# Patient Record
Sex: FEMALE | Race: WHITE | NOT HISPANIC OR LATINO | Employment: OTHER | URBAN - METROPOLITAN AREA
[De-identification: names, ages, dates, MRNs, and addresses within clinical notes are randomized per-mention and may not be internally consistent; named-entity substitution may affect disease eponyms.]

---

## 2017-04-26 ENCOUNTER — HOSPITAL ENCOUNTER (OUTPATIENT)
Dept: GENERAL RADIOLOGY | Facility: HOSPITAL | Age: 77
Discharge: HOME | End: 2017-04-26
Attending: INTERNAL MEDICINE | Admitting: INTERNAL MEDICINE

## 2017-04-26 ENCOUNTER — TRANSCRIBE ORDERS (OUTPATIENT)
Dept: ADMINISTRATIVE | Facility: HOSPITAL | Age: 77
End: 2017-04-26

## 2017-04-26 DIAGNOSIS — R05.9 COUGH: Primary | ICD-10-CM

## 2017-04-26 PROCEDURE — 71020 HC CHEST PA AND LATERAL: CPT

## 2017-09-13 ENCOUNTER — TRANSCRIBE ORDERS (OUTPATIENT)
Dept: ADMINISTRATIVE | Facility: HOSPITAL | Age: 77
End: 2017-09-13

## 2017-10-03 ENCOUNTER — TRANSCRIBE ORDERS (OUTPATIENT)
Dept: ADMINISTRATIVE | Facility: HOSPITAL | Age: 77
End: 2017-10-03

## 2017-10-03 DIAGNOSIS — Z12.31 VISIT FOR SCREENING MAMMOGRAM: Primary | ICD-10-CM

## 2017-11-02 ENCOUNTER — APPOINTMENT (OUTPATIENT)
Dept: MAMMOGRAPHY | Facility: HOSPITAL | Age: 77
End: 2017-11-02
Attending: INTERNAL MEDICINE

## 2017-11-17 ENCOUNTER — HOSPITAL ENCOUNTER (OUTPATIENT)
Dept: MAMMOGRAPHY | Facility: HOSPITAL | Age: 77
Discharge: HOME | End: 2017-11-17
Attending: INTERNAL MEDICINE | Admitting: INTERNAL MEDICINE

## 2017-11-17 DIAGNOSIS — Z12.31 VISIT FOR SCREENING MAMMOGRAM: ICD-10-CM

## 2017-11-17 PROCEDURE — G0202 SCR MAMMO BI INCL CAD: HCPCS

## 2017-11-17 PROCEDURE — 77063 BREAST TOMOSYNTHESIS BI: CPT

## 2017-11-18 PROCEDURE — 77063 BREAST TOMOSYNTHESIS BI: CPT | Performed by: RADIOLOGY

## 2017-11-18 PROCEDURE — G0202 SCR MAMMO BI INCL CAD: HCPCS | Performed by: RADIOLOGY

## 2018-02-28 ENCOUNTER — TELEPHONE (OUTPATIENT)
Dept: GASTROENTEROLOGY | Facility: CLINIC | Age: 78
End: 2018-02-28

## 2018-03-01 ENCOUNTER — LAB REQUISITION (OUTPATIENT)
Dept: LAB | Facility: HOSPITAL | Age: 78
End: 2018-03-01

## 2018-03-01 ENCOUNTER — OUTSIDE FACILITY SERVICE (OUTPATIENT)
Dept: GASTROENTEROLOGY | Facility: CLINIC | Age: 78
End: 2018-03-01

## 2018-03-01 DIAGNOSIS — Z12.11 ENCOUNTER FOR SCREENING FOR MALIGNANT NEOPLASM OF COLON: ICD-10-CM

## 2018-03-01 PROCEDURE — 88305 TISSUE EXAM BY PATHOLOGIST: CPT | Performed by: INTERNAL MEDICINE

## 2018-03-01 PROCEDURE — 45380 COLONOSCOPY AND BIOPSY: CPT | Performed by: INTERNAL MEDICINE

## 2018-03-02 LAB
CYTO UR: NORMAL
LAB AP CASE REPORT: NORMAL
LAB AP CLINICAL INFORMATION: NORMAL
Lab: NORMAL
PATH REPORT.FINAL DX SPEC: NORMAL
PATH REPORT.GROSS SPEC: NORMAL

## 2018-06-08 ENCOUNTER — TRANSCRIBE ORDERS (OUTPATIENT)
Dept: PHYSICAL THERAPY | Facility: HOSPITAL | Age: 78
End: 2018-06-08

## 2018-06-08 DIAGNOSIS — M54.41 RIGHT-SIDED LOW BACK PAIN WITH RIGHT-SIDED SCIATICA, UNSPECIFIED CHRONICITY: Primary | ICD-10-CM

## 2018-06-08 DIAGNOSIS — M25.551 RIGHT HIP PAIN: ICD-10-CM

## 2018-06-21 ENCOUNTER — HOSPITAL ENCOUNTER (OUTPATIENT)
Dept: PHYSICAL THERAPY | Facility: HOSPITAL | Age: 78
Setting detail: THERAPIES SERIES
Discharge: HOME | End: 2018-06-21

## 2018-06-21 DIAGNOSIS — M54.41 CHRONIC BILATERAL LOW BACK PAIN WITH RIGHT-SIDED SCIATICA: Primary | ICD-10-CM

## 2018-06-21 DIAGNOSIS — G89.29 CHRONIC BILATERAL LOW BACK PAIN WITH RIGHT-SIDED SCIATICA: Primary | ICD-10-CM

## 2018-06-21 PROCEDURE — G8978 MOBILITY CURRENT STATUS: HCPCS | Performed by: PHYSICAL THERAPIST

## 2018-06-21 PROCEDURE — 97162 PT EVAL MOD COMPLEX 30 MIN: CPT | Performed by: PHYSICAL THERAPIST

## 2018-06-21 PROCEDURE — G8979 MOBILITY GOAL STATUS: HCPCS | Performed by: PHYSICAL THERAPIST

## 2018-06-21 NOTE — THERAPY EVALUATION
"    Outpatient Physical Therapy Ortho Initial Evaluation  [facility]     Patient Name: Serenity Lagos  : 1940  MRN: [id number]  Today's Date: 2018      Visit Date: 2018    There is no problem list on file for this patient.       No past medical history on file.     Past Surgical History:   Procedure Laterality Date   • BREAST BIOPSY     • BREAST CYST EXCISION         Visit Dx:     ICD-10-CM ICD-9-CM   1. Chronic bilateral low back pain with right-sided sciatica M54.41 724.2    G89.29 724.3     338.29             Patient History     Row Name 18 1000             History    Chief Complaint Pain;Difficulty Walking  -ELIAS      Type of Pain Back pain;Lower Extremity / Leg   right  -ELIAS      Date Current Problem(s) Began --   chronic for many years  -ELIAS      Brief Description of Current Complaint This 77 year-old female presents with chronic low back pain, R hip and thigh pain, and intermittent tingling in right anterior calf.  She says she feels like she is \"lumbering\" instead of walking.  She has had no recent falls since 2017.  -ELIAS      Patient/Caregiver Goals Relieve pain;Improve mobility   improve gait and balance  -ELIAS      Hand Dominance right-handed  -ELIAS      Occupation/sports/leisure activities Retired teacher.  Enjoys sedentary activities, working on computer.  -ELIAS      What clinical tests have you had for this problem? --   none  -ELIAS         Pain     Pain Location Back;Hip;Leg   right  -ELIAS      Pain at Present 0  -ELIAS      Pain at Best 0  -ELIAS      Pain at Worst 6  -ELIAS      Pain Frequency Intermittent  -ELIAS      What Performance Factors Make the Current Problem(s) WORSE? moving after a period of inactivity, stiff for about the first 15-20 minutes in the mornings.  -ELIAS      What Performance Factors Make the Current Problem(s) BETTER? Sitting  -ELIAS      Is your sleep disturbed? Yes  -ELIAS      Total hours of sleep per night <6  -ELIAS      What position do you sleep in? Right " sidelying;Left sidelying  -ELIAS         Fall Risk Assessment    Any falls in the past year: No  -ELIAS      Does patient have a fear of falling Yes (comment)  -ELIAS         Daily Activities    Primary Language English  -ELIAS      Are you able to read Yes  -ELIAS      Are you able to write Yes  -ELIAS      How does patient learn best? Listening;Demonstration  -ELIAS      Teaching needs identified Home Exercise Program;Management of Condition  -ELIAS      Patient is concerned about/has problems with Flexibility;Performing home management (household chores, shopping, care of dependents);Standing;Walking;Grasping objects lifting;Climbing Stairs  -ELIAS      Does patient have problems with the following? None  -ELIAS      Pt Participated in POC and Goals Yes  -ELIAS         Safety    Are you being hurt, hit, or frightened by anyone at home or in your life? No  -ELIAS      Are you being neglected by a caregiver No  -ELIAS        User Key  (r) = Recorded By, (t) = Taken By, (c) = Cosigned By    Initials Name Provider Type    ELIAS George, PT Physical Therapist                PT Ortho     Row Name 06/21/18 1300       DTR- Lower Quarter Clearing    Patellar tendon (L2-4) Left:;3- Slightly hyperactive response;Right:;2- Normal response  -ELIAS    Achilles tendon (S1-2) Left:;1- Minimal response;Right:;0- No response  -ELIAS       Neural Tension Signs- Lower Quarter Clearing    Slump Bilateral:;Negative  -ELIAS    SLR Bilateral:;Negative  -ELIAS       Sensory Screen for Light Touch- Lower Quarter Clearing    L1 (inguinal area) Intact  -ELIAS    L2 (anterior mid thigh) Intact  -ELIAS    L3 (distal anterior thigh) Intact  -ELIAS    L4 (medial lower leg/foot) Intact  -ELIAS    L5 (lateral lower leg/great toe) Intact  -ELIAS    S1 (bottom of foot) Intact  -ELIAS       Myotomal Screen- Lower Quarter Clearing    Knee extension (L3) Bilateral:;5 (Normal)  -ELIAS    Ankle DF (L4) Bilateral:;5 (Normal)  -ELIAS    Great toe extension (L5) Right:;5 (Normal);Left:;4 (Good)  -ELIAS    Ankle PF (S1)  Bilateral:;5 (Normal)  -ELIAS    Knee flexion (S2) Bilateral:;5 (Normal)  -ELIAS       Lumbar ROM Screen- Lower Quarter Clearing    Lumbar Flexion --   75%, limited by hamstrings, no increase in back pain  -ELIAS    Lumbar Extension --   25%, tightness in back and front of hips  -ELIAS    Lumbar Lateral Flexion Normal   no complaints  -ELIAS       Hip/Thigh Palpation    Greater Trochanter Right:;Tender  -ELIAS       General ROM    GENERAL ROM COMMENTS No significant ROM deficits.  -ELIAS       General Assessment (Manual Muscle Testing)    Comment, General Manual Muscle Testing (MMT) Assessment Bilateral hip abduction and extension 3/5.  -ELIAS       Sensation    Sensation WNL? WNL  -ELIAS       Lower Extremity Flexibility    Hamstrings Bilateral:;Moderately limited  -ELIAS    Hip Flexors Bilateral:;Mildly limited  -ELIAS    Quadriceps Bilateral:;Mildly limited  -ELIAS    Gastrocnemius Bilateral:;Moderately limited  -ELIAS       Balance Skills Training    SLS Limited to , 3 seconds bilaterally, with R more limited than left.  -ELIAS      User Key  (r) = Recorded By, (t) = Taken By, (c) = Cosigned By    Initials Name Provider Type    ELIAS George, PT Physical Therapist                      Therapy Education  Given: HEP  Program: New  How Provided: Verbal, Demonstration, Written  Provided to: Patient  Level of Understanding: Verbalized, Demonstrated           PT OP Goals     Row Name 06/21/18 1600          PT Short Term Goals    STG Date to Achieve 07/19/18  -ELIAS     STG 1 Pt. demonstrates independence in effective initial HEP.  -ELIAS     STG 2 Pt. reports reduction in intensity of back and R LE pain.  -ELIAS     STG 3 Pt. reports improving gait confidence.  -        Long Term Goals    LTG Date to Achieve 08/16/18  -ELIAS     LTG 1 Pt. is independent in advanced HEP and conditioning program for continued improvement.  -ELIAS     LTG 2 Pain is not constant, is localized to low back, and is no worse than 3/10.  -ELIAS     LTG 3 Hip strength is improved by 1 grade to  4/5 in abduction and extension.  -ELIAS        Time Calculation    PT Goal Re-Cert Due Date 09/19/18  -       User Key  (r) = Recorded By, (t) = Taken By, (c) = Cosigned By    Initials Name Provider Type    ELIAS George PT Physical Therapist                PT Assessment/Plan     Row Name 06/21/18 1626          PT Assessment    Functional Limitations Limitations in functional capacity and performance;Limitation in home management;Performance in leisure activities  -ELIAS     Impairments Pain;Muscle strength;Range of motion;Joint mobility;Poor body mechanics;Gait;Endurance;Impaired flexibility  -ELIAS     Assessment Comments Pt. presents with chronic LBP with increasing R LE symptoms.  She is generally deconditioned with very sedentary lifestyle and will benefit from education in an exercise program which can be continued independently after DC from PT.  -ELIAS     Please refer to paper survey for additional self-reported information Yes  -ELIAS     Rehab Potential Good  -ELIAS     Patient/caregiver participated in establishment of treatment plan and goals Yes  -ELIAS     Patient would benefit from skilled therapy intervention Yes  -ELIAS        PT Plan    PT Frequency 2x/week  -ELIAS     Predicted Duration of Therapy Intervention (Therapy Eval) 12 visits  -ELIAS     Planned CPT's? PT EVAL MOD COMPLELITY: 38702;PT GAIT TRAINING EA 15 MIN: 83399;PT THER PROC EA 15 MIN: 27767;PT MANUAL THERAPY EA 15 MIN: 99336;PT NEUROMUSC RE-EDUCATION EA 15 MIN: 03156;PT ULTRASOUND EA 15 MIN: 70338;PT ELECTRICAL STIM UNATTEND: ;PT HOT OR COLD PACK TREAT MCARE;PT TRACTION LUMBAR: 28410  -ELIAS     PT Plan Comments PT up to 2x/week per POC.  -ELIAS       User Key  (r) = Recorded By, (t) = Taken By, (c) = Cosigned By    Initials Name Provider Type    ELIAS George PT Physical Therapist                              Outcome Measure Options: Modifed Owestry  Modified Oswestry  Modified Oswestry Score/Comments: 9/50=18%      Time Calculation:     Therapy  Suggested Charges     Code   Minutes Charges    None             Start Time: 1000     Therapy Charges for Today     Code Description Service Date Service Provider Modifiers Qty    07817773180 HC PT MOBILITY CURRENT 6/21/2018 Becka George, PT GP, CI 1    38388490680 HC PT MOBILITY PROJECTED 6/21/2018 Becka George, PT GP, CI 1    79383383753 HC PT EVAL MOD COMPLEXITY 3 6/21/2018 Becka George, PT GP 1          PT G-Codes  Outcome Measure Options: Modifed Owestry  Score: 18%  Functional Limitation: Mobility: Walking and moving around  Mobility: Walking and Moving Around Current Status (): At least 1 percent but less than 20 percent impaired, limited or restricted  Mobility: Walking and Moving Around Goal Status (): At least 1 percent but less than 20 percent impaired, limited or restricted         Becka George, PT  6/21/2018

## 2018-07-17 ENCOUNTER — HOSPITAL ENCOUNTER (OUTPATIENT)
Dept: PHYSICAL THERAPY | Facility: HOSPITAL | Age: 78
Setting detail: THERAPIES SERIES
Discharge: HOME | End: 2018-07-17

## 2018-07-17 DIAGNOSIS — M54.41 CHRONIC BILATERAL LOW BACK PAIN WITH RIGHT-SIDED SCIATICA: Primary | ICD-10-CM

## 2018-07-17 DIAGNOSIS — G89.29 CHRONIC BILATERAL LOW BACK PAIN WITH RIGHT-SIDED SCIATICA: Primary | ICD-10-CM

## 2018-07-17 PROCEDURE — 97110 THERAPEUTIC EXERCISES: CPT

## 2018-07-17 NOTE — THERAPY TREATMENT NOTE
Outpatient Physical Therapy Ortho Treatment Note  [facility]     Patient Name: Serenity Lagos  : 1940  MRN: [id number]  Today's Date: 2018      Visit Date: 2018    Visit Dx:    ICD-10-CM ICD-9-CM   1. Chronic bilateral low back pain with right-sided sciatica M54.41 724.2    G89.29 724.3     338.29       There is no problem list on file for this patient.       No past medical history on file.     Past Surgical History:   Procedure Laterality Date   • BREAST BIOPSY     • BREAST CYST EXCISION               PT Ortho     Row Name 18 1400       Subjective Comments    Subjective Comments Pt. reports she has not done her home exercises regularly.  She has travelled to [place] for a trade show.  She reports no symptoms at time of treatment today.  -ELIAS       Subjective Pain    Able to rate subjective pain? yes  -ELIAS    Pre-Treatment Pain Level 0  -ELIAS    Post-Treatment Pain Level 0  -ELIAS      User Key  (r) = Recorded By, (t) = Taken By, (c) = Cosigned By    Initials Name Provider Type    ELIAS George, PT Physical Therapist                            PT Assessment/Plan     Row Name 18 1400          PT Assessment    Assessment Comments Symptoms are very mild to absent recently.    -ELIAS        PT Plan    PT Plan Comments Continue PT working toward independent self-management of symptoms.  -ELIAS       User Key  (r) = Recorded By, (t) = Taken By, (c) = Cosigned By    Initials Name Provider Type    ELIAS George PT Physical Therapist                    Exercises     Row Name 18 1400             Subjective Comments    Subjective Comments Pt. reports she has not done her home exercises regularly.  She has travelled to [place] for a trade show.  She reports no symptoms at time of treatment today.  -ELIAS         Subjective Pain    Able to rate subjective pain? yes  -ELIAS      Pre-Treatment Pain Level 0  -ELIAS      Post-Treatment Pain Level 0  -ELIAS         Total Minutes    68721 - PT  Therapeutic Exercise Minutes 30  -ELIAS         Exercise 1    Exercise Name 1 Completed detailed review of HEP.  Pt. required additional cues to perform correctly.  She has difficulty laying supine due to sinus congestion in this position.  -ELIAS      Time 1 30  -ELIAS        User Key  (r) = Recorded By, (t) = Taken By, (c) = Cosigned By    Initials Name Provider Type    ELIAS George, PT Physical Therapist                                            Time Calculation:   Start Time: 1405  Total Timed Code Minutes- PT: 30 minute(s)  Therapy Suggested Charges     Code   Minutes Charges    21807 (CPT®) Hc Pt Neuromusc Re Education Ea 15 Min      29346 (CPT®) Hc Pt Ther Proc Ea 15 Min 30 2    80783 (CPT®) Hc Gait Training Ea 15 Min      43947 (CPT®) Hc Pt Therapeutic Act Ea 15 Min      17375 (CPT®) Hc Pt Manual Therapy Ea 15 Min      71761 (CPT®) Hc Pt Ther Massage- Per 15 Min      67654 (CPT®) Hc Pt Iontophoresis Ea 15 Min      28646 (CPT®) Hc Pt Elec Stim Ea-Per 15 Min      94424 (CPT®) Hc Pt Ultrasound Ea 15 Min      48818 (CPT®) Hc Pt Self Care/Mgmt/Train Ea 15 Min      Total  30 2                      Becka George, PT  7/17/2018

## 2018-07-19 ENCOUNTER — HOSPITAL ENCOUNTER (OUTPATIENT)
Dept: PHYSICAL THERAPY | Facility: HOSPITAL | Age: 78
Setting detail: THERAPIES SERIES
Discharge: HOME | End: 2018-07-19

## 2018-07-19 DIAGNOSIS — M54.41 CHRONIC BILATERAL LOW BACK PAIN WITH RIGHT-SIDED SCIATICA: Primary | ICD-10-CM

## 2018-07-19 DIAGNOSIS — G89.29 CHRONIC BILATERAL LOW BACK PAIN WITH RIGHT-SIDED SCIATICA: Primary | ICD-10-CM

## 2018-07-19 PROCEDURE — 97110 THERAPEUTIC EXERCISES: CPT

## 2018-07-19 NOTE — THERAPY PROGRESS REPORT/RE-CERT
Outpatient Physical Therapy Ortho Re-Assessment  [facility]     Patient Name: Serenity Lagos  : 1940  MRN: [id number]  Today's Date: 2018      Visit Date: 2018    There is no problem list on file for this patient.       No past medical history on file.     Past Surgical History:   Procedure Laterality Date   • BREAST BIOPSY     • BREAST CYST EXCISION         Visit Dx:     ICD-10-CM ICD-9-CM   1. Chronic bilateral low back pain with right-sided sciatica M54.41 724.2    G89.29 724.3     338.29                 PT Ortho     Row Name 18 1600       General Assessment (Manual Muscle Testing)    Comment, General Manual Muscle Testing (MMT) Assessment Bilateral hip abduction 3+/5, bilateral hip extension 3-/5.  -ELIAS    Row Name 18 1400       Subjective Comments    Subjective Comments Pt. has tried doing her home exercises.  She says they are helpful when she does them.  She is interested in additional exercises for her abdominals.  -ELIAS       Subjective Pain    Able to rate subjective pain? yes  -ELIAS    Pre-Treatment Pain Level 0  -ELIAS    Post-Treatment Pain Level 0  -ELIAS    Row Name 18 1400       Subjective Comments    Subjective Comments Pt. reports she has not done her home exercises regularly.  She has travelled to [place] for a trade show.  She reports no symptoms at time of treatment today.  -ELIAS       Subjective Pain    Able to rate subjective pain? yes  -ELIAS    Pre-Treatment Pain Level 0  -ELIAS    Post-Treatment Pain Level 0  -ELIAS      User Key  (r) = Recorded By, (t) = Taken By, (c) = Cosigned By    Initials Name Provider Type    ELIAS George PT Physical Therapist                      Therapy Education  Given: HEP  Program: Progressed  How Provided: Verbal, Demonstration, Written  Provided to: Patient  Level of Understanding: Verbalized, Demonstrated           PT OP Goals     Row Name 18 1400          PT Short Term Goals    STG Date to Achieve 18  -ELIAS     STG  1 Pt. demonstrates independence in effective initial HEP.  -ELIAS     STG 1 Progress Met  -     STG 2 Pt. reports reduction in intensity of back and R LE pain.  -ELIAS     STG 2 Progress Met  -     STG 3 Pt. reports improving gait confidence.  -     STG 3 Progress Progressing;Ongoing  -        Long Term Goals    LTG Date to Achieve 08/16/18  -     LTG 1 Pt. is independent in advanced HEP and conditioning program for continued improvement.  -ELIAS     LTG 1 Progress Ongoing  -ELIAS     LTG 2 Pain is not constant, is localized to low back, and is no worse than 3/10.  -ELIAS     LTG 2 Progress Partially Met;Ongoing  -ELIAS     LTG 3 Hip strength is improved by 1 grade to 4/5 in abduction and extension.  -ELIAS     LTG 3 Progress Ongoing  -       User Key  (r) = Recorded By, (t) = Taken By, (c) = Cosigned By    Initials Name Provider Type    ELIAS George, PT Physical Therapist                PT Assessment/Plan     Row Name 07/19/18 1600          PT Assessment    Assessment Comments Symptoms are responsive to basic exercises.  Pt. recognizes that she sits at computer too much and this contributes to her symptoms.   -        PT Plan    PT Plan Comments Continue for remaining visits to progress and finalize HEP.  Pt. expresses interest in working on her balance.  Order will be requested to address these concerns.  -       User Key  (r) = Recorded By, (t) = Taken By, (c) = Cosigned By    Initials Name Provider Type    ELIAS George, PT Physical Therapist                  Exercises     Row Name 07/19/18 1400             Subjective Comments    Subjective Comments Pt. has tried doing her home exercises.  She says they are helpful when she does them.  She is interested in additional exercises for her abdominals.  -ELIAS         Subjective Pain    Able to rate subjective pain? yes  -ELIAS      Pre-Treatment Pain Level 0  -ELIAS      Post-Treatment Pain Level 0  -ELIAS         Total Minutes    95797 - PT Therapeutic Exercise  Minutes 40  -ELIAS         Exercise 1    Exercise Name 1 Reassessment completed and exercises added to HEP after practice in clinic.  Pt. struggles to tolerate laying supine due to sinus congestion.  -ELIAS      Time 1 40  -ELIAS        User Key  (r) = Recorded By, (t) = Taken By, (c) = Cosigned By    Initials Name Provider Type    ELIAS George, PT Physical Therapist                        Outcome Measure Options: Sai Larry  Modified Oswestry  Modified Oswestry Score/Comments: 6/50=12%      Time Calculation:     Therapy Suggested Charges     Code   Minutes Charges    30148 (CPT®) Hc Pt Neuromusc Re Education Ea 15 Min      26542 (CPT®) Hc Pt Ther Proc Ea 15 Min 40 3    73586 (CPT®) Hc Gait Training Ea 15 Min      06820 (CPT®) Hc Pt Therapeutic Act Ea 15 Min      20802 (CPT®) Hc Pt Manual Therapy Ea 15 Min      38645 (CPT®) Hc Pt Ther Massage- Per 15 Min      90103 (CPT®) Hc Pt Iontophoresis Ea 15 Min      50728 (CPT®) Hc Pt Elec Stim Ea-Per 15 Min      04991 (CPT®) Hc Pt Ultrasound Ea 15 Min      55802 (CPT®) Hc Pt Self Care/Mgmt/Train Ea 15 Min      Total  40 3          Start Time: 1415  Total Timed Code Minutes- PT: 40 minute(s)         PT G-Codes  Outcome Measure Options: Sai George, PT  7/19/2018

## 2018-07-20 ENCOUNTER — TRANSCRIBE ORDERS (OUTPATIENT)
Dept: PHYSICAL THERAPY | Facility: HOSPITAL | Age: 78
End: 2018-07-20

## 2018-07-20 DIAGNOSIS — R26.89 POOR BALANCE: Primary | ICD-10-CM

## 2018-07-24 ENCOUNTER — HOSPITAL ENCOUNTER (OUTPATIENT)
Dept: PHYSICAL THERAPY | Facility: HOSPITAL | Age: 78
Setting detail: THERAPIES SERIES
Discharge: HOME | End: 2018-07-24

## 2018-07-24 DIAGNOSIS — M54.41 CHRONIC BILATERAL LOW BACK PAIN WITH RIGHT-SIDED SCIATICA: Primary | ICD-10-CM

## 2018-07-24 DIAGNOSIS — G89.29 CHRONIC BILATERAL LOW BACK PAIN WITH RIGHT-SIDED SCIATICA: Primary | ICD-10-CM

## 2018-07-24 PROCEDURE — 97110 THERAPEUTIC EXERCISES: CPT
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
## 2018-07-26 ENCOUNTER — HOSPITAL ENCOUNTER (OUTPATIENT)
Dept: PHYSICAL THERAPY | Facility: HOSPITAL | Age: 78
Setting detail: THERAPIES SERIES
Discharge: HOME | End: 2018-07-26

## 2018-07-26 DIAGNOSIS — G89.29 CHRONIC BILATERAL LOW BACK PAIN WITH RIGHT-SIDED SCIATICA: Primary | ICD-10-CM

## 2018-07-26 DIAGNOSIS — M54.41 CHRONIC BILATERAL LOW BACK PAIN WITH RIGHT-SIDED SCIATICA: Primary | ICD-10-CM

## 2018-07-26 PROCEDURE — 97110 THERAPEUTIC EXERCISES: CPT
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
## 2018-08-08 ENCOUNTER — APPOINTMENT (OUTPATIENT)
Dept: PHYSICAL THERAPY | Facility: HOSPITAL | Age: 78
End: 2018-08-08

## 2018-08-20 ENCOUNTER — DOCUMENTATION (OUTPATIENT)
Dept: PHYSICAL THERAPY | Facility: HOSPITAL | Age: 78
End: 2018-08-20

## 2018-08-20 DIAGNOSIS — G89.29 CHRONIC BILATERAL LOW BACK PAIN WITH RIGHT-SIDED SCIATICA: Primary | ICD-10-CM

## 2018-08-20 DIAGNOSIS — M54.41 CHRONIC BILATERAL LOW BACK PAIN WITH RIGHT-SIDED SCIATICA: Primary | ICD-10-CM

## 2018-08-20 NOTE — THERAPY DISCHARGE NOTE
Outpatient Physical Therapy Discharge Summary         Patient Name: Sreenity Lagos  : 1940  MRN: [id number]    Today's Date: 2018    Visit Dx:    ICD-10-CM ICD-9-CM   1. Chronic bilateral low back pain with right-sided sciatica M54.41 724.2    G89.29 724.3     338.29             PT OP Goals     Row Name 18 1500          PT Short Term Goals    STG Date to Achieve 18  -ELIAS     STG 1 Pt. demonstrates independence in effective initial HEP.  -ELIAS     STG 1 Progress Met  -ELIAS     STG 2 Pt. reports reduction in intensity of back and R LE pain.  -ELIAS     STG 2 Progress Met  -ELIAS     STG 3 Pt. reports improving gait confidence.  -ELIAS     STG 3 Progress Progressing;Ongoing  -ELIAS        Long Term Goals    LTG Date to Achieve 18  -ELIAS     LTG 1 Pt. is independent in advanced HEP and conditioning program for continued improvement.  -ELIAS     LTG 1 Progress Ongoing  -ELIAS     LTG 2 Pain is not constant, is localized to low back, and is no worse than 3/10.  -ELIAS     LTG 2 Progress Partially Met  -ELIAS     LTG 3 Hip strength is improved by 1 grade to 4/5 in abduction and extension.  -ELIAS     LTG 3 Progress Ongoing  -ELIAS       User Key  (r) = Recorded By, (t) = Taken By, (c) = Cosigned By    Initials Name Provider Type    Becka Mitchell, PT Physical Therapist          OP PT Discharge Summary  Date of Discharge: 18  Reason for Discharge: Patient/Caregiver request, Independent  Outcomes Achieved: Patient able to partially acheive established goals  Discharge Destination: Home with home program      Time Calculation:        Therapy Suggested Charges     Code   Minutes Charges    None                       Becka George, PT  2018

## 2018-09-17 ENCOUNTER — APPOINTMENT (OUTPATIENT)
Dept: PHYSICAL THERAPY | Facility: HOSPITAL | Age: 78
End: 2018-09-17

## 2018-12-19 ENCOUNTER — TRANSCRIBE ORDERS (OUTPATIENT)
Dept: ADMINISTRATIVE | Facility: HOSPITAL | Age: 78
End: 2018-12-19

## 2018-12-19 DIAGNOSIS — Z12.31 VISIT FOR SCREENING MAMMOGRAM: Primary | ICD-10-CM

## 2019-01-28 PROBLEM — E55.9 VITAMIN D DEFICIENCY: Status: ACTIVE | Noted: 2019-01-28

## 2019-01-28 PROBLEM — R60.0 PEDAL EDEMA: Status: ACTIVE | Noted: 2019-01-28

## 2019-01-28 PROBLEM — M85.80 OSTEOPENIA AFTER MENOPAUSE: Status: ACTIVE | Noted: 2019-01-28

## 2019-01-28 PROBLEM — G89.29 CHRONIC RIGHT-SIDED LOW BACK PAIN WITH RIGHT-SIDED SCIATICA: Status: ACTIVE | Noted: 2019-01-28

## 2019-01-28 PROBLEM — Z98.890 HISTORY OF BENIGN BREAST BIOPSY: Status: ACTIVE | Noted: 2019-01-28

## 2019-01-28 PROBLEM — L71.9 ACNE ROSACEA: Status: ACTIVE | Noted: 2019-01-28

## 2019-01-28 PROBLEM — Z78.0 OSTEOPENIA AFTER MENOPAUSE: Status: ACTIVE | Noted: 2019-01-28

## 2019-01-28 PROBLEM — I10 BENIGN ESSENTIAL HYPERTENSION: Status: ACTIVE | Noted: 2019-01-28

## 2019-01-28 PROBLEM — R20.2 NUMBNESS AND TINGLING OF BOTH LEGS BELOW KNEES: Status: ACTIVE | Noted: 2019-01-28

## 2019-01-28 PROBLEM — R20.0 NUMBNESS AND TINGLING OF BOTH LEGS BELOW KNEES: Status: ACTIVE | Noted: 2019-01-28

## 2019-01-28 PROBLEM — L29.9 CHRONIC PRURITUS: Status: ACTIVE | Noted: 2019-01-28

## 2019-01-28 PROBLEM — E66.811 OBESITY, CLASS I, BMI 30-34.9: Status: ACTIVE | Noted: 2019-01-28

## 2019-01-28 PROBLEM — M25.551 RIGHT HIP PAIN: Status: ACTIVE | Noted: 2019-01-28

## 2019-01-28 PROBLEM — M54.41 CHRONIC RIGHT-SIDED LOW BACK PAIN WITH RIGHT-SIDED SCIATICA: Status: ACTIVE | Noted: 2019-01-28

## 2019-01-28 PROBLEM — E66.9 OBESITY, CLASS I, BMI 30-34.9: Status: ACTIVE | Noted: 2019-01-28

## 2019-01-28 PROBLEM — E03.9 ACQUIRED HYPOTHYROIDISM: Status: ACTIVE | Noted: 2019-01-28

## 2019-01-28 PROBLEM — R26.89 POOR BALANCE: Status: ACTIVE | Noted: 2019-01-28

## 2019-01-28 PROBLEM — M17.0 PRIMARY OSTEOARTHRITIS OF BOTH KNEES: Status: ACTIVE | Noted: 2019-01-28

## 2019-02-05 ENCOUNTER — HOSPITAL ENCOUNTER (OUTPATIENT)
Dept: MAMMOGRAPHY | Facility: HOSPITAL | Age: 79
Discharge: HOME | End: 2019-02-05
Attending: INTERNAL MEDICINE | Admitting: INTERNAL MEDICINE

## 2019-02-05 DIAGNOSIS — Z12.31 VISIT FOR SCREENING MAMMOGRAM: ICD-10-CM

## 2019-02-05 PROCEDURE — 77063 BREAST TOMOSYNTHESIS BI: CPT | Performed by: RADIOLOGY

## 2019-02-05 PROCEDURE — 77063 BREAST TOMOSYNTHESIS BI: CPT

## 2019-02-05 PROCEDURE — 77067 SCR MAMMO BI INCL CAD: CPT | Performed by: RADIOLOGY

## 2019-02-05 PROCEDURE — 77067 SCR MAMMO BI INCL CAD: CPT

## 2019-05-03 PROBLEM — G89.29 OTHER CHRONIC PAIN: Status: ACTIVE | Noted: 2019-05-03

## 2019-05-03 PROBLEM — M54.41 ACUTE RIGHT-SIDED LOW BACK PAIN WITH RIGHT-SIDED SCIATICA: Status: ACTIVE | Noted: 2019-05-03

## 2019-05-16 ENCOUNTER — TELEPHONE (OUTPATIENT)
Dept: INTERNAL MEDICINE | Facility: CLINIC | Age: 79
End: 2019-05-16

## 2019-05-20 ENCOUNTER — LAB (OUTPATIENT)
Dept: LAB | Facility: HOSPITAL | Age: 79
End: 2019-05-20

## 2019-05-20 DIAGNOSIS — E03.9 ACQUIRED HYPOTHYROIDISM: ICD-10-CM

## 2019-05-20 DIAGNOSIS — I10 BENIGN ESSENTIAL HYPERTENSION: ICD-10-CM

## 2019-05-20 DIAGNOSIS — E55.9 VITAMIN D DEFICIENCY: ICD-10-CM

## 2019-05-20 LAB
ALBUMIN SERPL-MCNC: 3.6 G/DL (ref 3.5–5.2)
ALBUMIN UR-MCNC: <1.2 MG/L
ALBUMIN/GLOB SERPL: 0.9 G/DL
ALP SERPL-CCNC: 48 U/L (ref 39–117)
ALT SERPL W P-5'-P-CCNC: 16 U/L (ref 1–33)
ANION GAP SERPL CALCULATED.3IONS-SCNC: 11.7 MMOL/L
AST SERPL-CCNC: 16 U/L (ref 1–32)
BACTERIA UR QL AUTO: NORMAL /HPF
BASOPHILS # BLD AUTO: 0.07 10*3/MM3 (ref 0–0.2)
BASOPHILS NFR BLD AUTO: 0.9 % (ref 0–1.5)
BILIRUB SERPL-MCNC: 0.2 MG/DL (ref 0.2–1.2)
BILIRUB UR QL STRIP: NEGATIVE
BUN BLD-MCNC: 13 MG/DL (ref 8–23)
BUN/CREAT SERPL: 17.8 (ref 7–25)
CALCIUM SPEC-SCNC: 9.6 MG/DL (ref 8.6–10.5)
CHLORIDE SERPL-SCNC: 100 MMOL/L (ref 98–107)
CHOLEST SERPL-MCNC: 201 MG/DL (ref 0–200)
CLARITY UR: CLEAR
CO2 SERPL-SCNC: 24.3 MMOL/L (ref 22–29)
COLOR UR: YELLOW
CREAT BLD-MCNC: 0.73 MG/DL (ref 0.57–1)
CREAT UR-MCNC: 37.7 MG/DL
DEPRECATED RDW RBC AUTO: 46.3 FL (ref 37–54)
EOSINOPHIL # BLD AUTO: 0.16 10*3/MM3 (ref 0–0.4)
EOSINOPHIL NFR BLD AUTO: 2.1 % (ref 0.3–6.2)
ERYTHROCYTE [DISTWIDTH] IN BLOOD BY AUTOMATED COUNT: 12.9 % (ref 12.3–15.4)
GFR SERPL CREATININE-BSD FRML MDRD: 77 ML/MIN/1.73
GLOBULIN UR ELPH-MCNC: 3.9 GM/DL
GLUCOSE BLD-MCNC: 90 MG/DL (ref 65–99)
GLUCOSE UR STRIP-MCNC: NEGATIVE MG/DL
HCT VFR BLD AUTO: 42.2 % (ref 34–46.6)
HDLC SERPL-MCNC: 69 MG/DL (ref 40–60)
HGB BLD-MCNC: 13.1 G/DL (ref 12–15.9)
HGB UR QL STRIP.AUTO: NEGATIVE
HYALINE CASTS UR QL AUTO: NORMAL /LPF
IMM GRANULOCYTES # BLD AUTO: 0.02 10*3/MM3 (ref 0–0.05)
IMM GRANULOCYTES NFR BLD AUTO: 0.3 % (ref 0–0.5)
KETONES UR QL STRIP: NEGATIVE
LDLC SERPL CALC-MCNC: 106 MG/DL (ref 0–100)
LDLC/HDLC SERPL: 1.53 {RATIO}
LEUKOCYTE ESTERASE UR QL STRIP.AUTO: ABNORMAL
LYMPHOCYTES # BLD AUTO: 3.07 10*3/MM3 (ref 0.7–3.1)
LYMPHOCYTES NFR BLD AUTO: 40.7 % (ref 19.6–45.3)
MCH RBC QN AUTO: 30.5 PG (ref 26.6–33)
MCHC RBC AUTO-ENTMCNC: 31 G/DL (ref 31.5–35.7)
MCV RBC AUTO: 98.4 FL (ref 79–97)
MICROALBUMIN/CREAT UR: NORMAL MG/G
MONOCYTES # BLD AUTO: 0.81 10*3/MM3 (ref 0.1–0.9)
MONOCYTES NFR BLD AUTO: 10.7 % (ref 5–12)
NEUTROPHILS # BLD AUTO: 3.42 10*3/MM3 (ref 1.7–7)
NEUTROPHILS NFR BLD AUTO: 45.3 % (ref 42.7–76)
NITRITE UR QL STRIP: NEGATIVE
NRBC BLD AUTO-RTO: 0 /100 WBC (ref 0–0.2)
PH UR STRIP.AUTO: 7.5 [PH] (ref 5–8)
PLATELET # BLD AUTO: 299 10*3/MM3 (ref 140–450)
PMV BLD AUTO: 11.4 FL (ref 6–12)
POTASSIUM BLD-SCNC: 4.6 MMOL/L (ref 3.5–5.2)
PROT SERPL-MCNC: 7.5 G/DL (ref 6–8.5)
PROT UR QL STRIP: NEGATIVE
RBC # BLD AUTO: 4.29 10*6/MM3 (ref 3.77–5.28)
RBC # UR: NORMAL /HPF
REF LAB TEST METHOD: NORMAL
SODIUM BLD-SCNC: 136 MMOL/L (ref 136–145)
SP GR UR STRIP: 1.01 (ref 1–1.03)
SQUAMOUS #/AREA URNS HPF: NORMAL /HPF
T3FREE SERPL-MCNC: 2.22 PG/ML (ref 2–4.4)
T4 FREE SERPL-MCNC: 1.46 NG/DL (ref 0.93–1.7)
TRIGL SERPL-MCNC: 131 MG/DL (ref 0–150)
TSH SERPL DL<=0.05 MIU/L-ACNC: 3.74 MIU/ML (ref 0.27–4.2)
UROBILINOGEN UR QL STRIP: ABNORMAL
VLDLC SERPL-MCNC: 26.2 MG/DL (ref 5–40)
WBC NRBC COR # BLD: 7.55 10*3/MM3 (ref 3.4–10.8)
WBC UR QL AUTO: NORMAL /HPF

## 2019-05-20 PROCEDURE — 82570 ASSAY OF URINE CREATININE: CPT

## 2019-05-20 PROCEDURE — 82306 VITAMIN D 25 HYDROXY: CPT

## 2019-05-20 PROCEDURE — 80053 COMPREHEN METABOLIC PANEL: CPT

## 2019-05-20 PROCEDURE — 84481 FREE ASSAY (FT-3): CPT

## 2019-05-20 PROCEDURE — 85025 COMPLETE CBC W/AUTO DIFF WBC: CPT

## 2019-05-20 PROCEDURE — 81001 URINALYSIS AUTO W/SCOPE: CPT

## 2019-05-20 PROCEDURE — 80061 LIPID PANEL: CPT

## 2019-05-20 PROCEDURE — 84439 ASSAY OF FREE THYROXINE: CPT

## 2019-05-20 PROCEDURE — 84443 ASSAY THYROID STIM HORMONE: CPT

## 2019-05-20 PROCEDURE — 82043 UR ALBUMIN QUANTITATIVE: CPT

## 2019-05-21 ENCOUNTER — OFFICE VISIT (OUTPATIENT)
Dept: INTERNAL MEDICINE | Facility: CLINIC | Age: 79
End: 2019-05-21

## 2019-05-21 VITALS
SYSTOLIC BLOOD PRESSURE: 140 MMHG | HEART RATE: 70 BPM | HEIGHT: 64 IN | WEIGHT: 189 LBS | DIASTOLIC BLOOD PRESSURE: 86 MMHG | BODY MASS INDEX: 32.27 KG/M2

## 2019-05-21 DIAGNOSIS — M65.332 TRIGGER FINGER, LEFT MIDDLE FINGER: ICD-10-CM

## 2019-05-21 DIAGNOSIS — E55.9 VITAMIN D DEFICIENCY: ICD-10-CM

## 2019-05-21 DIAGNOSIS — I10 BENIGN ESSENTIAL HYPERTENSION: Primary | ICD-10-CM

## 2019-05-21 DIAGNOSIS — M25.551 RIGHT HIP PAIN: ICD-10-CM

## 2019-05-21 DIAGNOSIS — E03.9 ACQUIRED HYPOTHYROIDISM: ICD-10-CM

## 2019-05-21 DIAGNOSIS — R26.89 POOR BALANCE: ICD-10-CM

## 2019-05-21 DIAGNOSIS — R60.0 PEDAL EDEMA: ICD-10-CM

## 2019-05-21 DIAGNOSIS — E66.9 OBESITY, CLASS I, BMI 30-34.9: ICD-10-CM

## 2019-05-21 LAB — 25(OH)D3 SERPL-MCNC: 61.6 NG/ML (ref 30–100)

## 2019-05-21 PROCEDURE — 99214 OFFICE O/P EST MOD 30 MIN: CPT | Performed by: INTERNAL MEDICINE

## 2019-05-21 RX ORDER — LATANOPROST 50 UG/ML
1 SOLUTION/ DROPS OPHTHALMIC NIGHTLY
COMMUNITY
Start: 2019-04-15

## 2019-05-21 RX ORDER — LOSARTAN POTASSIUM 100 MG/1
100 TABLET ORAL DAILY
COMMUNITY
Start: 2019-04-15 | End: 2019-10-05 | Stop reason: SDUPTHER

## 2019-05-21 RX ORDER — LEVOTHYROXINE SODIUM 125 MCG
125 TABLET ORAL DAILY
COMMUNITY
Start: 2019-04-15 | End: 2020-02-20 | Stop reason: SDUPTHER

## 2019-05-21 RX ORDER — CYANOCOBALAMIN (VITAMIN B-12) 500 MCG
LOZENGE ORAL
COMMUNITY
Start: 2013-09-24

## 2019-05-21 RX ORDER — ACETAMINOPHEN 160 MG
TABLET,DISINTEGRATING ORAL AS NEEDED
COMMUNITY
Start: 2018-06-04 | End: 2021-12-17 | Stop reason: SDUPTHER

## 2019-05-21 RX ORDER — ASPIRIN 81 MG
TABLET, DELAYED RELEASE (ENTERIC COATED) ORAL
COMMUNITY
Start: 2018-11-14

## 2019-05-21 RX ORDER — CYPROHEPTADINE HYDROCHLORIDE 4 MG/1
4 TABLET ORAL DAILY
COMMUNITY
Start: 2019-05-19 | End: 2022-11-17 | Stop reason: SDDI

## 2019-05-21 RX ORDER — ASPIRIN 325 MG
325 TABLET ORAL AS NEEDED
COMMUNITY
End: 2019-09-26

## 2019-05-21 RX ORDER — RALOXIFENE HYDROCHLORIDE 60 MG/1
60 TABLET, FILM COATED ORAL DAILY
COMMUNITY
Start: 2019-04-15 | End: 2019-10-05 | Stop reason: SDUPTHER

## 2019-05-21 RX ORDER — CYANOCOBALAMIN (VITAMIN B-12) 2500 MCG
TABLET, SUBLINGUAL SUBLINGUAL
COMMUNITY
Start: 2018-06-04

## 2019-05-21 NOTE — PATIENT INSTRUCTIONS
For the left middle trigger finger, she will make an appointment with Dr. Josh Eagle or Dr. Serenity Griffin.  If she needs a referral she will let us know.    For joint pains including hip, shoulder, hands, may continue taking aspirin with food as needed.    Good results on the cholesterol testing with LDL (bad cholesterol) at 106, only slightly over the goal of 100.  Continue to eat a low-fat and low sugar diet and try to get some regular exercise.    For hypertension, continue taking losartan daily.  Avoid salt in the diet.    Losing some weight would help the blood pressure and cholesterol and overall health.  Try to eat small portion sizes and lose about 5 pounds by November    For hypothyroidism, continue your current dose of 125 mcg of levothyroxine daily.    For vitamin D deficiency, continue your current dose.    For varicose veins and swelling in the feet with travel, consider wearing support hose or support socks.  Also elevate your feet whenever you get the chance and drink plenty of water.    Balance improves with doing some regular exercises.

## 2019-05-21 NOTE — PROGRESS NOTES
[facility]     Serenity Lagos  1940   [id number]      Patient Care Team:  Bernadette Somers MD as PCP - General (Internal Medicine)    Chief Complaint;:   Chief Complaint   Patient presents with   • Hypertension     follow-up   • Hypothyroidism            HPI  Patient is a 78 y.o. female presents with left middle finger pain and triggering. Onset of symptoms was abrupt starting 2 weeks ago.  Chronicity acutely worse. Severity severe.  Symptoms are associated with severe arthritis. Pertinent negatives no redness.   Symptoms are aggravated by use.   Symptoms improve with nothing.  Context Dr. Gillis used to inject her fingers whenever they were painful or triggering.  She has also seen Dr. Arguelles in the past but he is retired.      CHRONIC CONDITIONS  BP's usually controlled. Takes meds regularly.     Takes thyroid med regularly. Energy is good.     Edema in feet when traveling.Does not like support hose.     R hip pain some better. Only went to PT once then quit due to 's illness.    Has shoulder pain and hip pain on and off and low back pain.  Whenever there is a flare she takes 1 of the 325 mg aspirin tablets with food and the pain resolves.    Past Medical History:   Diagnosis Date   • Cervicalgia    • Chronic bronchitis (CMS/HCC)    • Dysphagia    • Glaucoma, open angle    • Hearing loss    • Osteoarthritis     cervical spine degenerative   • Perforation of tympanic membrane    • Pruritus     chronic general pruritis   • Salivary gland cancer (CMS/HCC)    • Shingles     postherpetic neuralgia-thorax   • Spondylolisthesis      lumbar spine   • Wrist tendonitis        Past Surgical History:   Procedure Laterality Date   • APPENDECTOMY     • BREAST BIOPSY     • BREAST CYST EXCISION     • ENDOSCOPY  2017    normal (per pt) EGD   • MOUTH SURGERY  2000   • TONSILLECTOMY         Family History   Problem Relation Age of Onset   • Hypertension Mother    • Heart failure Mother          CHF   • Coronary artery disease Mother         CABG   • Lung cancer Father 59        Heavy smoker and Black Lung   • Rheum arthritis Sister    • Other Sister 76        sepsis due to infected knee replacement   • COPD Sister         heavy smoker    • Alzheimer's disease Maternal Grandmother    • Coronary artery disease Maternal Grandfather    • Cancer Paternal Grandmother    • Coronary artery disease Paternal Grandfather    • Hypertension Other    • Obesity Other    • Stroke Maternal Aunt    • Stroke Maternal Aunt    • Stroke Maternal Aunt    • Coronary artery disease Maternal Uncle    • Stroke Maternal Uncle    • Breast cancer Neg Hx    • Ovarian cancer Neg Hx        Social History     Socioeconomic History   • Marital status:      Spouse name: Not on file   • Number of children: Not on file   • Years of education: Not on file   • Highest education level: Not on file   Tobacco Use   • Smoking status: Former Smoker     Types: Cigarettes     Last attempt to quit:      Years since quittin.4   • Smokeless tobacco: Never Used   Substance and Sexual Activity   • Alcohol use: Yes     Frequency: 4 or more times a week     Drinks per session: 1 or 2     Comment: a couple glasses of wine daily with dinner   • Drug use: Defer   • Sexual activity: Defer       No Known Allergies    Review of Systems:     Review of Systems   Constitutional: Negative for chills, fatigue and fever.   HENT: Positive for postnasal drip.    Respiratory: Positive for cough. Negative for shortness of breath and wheezing.    Cardiovascular: Positive for leg swelling. Negative for chest pain and palpitations.   Gastrointestinal: Negative for abdominal pain, blood in stool, constipation and diarrhea.   Genitourinary: Negative for dysuria and frequency.   Musculoskeletal: Positive for arthralgias and back pain.   Allergic/Immunologic: Positive for environmental allergies.   Psychiatric/Behavioral: Negative for dysphoric mood. The patient is  "not nervous/anxious.        Vital Signs  Vitals:    05/21/19 1535   BP: 140/86   BP Location: Left arm   Patient Position: Sitting   Cuff Size: Adult   Pulse: 70   Weight: 85.7 kg (189 lb)   Height: 161.3 cm (63.5\")     Body mass index is 32.95 kg/m².      Current Outpatient Medications:   •  aspirin 325 MG tablet, Take 325 mg by mouth As Needed for Mild Pain ., Disp: , Rfl:   •  Biotin 5000 MCG sublingual tablet, take 1 by oral route  every day, Disp: , Rfl:   •  Cholecalciferol (VITAMIN D3) 2000 units capsule, take 1 Capsule by Oral route 2 times every day, Disp: , Rfl:   •  cyproheptadine (PERIACTIN) 4 MG tablet, , Disp: , Rfl:   •  Docusate Sodium 100 MG capsule, take 1 tablet by oral route twice daily, Disp: , Rfl:   •  Glucosamine-Chondroitin (OSTEO BI-FLEX REGULAR STRENGTH PO), 1 daily, Disp: , Rfl:   •  latanoprost (XALATAN) 0.005 % ophthalmic solution, , Disp: , Rfl:   •  losartan (COZAAR) 100 MG tablet, , Disp: , Rfl:   •  Probiotic Product (PROBIOTIC DAILY PO), One capsule daily, Disp: , Rfl:   •  raloxifene (EVISTA) 60 MG tablet, , Disp: , Rfl:   •  SYNTHROID 125 MCG tablet, , Disp: , Rfl:   •  Vitamin E 400 units tablet, Vitamin E 400 UNIT Oral Tablet; Patient Sig: Vitamin E 400 UNIT Oral Tablet TAKE 1 TABLET DAILY.; 0; 24-Sep-2013; Active, Disp: , Rfl:     Physical Exam:    Physical Exam   Constitutional: She is oriented to person, place, and time. She appears well-developed and well-nourished. She is obese.  HENT:   Head: Normocephalic.   Eyes: Conjunctivae and EOM are normal. Pupils are equal, round, and reactive to light.   Neck: Normal range of motion. Neck supple. No thyromegaly present.   Cardiovascular: Normal rate, regular rhythm, normal heart sounds and intact distal pulses.   Trace edema in ankles. Varicose veins in both lower legs.   Pulmonary/Chest: Effort normal and breath sounds normal.   Musculoskeletal: She exhibits edema.        Right knee: She exhibits deformity.        Left knee: She " exhibits deformity.        Right hand: She exhibits deformity. She exhibits normal range of motion and no tenderness.        Left hand: She exhibits decreased range of motion, tenderness and deformity.   L middle finger triggering.   Lymphadenopathy:     She has no cervical adenopathy.   Neurological: She is alert and oriented to person, place, and time.   Psychiatric: She has a normal mood and affect. Thought content normal.   Nursing note and vitals reviewed.       ACE III MINI        Results Review:    I reviewed the patient's new clinical results.  I reviewed the patient's recent labs with her.    CMP:  Lab Results   Component Value Date    BUN 13 05/20/2019    CREATININE 0.73 05/20/2019    EGFRIFNONA 77 05/20/2019    BCR 17.8 05/20/2019     05/20/2019    K 4.6 05/20/2019    CO2 24.3 05/20/2019    CALCIUM 9.6 05/20/2019    ALBUMIN 3.60 05/20/2019    BILITOT 0.2 05/20/2019    ALKPHOS 48 05/20/2019    AST 16 05/20/2019    ALT 16 05/20/2019     HbA1c:  No results found for: HGBA1C  Microalbumin:  Lab Results   Component Value Date    MICROALBUR <1.2 05/20/2019     Lipid Panel  Lab Results   Component Value Date    CHOL 201 (H) 05/20/2019    TRIG 131 05/20/2019    HDL 69 (H) 05/20/2019     (H) 05/20/2019    AST 16 05/20/2019    ALT 16 05/20/2019       Medication Review: Medications reviewed and noted    Problem List Items Addressed This Visit        Cardiovascular and Mediastinum    Benign essential hypertension - Primary    Relevant Medications    losartan (COZAAR) 100 MG tablet    Other Relevant Orders    Comprehensive Metabolic Panel (Completed)    Urinalysis With Microscopic - Urine, Clean Catch (Completed)    CBC & Differential (Completed)    Microalbumin / Creatinine Urine Ratio - Urine, Clean Catch (Completed)       Digestive    Vitamin D deficiency    Relevant Orders    Vitamin D 25 Hydroxy (Completed)       Endocrine    Acquired hypothyroidism    Relevant Medications    SYNTHROID 125 MCG tablet     Other Relevant Orders    TSH (Completed)    Lipid Panel (Completed)    T4, Free (Completed)    T3, Free (Completed)       Nervous and Auditory    Right hip pain       Other    Obesity, Class I, BMI 30-34.9    Poor balance    Pedal edema      Other Visit Diagnoses     Trigger finger, left middle finger               Patient Instructions   For the left middle trigger finger, she will make an appointment with Dr. Josh Eagle or Dr. Serenity Griffin.  If she needs a referral she will let us know.    For joint pains including hip, shoulder, hands, may continue taking aspirin with food as needed.    Good results on the cholesterol testing with LDL (bad cholesterol) at 106, only slightly over the goal of 100.  Continue to eat a low-fat and low sugar diet and try to get some regular exercise.    For hypertension, continue taking losartan daily.  Avoid salt in the diet.    Losing some weight would help the blood pressure and cholesterol and overall health.  Try to eat small portion sizes and lose about 5 pounds by November    For hypothyroidism, continue your current dose of 125 mcg of levothyroxine daily.    For vitamin D deficiency, continue your current dose.    For varicose veins and swelling in the feet with travel, consider wearing support hose or support socks.  Also elevate your feet whenever you get the chance and drink plenty of water.    Balance improves with doing some regular exercises.      Plan of care reviewed with patient at the conclusion of today's visit. Education was provided regarding diagnosis, management, and any prescribed or recommended OTC medications.Patient verbalizes understanding of and agreement with management plan.         Bernadette Somers MD

## 2019-07-02 ENCOUNTER — TRANSCRIBE ORDERS (OUTPATIENT)
Dept: MAMMOGRAPHY | Facility: HOSPITAL | Age: 79
End: 2019-07-02

## 2019-07-02 DIAGNOSIS — N64.59 OTHER SIGNS AND SYMPTOMS IN BREAST: Primary | ICD-10-CM

## 2019-07-18 ENCOUNTER — HOSPITAL ENCOUNTER (OUTPATIENT)
Dept: MAMMOGRAPHY | Facility: HOSPITAL | Age: 79
Discharge: HOME | End: 2019-07-18
Admitting: SURGERY

## 2019-07-18 ENCOUNTER — HOSPITAL ENCOUNTER (OUTPATIENT)
Dept: ULTRASOUND IMAGING | Facility: HOSPITAL | Age: 79
Discharge: HOME | End: 2019-07-18

## 2019-07-18 ENCOUNTER — TRANSCRIBE ORDERS (OUTPATIENT)
Dept: ADMINISTRATIVE | Facility: HOSPITAL | Age: 79
End: 2019-07-18

## 2019-07-18 DIAGNOSIS — N64.59 OTHER SIGNS AND SYMPTOMS IN BREAST: ICD-10-CM

## 2019-07-18 DIAGNOSIS — R92.8 ABNORMAL MAMMOGRAM: Primary | ICD-10-CM

## 2019-07-18 PROCEDURE — G0279 TOMOSYNTHESIS, MAMMO: HCPCS | Performed by: RADIOLOGY

## 2019-07-18 PROCEDURE — 76642 ULTRASOUND BREAST LIMITED: CPT

## 2019-07-18 PROCEDURE — 77065 DX MAMMO INCL CAD UNI: CPT

## 2019-07-18 PROCEDURE — 77065 DX MAMMO INCL CAD UNI: CPT | Performed by: RADIOLOGY

## 2019-07-18 PROCEDURE — G0279 TOMOSYNTHESIS, MAMMO: HCPCS

## 2019-07-18 PROCEDURE — 76642 ULTRASOUND BREAST LIMITED: CPT | Performed by: RADIOLOGY

## 2019-07-29 ENCOUNTER — HOSPITAL ENCOUNTER (OUTPATIENT)
Dept: ULTRASOUND IMAGING | Facility: HOSPITAL | Age: 79
Discharge: HOME | End: 2019-07-29
Admitting: RADIOLOGY

## 2019-07-29 ENCOUNTER — HOSPITAL ENCOUNTER (OUTPATIENT)
Dept: MAMMOGRAPHY | Facility: HOSPITAL | Age: 79
Discharge: HOME | End: 2019-07-29

## 2019-07-29 DIAGNOSIS — R92.8 ABNORMAL MAMMOGRAM: ICD-10-CM

## 2019-07-29 PROCEDURE — 25010000003 LIDOCAINE 1 % SOLUTION: Performed by: RADIOLOGY

## 2019-07-29 PROCEDURE — 77065 DX MAMMO INCL CAD UNI: CPT | Performed by: RADIOLOGY

## 2019-07-29 PROCEDURE — 19083 BX BREAST 1ST LESION US IMAG: CPT | Performed by: RADIOLOGY

## 2019-07-29 PROCEDURE — A4648 IMPLANTABLE TISSUE MARKER: HCPCS

## 2019-07-29 PROCEDURE — 88305 TISSUE EXAM BY PATHOLOGIST: CPT | Performed by: SURGERY

## 2019-07-29 RX ORDER — LIDOCAINE HYDROCHLORIDE 10 MG/ML
5 INJECTION, SOLUTION INFILTRATION; PERINEURAL ONCE
Status: COMPLETED | OUTPATIENT
Start: 2019-07-29 | End: 2019-07-29

## 2019-07-29 RX ORDER — LIDOCAINE HYDROCHLORIDE AND EPINEPHRINE 10; 10 MG/ML; UG/ML
10 INJECTION, SOLUTION INFILTRATION; PERINEURAL ONCE
Status: COMPLETED | OUTPATIENT
Start: 2019-07-29 | End: 2019-07-29

## 2019-07-29 RX ADMIN — LIDOCAINE HYDROCHLORIDE,EPINEPHRINE BITARTRATE 10 ML: 10; .01 INJECTION, SOLUTION INFILTRATION; PERINEURAL at 15:29

## 2019-07-29 RX ADMIN — LIDOCAINE HYDROCHLORIDE 3 ML: 10 INJECTION, SOLUTION INFILTRATION; PERINEURAL at 15:29

## 2019-07-31 LAB
CYTO UR: NORMAL
LAB AP CASE REPORT: NORMAL
LAB AP CLINICAL INFORMATION: NORMAL
LAB AP DIAGNOSIS COMMENT: NORMAL
PATH REPORT.FINAL DX SPEC: NORMAL
PATH REPORT.GROSS SPEC: NORMAL

## 2019-08-01 ENCOUNTER — TELEPHONE (OUTPATIENT)
Dept: MAMMOGRAPHY | Facility: HOSPITAL | Age: 79
End: 2019-08-01

## 2019-08-01 NOTE — TELEPHONE ENCOUNTER
Pt notified of pathology results and recommendations. Verbalizes understanding. Denies discomfort. Denies signs and symptoms of infection. Questions answered, verbalized understanding.

## 2019-08-27 ENCOUNTER — HOSPITAL ENCOUNTER (EMERGENCY)
Facility: HOSPITAL | Age: 79
Discharge: HOME | End: 2019-08-27
Attending: EMERGENCY MEDICINE | Admitting: EMERGENCY MEDICINE

## 2019-08-27 ENCOUNTER — APPOINTMENT (OUTPATIENT)
Dept: GENERAL RADIOLOGY | Facility: HOSPITAL | Age: 79
End: 2019-08-27

## 2019-08-27 VITALS
SYSTOLIC BLOOD PRESSURE: 149 MMHG | HEIGHT: 63 IN | WEIGHT: 180 LBS | OXYGEN SATURATION: 98 % | RESPIRATION RATE: 14 BRPM | HEART RATE: 80 BPM | DIASTOLIC BLOOD PRESSURE: 72 MMHG | TEMPERATURE: 98.7 F | BODY MASS INDEX: 31.89 KG/M2

## 2019-08-27 DIAGNOSIS — R07.9 CHEST PAIN, UNSPECIFIED TYPE: Primary | ICD-10-CM

## 2019-08-27 LAB
ALBUMIN SERPL-MCNC: 3.9 G/DL (ref 3.5–5.2)
ALBUMIN/GLOB SERPL: 1.3 G/DL
ALP SERPL-CCNC: 62 U/L (ref 39–117)
ALT SERPL W P-5'-P-CCNC: 32 U/L (ref 1–33)
ANION GAP SERPL CALCULATED.3IONS-SCNC: 10 MMOL/L (ref 5–15)
AST SERPL-CCNC: 23 U/L (ref 1–32)
BASOPHILS # BLD AUTO: 0.04 10*3/MM3 (ref 0–0.2)
BASOPHILS NFR BLD AUTO: 0.5 % (ref 0–1.5)
BILIRUB SERPL-MCNC: 0.2 MG/DL (ref 0.2–1.2)
BUN BLD-MCNC: 15 MG/DL (ref 8–23)
BUN/CREAT SERPL: 20.3 (ref 7–25)
CALCIUM SPEC-SCNC: 8.9 MG/DL (ref 8.6–10.5)
CHLORIDE SERPL-SCNC: 102 MMOL/L (ref 98–107)
CO2 SERPL-SCNC: 26 MMOL/L (ref 22–29)
CREAT BLD-MCNC: 0.74 MG/DL (ref 0.57–1)
DEPRECATED RDW RBC AUTO: 42.7 FL (ref 37–54)
EOSINOPHIL # BLD AUTO: 0.18 10*3/MM3 (ref 0–0.4)
EOSINOPHIL NFR BLD AUTO: 2.3 % (ref 0.3–6.2)
ERYTHROCYTE [DISTWIDTH] IN BLOOD BY AUTOMATED COUNT: 12.5 % (ref 12.3–15.4)
GFR SERPL CREATININE-BSD FRML MDRD: 76 ML/MIN/1.73
GLOBULIN UR ELPH-MCNC: 3.1 GM/DL
GLUCOSE BLD-MCNC: 97 MG/DL (ref 65–99)
HCT VFR BLD AUTO: 38.6 % (ref 34–46.6)
HGB BLD-MCNC: 12.5 G/DL (ref 12–15.9)
HOLD SPECIMEN: NORMAL
HOLD SPECIMEN: NORMAL
IMM GRANULOCYTES # BLD AUTO: 0.03 10*3/MM3 (ref 0–0.05)
IMM GRANULOCYTES NFR BLD AUTO: 0.4 % (ref 0–0.5)
LIPASE SERPL-CCNC: 30 U/L (ref 13–60)
LYMPHOCYTES # BLD AUTO: 3.14 10*3/MM3 (ref 0.7–3.1)
LYMPHOCYTES NFR BLD AUTO: 39.7 % (ref 19.6–45.3)
MCH RBC QN AUTO: 30.3 PG (ref 26.6–33)
MCHC RBC AUTO-ENTMCNC: 32.4 G/DL (ref 31.5–35.7)
MCV RBC AUTO: 93.5 FL (ref 79–97)
MONOCYTES # BLD AUTO: 0.74 10*3/MM3 (ref 0.1–0.9)
MONOCYTES NFR BLD AUTO: 9.4 % (ref 5–12)
NEUTROPHILS # BLD AUTO: 3.78 10*3/MM3 (ref 1.7–7)
NEUTROPHILS NFR BLD AUTO: 47.7 % (ref 42.7–76)
NRBC BLD AUTO-RTO: 0 /100 WBC (ref 0–0.2)
NT-PROBNP SERPL-MCNC: 47.8 PG/ML (ref 5–1800)
PLATELET # BLD AUTO: 318 10*3/MM3 (ref 140–450)
PMV BLD AUTO: 10.6 FL (ref 6–12)
POTASSIUM BLD-SCNC: 4 MMOL/L (ref 3.5–5.2)
PROT SERPL-MCNC: 7 G/DL (ref 6–8.5)
RBC # BLD AUTO: 4.13 10*6/MM3 (ref 3.77–5.28)
SODIUM BLD-SCNC: 138 MMOL/L (ref 136–145)
TROPONIN T SERPL-MCNC: <0.01 NG/ML (ref 0–0.03)
TROPONIN T SERPL-MCNC: <0.01 NG/ML (ref 0–0.03)
WBC NRBC COR # BLD: 7.91 10*3/MM3 (ref 3.4–10.8)
WHOLE BLOOD HOLD SPECIMEN: NORMAL
WHOLE BLOOD HOLD SPECIMEN: NORMAL

## 2019-08-27 PROCEDURE — 71046 X-RAY EXAM CHEST 2 VIEWS: CPT

## 2019-08-27 PROCEDURE — 84484 ASSAY OF TROPONIN QUANT: CPT | Performed by: EMERGENCY MEDICINE

## 2019-08-27 PROCEDURE — 83880 ASSAY OF NATRIURETIC PEPTIDE: CPT | Performed by: EMERGENCY MEDICINE

## 2019-08-27 PROCEDURE — 93005 ELECTROCARDIOGRAM TRACING: CPT | Performed by: EMERGENCY MEDICINE

## 2019-08-27 PROCEDURE — 83690 ASSAY OF LIPASE: CPT | Performed by: EMERGENCY MEDICINE

## 2019-08-27 PROCEDURE — 99284 EMERGENCY DEPT VISIT MOD MDM: CPT

## 2019-08-27 PROCEDURE — 93005 ELECTROCARDIOGRAM TRACING: CPT

## 2019-08-27 PROCEDURE — 85025 COMPLETE CBC W/AUTO DIFF WBC: CPT | Performed by: EMERGENCY MEDICINE

## 2019-08-27 PROCEDURE — 80053 COMPREHEN METABOLIC PANEL: CPT | Performed by: EMERGENCY MEDICINE

## 2019-08-27 RX ORDER — SODIUM CHLORIDE 0.9 % (FLUSH) 0.9 %
10 SYRINGE (ML) INJECTION AS NEEDED
Status: DISCONTINUED | OUTPATIENT
Start: 2019-08-27 | End: 2019-08-27 | Stop reason: HOSPADM

## 2019-09-04 ENCOUNTER — HOSPITAL ENCOUNTER (OUTPATIENT)
Dept: CARDIOLOGY | Facility: HOSPITAL | Age: 79
Discharge: HOME | End: 2019-09-04
Admitting: NURSE PRACTITIONER

## 2019-09-04 ENCOUNTER — OFFICE VISIT (OUTPATIENT)
Dept: CARDIOLOGY | Facility: HOSPITAL | Age: 79
End: 2019-09-04

## 2019-09-04 VITALS
WEIGHT: 188.13 LBS | TEMPERATURE: 97.4 F | OXYGEN SATURATION: 99 % | HEART RATE: 78 BPM | HEIGHT: 63 IN | BODY MASS INDEX: 33.33 KG/M2 | RESPIRATION RATE: 20 BRPM | DIASTOLIC BLOOD PRESSURE: 78 MMHG | SYSTOLIC BLOOD PRESSURE: 179 MMHG

## 2019-09-04 DIAGNOSIS — R06.09 DOE (DYSPNEA ON EXERTION): ICD-10-CM

## 2019-09-04 DIAGNOSIS — R00.2 PALPITATIONS: ICD-10-CM

## 2019-09-04 DIAGNOSIS — I10 ESSENTIAL HYPERTENSION: ICD-10-CM

## 2019-09-04 DIAGNOSIS — R07.9 CHEST PAIN, UNSPECIFIED TYPE: Primary | ICD-10-CM

## 2019-09-04 PROCEDURE — 99214 OFFICE O/P EST MOD 30 MIN: CPT | Performed by: NURSE PRACTITIONER

## 2019-09-04 PROCEDURE — 0296T HC EXT ECG > 48HR TO 21 DAY RCRD W/CONECT INTL RCRD: CPT

## 2019-09-04 NOTE — PROGRESS NOTES
[facility] Heart Monitor Documentation    Serenity Lagos  1940  [id number]  09/04/19    SHARMILA Hoffmann    [] ZIO XT Patch  Model [id number] Prescribed for N/A Days    · Serial Number: (N + 9 Digits) N   · Apply-By Date on Box:   · USPS Tracking Number:   · USPS Tracking        [] [employer] BodyGuardian MINI PLUS Mobile Cardiac Telemetry  Model BGMINIPLUS Prescribed for N/A Days    · Serial Number: (BGM + 7 Digits) BGM  · Shipped-By Date on Box:   · UPS Tracking Number: [id number]  · UPS Tracking      [x] [employer] BodyGuardian MINI Holter Monitor  Model BGMINIEL Prescribed for 14 Days    · Serial Number: (7 Digits) [id number]  · Shipped-By Date on Box: August 30, 2019  · UPS Tracking Number: [id number]  · UPS Tracking        This monitor was applied to the patient's chest and checked for proper functioning.  Ms. Serenity Lagos was instructed in the proper use of this monitor.  She was given the opportunity to ask questions and left the office with the device 's instruction manual.    Jil Rao CMA, 9:35 AM, 09/04/19                  [facility]

## 2019-09-18 ENCOUNTER — TELEPHONE (OUTPATIENT)
Dept: CARDIOLOGY | Facility: HOSPITAL | Age: 79
End: 2019-09-18

## 2019-09-18 NOTE — TELEPHONE ENCOUNTER
Pt called stating she turned her monitor in and also stated she was having some small ping pains in chest. Told her if they seemed to get more frequent she should go to the ER. She also had questions about nuclear stress test for Monday 9/23/19. Went over instructions with pt.

## 2019-09-23 ENCOUNTER — HOSPITAL ENCOUNTER (OUTPATIENT)
Dept: CARDIOLOGY | Facility: HOSPITAL | Age: 79
Discharge: HOME | End: 2019-09-23

## 2019-09-23 DIAGNOSIS — R07.9 CHEST PAIN, UNSPECIFIED TYPE: ICD-10-CM

## 2019-09-23 DIAGNOSIS — R06.09 DOE (DYSPNEA ON EXERTION): ICD-10-CM

## 2019-09-23 LAB
BH CV STRESS BP STAGE 2: NORMAL
BH CV STRESS BP STAGE 4: NORMAL
BH CV STRESS COMMENTS STAGE 1: NORMAL
BH CV STRESS DOSE REGADENOSON STAGE 1: 0.4
BH CV STRESS DURATION MIN STAGE 1: 1
BH CV STRESS DURATION MIN STAGE 2: 1
BH CV STRESS DURATION MIN STAGE 3: 1
BH CV STRESS DURATION MIN STAGE 4: 1
BH CV STRESS DURATION SEC STAGE 1: 0
BH CV STRESS DURATION SEC STAGE 2: 0
BH CV STRESS DURATION SEC STAGE 3: 0
BH CV STRESS DURATION SEC STAGE 4: 0
BH CV STRESS HR STAGE 1: 72
BH CV STRESS HR STAGE 2: 95
BH CV STRESS HR STAGE 3: 88
BH CV STRESS HR STAGE 4: 86
BH CV STRESS PROTOCOL 1: NORMAL
BH CV STRESS RECOVERY BP: NORMAL MMHG
BH CV STRESS RECOVERY HR: 87 BPM
BH CV STRESS STAGE 1: 1
BH CV STRESS STAGE 2: 2
BH CV STRESS STAGE 3: 3
BH CV STRESS STAGE 4: 4
LV EF NUC BP: 84 %
MAXIMAL PREDICTED HEART RATE: 142 BPM
PERCENT MAX PREDICTED HR: 68.31 %
STRESS BASELINE BP: NORMAL MMHG
STRESS BASELINE HR: 61 BPM
STRESS PERCENT HR: 80 %
STRESS POST PEAK BP: NORMAL MMHG
STRESS POST PEAK HR: 97 BPM
STRESS TARGET HR: 121 BPM

## 2019-09-23 PROCEDURE — 25010000002 REGADENOSON 0.4 MG/5ML SOLUTION: Performed by: NURSE PRACTITIONER

## 2019-09-23 PROCEDURE — 78452 HT MUSCLE IMAGE SPECT MULT: CPT | Performed by: INTERNAL MEDICINE

## 2019-09-23 PROCEDURE — 93017 CV STRESS TEST TRACING ONLY: CPT

## 2019-09-23 PROCEDURE — 0 TECHNETIUM SESTAMIBI: Performed by: NURSE PRACTITIONER

## 2019-09-23 PROCEDURE — A9500 TC99M SESTAMIBI: HCPCS | Performed by: NURSE PRACTITIONER

## 2019-09-23 PROCEDURE — 93018 CV STRESS TEST I&R ONLY: CPT | Performed by: INTERNAL MEDICINE

## 2019-09-23 PROCEDURE — 78452 HT MUSCLE IMAGE SPECT MULT: CPT

## 2019-09-23 RX ADMIN — TECHNETIUM TC 99M SESTAMIBI 1 DOSE: 1 INJECTION INTRAVENOUS at 10:15

## 2019-09-23 RX ADMIN — REGADENOSON 0.4 MG: 0.08 INJECTION, SOLUTION INTRAVENOUS at 10:02

## 2019-09-23 RX ADMIN — TECHNETIUM TC 99M SESTAMIBI 1 DOSE: 1 INJECTION INTRAVENOUS at 08:30

## 2019-09-25 PROBLEM — I25.10 CAD (CORONARY ARTERY DISEASE): Status: ACTIVE | Noted: 2019-09-25

## 2019-09-26 ENCOUNTER — TELEPHONE (OUTPATIENT)
Dept: CARDIOLOGY | Facility: HOSPITAL | Age: 79
End: 2019-09-26

## 2019-09-26 DIAGNOSIS — I25.119 CORONARY ARTERY DISEASE INVOLVING NATIVE CORONARY ARTERY OF NATIVE HEART WITH ANGINA PECTORIS (HCC): Primary | ICD-10-CM

## 2019-09-26 RX ORDER — ATORVASTATIN CALCIUM 20 MG/1
20 TABLET, FILM COATED ORAL NIGHTLY
Qty: 30 TABLET | Refills: 3 | Status: SHIPPED | OUTPATIENT
Start: 2019-09-26 | End: 2019-10-18

## 2019-09-26 RX ORDER — ASPIRIN 81 MG/1
81 TABLET ORAL DAILY
Qty: 30 TABLET | Refills: 3 | Status: SHIPPED | OUTPATIENT
Start: 2019-09-26 | End: 2020-01-20 | Stop reason: SDUPTHER

## 2019-09-26 NOTE — PROGRESS NOTES
Reviewed patient stress test.  No ischemia.  Coronary artery calcification noted on CT portion.    Would like patient to start aspirin 81 mg enteric-coated daily.  Also to start a statin.    Pt will have referral for C for continued management.

## 2019-09-26 NOTE — TELEPHONE ENCOUNTER
Patient notified of results of stress test and informed patient to begin medication. Patient verbalized understanding.    ===View-only below this line===    ----- Message -----  From: Sheila Redman APRN  Sent: 9/26/2019   8:17 AM  To: Jil Rao [facility]    I have reviewed patient's stress test.  Her stress test did not show significant or concerning heart blockage.  But it did show that she has some coronary artery calcification.      Due to her results I would like for her to start an aspirin 81 mg enteric-coated daily, and atorvastatin 20 mg 1 tablet at night. This will help prevent heart blockages.      I have also sent referral to [facility] for continued management.  She has an appointment scheduled with me in 1 month to review her heart monitor results which are still pending.  If [facility] can schedule her an appointment around that time we will cancel my appointment, if not we will keep that appointment and discuss her results.

## 2019-09-27 ENCOUNTER — TELEPHONE (OUTPATIENT)
Dept: CARDIOLOGY | Facility: HOSPITAL | Age: 79
End: 2019-09-27

## 2019-09-27 NOTE — TELEPHONE ENCOUNTER
Spoke to patient about phone call yesterday. Patient states that she has concerns about medications prescribed due to side effects. Explained to patient that many medications have side effects and that many patient do no experience side effects and that it would be wise to try the Atorvastatin due to the coronary artery calcification noted on her stress test. Patient seemed to verbalize understanding.    Patient also noted that she has been scheduled with Dr. Kearney and she does not need to keep her appointment with Sheila. Appointment was cancelled.

## 2019-09-30 ENCOUNTER — TELEPHONE (OUTPATIENT)
Dept: CARDIOLOGY | Facility: HOSPITAL | Age: 79
End: 2019-09-30

## 2019-09-30 DIAGNOSIS — R00.2 PALPITATIONS: Primary | ICD-10-CM

## 2019-09-30 NOTE — TELEPHONE ENCOUNTER
Patient called and would like to speak with provider regarding monitor results.  She is aware that the provider, Yvonne Redman is out of the office today, and returning on Tuesday.

## 2019-10-01 PROCEDURE — 0298T HOLTER MONITOR - 72 HOUR UP TO 21 DAY: CPT | Performed by: INTERNAL MEDICINE

## 2019-10-01 RX ORDER — METOPROLOL SUCCINATE 25 MG/1
25 TABLET, EXTENDED RELEASE ORAL DAILY
Qty: 30 TABLET | Refills: 3 | Status: SHIPPED | OUTPATIENT
Start: 2019-10-01 | End: 2019-12-27 | Stop reason: SDUPTHER

## 2019-10-07 RX ORDER — RALOXIFENE HYDROCHLORIDE 60 MG/1
TABLET, FILM COATED ORAL
Qty: 90 TABLET | Refills: 4 | Status: SHIPPED | OUTPATIENT
Start: 2019-10-07 | End: 2020-12-30

## 2019-10-07 RX ORDER — LOSARTAN POTASSIUM 100 MG/1
TABLET ORAL
Qty: 90 TABLET | Refills: 4 | Status: SHIPPED | OUTPATIENT
Start: 2019-10-07 | End: 2020-12-30

## 2019-10-17 NOTE — PROGRESS NOTES
OFFICE VISIT  NOTE  [facility] CARDIOLOGY      Name: Serenity Lagos    Date: 10/18/2019  MRN:  [id number]  :  1940      REFERRING/PRIMARY PROVIDER:  Sheila Redman APRN    Chief Complaint   Patient presents with   • Coronary Artery Disease     Consult    • Chest Pain   • Shortness of Breath   • Irregular Heart Beat   • Edema       HPI: Serenity Lagos is a 79 y.o. female who presents today for new consultation for coronary artery disease.  Patient presented on 2019 to the ER with complaints of chest pain.  Described as substernal chest discomfort, awoke patient from sleep.  She underwent exercise nuclear stress test 2019 which was normal, although did show coronary calcification on the CT portion.  Patient reports an episode of chest discomfort on 2019 while walking [place] the mall, she was evaluated at the ER, ruled out for ACS with normal EKG.  Followed up with heart valve clinic, stress test was ordered which was normal as stated above.  She reports daily short-lived sharp/electrical pains, focal can be right-sided or left-sided she is not sure if it is possible musculoskeletal.  Denies exertional chest pain or pressure.  She gets occasional shortness of breath when walking up inclines or stairs.  Denies lower extremity edema, PND, or orthopnea.  Is fairly sedentary at home, does not exercise routinely.  She wakes up in the morning with occasional fast heart rates, the usually go away within 30 seconds.  She does not want to take Lipitor due to perceived side effects.  No history of CVA or MI    Past Medical History:   Diagnosis Date   • Cervicalgia    • Chronic bronchitis (CMS/HCC)    • Dysphagia    • Glaucoma, open angle    • Hearing loss    • Osteoarthritis     cervical spine degenerative   • Perforation of tympanic membrane    • Pruritus     chronic general pruritis   • Salivary gland cancer (CMS/HCC)    • Shingles     postherpetic neuralgia-thorax   •  Spondylolisthesis      lumbar spine   • Wrist tendonitis        Past Surgical History:   Procedure Laterality Date   • APPENDECTOMY     • BREAST BIOPSY     • BREAST BIOPSY Right 2019   • BREAST CYST EXCISION     • ENDOSCOPY  2017    normal (per pt) EGD   • MOUTH SURGERY     • TONSILLECTOMY         Social History     Socioeconomic History   • Marital status:      Spouse name: Not on file   • Number of children: Not on file   • Years of education: Not on file   • Highest education level: Not on file   Tobacco Use   • Smoking status: Former Smoker     Years: 20.00     Types: Cigarettes     Last attempt to quit: 1980     Years since quittin.8   • Smokeless tobacco: Never Used   Substance and Sexual Activity   • Alcohol use: Yes     Frequency: 4 or more times a week     Drinks per session: 1 or 2     Binge frequency: Never     Comment: a couple glasses of wine daily with dinner   • Drug use: No   • Sexual activity: Defer   Social History Narrative    Caffeine: 1 pot of coffee (8 cups); 1 coke (8 oz can) daily       Family History   Problem Relation Age of Onset   • Hypertension Mother    • Heart failure Mother         CHF   • Coronary artery disease Mother         CABG   • Lung cancer Father 59        Heavy smoker and Black Lung   • Rheum arthritis Sister    • Other Sister 76        sepsis due to infected knee replacement   • COPD Sister         heavy smoker    • Alzheimer's disease Maternal Grandmother    • Coronary artery disease Maternal Grandfather    • Cancer Paternal Grandmother    • No Known Problems Paternal Grandfather    • Hypertension Other    • Obesity Other    • Stroke Maternal Aunt    • Stroke Maternal Aunt    • Stroke Maternal Aunt    • Coronary artery disease Maternal Uncle    • Stroke Maternal Uncle    • Breast cancer Neg Hx    • Ovarian cancer Neg Hx         ROS:   Constitutional no fever,  no weight loss   Skin no rash, no subcutaneous nodules   Otolaryngeal no difficulty  "swallowing   Cardiovascular See HPI   Pulmonary no cough, no sputum production   Gastrointestinal no constipation, no diarrhea   Genitourinary no dysuria, no hematuria   Hematologic no easy bruisability, no abnormal bleeding   Musculoskeletal no muscle pain   Neurologic no dizziness, no falls         No Known Allergies      Current Outpatient Medications:   •  aspirin 81 MG EC tablet, Take 1 tablet by mouth Daily., Disp: 30 tablet, Rfl: 3  •  Biotin 5000 MCG sublingual tablet, take 1 by oral route  every day, Disp: , Rfl:   •  Cholecalciferol (VITAMIN D3) 2000 units capsule, take 1 Capsule by Oral daily, Disp: , Rfl:   •  cyproheptadine (PERIACTIN) 4 MG tablet, Take 4 mg by mouth Daily As Needed., Disp: , Rfl:   •  Docusate Sodium 100 MG capsule, take 1 tablet by oral route twice daily, Disp: , Rfl:   •  Glucosamine-Chondroitin (OSTEO BI-FLEX REGULAR STRENGTH PO), 1 daily, Disp: , Rfl:   •  latanoprost (XALATAN) 0.005 % ophthalmic solution, Administer 1 drop to both eyes Every Night., Disp: , Rfl:   •  losartan (COZAAR) 100 MG tablet, TAKE 1 TABLET DAILY, Disp: 90 tablet, Rfl: 4  •  metoprolol succinate XL (TOPROL-XL) 25 MG 24 hr tablet, Take 1 tablet by mouth Daily., Disp: 30 tablet, Rfl: 3  •  Probiotic Product (PROBIOTIC DAILY PO), One capsule daily, Disp: , Rfl:   •  raloxifene (EVISTA) 60 MG tablet, TAKE 1 TABLET DAILY, Disp: 90 tablet, Rfl: 4  •  SYNTHROID 125 MCG tablet, Take 125 mcg by mouth Daily., Disp: , Rfl:   •  Vitamin E 400 units tablet, Vitamin E 400 UNIT Oral Tablet; Patient Sig: Vitamin E 400 UNIT Oral Tablet TAKE 1 TABLET DAILY.; 0; 24-Sep-2013; Active, Disp: , Rfl:     Vitals:    10/18/19 0837   BP: 138/80   BP Location: Right arm   Patient Position: Sitting   Pulse: 67   Weight: 86.1 kg (189 lb 12.8 oz)   Height: 160 cm (63\")     Body mass index is 33.62 kg/m².    PHYSICAL EXAM:    General Appearance:   · well developed  · well nourished  HENT:   · oropharynx moist  · lips not " cyanotic  Neck:  · thyroid not enlarged  · supple  Respiratory:  · no respiratory distress  · normal breath sounds  · no rales  Cardiovascular:  · no jugular venous distention  · regular rhythm  · apical impulse normal  · S1 normal, S2 normal  · no S3, no S4   · no murmur  · no rub, no thrill  · carotid pulses normal; no bruit  · pedal pulses normal  · lower extremity edema: none    Gastrointestinal:   · bowel sounds normal  · non-tender  · no hepatomegaly, no splenomegaly  Musculoskeletal:  · no clubbing of fingers.   · normocephalic, head atraumatic  Skin:   · warm, dry  Psychiatric:  · judgement and insight appropriate  · normal mood and affect    RESULTS:     ECG 12 Lead  Date/Time: 10/18/2019 9:27 AM  Performed by: Syed Hooker MD  Authorized by: Syed Hooker MD   Comparison: compared with previous ECG from 8/27/2019  Similar to previous ECG  Rhythm: sinus rhythm  Rate: normal  QRS axis: normal    Clinical impression: normal ECG                   Labs:  Lab Results   Component Value Date    CHOL 201 (H) 05/20/2019    TRIG 131 05/20/2019    HDL 69 (H) 05/20/2019     (H) 05/20/2019    AST 23 08/27/2019    ALT 32 08/27/2019     No results found for: HGBA1C  No components found for: CREATINININE  eGFR Non  Amer   Date Value Ref Range Status   08/27/2019 76 >60 mL/min/1.73 Final   05/20/2019 77 >60 mL/min/1.73 Final         ASSESSMENT:  Problem List Items Addressed This Visit        Cardiovascular and Mediastinum    Benign essential hypertension    CAD (coronary artery disease) - Primary    Overview     · Myocardial perfusion stress test 9/23/2019: EF greater than 70%, no ischemia, coronary artery calcification noted on CT portion of stressed.         Relevant Orders    ECG 12 Lead    Mixed hyperlipidemia    Palpitations      Other Visit Diagnoses     Dyspnea on exertion        Relevant Orders    Adult Transthoracic Echo Complete W/ Cont if Necessary Per Protocol          PLAN:    1.  Chest  pain:  Sharp, atypical, stress test reviewed, low risk  She does have coronary calcification but they are likely extravascular.  Continue monitoring    2.  Palpitations/paroxysmal SVT:  Advised patient decrease caffeine intake, she currently drinks 1 pot of coffee daily  Decrease caffeine, increase water intake  Continue metoprolol  Increase exercise    3.  Hyperlipidemia:  Recent lipid panel reviewed, as she has no history of CVA or microinfarction, I do not feel strongly that she needs statin therapy.  She does not want to take a statin due to perceived side effects  Work on diet control low cholesterol diet.    4.  Dyspnea on exertion:  Check echocardiogram      Return to clinic in 4 months     Thank you for the opportunity to share in the care of your patient; please do not hesitate to call me with any questions.     Syed Hooker MD, [facility]  Office: [phone] [address] [place] [address]

## 2019-10-18 ENCOUNTER — CONSULT (OUTPATIENT)
Dept: CARDIOLOGY | Facility: CLINIC | Age: 79
End: 2019-10-18

## 2019-10-18 VITALS
DIASTOLIC BLOOD PRESSURE: 80 MMHG | BODY MASS INDEX: 33.63 KG/M2 | WEIGHT: 189.8 LBS | HEART RATE: 67 BPM | HEIGHT: 63 IN | SYSTOLIC BLOOD PRESSURE: 138 MMHG

## 2019-10-18 DIAGNOSIS — E78.2 MIXED HYPERLIPIDEMIA: ICD-10-CM

## 2019-10-18 DIAGNOSIS — R06.09 DYSPNEA ON EXERTION: ICD-10-CM

## 2019-10-18 DIAGNOSIS — R00.2 PALPITATIONS: ICD-10-CM

## 2019-10-18 DIAGNOSIS — I10 BENIGN ESSENTIAL HYPERTENSION: ICD-10-CM

## 2019-10-18 DIAGNOSIS — I25.118 CORONARY ARTERY DISEASE OF NATIVE ARTERY OF NATIVE HEART WITH STABLE ANGINA PECTORIS (HCC): Primary | ICD-10-CM

## 2019-10-18 PROCEDURE — 99204 OFFICE O/P NEW MOD 45 MIN: CPT | Performed by: INTERNAL MEDICINE

## 2019-10-18 PROCEDURE — 93000 ELECTROCARDIOGRAM COMPLETE: CPT | Performed by: INTERNAL MEDICINE

## 2019-10-18 NOTE — PATIENT INSTRUCTIONS
Decrease caffeine and increase water     Exercise daily.   Heart Disease Prevention  Heart disease is the leading cause of death in the world. Coronary artery disease is the most common cause of heart disease. This condition results when cholesterol and other substances (plaque) build up inside the walls of the blood vessels that supply your heart muscle (arteries). This buildup in arteries is called atherosclerosis. You can take actions to lower your risk of heart disease.  How can heart disease affect me?  Heart disease can cause many unpleasant symptoms and complications, such as:  · Chest pain (angina).  · Reduced or blocked blood flow to your heart. This can cause:  ? Irregular heartbeats (arrhythmias).  ? Heart attack.  ? Heart failure.  What can increase my risk?  The following factors may make you more likely to develop this condition:  · High blood pressure (hypertension).  · High cholesterol.  · Smoking.  · A diet high in saturated fats or trans fats.  · Lack of physical activity.  · Obesity.  · Drinking too much alcohol.  · Diabetes.  · Having a family history of heart disease.  What actions can I take to prevent heart disease?  Nutrition    · Eat a heart-healthy eating plan as told by your health care provider. Examples include the DASH (Dietary Approaches to Stop Hypertension) eating plan or the Mediterranean diet.  · Generally, it is recommended that you:  ? Eat less salt (sodium). Ask your health care provider how much sodium is safe for you. Most people should have less than 2,300 mg each day.  ? Limit unhealthy fats, such as saturated and trans fats, in your diet. You can do this by eating low-fat dairy products, eating less red meat, and avoiding processed foods.  ? Eat healthy fats (omega-3 fatty acids). These are found in fish, such as mackerel or salmon.  ? Eat more fruits and vegetables. You should try to fill one-half of your plate with fruits and vegetables at each meal.  ? Eat more whole  grains.  ? Avoid foods and drinks that have added sugars.  Lifestyle    · Get regular exercise. This is one of the most important things you can do for your health. Generally, it is recommended that you:  ? Exercise for at least 30 minutes on most days of the week (150 minutes each week). The exercise should increase your heart rate and make you sweat (aerobic exercise).  ? Add strength exercises on at least 2 days each week.  · Do not use any products that contain nicotine or tobacco, such as cigarettes and e-cigarettes. These can damage your heart and blood vessels. If you need help quitting, ask your health care provider.  Alcohol use  · Do not drink alcohol if:  ? Your health care provider tells you not to drink.  ? You are pregnant, may be pregnant, or are planning to become pregnant.  · If you drink alcohol, limit how much you have:  ? 0-1 drink a day for women.  ? 0-2 drinks a day for men.  · Be aware of how much alcohol is in your drink. In the [place] one drink equals one typical bottle of beer (12 oz), one-half glass of wine (5 oz), or one shot of hard liquor (1½ oz).  Medicines  · Take over-the-counter and prescription medicines only as told by your health care provider.  · Ask your health care provider whether you should take an aspirin every day. Taking aspirin may help reduce your risk of heart disease and stroke.  · Depending on your risk factors, your health care provider may prescribe medicines to lower your risk of heart disease or to control related conditions. You may take medicine to:  ? Lower cholesterol.  ? Control blood pressure.  ? Control diabetes.  General information  · Keep your blood pressure under control, as recommended by your health care provider. For most healthy people, the upper number of your blood pressure (systolic) should be no higher than 120, and the lower number (diastolic) no higher than 80. Treatment may be needed if your blood pressure is higher than 130/80.  · Have your  blood pressure checked at least every two years. Your health care provider may check your blood pressure more often if you have high blood pressure.  · After age 20, have your cholesterol checked every 4-6 years. If you have risk factors for heart disease, you may need to have it checked more frequently. Treatment may be needed if your cholesterol is high.  · Have your body mass index (BMI) checked every year. Your health care provider can calculate your BMI from your height and weight.  · Work with your health care provider to lose weight, if needed, or to maintain a healthy weight.  Where to find more information:  · Centers for Disease Control and Prevention: www.cdc.gov/heartdisease  · American Heart Association: www.heart.org  ? Take a free online heart disease risk quiz to better understand your personal risk factors.  Summary  · Heart disease is the leading cause of death in the world.  · Heart disease can cause chest pain, abnormal heart rhythms, heart attack, and heart failure.  · High blood pressure, high cholesterol, and smoking are the main risk factors for heart disease, although other factors also contribute.  · You can take actions to lower your chances of developing heart disease. Work with your health care provider to reduce your risk by following a heart-healthy diet, being physically active, and controlling your weight, blood pressure, and cholesterol level.  This information is not intended to replace advice given to you by your health care provider. Make sure you discuss any questions you have with your health care provider.  Document Released: 08/01/2005 Document Revised: 01/02/2019 Document Reviewed: 01/02/2019  [employer] Patient Education © 2019 [employer]

## 2019-11-18 ENCOUNTER — LAB (OUTPATIENT)
Dept: LAB | Facility: HOSPITAL | Age: 79
End: 2019-11-18

## 2019-11-18 ENCOUNTER — TELEPHONE (OUTPATIENT)
Dept: INTERNAL MEDICINE | Facility: CLINIC | Age: 79
End: 2019-11-18

## 2019-11-18 DIAGNOSIS — E55.9 VITAMIN D DEFICIENCY: ICD-10-CM

## 2019-11-18 DIAGNOSIS — E03.9 ACQUIRED HYPOTHYROIDISM: ICD-10-CM

## 2019-11-18 DIAGNOSIS — E78.2 MIXED HYPERLIPIDEMIA: ICD-10-CM

## 2019-11-18 DIAGNOSIS — I25.10 CORONARY ARTERY DISEASE INVOLVING NATIVE CORONARY ARTERY OF NATIVE HEART WITHOUT ANGINA PECTORIS: Primary | ICD-10-CM

## 2019-11-18 LAB
25(OH)D3 SERPL-MCNC: 54.9 NG/ML (ref 30–100)
ALBUMIN SERPL-MCNC: 4.2 G/DL (ref 3.5–5.2)
ALBUMIN/GLOB SERPL: 1.1 G/DL
ALP SERPL-CCNC: 48 U/L (ref 39–117)
ALT SERPL W P-5'-P-CCNC: 11 U/L (ref 1–33)
ANION GAP SERPL CALCULATED.3IONS-SCNC: 13.6 MMOL/L (ref 5–15)
AST SERPL-CCNC: 18 U/L (ref 1–32)
BASOPHILS # BLD AUTO: 0.06 10*3/MM3 (ref 0–0.2)
BASOPHILS NFR BLD AUTO: 0.6 % (ref 0–1.5)
BILIRUB SERPL-MCNC: 0.3 MG/DL (ref 0.2–1.2)
BUN BLD-MCNC: 13 MG/DL (ref 8–23)
BUN/CREAT SERPL: 18.3 (ref 7–25)
CALCIUM SPEC-SCNC: 9.6 MG/DL (ref 8.6–10.5)
CHLORIDE SERPL-SCNC: 101 MMOL/L (ref 98–107)
CHOLEST SERPL-MCNC: 189 MG/DL (ref 0–200)
CO2 SERPL-SCNC: 25.4 MMOL/L (ref 22–29)
CREAT BLD-MCNC: 0.71 MG/DL (ref 0.57–1)
DEPRECATED RDW RBC AUTO: 41.1 FL (ref 37–54)
EOSINOPHIL # BLD AUTO: 0.21 10*3/MM3 (ref 0–0.4)
EOSINOPHIL NFR BLD AUTO: 2.1 % (ref 0.3–6.2)
ERYTHROCYTE [DISTWIDTH] IN BLOOD BY AUTOMATED COUNT: 12.3 % (ref 12.3–15.4)
GFR SERPL CREATININE-BSD FRML MDRD: 80 ML/MIN/1.73
GLOBULIN UR ELPH-MCNC: 3.8 GM/DL
GLUCOSE BLD-MCNC: 88 MG/DL (ref 65–99)
HCT VFR BLD AUTO: 39.6 % (ref 34–46.6)
HDLC SERPL-MCNC: 68 MG/DL (ref 40–60)
HGB BLD-MCNC: 13.3 G/DL (ref 12–15.9)
IMM GRANULOCYTES # BLD AUTO: 0.05 10*3/MM3 (ref 0–0.05)
IMM GRANULOCYTES NFR BLD AUTO: 0.5 % (ref 0–0.5)
LDLC SERPL CALC-MCNC: 94 MG/DL (ref 0–100)
LDLC/HDLC SERPL: 1.38 {RATIO}
LYMPHOCYTES # BLD AUTO: 3.59 10*3/MM3 (ref 0.7–3.1)
LYMPHOCYTES NFR BLD AUTO: 35.5 % (ref 19.6–45.3)
MCH RBC QN AUTO: 30.8 PG (ref 26.6–33)
MCHC RBC AUTO-ENTMCNC: 33.6 G/DL (ref 31.5–35.7)
MCV RBC AUTO: 91.7 FL (ref 79–97)
MONOCYTES # BLD AUTO: 0.94 10*3/MM3 (ref 0.1–0.9)
MONOCYTES NFR BLD AUTO: 9.3 % (ref 5–12)
NEUTROPHILS # BLD AUTO: 5.27 10*3/MM3 (ref 1.7–7)
NEUTROPHILS NFR BLD AUTO: 52 % (ref 42.7–76)
NRBC BLD AUTO-RTO: 0 /100 WBC (ref 0–0.2)
PLATELET # BLD AUTO: 342 10*3/MM3 (ref 140–450)
PMV BLD AUTO: 11.3 FL (ref 6–12)
POTASSIUM BLD-SCNC: 4.5 MMOL/L (ref 3.5–5.2)
PROT SERPL-MCNC: 8 G/DL (ref 6–8.5)
RBC # BLD AUTO: 4.32 10*6/MM3 (ref 3.77–5.28)
SODIUM BLD-SCNC: 140 MMOL/L (ref 136–145)
TRIGL SERPL-MCNC: 135 MG/DL (ref 0–150)
TSH SERPL DL<=0.05 MIU/L-ACNC: 2.7 UIU/ML (ref 0.27–4.2)
VLDLC SERPL-MCNC: 27 MG/DL (ref 5–40)
WBC NRBC COR # BLD: 10.12 10*3/MM3 (ref 3.4–10.8)

## 2019-11-18 PROCEDURE — 82306 VITAMIN D 25 HYDROXY: CPT

## 2019-11-18 PROCEDURE — 81003 URINALYSIS AUTO W/O SCOPE: CPT

## 2019-11-18 PROCEDURE — 80053 COMPREHEN METABOLIC PANEL: CPT

## 2019-11-18 PROCEDURE — 84443 ASSAY THYROID STIM HORMONE: CPT

## 2019-11-18 PROCEDURE — 80061 LIPID PANEL: CPT

## 2019-11-18 PROCEDURE — 85025 COMPLETE CBC W/AUTO DIFF WBC: CPT

## 2019-11-19 LAB
BILIRUB UR QL STRIP: NEGATIVE
CLARITY UR: CLEAR
COLOR UR: YELLOW
GLUCOSE UR STRIP-MCNC: NEGATIVE MG/DL
HGB UR QL STRIP.AUTO: NEGATIVE
KETONES UR QL STRIP: NEGATIVE
LEUKOCYTE ESTERASE UR QL STRIP.AUTO: ABNORMAL
NITRITE UR QL STRIP: NEGATIVE
PH UR STRIP.AUTO: 7 [PH] (ref 5–8)
PROT UR QL STRIP: NEGATIVE
SP GR UR STRIP: <=1.005 (ref 1–1.03)
UROBILINOGEN UR QL STRIP: ABNORMAL

## 2019-11-20 ENCOUNTER — OFFICE VISIT (OUTPATIENT)
Dept: INTERNAL MEDICINE | Facility: CLINIC | Age: 79
End: 2019-11-20

## 2019-11-20 VITALS
SYSTOLIC BLOOD PRESSURE: 150 MMHG | WEIGHT: 186 LBS | HEART RATE: 64 BPM | TEMPERATURE: 97.8 F | DIASTOLIC BLOOD PRESSURE: 68 MMHG | HEIGHT: 63 IN | BODY MASS INDEX: 32.96 KG/M2

## 2019-11-20 DIAGNOSIS — Z00.00 MEDICARE ANNUAL WELLNESS VISIT, SUBSEQUENT: Primary | ICD-10-CM

## 2019-11-20 PROCEDURE — G0439 PPPS, SUBSEQ VISIT: HCPCS | Performed by: NURSE PRACTITIONER

## 2019-11-20 NOTE — PATIENT INSTRUCTIONS
Check with your pharmacy to see if you had second Hep A vaccine.  (We gave you one 2018)    Medicare Wellness  Personal Prevention Plan of Service     Date of Office Visit:  2019  Encounter Provider:  SHARMILA Sanchez  Place of Service:  [facility] INTERNAL MEDICINE  Patient Name: Serenity Lagos  :  1940    As part of the Medicare Wellness portion of your visit today, we are providing you with this personalized preventive plan of services (PPPS). This plan is based upon recommendations of the [place] Preventive Services Task Force (USPSTF) and the Advisory Committee on Immunization Practices (ACIP).    This lists the preventive care services that should be considered, and provides dates of when you are due. Items listed as completed are up-to-date and do not require any further intervention.    Health Maintenance   Topic Date Due   • TDAP/TD VACCINES (1 - Tdap) 1960   • ZOSTER VACCINE (3 of 3) 2019   • MEDICARE ANNUAL WELLNESS  2019   • LIPID PANEL  2020   • COLONOSCOPY  2028   • INFLUENZA VACCINE  Completed   • PNEUMOCOCCAL VACCINES (65+ LOW/MEDIUM RISK)  Completed       No orders of the defined types were placed in this encounter.      Return for Next scheduled follow up, Labs this visit, Annual physical.

## 2019-11-20 NOTE — PROGRESS NOTES
QUICK REFERENCE INFORMATION:  The ABCs of the Annual Wellness Visit    Subsequent Medicare Wellness Visit    HEALTH RISK ASSESSMENT    1940    Recent Hospitalizations:  No hospitalization(s) within the last year..        Current Medical Providers:  Patient Care Team:  Bernadette Somers MD as PCP - General (Internal Medicine)        Smoking Status:  Social History     Tobacco Use   Smoking Status Former Smoker   • Years: 20.00   • Types: Cigarettes   • Last attempt to quit:    • Years since quittin.9   Smokeless Tobacco Never Used       Alcohol Consumption:  Social History     Substance and Sexual Activity   Alcohol Use Yes   • Alcohol/week: 0.6 - 1.2 oz   • Types: 1 - 2 Glasses of wine per week   • Frequency: 4 or more times a week   • Drinks per session: 1 or 2   • Binge frequency: Never    Comment:  daily with dinner       Depression Screen:   PHQ-2/PHQ-9 Depression Screening 2019   Little interest or pleasure in doing things 0   Feeling down, depressed, or hopeless 0   Total Score 0       Health Habits and Functional and Cognitive Screening:  Functional & Cognitive Status 2019   Do you have difficulty preparing food and eating? No   Do you have difficulty bathing yourself, getting dressed or grooming yourself? No   Do you have difficulty using the toilet? No   Do you have difficulty moving around from place to place? No   Do you have trouble with steps or getting out of a bed or a chair? Yes   Current Diet Well Balanced Diet   Dental Exam Up to date   Eye Exam Up to date   Exercise (times per week) 0 times per week   Current Exercise Activities Include None   Do you need help using the phone?  No   Are you deaf or do you have serious difficulty hearing?  Yes   Do you need help with transportation? No   Do you need help shopping? No   Do you need help preparing meals?  No   Do you need help with housework?  No   Do you need help with laundry? No   Do you need help taking your  medications? No   Do you need help managing money? No   Do you ever drive or ride in a car without wearing a seat belt? No   Have you felt unusual stress, anger or loneliness in the last month? No   Who do you live with? Spouse   If you need help, do you have trouble finding someone available to you? No   Have you been bothered in the last four weeks by sexual problems? No   Do you have difficulty concentrating, remembering or making decisions? No       Fall Risk Screen:  ANTOINETTE Fall Risk Assessment was completed, and patient is at MODERATE risk for falls. Assessment completed on:2019    ACE III MINI   ATTENTION  What is the year: correct  What is the month of the year: correct  What is the day of the week?: correct  What is the date?: correct  MEMORY  Repeat address three times, only score third attempt: [address] [place], [place]: 7  HOW MANY ANIMALS DID THE PATIENT NAME  Verbal Fluency -- Animal Names (0-25): 17-21  CLOCK DRAWING  Clock Drawing: All Correct  MEMORY RECALL  Tell me what you remember about that name and address we were repeating at the beginnin  ACE TOTAL SCORE  Total ACE Score - <25/30 strongly suggests cognitive impairment; <21/30 almost certainly shows dementia: 29    Does the patient have evidence of cognitive impairment? No    Aspirin use counseling: Does not need ASA (and currently is not on it)    Recent Lab Results:  CMP:  Lab Results   Component Value Date    BUN 13 2019    CREATININE 0.71 2019    EGFRIFNONA 80 2019    BCR 18.3 2019     2019    K 4.5 2019    CO2 25.4 2019    CALCIUM 9.6 2019    ALBUMIN 4.20 2019    BILITOT 0.3 2019    ALKPHOS 48 2019    AST 18 2019    ALT 11 2019     HbA1c:  No results found for: HGBA1C  Microalbumin:  Lab Results   Component Value Date    MICROALBUR <1.2 2019     Lipid Panel  Lab Results   Component Value Date    CHOL 189 2019     TRIG 135 11/18/2019    HDL 68 (H) 11/18/2019    LDL 94 11/18/2019    AST 18 11/18/2019    ALT 11 11/18/2019       Visual Acuity:  No exam data present    Age-appropriate Screening Schedule:  Refer to the list below for future screening recommendations based on patient's age, sex and/or medical conditions. Orders for these recommended tests are listed in the plan section. The patient has been provided with a written plan.    Health Maintenance   Topic Date Due   • TDAP/TD VACCINES (1 - Tdap) 07/31/1960   • ZOSTER VACCINE (3 of 3) 04/24/2019   • LIPID PANEL  11/18/2020   • COLONOSCOPY  03/01/2028   • INFLUENZA VACCINE  Completed   • PNEUMOCOCCAL VACCINES (65+ LOW/MEDIUM RISK)  Completed        Subjective   History of Present Illness    Serenity Lagos is a 79 y.o. female who presents for a Subsequent Wellness Visit.    CHRONIC CONDITIONS    The following portions of the patient's history were reviewed and updated as appropriate: allergies, current medications, past family history, past medical history, past social history, past surgical history and problem list.    Outpatient Medications Prior to Visit   Medication Sig Dispense Refill   • aspirin 81 MG EC tablet Take 1 tablet by mouth Daily. 30 tablet 3   • Biotin 5000 MCG sublingual tablet take 1 by oral route  every day     • Cholecalciferol (VITAMIN D3) 2000 units capsule take 1 Capsule by Oral daily     • cyproheptadine (PERIACTIN) 4 MG tablet Take 4 mg by mouth Daily As Needed.     • Docusate Sodium 100 MG capsule take 1 tablet by oral route twice daily     • Glucosamine-Chondroitin (OSTEO BI-FLEX REGULAR STRENGTH PO) 1 daily     • latanoprost (XALATAN) 0.005 % ophthalmic solution Administer 1 drop to both eyes Every Night.     • losartan (COZAAR) 100 MG tablet TAKE 1 TABLET DAILY 90 tablet 4   • metoprolol succinate XL (TOPROL-XL) 25 MG 24 hr tablet Take 1 tablet by mouth Daily. 30 tablet 3   • Probiotic Product (PROBIOTIC DAILY PO) One capsule daily     •  raloxifene (EVISTA) 60 MG tablet TAKE 1 TABLET DAILY 90 tablet 4   • SYNTHROID 125 MCG tablet Take 125 mcg by mouth Daily.     • Vitamin E 400 units tablet Vitamin E 400 UNIT Oral Tablet; Patient Sig: Vitamin E 400 UNIT Oral Tablet TAKE 1 TABLET DAILY.; 0; 24-Sep-2013; Active       No facility-administered medications prior to visit.        Patient Active Problem List   Diagnosis   • Osteopenia after menopause   • Chronic right-sided low back pain with right-sided sciatica   • Primary osteoarthritis of both knees   • Numbness and tingling of both legs below knees   • Chronic pruritus   • Acquired hypothyroidism   • Acne rosacea   • Vitamin D deficiency   • Obesity, Class I, BMI 30-34.9   • Benign essential hypertension   • Right hip pain   • Poor balance   • Pedal edema   • History of benign breast biopsy   • Acute right-sided low back pain with right-sided sciatica   • Other chronic pain   • CAD (coronary artery disease)   • Mixed hyperlipidemia   • Palpitations       Advance Care Planning:  Patient has an advance directive - a copy has not been provided. Have asked the patient to send this to us to add to record    Identification of Risk Factors:  Risk factors include: Immunizations Discussed/Encouraged (specific immunizations; Hepatitis A Vaccine/Series ).    Review of Systems   Constitutional: Negative for chills, fatigue and fever.   HENT: Negative for congestion, ear pain and sinus pressure.    Respiratory: Negative for cough, chest tightness, shortness of breath and wheezing.    Cardiovascular: Negative for chest pain and palpitations.   Gastrointestinal: Negative for abdominal pain, blood in stool and constipation.   Skin: Negative for color change.   Allergic/Immunologic: Negative for environmental allergies.   Neurological: Negative for dizziness, speech difficulty and headaches.   Psychiatric/Behavioral: Negative for confusion. The patient is not nervous/anxious.        Compared to one year ago, the  "patient feels her physical health is worse.  Compared to one year ago, the patient feels her mental health is the same.    Objective     Physical Exam     Procedures     Vitals:    11/20/19 1509 11/20/19 1539   BP: 150/91 150/68   BP Location: Left arm    Patient Position: Sitting    Cuff Size: Adult    Pulse: 64    Temp: 97.8 °F (36.6 °C)    TempSrc: Temporal    Weight: 84.4 kg (186 lb)    Height: 159 cm (62.6\")    PainSc: 0-No pain        Patient's Body mass index is 33.37 kg/m². BMI is above normal parameters. Recommendations include: exercise counseling, nutrition counseling and referral to primary care.      Assessment/Plan   Problem List Items Addressed This Visit     None      Visit Diagnoses     Medicare annual wellness visit, subsequent    -  Primary        Patient Self-Management and Personalized Health Advice  The patient has been provided with information about: diet, exercise and weight management and preventive services including:   · Annual Wellness Visit (AWV).    Outpatient Encounter Medications as of 11/20/2019   Medication Sig Dispense Refill   • aspirin 81 MG EC tablet Take 1 tablet by mouth Daily. 30 tablet 3   • Biotin 5000 MCG sublingual tablet take 1 by oral route  every day     • Cholecalciferol (VITAMIN D3) 2000 units capsule take 1 Capsule by Oral daily     • cyproheptadine (PERIACTIN) 4 MG tablet Take 4 mg by mouth Daily As Needed.     • Docusate Sodium 100 MG capsule take 1 tablet by oral route twice daily     • Glucosamine-Chondroitin (OSTEO BI-FLEX REGULAR STRENGTH PO) 1 daily     • latanoprost (XALATAN) 0.005 % ophthalmic solution Administer 1 drop to both eyes Every Night.     • losartan (COZAAR) 100 MG tablet TAKE 1 TABLET DAILY 90 tablet 4   • metoprolol succinate XL (TOPROL-XL) 25 MG 24 hr tablet Take 1 tablet by mouth Daily. 30 tablet 3   • Probiotic Product (PROBIOTIC DAILY PO) One capsule daily     • raloxifene (EVISTA) 60 MG tablet TAKE 1 TABLET DAILY 90 tablet 4   • SYNTHROID " 125 MCG tablet Take 125 mcg by mouth Daily.     • Vitamin E 400 units tablet Vitamin E 400 UNIT Oral Tablet; Patient Sig: Vitamin E 400 UNIT Oral Tablet TAKE 1 TABLET DAILY.; 0; 24-Sep-2013; Active       No facility-administered encounter medications on file as of 2019.        Reviewed use of high risk medication in the elderly: yes  Reviewed for potential of harmful drug interactions in the elderly: yes    Follow Up:  Return for Next scheduled follow up, Labs this visit, Annual physical.     Patient Instructions     Check with your pharmacy to see if you had second Hep A vaccine.  (We gave you one 2018)    Medicare Wellness  Personal Prevention Plan of Service     Date of Office Visit:  2019  Encounter Provider:  SHARMILA Sanchez  Place of Service:  [facility]  Patient Name: Serenity Lagos  :  1940    As part of the Medicare Wellness portion of your visit today, we are providing you with this personalized preventive plan of services (PPPS). This plan is based upon recommendations of the [place] Preventive Services Task Force (USPSTF) and the Advisory Committee on Immunization Practices (ACIP).    This lists the preventive care services that should be considered, and provides dates of when you are due. Items listed as completed are up-to-date and do not require any further intervention.    Health Maintenance   Topic Date Due   • TDAP/TD VACCINES (1 - Tdap) 1960   • ZOSTER VACCINE (3 of 3) 2019   • MEDICARE ANNUAL WELLNESS  2019   • LIPID PANEL  2020   • COLONOSCOPY  2028   • INFLUENZA VACCINE  Completed   • PNEUMOCOCCAL VACCINES (65+ LOW/MEDIUM RISK)  Completed       No orders of the defined types were placed in this encounter.      Return for Next scheduled follow up, Labs this visit, Annual physical.            An After Visit Summary and PPPS with all of these plans were given to the patient.

## 2019-11-21 ENCOUNTER — HOSPITAL ENCOUNTER (OUTPATIENT)
Dept: CARDIOLOGY | Facility: HOSPITAL | Age: 79
Discharge: HOME | End: 2019-11-21
Admitting: INTERNAL MEDICINE

## 2019-11-21 VITALS — BODY MASS INDEX: 34.23 KG/M2 | HEIGHT: 62 IN | WEIGHT: 186 LBS

## 2019-11-21 DIAGNOSIS — R06.09 DYSPNEA ON EXERTION: ICD-10-CM

## 2019-11-21 PROCEDURE — 93306 TTE W/DOPPLER COMPLETE: CPT | Performed by: INTERNAL MEDICINE

## 2019-11-21 PROCEDURE — 93306 TTE W/DOPPLER COMPLETE: CPT

## 2019-11-22 ENCOUNTER — TELEPHONE (OUTPATIENT)
Dept: CARDIOLOGY | Facility: CLINIC | Age: 79
End: 2019-11-22

## 2019-11-22 LAB
ASCENDING AORTA: 2.8 CM
BH CV ECHO MEAS - AO MAX PG (FULL): 2.7 MMHG
BH CV ECHO MEAS - AO MAX PG: 7.3 MMHG
BH CV ECHO MEAS - AO MEAN PG (FULL): 2 MMHG
BH CV ECHO MEAS - AO MEAN PG: 4 MMHG
BH CV ECHO MEAS - AO ROOT AREA (BSA CORRECTED): 1.8
BH CV ECHO MEAS - AO ROOT AREA: 8.6 CM^2
BH CV ECHO MEAS - AO ROOT DIAM: 3.3 CM
BH CV ECHO MEAS - AO V2 MAX: 135 CM/SEC
BH CV ECHO MEAS - AO V2 MEAN: 92 CM/SEC
BH CV ECHO MEAS - AO V2 VTI: 34.9 CM
BH CV ECHO MEAS - ASC AORTA: 2.8 CM
BH CV ECHO MEAS - AVA(I,A): 2.2 CM^2
BH CV ECHO MEAS - AVA(I,D): 2.2 CM^2
BH CV ECHO MEAS - AVA(V,A): 2.5 CM^2
BH CV ECHO MEAS - AVA(V,D): 2.5 CM^2
BH CV ECHO MEAS - BSA(HAYCOCK): 2 M^2
BH CV ECHO MEAS - BSA: 1.9 M^2
BH CV ECHO MEAS - BZI_BMI: 34 KILOGRAMS/M^2
BH CV ECHO MEAS - BZI_METRIC_HEIGHT: 157.5 CM
BH CV ECHO MEAS - BZI_METRIC_WEIGHT: 84.4 KG
BH CV ECHO MEAS - EDV(CUBED): 53.6 ML
BH CV ECHO MEAS - EDV(MOD-SP2): 61 ML
BH CV ECHO MEAS - EDV(MOD-SP4): 49 ML
BH CV ECHO MEAS - EDV(TEICH): 60.8 ML
BH CV ECHO MEAS - EF(CUBED): 74.2 %
BH CV ECHO MEAS - EF(MOD-BP): 69 %
BH CV ECHO MEAS - EF(MOD-SP2): 70.5 %
BH CV ECHO MEAS - EF(MOD-SP4): 69.4 %
BH CV ECHO MEAS - EF(TEICH): 66.8 %
BH CV ECHO MEAS - ESV(CUBED): 13.8 ML
BH CV ECHO MEAS - ESV(MOD-SP2): 18 ML
BH CV ECHO MEAS - ESV(MOD-SP4): 15 ML
BH CV ECHO MEAS - ESV(TEICH): 20.2 ML
BH CV ECHO MEAS - FS: 36.3 %
BH CV ECHO MEAS - IVS/LVPW: 1.1
BH CV ECHO MEAS - IVSD: 1 CM
BH CV ECHO MEAS - LA DIMENSION: 3.4 CM
BH CV ECHO MEAS - LA/AO: 1
BH CV ECHO MEAS - LAD MAJOR: 4.6 CM
BH CV ECHO MEAS - LAT PEAK E' VEL: 9.9 CM/SEC
BH CV ECHO MEAS - LATERAL E/E' RATIO: 10.7
BH CV ECHO MEAS - LV DIASTOLIC VOL/BSA (35-75): 26.4 ML/M^2
BH CV ECHO MEAS - LV MASS(C)D: 110.7 GRAMS
BH CV ECHO MEAS - LV MASS(C)DI: 59.8 GRAMS/M^2
BH CV ECHO MEAS - LV MAX PG: 4.6 MMHG
BH CV ECHO MEAS - LV MEAN PG: 2 MMHG
BH CV ECHO MEAS - LV SYSTOLIC VOL/BSA (12-30): 8.1 ML/M^2
BH CV ECHO MEAS - LV V1 MAX: 107 CM/SEC
BH CV ECHO MEAS - LV V1 MEAN: 63.8 CM/SEC
BH CV ECHO MEAS - LV V1 VTI: 24.4 CM
BH CV ECHO MEAS - LVIDD: 3.8 CM
BH CV ECHO MEAS - LVIDS: 2.4 CM
BH CV ECHO MEAS - LVLD AP2: 7.4 CM
BH CV ECHO MEAS - LVLD AP4: 7.3 CM
BH CV ECHO MEAS - LVLS AP2: 5.9 CM
BH CV ECHO MEAS - LVLS AP4: 6 CM
BH CV ECHO MEAS - LVOT AREA (M): 3.1 CM^2
BH CV ECHO MEAS - LVOT AREA: 3.1 CM^2
BH CV ECHO MEAS - LVOT DIAM: 2 CM
BH CV ECHO MEAS - LVPWD: 0.93 CM
BH CV ECHO MEAS - MED PEAK E' VEL: 6.1 CM/SEC
BH CV ECHO MEAS - MEDIAL E/E' RATIO: 17.3
BH CV ECHO MEAS - MV A MAX VEL: 131 CM/SEC
BH CV ECHO MEAS - MV DEC TIME: 0.3 SEC
BH CV ECHO MEAS - MV E MAX VEL: 106 CM/SEC
BH CV ECHO MEAS - MV E/A: 0.81
BH CV ECHO MEAS - MV MAX PG: 8.1 MMHG
BH CV ECHO MEAS - MV MEAN PG: 3 MMHG
BH CV ECHO MEAS - MV V2 MAX: 142 CM/SEC
BH CV ECHO MEAS - MV V2 MEAN: 86.2 CM/SEC
BH CV ECHO MEAS - MV V2 VTI: 43 CM
BH CV ECHO MEAS - MVA(VTI): 1.8 CM^2
BH CV ECHO MEAS - PA ACC SLOPE: 453 CM/SEC^2
BH CV ECHO MEAS - PA ACC TIME: 0.18 SEC
BH CV ECHO MEAS - PA MAX PG: 4.8 MMHG
BH CV ECHO MEAS - PA PR(ACCEL): -3.4 MMHG
BH CV ECHO MEAS - PA V2 MAX: 109 CM/SEC
BH CV ECHO MEAS - PI END-D VEL: 81.4 CM/SEC
BH CV ECHO MEAS - RAP SYSTOLE: 3 MMHG
BH CV ECHO MEAS - RVSP: 29.4 MMHG
BH CV ECHO MEAS - SI(AO): 161.1 ML/M^2
BH CV ECHO MEAS - SI(CUBED): 21.5 ML/M^2
BH CV ECHO MEAS - SI(LVOT): 41.4 ML/M^2
BH CV ECHO MEAS - SI(MOD-SP2): 23.2 ML/M^2
BH CV ECHO MEAS - SI(MOD-SP4): 18.3 ML/M^2
BH CV ECHO MEAS - SI(TEICH): 21.9 ML/M^2
BH CV ECHO MEAS - SV(AO): 298.5 ML
BH CV ECHO MEAS - SV(CUBED): 39.8 ML
BH CV ECHO MEAS - SV(LVOT): 76.7 ML
BH CV ECHO MEAS - SV(MOD-SP2): 43 ML
BH CV ECHO MEAS - SV(MOD-SP4): 34 ML
BH CV ECHO MEAS - SV(TEICH): 40.6 ML
BH CV ECHO MEAS - TAPSE (>1.6): 2.2 CM2
BH CV ECHO MEAS - TR MAX PG: 26.4 MMHG
BH CV ECHO MEAS - TR MAX VEL: 257 CM/SEC
BH CV ECHO MEASUREMENTS AVERAGE E/E' RATIO: 13.25
BH CV VAS BP LEFT ARM: NORMAL MMHG
BH CV XLRA - RV BASE: 3.1 CM
BH CV XLRA - RV LENGTH: 6.4 CM
BH CV XLRA - RV MID: 2.4 CM
BH CV XLRA - TDI S': 12.6 CM/SEC
LEFT ATRIUM VOLUME INDEX: 16.7 ML/M^2
LEFT ATRIUM VOLUME: 31 ML

## 2019-11-22 NOTE — TELEPHONE ENCOUNTER
----- Message from Syed Hooker MD sent at 11/22/2019  9:45 AM EST -----  Please inform the patient of their normal test results. Thank you.

## 2019-11-27 ENCOUNTER — OFFICE VISIT (OUTPATIENT)
Dept: INTERNAL MEDICINE | Facility: CLINIC | Age: 79
End: 2019-11-27

## 2019-11-27 VITALS
BODY MASS INDEX: 35.51 KG/M2 | HEIGHT: 62 IN | HEART RATE: 60 BPM | DIASTOLIC BLOOD PRESSURE: 96 MMHG | SYSTOLIC BLOOD PRESSURE: 166 MMHG | WEIGHT: 193 LBS

## 2019-11-27 DIAGNOSIS — I25.84 CORONARY ARTERY CALCIFICATION OF NATIVE ARTERY: Chronic | ICD-10-CM

## 2019-11-27 DIAGNOSIS — I25.10 CORONARY ARTERY CALCIFICATION OF NATIVE ARTERY: Chronic | ICD-10-CM

## 2019-11-27 DIAGNOSIS — M85.80 OSTEOPENIA AFTER MENOPAUSE: ICD-10-CM

## 2019-11-27 DIAGNOSIS — E66.01 CLASS 2 SEVERE OBESITY DUE TO EXCESS CALORIES WITH SERIOUS COMORBIDITY AND BODY MASS INDEX (BMI) OF 35.0 TO 35.9 IN ADULT (HCC): Chronic | ICD-10-CM

## 2019-11-27 DIAGNOSIS — E55.9 VITAMIN D DEFICIENCY: ICD-10-CM

## 2019-11-27 DIAGNOSIS — R07.89 ATYPICAL CHEST PAIN: Primary | ICD-10-CM

## 2019-11-27 DIAGNOSIS — E78.2 MIXED HYPERLIPIDEMIA: ICD-10-CM

## 2019-11-27 DIAGNOSIS — I10 BENIGN ESSENTIAL HYPERTENSION: ICD-10-CM

## 2019-11-27 DIAGNOSIS — E03.9 ACQUIRED HYPOTHYROIDISM: ICD-10-CM

## 2019-11-27 DIAGNOSIS — Z78.0 OSTEOPENIA AFTER MENOPAUSE: ICD-10-CM

## 2019-11-27 DIAGNOSIS — N64.4 BREAST TENDERNESS IN FEMALE: ICD-10-CM

## 2019-11-27 PROBLEM — E66.812 CLASS 2 SEVERE OBESITY DUE TO EXCESS CALORIES WITH SERIOUS COMORBIDITY AND BODY MASS INDEX (BMI) OF 35.0 TO 35.9 IN ADULT: Chronic | Status: ACTIVE | Noted: 2019-11-27

## 2019-11-27 PROCEDURE — 99214 OFFICE O/P EST MOD 30 MIN: CPT | Performed by: INTERNAL MEDICINE

## 2019-11-27 RX ORDER — OMEPRAZOLE 40 MG/1
40 CAPSULE, DELAYED RELEASE ORAL DAILY
Qty: 30 CAPSULE | Refills: 5 | OUTPATIENT
Start: 2019-11-27 | End: 2020-07-26 | Stop reason: HOSPADM

## 2019-11-27 NOTE — PROGRESS NOTES
[facility]     Serenity Lagos  1940   [id number]      Patient Care Team:  Bernadette Somers MD as PCP - General (Internal Medicine)    Chief Complaint   Patient presents with   • Hypertension   • Hyperlipidemia   • Hypothyroidism            HPI  Patient is a 79 y.o. female presents with chest pain, hypertension, hyperlipidemia, hypothyroidism      CHRONIC CONDITIONS    She complains of the chest pain lasting all day on Saturday.  She was not doing anything active, she was working all day on the computer.  She was not stressed.  It is unknown what the blood pressure was that day.  She did not have any associated symptoms like nausea, palpitations, edema.  She denies heartburn or indigestion.  She is not taking anything for GERD.  She admits to drinking a full pot of coffee and a Coke every  day.  She is resistant to decreasing her caffeine.    Stress test in September was normal except for some coronary calcium on the CT portion of it.  She had an echo last week that showed normal wall motion.  Tricuspid regurgitation to be mild to moderate.  Ejection fraction was greater than 70.  She did show some mild diastolic dysfunction.    BP's-she has not checked at home. Takes metoprolol and losartan.     Hyperlipidemia is very mild.  in May, but normal this time.  Dr. Hooker did not feel she needed to be on a statin since her chest pain did not appear to be cardiac related.    She takes Synthroid daily.    She denies any recent edema.    Past Medical History:   Diagnosis Date   • Cervicalgia    • Chronic bronchitis (CMS/HCC)    • Dysphagia    • Glaucoma, open angle    • Hearing loss    • Osteoarthritis     cervical spine degenerative   • Perforation of tympanic membrane    • Pruritus     chronic general pruritis   • Salivary gland cancer (CMS/HCC)    • Shingles     postherpetic neuralgia-thorax   • Spondylolisthesis      lumbar spine   • Wrist tendonitis        Past Surgical History:    Procedure Laterality Date   • APPENDECTOMY     • BREAST BIOPSY     • BREAST BIOPSY Right 2019   • BREAST CYST EXCISION     • ENDOSCOPY  2017    normal (per pt) EGD   • MOUTH SURGERY     • TONSILLECTOMY         Family History   Problem Relation Age of Onset   • Hypertension Mother    • Heart failure Mother         CHF   • Coronary artery disease Mother         CABG   • Lung cancer Father 59        Heavy smoker and Black Lung   • Rheum arthritis Sister    • Other Sister 76        sepsis due to infected knee replacement   • COPD Sister         heavy smoker    • Alzheimer's disease Maternal Grandmother    • Coronary artery disease Maternal Grandfather    • Cancer Paternal Grandmother    • No Known Problems Paternal Grandfather    • Hypertension Other    • Obesity Other    • Stroke Maternal Aunt    • Stroke Maternal Aunt    • Stroke Maternal Aunt    • Coronary artery disease Maternal Uncle    • Stroke Maternal Uncle    • Breast cancer Neg Hx    • Ovarian cancer Neg Hx        Social History     Socioeconomic History   • Marital status:      Spouse name: Not on file   • Number of children: Not on file   • Years of education: Not on file   • Highest education level: Not on file   Tobacco Use   • Smoking status: Former Smoker     Years: 20.00     Types: Cigarettes     Last attempt to quit: 1980     Years since quittin.9   • Smokeless tobacco: Never Used   Substance and Sexual Activity   • Alcohol use: Yes     Alcohol/week: 0.6 - 1.2 oz     Types: 1 - 2 Glasses of wine per week     Frequency: 4 or more times a week     Drinks per session: 1 or 2     Binge frequency: Never     Comment:  daily with dinner   • Drug use: No   • Sexual activity: Defer   Social History Narrative    Caffeine: 1 pot of coffee (8 cups); 1 coke (8 oz can) daily       No Known Allergies    Review of Systems:     Review of Systems   Constitutional: Negative for chills, fatigue and fever.   HENT: Positive for postnasal drip,  "rhinorrhea, sinus pressure and sneezing. Negative for congestion, ear pain, sore throat and swollen glands.    Eyes: Negative for visual disturbance.   Respiratory: Positive for cough. Negative for chest tightness, shortness of breath and wheezing.    Cardiovascular: Positive for chest pain. Negative for palpitations and leg swelling.   Gastrointestinal: Negative for abdominal pain, blood in stool, constipation and GERD.   Endocrine: Negative for cold intolerance and heat intolerance.   Genitourinary: Negative for dysuria and frequency.   Musculoskeletal: Positive for arthralgias. Negative for back pain and gait problem.   Skin: Negative for color change and rash.   Allergic/Immunologic: Negative for environmental allergies.   Neurological: Negative for dizziness, speech difficulty and headache.   Hematological: Negative for adenopathy. Does not bruise/bleed easily.   Psychiatric/Behavioral: Positive for stress. Negative for decreased concentration, dysphoric mood and suicidal ideas. The patient is not nervous/anxious.        Vital Signs  Vitals:    11/27/19 1453   BP: 166/96   BP Location: Left arm   Patient Position: Sitting   Cuff Size: Adult   Pulse: 60   Weight: 87.5 kg (193 lb)   Height: 157.5 cm (62.01\")   PainSc: 0-No pain     Body mass index is 35.29 kg/m².      Current Outpatient Medications:   •  aspirin 81 MG EC tablet, Take 1 tablet by mouth Daily., Disp: 30 tablet, Rfl: 3  •  Biotin 5000 MCG sublingual tablet, take 1 by oral route  every day, Disp: , Rfl:   •  Cholecalciferol (VITAMIN D3) 2000 units capsule, take 1 Capsule by Oral daily, Disp: , Rfl:   •  cyproheptadine (PERIACTIN) 4 MG tablet, Take 4 mg by mouth Daily As Needed., Disp: , Rfl:   •  Docusate Sodium 100 MG capsule, take 1 tablet by oral route twice daily, Disp: , Rfl:   •  Glucosamine-Chondroitin (OSTEO BI-FLEX REGULAR STRENGTH PO), 1 daily, Disp: , Rfl:   •  latanoprost (XALATAN) 0.005 % ophthalmic solution, Administer 1 drop to both " eyes Every Night., Disp: , Rfl:   •  losartan (COZAAR) 100 MG tablet, TAKE 1 TABLET DAILY, Disp: 90 tablet, Rfl: 4  •  metoprolol succinate XL (TOPROL-XL) 25 MG 24 hr tablet, Take 1 tablet by mouth Daily., Disp: 30 tablet, Rfl: 3  •  Probiotic Product (PROBIOTIC DAILY PO), One capsule daily, Disp: , Rfl:   •  raloxifene (EVISTA) 60 MG tablet, TAKE 1 TABLET DAILY, Disp: 90 tablet, Rfl: 4  •  SYNTHROID 125 MCG tablet, Take 125 mcg by mouth Daily., Disp: , Rfl:   •  Vitamin E 400 units tablet, Vitamin E 400 UNIT Oral Tablet; Patient Sig: Vitamin E 400 UNIT Oral Tablet TAKE 1 TABLET DAILY.; 0; 24-Sep-2013; Active, Disp: , Rfl:   •  omeprazole (priLOSEC) 40 MG capsule, Take 1 capsule by mouth Daily., Disp: 30 capsule, Rfl: 5    Physical Exam:    Physical Exam   Constitutional: She is oriented to person, place, and time. She appears well-developed and well-nourished. She is morbidly obese.  Eyes: Conjunctivae and EOM are normal. Pupils are equal, round, and reactive to light.   Neck: Normal range of motion. Neck supple. No thyromegaly present.   Cardiovascular: Normal rate, regular rhythm, normal heart sounds and intact distal pulses.   No murmur heard.  Pulmonary/Chest: Effort normal and breath sounds normal. She has no wheezes. Right breast exhibits tenderness. Right breast exhibits no inverted nipple, no mass, no nipple discharge and no skin change. Left breast exhibits tenderness. Left breast exhibits no inverted nipple, no mass, no nipple discharge and no skin change.   There is tenderness of the mid chest, but both breast are very tender and the patient states that they always are.   Abdominal: Soft. Bowel sounds are normal. She exhibits no distension and no mass. There is no tenderness.   Musculoskeletal: Normal range of motion. She exhibits no edema or tenderness.        Right knee: She exhibits deformity. She exhibits no swelling.        Left knee: She exhibits deformity. She exhibits no swelling.        Right  hand: She exhibits deformity. She exhibits no tenderness.        Left hand: She exhibits deformity. She exhibits no tenderness.   Primary osteoarthritis of hands and knees.   Lymphadenopathy:     She has no cervical adenopathy.   Neurological: She is alert and oriented to person, place, and time. She has normal strength. No cranial nerve deficit or sensory deficit. Coordination and gait normal.   Skin: Skin is warm and dry. No rash noted.   Psychiatric: She has a normal mood and affect. Her speech is normal and behavior is normal. Judgment and thought content normal. Cognition and memory are normal.   Nursing note and vitals reviewed.       ACE III MINI        Results Review:    I reviewed the patient's new clinical results.  We reviewed her labs from last week in detail.    CMP:  Lab Results   Component Value Date    BUN 13 11/18/2019    CREATININE 0.71 11/18/2019    EGFRIFNONA 80 11/18/2019    BCR 18.3 11/18/2019     11/18/2019    K 4.5 11/18/2019    CO2 25.4 11/18/2019    CALCIUM 9.6 11/18/2019    ALBUMIN 4.20 11/18/2019    BILITOT 0.3 11/18/2019    ALKPHOS 48 11/18/2019    AST 18 11/18/2019    ALT 11 11/18/2019     HbA1c:  No results found for: HGBA1C  Microalbumin:  Lab Results   Component Value Date    MICROALBUR <1.2 05/20/2019     Lipid Panel  Lab Results   Component Value Date    CHOL 189 11/18/2019    TRIG 135 11/18/2019    HDL 68 (H) 11/18/2019    LDL 94 11/18/2019    AST 18 11/18/2019    ALT 11 11/18/2019       Medication Review: Medications reviewed and noted  Patient Instructions   Problem List Items Addressed This Visit        Cardiovascular and Mediastinum    Coronary artery calcification of native artery (Chronic)    Overview     11/27/2019 Bernadette Somers MD    Stress test in September 2019 showed some calcification of the coronary arteries on the CT portion of the test.  The patient saw Dr. Hooker and was not felt to have significant coronary artery disease.    Continue 81 mg  enteric-coated aspirin daily.  Improve blood pressure control.  Improve low-fat diet.  Increase exercise.         Relevant Medications    metoprolol succinate XL (TOPROL-XL) 25 MG 24 hr tablet    Benign essential hypertension    Overview     11/27/2019 Bernadette Somers MD    Continue losartan and metoprolol daily.  Decrease salt in the diet.  Avoid sodas.  Decrease caffeine.    Monitor blood pressures at home and record the values to bring to your next appointment.  Goal is less than 135/85.         Relevant Medications    metoprolol succinate XL (TOPROL-XL) 25 MG 24 hr tablet    losartan (COZAAR) 100 MG tablet    Mixed hyperlipidemia    Overview     11/27/2019 Bernadette Somers MD    Continue to improve low-fat diet.  Start going to the [facility] to exercise 3 days a week.            Digestive    Class 2 severe obesity due to excess calories with serious comorbidity and body mass index (BMI) of 35.0 to 35.9 in adult (CMS/[facility]) (Chronic)    Overview     11/27/2019 Bernadette Somers MD    Start going to the  3 days a week to increase exercise and physical activity.  Improve low-fat and low sugar diet.  Try to lose about 4 pounds per month.  Weigh once a week on Monday mornings to monitor progress.         Vitamin D deficiency    Overview     11/27/2019 Bernadette Somers MD    Continue current dose of vitamin D.            Endocrine    Acquired hypothyroidism    Overview     11/27/2019 Bernadette Somers MD    Continue current dose of Synthroid.         Relevant Medications    SYNTHROID 125 MCG tablet    metoprolol succinate XL (TOPROL-XL) 25 MG 24 hr tablet       Nervous and Auditory    Breast tenderness in female    Overview     11/27/2019 Bernadette Somers MD    She was advised to decrease caffeine.         Atypical chest pain - Primary    Overview     11/27/2019 Bernadette Somers MD    Chest pain has low likelihood of being cardiac in origin.  It may be GI related.  It could be musculoskeletal.    She was advised  to check the blood pressures at home and if they are running more than 135/85 we will take steps to optimize control.      She was advised to decrease her caffeine intake.    We will do a trial of omeprazole daily for a month  to decrease acid reflux to see if that improves the chest pain.         Relevant Medications    omeprazole (priLOSEC) 40 MG capsule       Musculoskeletal and Integument    Osteopenia after menopause    Overview     11/27/2019 Bernadette Somers MD    Do some weightbearing exercises daily with walking and small hand weights at home.  Continue calcium and vitamin D.                    Diagnosis Plan   1. Atypical chest pain  omeprazole (priLOSEC) 40 MG capsule   2. Benign essential hypertension     3. Coronary artery calcification of native artery     4. Class 2 severe obesity due to excess calories with serious comorbidity and body mass index (BMI) of 35.0 to 35.9 in adult (CMS/[facility])     5. Mixed hyperlipidemia     6. Acquired hypothyroidism     7. Vitamin D deficiency     8. Osteopenia after menopause     9. Breast tenderness in female         Patient Instructions     Patient Instructions   Problem List Items Addressed This Visit        Cardiovascular and Mediastinum    Coronary artery calcification of native artery (Chronic)    Overview     11/27/2019 Bernadette Somers MD    Stress test in September 2019 showed some calcification of the coronary arteries on the CT portion of the test.  The patient saw Dr. Hooker and was not felt to have significant coronary artery disease.    Continue 81 mg enteric-coated aspirin daily.  Improve blood pressure control.  Improve low-fat diet.  Increase exercise.         Relevant Medications    metoprolol succinate XL (TOPROL-XL) 25 MG 24 hr tablet    Benign essential hypertension    Overview     11/27/2019 Bernadette Somers MD    Continue losartan and metoprolol daily.  Decrease salt in the diet.  Avoid sodas.  Decrease caffeine.    Monitor blood pressures at  home and record the values to bring to your next appointment.  Goal is less than 135/85.         Relevant Medications    metoprolol succinate XL (TOPROL-XL) 25 MG 24 hr tablet    losartan (COZAAR) 100 MG tablet    Mixed hyperlipidemia    Overview     11/27/2019 Bernadette Somers MD    Continue to improve low-fat diet.  Start going to the [facility] to exercise 3 days a week.            Digestive    Class 2 severe obesity due to excess calories with serious comorbidity and body mass index (BMI) of 35.0 to 35.9 in adult (CMS/HCC) (Chronic)    Overview     11/27/2019 Bernadette Somers MD    Start going to the  3 days a week to increase exercise and physical activity.  Improve low-fat and low sugar diet.  Try to lose about 4 pounds per month.  Weigh once a week on Monday mornings to monitor progress.         Vitamin D deficiency    Overview     11/27/2019 Bernadette Somers MD    Continue current dose of vitamin D.            Endocrine    Acquired hypothyroidism    Overview     11/27/2019 Bernadette Somers MD    Continue current dose of Synthroid.         Relevant Medications    SYNTHROID 125 MCG tablet    metoprolol succinate XL (TOPROL-XL) 25 MG 24 hr tablet       Nervous and Auditory    Breast tenderness in female    Overview     11/27/2019 Bernadette Somers MD    She was advised to decrease caffeine.         Atypical chest pain - Primary    Overview     11/27/2019 Bernadette Somers MD    Chest pain has low likelihood of being cardiac in origin.  It may be GI related.  It could be musculoskeletal.    She was advised to check the blood pressures at home and if they are running more than 135/85 we will take steps to optimize control.      She was advised to decrease her caffeine intake.    We will do a trial of omeprazole daily for a month  to decrease acid reflux to see if that improves the chest pain.         Relevant Medications    omeprazole (priLOSEC) 40 MG capsule       Musculoskeletal and Integument     Osteopenia after menopause    Overview     11/27/2019 Bernadette Somers MD    Do some weightbearing exercises daily with walking and small hand weights at home.  Continue calcium and vitamin D.                        Plan of care reviewed with patient at the conclusion of today's visit. Education was provided regarding diagnosis, management, and any prescribed or recommended OTC medications.Patient verbalizes understanding of and agreement with management plan.         Bernadette Somers MD

## 2019-11-27 NOTE — PATIENT INSTRUCTIONS
Patient Instructions   Problem List Items Addressed This Visit        Cardiovascular and Mediastinum    Coronary artery calcification of native artery (Chronic)    Overview     11/27/2019 Bernadette Somers MD    Stress test in September 2019 showed some calcification of the coronary arteries on the CT portion of the test.  The patient saw Dr. Hooker and was not felt to have significant coronary artery disease.    Continue 81 mg enteric-coated aspirin daily.  Improve blood pressure control.  Improve low-fat diet.  Increase exercise.         Relevant Medications    metoprolol succinate XL (TOPROL-XL) 25 MG 24 hr tablet    Benign essential hypertension    Overview     11/27/2019 Bernadette Somers MD    Continue losartan and metoprolol daily.  Decrease salt in the diet.  Avoid sodas.  Decrease caffeine.    Monitor blood pressures at home and record the values to bring to your next appointment.  Goal is less than 135/85.         Relevant Medications    metoprolol succinate XL (TOPROL-XL) 25 MG 24 hr tablet    losartan (COZAAR) 100 MG tablet    Mixed hyperlipidemia    Overview     11/27/2019 Bernadette Somers MD    Continue to improve low-fat diet.  Start going to the [facility] to exercise 3 days a week.            Digestive    Class 2 severe obesity due to excess calories with serious comorbidity and body mass index (BMI) of 35.0 to 35.9 in adult (CMS/[facility]) (Chronic)    Overview     11/27/2019 Bernadette Somers MD    Start going to the  3 days a week to increase exercise and physical activity.  Improve low-fat and low sugar diet.  Try to lose about 4 pounds per month.  Weigh once a week on Monday mornings to monitor progress.         Vitamin D deficiency    Overview     11/27/2019 Bernadette Somers MD    Continue current dose of vitamin D.            Endocrine    Acquired hypothyroidism    Overview     11/27/2019 eBrnadette Somers MD    Continue current dose of Synthroid.         Relevant Medications    SYNTHROID 125  MCG tablet    metoprolol succinate XL (TOPROL-XL) 25 MG 24 hr tablet       Nervous and Auditory    Breast tenderness in female    Overview     11/27/2019 Bernadette Somers MD    She was advised to decrease caffeine.         Atypical chest pain - Primary    Overview     11/27/2019 Bernadette Somers MD    Chest pain has low likelihood of being cardiac in origin.  It may be GI related.  It could be musculoskeletal.    She was advised to check the blood pressures at home and if they are running more than 135/85 we will take steps to optimize control.      She was advised to decrease her caffeine intake.    We will do a trial of omeprazole daily for a month  to decrease acid reflux to see if that improves the chest pain.         Relevant Medications    omeprazole (priLOSEC) 40 MG capsule       Musculoskeletal and Integument    Osteopenia after menopause    Overview     11/27/2019 Bernadette Somers MD    Do some weightbearing exercises daily with walking and small hand weights at home.  Continue calcium and vitamin D.

## 2019-12-03 ENCOUNTER — TRANSCRIBE ORDERS (OUTPATIENT)
Dept: ADMINISTRATIVE | Facility: HOSPITAL | Age: 79
End: 2019-12-03

## 2019-12-03 DIAGNOSIS — R92.8 ABNORMAL MAMMOGRAM: Primary | ICD-10-CM

## 2019-12-26 DIAGNOSIS — I25.119 CORONARY ARTERY DISEASE INVOLVING NATIVE CORONARY ARTERY OF NATIVE HEART WITH ANGINA PECTORIS (HCC): ICD-10-CM

## 2019-12-27 ENCOUNTER — OFFICE VISIT (OUTPATIENT)
Dept: INTERNAL MEDICINE | Facility: CLINIC | Age: 79
End: 2019-12-27

## 2019-12-27 VITALS
SYSTOLIC BLOOD PRESSURE: 130 MMHG | HEIGHT: 62 IN | BODY MASS INDEX: 35.7 KG/M2 | DIASTOLIC BLOOD PRESSURE: 84 MMHG | WEIGHT: 194 LBS | HEART RATE: 66 BPM

## 2019-12-27 DIAGNOSIS — R00.2 PALPITATIONS: ICD-10-CM

## 2019-12-27 DIAGNOSIS — I10 BENIGN ESSENTIAL HYPERTENSION: Primary | ICD-10-CM

## 2019-12-27 DIAGNOSIS — H40.053 BILATERAL OCULAR HYPERTENSION: ICD-10-CM

## 2019-12-27 DIAGNOSIS — I25.10 CORONARY ARTERY DISEASE INVOLVING NATIVE CORONARY ARTERY OF NATIVE HEART WITHOUT ANGINA PECTORIS: ICD-10-CM

## 2019-12-27 DIAGNOSIS — E78.2 MIXED HYPERLIPIDEMIA: ICD-10-CM

## 2019-12-27 PROCEDURE — 99214 OFFICE O/P EST MOD 30 MIN: CPT | Performed by: INTERNAL MEDICINE

## 2019-12-27 RX ORDER — ATORVASTATIN CALCIUM 20 MG/1
20 TABLET, FILM COATED ORAL
Qty: 90 TABLET | Refills: 0 | Status: SHIPPED | OUTPATIENT
Start: 2019-12-27 | End: 2020-02-21

## 2019-12-27 RX ORDER — METOPROLOL SUCCINATE 50 MG/1
50 TABLET, EXTENDED RELEASE ORAL DAILY
Qty: 90 TABLET | Refills: 1 | Status: SHIPPED | OUTPATIENT
Start: 2019-12-27 | End: 2020-01-20 | Stop reason: SDUPTHER

## 2019-12-27 NOTE — PROGRESS NOTES
[facility]     Serenity Lagos  1940   [id number]      Patient Care Team:  Bernadette Somers MD as PCP - General (Internal Medicine)  Syed Hooker MD as Consulting Physician (Cardiology)  Celio Iniguez MD as Consulting Physician (Ophthalmology)    Chief Complaint   Patient presents with   • Hypertension     f/u            HPI  Patient is a 79 y.o. female presents with f/u hypertension and hyperlipidemia        CHRONIC CONDITIONS  BP's at home 139 to 162/75 to 89. No exercise. States she is afraid to walk on uneven sidewalk, has had exercise equipment in the past and didn't use it. Doesn't go to gym because  needs their only car to go to meet his friends.     She does not salt her food extra but she does buy prepackaged processed foods.    She drinks a large amount of caffeine every day.    Past Medical History:   Diagnosis Date   • Cervicalgia    • Chronic bronchitis (CMS/HCC)    • Dysphagia    • Glaucoma, open angle    • Hearing loss    • Osteoarthritis     cervical spine degenerative   • Perforation of tympanic membrane    • Pruritus     chronic general pruritis   • Salivary gland cancer (CMS/HCC)    • Shingles     postherpetic neuralgia-thorax   • Spondylolisthesis      lumbar spine   • Wrist tendonitis        Past Surgical History:   Procedure Laterality Date   • APPENDECTOMY     • BREAST BIOPSY     • BREAST BIOPSY Right 07/29/2019   • BREAST CYST EXCISION     • ENDOSCOPY  2017    normal (per pt) EGD   • MOUTH SURGERY  2000   • TONSILLECTOMY         Family History   Problem Relation Age of Onset   • Hypertension Mother    • Heart failure Mother         CHF   • Coronary artery disease Mother         CABG   • Lung cancer Father 59        Heavy smoker and Black Lung   • Rheum arthritis Sister    • Other Sister 76        sepsis due to infected knee replacement   • COPD Sister         heavy smoker    • Alzheimer's disease Maternal Grandmother    • Coronary artery disease  "Maternal Grandfather    • Cancer Paternal Grandmother    • No Known Problems Paternal Grandfather    • Hypertension Other    • Obesity Other    • Stroke Maternal Aunt    • Stroke Maternal Aunt    • Stroke Maternal Aunt    • Coronary artery disease Maternal Uncle    • Stroke Maternal Uncle    • Breast cancer Neg Hx    • Ovarian cancer Neg Hx        Social History     Socioeconomic History   • Marital status:      Spouse name: Not on file   • Number of children: Not on file   • Years of education: Not on file   • Highest education level: Not on file   Tobacco Use   • Smoking status: Former Smoker     Years: 20.00     Types: Cigarettes     Last attempt to quit: 1980     Years since quittin.0   • Smokeless tobacco: Never Used   Substance and Sexual Activity   • Alcohol use: Yes     Alcohol/week: 1.0 - 2.0 standard drinks     Types: 1 - 2 Glasses of wine per week     Frequency: 4 or more times a week     Drinks per session: 1 or 2     Binge frequency: Never     Comment:  daily with dinner   • Drug use: No   • Sexual activity: Defer   Social History Narrative    Caffeine: 1 pot of coffee (8 cups); 1 coke (8 oz can) daily       No Known Allergies    Review of Systems:     Review of Systems   Constitutional: Negative for chills, fatigue and fever.   Respiratory: Negative for cough, shortness of breath and wheezing.    Cardiovascular: Negative for chest pain, palpitations and leg swelling.   Gastrointestinal: Negative for abdominal pain, blood in stool, constipation and diarrhea.   Genitourinary: Negative for dysuria and frequency.   Musculoskeletal: Negative for arthralgias and gait problem.       Vital Signs  Vitals:    19 1318   BP: 130/84   BP Location: Left arm   Patient Position: Sitting   Cuff Size: Adult   Pulse: 66   Weight: 88 kg (194 lb)   Height: 157.5 cm (62\")   PainSc: 0-No pain     Body mass index is 35.48 kg/m².      Current Outpatient Medications:   •  aspirin 81 MG EC tablet, Take 1 tablet " by mouth Daily., Disp: 30 tablet, Rfl: 3  •  Biotin 5000 MCG sublingual tablet, take 1 by oral route  every day, Disp: , Rfl:   •  Cholecalciferol (VITAMIN D3) 2000 units capsule, take 1 Capsule by Oral daily, Disp: , Rfl:   •  cyproheptadine (PERIACTIN) 4 MG tablet, Take 4 mg by mouth Daily As Needed., Disp: , Rfl:   •  Docusate Sodium 100 MG capsule, take 1 tablet by oral route twice daily, Disp: , Rfl:   •  Glucosamine-Chondroitin (OSTEO BI-FLEX REGULAR STRENGTH PO), 1 daily, Disp: , Rfl:   •  latanoprost (XALATAN) 0.005 % ophthalmic solution, Administer 1 drop to both eyes Every Night., Disp: , Rfl:   •  losartan (COZAAR) 100 MG tablet, TAKE 1 TABLET DAILY, Disp: 90 tablet, Rfl: 4  •  metoprolol succinate XL (TOPROL-XL) 50 MG 24 hr tablet, Take 1 tablet by mouth Daily., Disp: 90 tablet, Rfl: 1  •  omeprazole (priLOSEC) 40 MG capsule, Take 1 capsule by mouth Daily., Disp: 30 capsule, Rfl: 5  •  Probiotic Product (PROBIOTIC DAILY PO), One capsule daily, Disp: , Rfl:   •  raloxifene (EVISTA) 60 MG tablet, TAKE 1 TABLET DAILY, Disp: 90 tablet, Rfl: 4  •  SYNTHROID 125 MCG tablet, Take 125 mcg by mouth Daily., Disp: , Rfl:   •  Vitamin E 400 units tablet, Vitamin E 400 UNIT Oral Tablet; Patient Sig: Vitamin E 400 UNIT Oral Tablet TAKE 1 TABLET DAILY.; 0; 24-Sep-2013; Active, Disp: , Rfl:   •  atorvastatin (LIPITOR) 20 MG tablet, Take 1 tablet by mouth every night at bedtime. Obtain further refills at appointment with Dr. Hooker on 2/21/20, Disp: 90 tablet, Rfl: 0    Physical Exam:    Physical Exam   Constitutional: She is oriented to person, place, and time. She appears well-developed and well-nourished. She is obese.  HENT:   Head: Normocephalic.   Eyes: Pupils are equal, round, and reactive to light. Conjunctivae and EOM are normal.   Neck: Normal range of motion. Neck supple. No thyromegaly present.   Cardiovascular: Normal rate, regular rhythm, normal heart sounds and intact distal pulses.   Pulmonary/Chest:  Effort normal and breath sounds normal.   Musculoskeletal: Normal range of motion. She exhibits no edema.   Lymphadenopathy:     She has no cervical adenopathy.   Neurological: She is alert and oriented to person, place, and time.   Psychiatric: She has a normal mood and affect. Thought content normal.   Nursing note and vitals reviewed.       ACE III MINI        Results Review:    None    CMP:  Lab Results   Component Value Date    BUN 13 11/18/2019    CREATININE 0.71 11/18/2019    EGFRIFNONA 80 11/18/2019    BCR 18.3 11/18/2019     11/18/2019    K 4.5 11/18/2019    CO2 25.4 11/18/2019    CALCIUM 9.6 11/18/2019    ALBUMIN 4.20 11/18/2019    BILITOT 0.3 11/18/2019    ALKPHOS 48 11/18/2019    AST 18 11/18/2019    ALT 11 11/18/2019     HbA1c:  No results found for: HGBA1C  Microalbumin:  Lab Results   Component Value Date    MICROALBUR <1.2 05/20/2019     Lipid Panel  Lab Results   Component Value Date    CHOL 189 11/18/2019    TRIG 135 11/18/2019    HDL 68 (H) 11/18/2019    LDL 94 11/18/2019    AST 18 11/18/2019    ALT 11 11/18/2019       Medication Review: Medications reviewed and noted  Patient Instructions   Problem List Items Addressed This Visit        Cardiovascular and Mediastinum    Benign essential hypertension - Primary    Overview     12/27/2019 Bernadette Somers MD    Continue losartan 100mg daily.  Increase metoprolol to 50mg  daily.      Increase exercise and walking. Decrease salt in the diet.  Avoid sodas.  Decrease caffeine.    Monitor blood pressures at home and record the values to send to us in about 3 weeks.  Goal is less than 135/85.         Relevant Medications    losartan (COZAAR) 100 MG tablet    metoprolol succinate XL (TOPROL-XL) 50 MG 24 hr tablet    CAD (coronary artery disease)    Overview     · Myocardial perfusion stress test 9/23/2019 was normal: EF greater than 70%, no ischemia, coronary artery calcification noted on CT portion of stress test.    Echo with mild diastolic  dysfunction and mild to moderate TR.    12/27/2019 Bernadette Somers MD     Follow up with cardiologist, Dr. Hooker, as planned.                 Relevant Medications    aspirin 81 MG EC tablet    atorvastatin (LIPITOR) 20 MG tablet    metoprolol succinate XL (TOPROL-XL) 50 MG 24 hr tablet    Mixed hyperlipidemia    Overview     12/27/2019 Bernadette Somers MD    Continue to improve low-fat diet.  Start going to the [facility] to exercise 3 days a week.         Relevant Medications    atorvastatin (LIPITOR) 20 MG tablet    Palpitations    Overview     · 14-day heart monitor 9/4/2019: Wear time 12 days.  Average heart rate 82 bpm.  PACs less than 1% brief SVT longest 8 beats.    12/27/2019 Bernadette Somers MD    Take 50 mg metoprolol every  day.         Relevant Medications    metoprolol succinate XL (TOPROL-XL) 50 MG 24 hr tablet       Other    Bilateral ocular hypertension    Overview     12/27/2019 Bernadette Somers MD    Ocular pressures are now around 16 per the patient.    She will follow-up with Dr. Champagne every 4 months as planned.         Relevant Medications    latanoprost (XALATAN) 0.005 % ophthalmic solution             Diagnosis Plan   1. Benign essential hypertension  metoprolol succinate XL (TOPROL-XL) 50 MG 24 hr tablet   2. Palpitations  metoprolol succinate XL (TOPROL-XL) 50 MG 24 hr tablet   3. Mixed hyperlipidemia     4. Coronary artery disease involving native coronary artery of native heart without angina pectoris     5. Bilateral ocular hypertension         There are no Patient Instructions on file for this visit.    Plan of care reviewed with patient at the conclusion of today's visit. Education was provided regarding diagnosis, management, and any prescribed or recommended OTC medications.Patient verbalizes understanding of and agreement with management plan.         Bernadette Somers MD        Answers for HPI/ROS submitted by the patient on 12/20/2019   Hypertension  Chronicity:  recurrent  Onset: more than 1 year ago  Progression since onset: gradually worsening  Condition status: resistant  anxiety: Yes  peripheral edema: Yes  Agents associated with hypertension: estrogens, thyroid hormones  CAD risks: family history, obesity, post-menopausal state, sedentary lifestyle  Compliance problems: exercise

## 2019-12-27 NOTE — TELEPHONE ENCOUNTER
Patient last seen on 9/4/19 and will follow up on 2/21/20 with Dr. Hooker. Sent a refill for atorvastatin 20mg daily with instructions to obtain further refills at appointment with Dr. Hooker.     Jil Pierre, AugustinaD

## 2020-01-16 DIAGNOSIS — I25.119 CORONARY ARTERY DISEASE INVOLVING NATIVE CORONARY ARTERY OF NATIVE HEART WITH ANGINA PECTORIS (HCC): ICD-10-CM

## 2020-01-16 DIAGNOSIS — R00.2 PALPITATIONS: ICD-10-CM

## 2020-01-16 RX ORDER — ASPIRIN 81 MG/1
TABLET ORAL
Qty: 30 TABLET | Refills: 3 | OUTPATIENT
Start: 2020-01-16

## 2020-01-16 RX ORDER — METOPROLOL SUCCINATE 25 MG/1
TABLET, EXTENDED RELEASE ORAL
Qty: 90 TABLET | Refills: 1 | OUTPATIENT
Start: 2020-01-16

## 2020-01-16 NOTE — TELEPHONE ENCOUNTER
Pt has f/u with Dr. Hooker.  No f/u in H&V.  Refill requested routed to [facility].

## 2020-01-20 DIAGNOSIS — R00.2 PALPITATIONS: ICD-10-CM

## 2020-01-20 DIAGNOSIS — I25.119 CORONARY ARTERY DISEASE INVOLVING NATIVE CORONARY ARTERY OF NATIVE HEART WITH ANGINA PECTORIS (HCC): ICD-10-CM

## 2020-01-20 DIAGNOSIS — I10 BENIGN ESSENTIAL HYPERTENSION: ICD-10-CM

## 2020-01-20 RX ORDER — ASPIRIN 81 MG/1
81 TABLET ORAL DAILY
Qty: 90 TABLET | Refills: 3 | Status: SHIPPED | OUTPATIENT
Start: 2020-01-20

## 2020-01-20 RX ORDER — METOPROLOL SUCCINATE 50 MG/1
50 TABLET, EXTENDED RELEASE ORAL DAILY
Qty: 90 TABLET | Refills: 3 | Status: SHIPPED | OUTPATIENT
Start: 2020-01-20 | End: 2021-02-01 | Stop reason: SDUPTHER

## 2020-02-20 PROBLEM — R06.09 DOE (DYSPNEA ON EXERTION): Status: ACTIVE | Noted: 2020-02-20

## 2020-02-20 PROBLEM — I25.118 CORONARY ARTERY DISEASE OF NATIVE ARTERY OF NATIVE HEART WITH STABLE ANGINA PECTORIS: Status: ACTIVE | Noted: 2019-09-25

## 2020-02-20 PROBLEM — R07.9 CHEST PAIN: Status: ACTIVE | Noted: 2019-11-27

## 2020-02-20 RX ORDER — LEVOTHYROXINE SODIUM 0.12 MG/1
TABLET ORAL
Qty: 90 TABLET | Refills: 4 | OUTPATIENT
Start: 2020-02-20

## 2020-02-20 RX ORDER — LEVOTHYROXINE SODIUM 125 MCG
125 TABLET ORAL DAILY
Qty: 90 TABLET | Refills: 1 | Status: SHIPPED | OUTPATIENT
Start: 2020-02-20 | End: 2020-02-28 | Stop reason: SDUPTHER

## 2020-02-20 NOTE — PROGRESS NOTES
OFFICE VISIT  NOTE  [facility] CARDIOLOGY      Name: Serenity Lagos    Date: 2020  MRN:  [id number]  :  1940      REFERRING/PRIMARY PROVIDER:  No ref. provider found    Chief Complaint   Patient presents with   • Chest Pain       HPI: Serenity Lagos is a 79 y.o. female who presents today for follow-up for coronary artery disease.  Patient presented on 2019 to the ER with complaints of chest pain.  Described as substernal chest discomfort, awoke patient from sleep.  She underwent exercise nuclear stress test 2019 which was normal, although did show coronary calcification on the CT portion.  Patient reports an episode of chest discomfort on 2019 while walking [place] the mall, she was evaluated at the ER, ruled out for ACS with normal EKG.  Followed up with heart valve clinic, stress test was ordered which was normal as stated above.  She reports daily short-lived sharp/electrical pains, focal can be right-sided or left-sided she is not sure if it is possible musculoskeletal.  Denies exertional chest pain or pressure.  She gets occasional shortness of breath when walking up inclines or stairs.  Denies lower extremity edema, PND, or orthopnea.  Is fairly sedentary at home, does not exercise routinely. She does not want to take Lipitor due to perceived side effects.  No history of CVA or MI.  Palpitations have drastically improved after PCP increase metoprolol to 50 mg daily.  She is tolerating that dose well.  Denies exertional chest pain or shortness of breath.    Past Medical History:   Diagnosis Date   • Cervicalgia    • Chronic bronchitis (CMS/HCC)    • Coronary artery disease    • Dysphagia    • Glaucoma, open angle    • Hearing loss    • Hypertension    • Mixed hyperlipidemia    • Osteoarthritis     cervical spine degenerative   • Perforation of tympanic membrane    • Pruritus     chronic general pruritis   • Salivary gland cancer (CMS/HCC)    • Shingles      postherpetic neuralgia-thorax   • Spondylolisthesis      lumbar spine   • Wrist tendonitis        Past Surgical History:   Procedure Laterality Date   • APPENDECTOMY     • BREAST BIOPSY     • BREAST BIOPSY Right 2019   • BREAST CYST EXCISION     • ENDOSCOPY  2017    normal (per pt) EGD   • MOUTH SURGERY     • TONSILLECTOMY         Social History     Socioeconomic History   • Marital status:      Spouse name: Not on file   • Number of children: Not on file   • Years of education: Not on file   • Highest education level: Not on file   Tobacco Use   • Smoking status: Former Smoker     Packs/day: 1.00     Years: 20.00     Pack years: 20.00     Types: Cigarettes     Last attempt to quit:      Years since quittin.1   • Smokeless tobacco: Never Used   Substance and Sexual Activity   • Alcohol use: Yes     Alcohol/week: 7.0 standard drinks     Types: 7 Glasses of wine per week     Frequency: 4 or more times a week     Drinks per session: 1 or 2     Binge frequency: Never     Comment:  daily with dinner   • Drug use: No   • Sexual activity: Never   Social History Narrative    Caffeine: 1 pot of coffee (8 cups); 1 coke (8 oz can) daily       Family History   Problem Relation Age of Onset   • Hypertension Mother    • Heart failure Mother         CHF   • Coronary artery disease Mother         CABG   • Lung cancer Father 59        Heavy smoker and Black Lung   • Rheum arthritis Sister    • Other Sister 76        sepsis due to infected knee replacement   • COPD Sister         heavy smoker    • Alzheimer's disease Maternal Grandmother    • Coronary artery disease Maternal Grandfather    • Cancer Paternal Grandmother    • Heart disease Paternal Grandfather    • Hypertension Other    • Obesity Other    • Stroke Maternal Aunt    • Heart disease Maternal Aunt    • Stroke Maternal Aunt    • Stroke Maternal Aunt    • Coronary artery disease Maternal Uncle    • Stroke Maternal Uncle    • Heart disease  Maternal Uncle    • Breast cancer Neg Hx    • Ovarian cancer Neg Hx         ROS:   Constitutional no fever,  no weight loss   Skin no rash, no subcutaneous nodules   Otolaryngeal no difficulty swallowing   Cardiovascular See HPI   Pulmonary no cough, no sputum production   Gastrointestinal no constipation, no diarrhea   Genitourinary no dysuria, no hematuria   Hematologic no easy bruisability, no abnormal bleeding   Musculoskeletal no muscle pain   Neurologic no dizziness, no falls         No Known Allergies      Current Outpatient Medications:   •  aspirin 81 MG EC tablet, Take 1 tablet by mouth Daily., Disp: 90 tablet, Rfl: 3  •  Biotin 5000 MCG sublingual tablet, take 1 by oral route  every day, Disp: , Rfl:   •  Cholecalciferol (VITAMIN D3) 2000 units capsule, take 1 Capsule by Oral daily, Disp: , Rfl:   •  cyproheptadine (PERIACTIN) 4 MG tablet, Take 4 mg by mouth Daily As Needed., Disp: , Rfl:   •  Docusate Sodium 100 MG capsule, take 1 tablet by oral route twice daily, Disp: , Rfl:   •  Glucosamine-Chondroitin (OSTEO BI-FLEX REGULAR STRENGTH PO), 1 daily, Disp: , Rfl:   •  latanoprost (XALATAN) 0.005 % ophthalmic solution, Administer 1 drop to both eyes Every Night., Disp: , Rfl:   •  losartan (COZAAR) 100 MG tablet, TAKE 1 TABLET DAILY, Disp: 90 tablet, Rfl: 4  •  metoprolol succinate XL (TOPROL-XL) 50 MG 24 hr tablet, Take 1 tablet by mouth Daily., Disp: 90 tablet, Rfl: 3  •  omeprazole (priLOSEC) 40 MG capsule, Take 1 capsule by mouth Daily., Disp: 30 capsule, Rfl: 5  •  Probiotic Product (PROBIOTIC DAILY PO), One capsule daily, Disp: , Rfl:   •  raloxifene (EVISTA) 60 MG tablet, TAKE 1 TABLET DAILY, Disp: 90 tablet, Rfl: 4  •  SYNTHROID 125 MCG tablet, Take 1 tablet by mouth Daily., Disp: 90 tablet, Rfl: 1  •  Vitamin E 400 units tablet, Vitamin E 400 UNIT Oral Tablet; Patient Sig: Vitamin E 400 UNIT Oral Tablet TAKE 1 TABLET DAILY.; 0; 24-Sep-2013; Active, Disp: , Rfl:     Vitals:    02/21/20 1157   BP:  "120/64   BP Location: Right arm   Patient Position: Sitting   Pulse: 68   Weight: 84.4 kg (186 lb)   Height: 157.5 cm (62\")     Body mass index is 34.02 kg/m².    PHYSICAL EXAM:    General Appearance:   · well developed  · well nourished  HENT:   · oropharynx moist  · lips not cyanotic  Neck:  · thyroid not enlarged  · supple  Respiratory:  · no respiratory distress  · normal breath sounds  · no rales  Cardiovascular:  · no jugular venous distention  · regular rhythm  · apical impulse normal  · S1 normal, S2 normal  · no S3, no S4   · no murmur  · no rub, no thrill  · carotid pulses normal; no bruit  · pedal pulses normal  · lower extremity edema: none    Gastrointestinal:   · bowel sounds normal  · non-tender  · no hepatomegaly, no splenomegaly  Musculoskeletal:  · no clubbing of fingers.   · normocephalic, head atraumatic  Skin:   · warm, dry  Psychiatric:  · judgement and insight appropriate  · normal mood and affect    RESULTS:   Procedures    Results for orders placed during the hospital encounter of 11/21/19   Adult Transthoracic Echo Complete W/ Cont if Necessary Per Protocol    Narrative · Calculated EF = 69.0%.  · Left ventricular diastolic dysfunction (grade I a) consistent with   impaired relaxation.  · Left ventricular systolic function is normal.  · Mild to moderate tricuspid valve regurgitation is present.  · Estimated right ventricular systolic pressure from tricuspid   regurgitation is normal (<35 mmHg  · Mild pulmonic valve regurgitation is present.            Labs:  Lab Results   Component Value Date    CHOL 189 11/18/2019    TRIG 135 11/18/2019    HDL 68 (H) 11/18/2019    LDL 94 11/18/2019    AST 18 11/18/2019    ALT 11 11/18/2019     No results found for: HGBA1C  No components found for: CREATINININE  eGFR Non  Amer   Date Value Ref Range Status   11/18/2019 80 >60 mL/min/1.73 Final   08/27/2019 76 >60 mL/min/1.73 Final   05/20/2019 77 >60 mL/min/1.73 Final         ASSESSMENT:  Problem " List Items Addressed This Visit        Cardiovascular and Mediastinum    Benign essential hypertension    Overview     12/27/2019 Bernadette Somers MD    Continue losartan 100mg daily.  Increase metoprolol to 50mg  daily.      Increase exercise and walking. Decrease salt in the diet.  Avoid sodas.  Decrease caffeine.    Monitor blood pressures at home and record the values to send to us in about 3 weeks.  Goal is less than 135/85.         Coronary artery disease of native artery of native heart with stable angina pectoris (CMS/HCC)    Overview     · Myocardial perfusion stress test 9/23/2019 was normal: EF greater than 70%, no ischemia, coronary artery calcification noted on CT portion of stress test.    Echo with mild diastolic dysfunction and mild to moderate TR.    12/27/2019 Bernadette Somers MD     Follow up with cardiologist, Dr. Hooker, as planned.                 Mixed hyperlipidemia - Primary    Palpitations    Overview     · 14-day heart monitor 9/4/2019: Wear time 12 days.  Average heart rate 82 bpm.  PACs less than 1% brief SVT longest 8 beats.              Respiratory    KING (dyspnea on exertion)    Overview     11/21/19 Echo  · LVEF = 69.0%.  · Left ventricular diastolic dysfunction (grade I a) consistent with impaired relaxation.  · Mild to moderate tricuspid valve regurgitation is present.            Nervous and Auditory    Chest pain    Overview     9/23/2019 Stress test   · LVEF > 70%  · Myocardial perfusion imaging indicates a normal myocardial perfusion study with no evidence of ischemia.  · There was coronary artery calcification noted on the CT portion of the stress test.  · Impressions are consistent with a low risk study.               PLAN:    1.  Chest pain:  Atypical likely due to palpitations, resolved with increased dose of metoprolol   stress test from 9/23/2019 reviewed, low risk  She does have coronary calcification but they are likely extravascular.  Continue monitoring    2.   Palpitations/paroxysmal SVT:  Advised patient decrease caffeine intake, she currently drinks 1 pot of coffee daily  Decrease caffeine, increase water intake  Continue metoprolol at current dose  Increase exercise    3.  Hyperlipidemia:  Recent lipid panel reviewed, as she has no history of CVA or microinfarction, I do not feel strongly that she needs statin therapy.  She does not want to take a statin due to perceived side effects  Work on diet control low cholesterol diet.    4.  Dyspnea on exertion:  Echocardiogram 11/21/19 reviewed, LVEF=69%, grade I diastolic dysfunction.       Return to clinic in 6 months or sooner as needed    Thank you for the opportunity to share in the care of your patient; please do not hesitate to call me with any questions.     Syed oHoker MD, FACC  Office: [phone] [address] [id number]

## 2020-02-20 NOTE — TELEPHONE ENCOUNTER
Patient called requesting a refill for  SYNTHROID 125 MCG tablet.    Please advise.  Call back:[phone]    Pharmacy:  [facility]/pharmacy #[id number] - [facility] [address]

## 2020-02-21 ENCOUNTER — OFFICE VISIT (OUTPATIENT)
Dept: CARDIOLOGY | Facility: CLINIC | Age: 80
End: 2020-02-21

## 2020-02-21 VITALS
DIASTOLIC BLOOD PRESSURE: 64 MMHG | BODY MASS INDEX: 34.23 KG/M2 | HEART RATE: 68 BPM | WEIGHT: 186 LBS | HEIGHT: 62 IN | SYSTOLIC BLOOD PRESSURE: 120 MMHG

## 2020-02-21 DIAGNOSIS — I10 BENIGN ESSENTIAL HYPERTENSION: ICD-10-CM

## 2020-02-21 DIAGNOSIS — E78.2 MIXED HYPERLIPIDEMIA: Primary | ICD-10-CM

## 2020-02-21 DIAGNOSIS — R07.9 CHEST PAIN, UNSPECIFIED TYPE: ICD-10-CM

## 2020-02-21 DIAGNOSIS — I25.118 CORONARY ARTERY DISEASE OF NATIVE ARTERY OF NATIVE HEART WITH STABLE ANGINA PECTORIS (HCC): ICD-10-CM

## 2020-02-21 DIAGNOSIS — R06.09 DOE (DYSPNEA ON EXERTION): ICD-10-CM

## 2020-02-21 DIAGNOSIS — R00.2 PALPITATIONS: ICD-10-CM

## 2020-02-21 PROCEDURE — 99214 OFFICE O/P EST MOD 30 MIN: CPT | Performed by: INTERNAL MEDICINE

## 2020-02-27 ENCOUNTER — HOSPITAL ENCOUNTER (OUTPATIENT)
Dept: MAMMOGRAPHY | Facility: HOSPITAL | Age: 80
Discharge: HOME | End: 2020-02-27
Admitting: INTERNAL MEDICINE

## 2020-02-27 ENCOUNTER — HOSPITAL ENCOUNTER (OUTPATIENT)
Dept: ULTRASOUND IMAGING | Facility: HOSPITAL | Age: 80
Discharge: HOME | End: 2020-02-27

## 2020-02-27 DIAGNOSIS — R92.8 ABNORMAL MAMMOGRAM: ICD-10-CM

## 2020-02-27 PROCEDURE — G0279 TOMOSYNTHESIS, MAMMO: HCPCS | Performed by: RADIOLOGY

## 2020-02-27 PROCEDURE — 76642 ULTRASOUND BREAST LIMITED: CPT | Performed by: RADIOLOGY

## 2020-02-27 PROCEDURE — G0279 TOMOSYNTHESIS, MAMMO: HCPCS

## 2020-02-27 PROCEDURE — 76642 ULTRASOUND BREAST LIMITED: CPT

## 2020-02-27 PROCEDURE — 77066 DX MAMMO INCL CAD BI: CPT | Performed by: RADIOLOGY

## 2020-02-27 PROCEDURE — 77066 DX MAMMO INCL CAD BI: CPT

## 2020-02-28 RX ORDER — LEVOTHYROXINE SODIUM 125 MCG
125 TABLET ORAL DAILY
Qty: 90 TABLET | Refills: 1 | Status: SHIPPED | OUTPATIENT
Start: 2020-02-28 | End: 2020-04-30

## 2020-04-30 ENCOUNTER — TELEPHONE (OUTPATIENT)
Dept: INTERNAL MEDICINE | Facility: CLINIC | Age: 80
End: 2020-04-30

## 2020-04-30 RX ORDER — LEVOTHYROXINE SODIUM 125 MCG
125 TABLET ORAL DAILY
Qty: 90 TABLET | Refills: 1 | Status: CANCELLED | OUTPATIENT
Start: 2020-04-30

## 2020-04-30 RX ORDER — LEVOTHYROXINE SODIUM 0.12 MG/1
125 TABLET ORAL DAILY
Qty: 90 TABLET | Refills: 1 | Status: SHIPPED | OUTPATIENT
Start: 2020-04-30 | End: 2020-10-20

## 2020-04-30 NOTE — TELEPHONE ENCOUNTER
I took out the brand-name prescription and put in levothyroxine generic and sent that to her mail order.  Please let her know we are sorry, that was a mistake.

## 2020-04-30 NOTE — TELEPHONE ENCOUNTER
"Patient states she normally gets the generic levothyroxine. The synthroid was sent in February at \"[facility]". Can the prescription be sent in as generic?  "

## 2020-04-30 NOTE — TELEPHONE ENCOUNTER
Pt called in med refill on levothyroxine please send to the [facility] - [place], [place] - [address] [place] [address] - [phone] [facility] [phone]       Please advise [phone]

## 2020-05-15 ENCOUNTER — TELEPHONE (OUTPATIENT)
Dept: INTERNAL MEDICINE | Facility: CLINIC | Age: 80
End: 2020-05-15

## 2020-05-15 NOTE — TELEPHONE ENCOUNTER
I put in lab order.  She may do the labs on the same day she is here or any day before that.  Tell her to go to the lab next-door to us

## 2020-05-21 ENCOUNTER — LAB (OUTPATIENT)
Dept: LAB | Facility: HOSPITAL | Age: 80
End: 2020-05-21

## 2020-05-21 DIAGNOSIS — E78.2 MIXED HYPERLIPIDEMIA: ICD-10-CM

## 2020-05-21 DIAGNOSIS — E03.9 ACQUIRED HYPOTHYROIDISM: ICD-10-CM

## 2020-05-21 DIAGNOSIS — R20.2 NUMBNESS AND TINGLING OF BOTH LEGS BELOW KNEES: ICD-10-CM

## 2020-05-21 DIAGNOSIS — E55.9 VITAMIN D DEFICIENCY: ICD-10-CM

## 2020-05-21 DIAGNOSIS — R20.0 NUMBNESS AND TINGLING OF BOTH LEGS BELOW KNEES: ICD-10-CM

## 2020-05-21 DIAGNOSIS — I10 BENIGN ESSENTIAL HYPERTENSION: ICD-10-CM

## 2020-05-21 LAB
25(OH)D3 SERPL-MCNC: 42.1 NG/ML (ref 30–100)
ALBUMIN SERPL-MCNC: 3.8 G/DL (ref 3.5–5.2)
ALBUMIN/GLOB SERPL: 1.1 G/DL
ALP SERPL-CCNC: 49 U/L (ref 39–117)
ALT SERPL W P-5'-P-CCNC: 15 U/L (ref 1–33)
ANION GAP SERPL CALCULATED.3IONS-SCNC: 9.6 MMOL/L (ref 5–15)
AST SERPL-CCNC: 16 U/L (ref 1–32)
BASOPHILS # BLD AUTO: 0.04 10*3/MM3 (ref 0–0.2)
BASOPHILS NFR BLD AUTO: 0.5 % (ref 0–1.5)
BILIRUB SERPL-MCNC: 0.3 MG/DL (ref 0.2–1.2)
BUN BLD-MCNC: 14 MG/DL (ref 8–23)
BUN/CREAT SERPL: 18.7 (ref 7–25)
CALCIUM SPEC-SCNC: 9.3 MG/DL (ref 8.6–10.5)
CHLORIDE SERPL-SCNC: 100 MMOL/L (ref 98–107)
CHOLEST SERPL-MCNC: 174 MG/DL (ref 0–200)
CO2 SERPL-SCNC: 26.4 MMOL/L (ref 22–29)
CREAT BLD-MCNC: 0.75 MG/DL (ref 0.57–1)
DEPRECATED RDW RBC AUTO: 39.7 FL (ref 37–54)
EOSINOPHIL # BLD AUTO: 0.17 10*3/MM3 (ref 0–0.4)
EOSINOPHIL NFR BLD AUTO: 2 % (ref 0.3–6.2)
ERYTHROCYTE [DISTWIDTH] IN BLOOD BY AUTOMATED COUNT: 12.4 % (ref 12.3–15.4)
GFR SERPL CREATININE-BSD FRML MDRD: 75 ML/MIN/1.73
GLOBULIN UR ELPH-MCNC: 3.6 GM/DL
GLUCOSE BLD-MCNC: 95 MG/DL (ref 65–99)
HCT VFR BLD AUTO: 36.6 % (ref 34–46.6)
HDLC SERPL-MCNC: 62 MG/DL (ref 40–60)
HGB BLD-MCNC: 12.6 G/DL (ref 12–15.9)
IMM GRANULOCYTES # BLD AUTO: 0.03 10*3/MM3 (ref 0–0.05)
IMM GRANULOCYTES NFR BLD AUTO: 0.3 % (ref 0–0.5)
LDLC SERPL CALC-MCNC: 90 MG/DL (ref 0–100)
LDLC/HDLC SERPL: 1.45 {RATIO}
LYMPHOCYTES # BLD AUTO: 2.67 10*3/MM3 (ref 0.7–3.1)
LYMPHOCYTES NFR BLD AUTO: 31.1 % (ref 19.6–45.3)
MCH RBC QN AUTO: 30.7 PG (ref 26.6–33)
MCHC RBC AUTO-ENTMCNC: 34.4 G/DL (ref 31.5–35.7)
MCV RBC AUTO: 89.1 FL (ref 79–97)
MONOCYTES # BLD AUTO: 0.9 10*3/MM3 (ref 0.1–0.9)
MONOCYTES NFR BLD AUTO: 10.5 % (ref 5–12)
NEUTROPHILS # BLD AUTO: 4.78 10*3/MM3 (ref 1.7–7)
NEUTROPHILS NFR BLD AUTO: 55.6 % (ref 42.7–76)
NRBC BLD AUTO-RTO: 0 /100 WBC (ref 0–0.2)
PLATELET # BLD AUTO: 309 10*3/MM3 (ref 140–450)
PMV BLD AUTO: 11.4 FL (ref 6–12)
POTASSIUM BLD-SCNC: 4.5 MMOL/L (ref 3.5–5.2)
PROT SERPL-MCNC: 7.4 G/DL (ref 6–8.5)
RBC # BLD AUTO: 4.11 10*6/MM3 (ref 3.77–5.28)
SODIUM BLD-SCNC: 136 MMOL/L (ref 136–145)
T4 FREE SERPL-MCNC: 1.68 NG/DL (ref 0.93–1.7)
TRIGL SERPL-MCNC: 111 MG/DL (ref 0–150)
TSH SERPL DL<=0.05 MIU/L-ACNC: 1.1 UIU/ML (ref 0.27–4.2)
VIT B12 BLD-MCNC: 308 PG/ML (ref 211–946)
VLDLC SERPL-MCNC: 22.2 MG/DL (ref 5–40)
WBC NRBC COR # BLD: 8.59 10*3/MM3 (ref 3.4–10.8)

## 2020-05-21 PROCEDURE — 82306 VITAMIN D 25 HYDROXY: CPT

## 2020-05-21 PROCEDURE — 82607 VITAMIN B-12: CPT

## 2020-05-21 PROCEDURE — 84439 ASSAY OF FREE THYROXINE: CPT

## 2020-05-21 PROCEDURE — 84443 ASSAY THYROID STIM HORMONE: CPT

## 2020-05-21 PROCEDURE — 80053 COMPREHEN METABOLIC PANEL: CPT

## 2020-05-21 PROCEDURE — 85025 COMPLETE CBC W/AUTO DIFF WBC: CPT

## 2020-05-21 PROCEDURE — 80061 LIPID PANEL: CPT

## 2020-05-27 ENCOUNTER — OFFICE VISIT (OUTPATIENT)
Dept: INTERNAL MEDICINE | Facility: CLINIC | Age: 80
End: 2020-05-27

## 2020-05-27 DIAGNOSIS — E53.8 LOW VITAMIN B12 LEVEL: ICD-10-CM

## 2020-05-27 DIAGNOSIS — R20.0 NUMBNESS AND TINGLING OF BOTH LEGS BELOW KNEES: ICD-10-CM

## 2020-05-27 DIAGNOSIS — I25.118 CORONARY ARTERY DISEASE OF NATIVE ARTERY OF NATIVE HEART WITH STABLE ANGINA PECTORIS (HCC): ICD-10-CM

## 2020-05-27 DIAGNOSIS — M85.80 OSTEOPENIA AFTER MENOPAUSE: ICD-10-CM

## 2020-05-27 DIAGNOSIS — E66.01 CLASS 2 SEVERE OBESITY DUE TO EXCESS CALORIES WITH SERIOUS COMORBIDITY AND BODY MASS INDEX (BMI) OF 35.0 TO 35.9 IN ADULT (HCC): Chronic | ICD-10-CM

## 2020-05-27 DIAGNOSIS — E55.9 VITAMIN D DEFICIENCY: ICD-10-CM

## 2020-05-27 DIAGNOSIS — R00.2 PALPITATIONS: ICD-10-CM

## 2020-05-27 DIAGNOSIS — R20.0 NUMBNESS OF ARM: ICD-10-CM

## 2020-05-27 DIAGNOSIS — I10 BENIGN ESSENTIAL HYPERTENSION: Primary | ICD-10-CM

## 2020-05-27 DIAGNOSIS — E03.9 ACQUIRED HYPOTHYROIDISM: ICD-10-CM

## 2020-05-27 DIAGNOSIS — Z78.0 OSTEOPENIA AFTER MENOPAUSE: ICD-10-CM

## 2020-05-27 DIAGNOSIS — E78.2 MIXED HYPERLIPIDEMIA: ICD-10-CM

## 2020-05-27 DIAGNOSIS — R20.2 NUMBNESS AND TINGLING OF BOTH LEGS BELOW KNEES: ICD-10-CM

## 2020-05-27 PROBLEM — M54.2 CERVICALGIA: Chronic | Status: ACTIVE | Noted: 2020-05-27

## 2020-05-27 PROBLEM — R79.89 LOW VITAMIN B12 LEVEL: Status: ACTIVE | Noted: 2020-05-27

## 2020-05-27 PROCEDURE — 99214 OFFICE O/P EST MOD 30 MIN: CPT | Performed by: INTERNAL MEDICINE

## 2020-05-27 RX ORDER — ASCORBIC ACID 1000 MG
1 TABLET, EXTENDED RELEASE ORAL DAILY
COMMUNITY
Start: 2020-05-07 | End: 2022-10-25

## 2020-05-27 NOTE — PATIENT INSTRUCTIONS
Patient Instructions   Problem List Items Addressed This Visit        Cardiovascular and Mediastinum    Benign essential hypertension - Primary    Overview     5/27/2020 Bernadette Somers MD    Continue losartan 100mg and metoprolol  50mg  daily.      Try to resume exercise and walking.  Continue to avoid salt in the diet.  Avoid sodas.  Decrease caffeine.             Relevant Medications    losartan (COZAAR) 100 MG tablet    metoprolol succinate XL (TOPROL-XL) 50 MG 24 hr tablet    Other Relevant Orders    CBC & Differential (Completed)    Comprehensive Metabolic Panel (Completed)    Coronary artery disease of native artery of native heart with stable angina pectoris (CMS/[facility])    Overview     · Myocardial perfusion stress test 9/23/2019 was normal: EF greater than 70%, no ischemia, coronary artery calcification noted on CT portion of stress test.    Echo with mild diastolic dysfunction and mild to moderate TR.    5/27/2020 Bernadette Somers MD     Continue metoprolol and losartan and daily aspirin.    Follow up with cardiologist, Dr. Hooker, as planned.                 Relevant Medications    aspirin 81 MG EC tablet    metoprolol succinate XL (TOPROL-XL) 50 MG 24 hr tablet    Mixed hyperlipidemia    Overview     5/27/2020 Bernadette Somers MD    Continue low-fat diet.  Try to resume regular exercise and walking as the foot pain improves.         Relevant Orders    Lipid Panel (Completed)    Palpitations    Overview     · 14-day heart monitor 9/4/2019: Wear time 12 days.  Average heart rate 82 bpm.  PACs less than 1% brief SVT longest 8 beats.    5/27/2020 Bernadette Somers MD    Avoid too much caffeine and chocolate.  Drink plenty of water.           Relevant Medications    metoprolol succinate XL (TOPROL-XL) 50 MG 24 hr tablet       Digestive    Class 2 severe obesity due to excess calories with serious comorbidity and body mass index (BMI) of 35.0 to 35.9 in adult (CMS/[facility]) (Chronic)    Overview     5/27/2020  Bernadette Somers MD    Try to increase exercise and physical activity.  Continue to improve low-fat and low sugar diet.  Try to lose about 4 pounds per month.  Weigh once a week on Monday mornings to monitor progress.         Vitamin D deficiency    Overview     5/27/2020 Bernadette Somers MD    Continue current dose of vitamin D3.         Relevant Orders    Vitamin D 25 Hydroxy (Completed)       Endocrine    Acquired hypothyroidism    Overview     5/27/2020 Bernadette Somers MD    Continue current dose of Synthroid.         Relevant Medications    metoprolol succinate XL (TOPROL-XL) 50 MG 24 hr tablet    levothyroxine (SYNTHROID, LEVOTHROID) 125 MCG tablet    Other Relevant Orders    TSH (Completed)    T4, Free (Completed)       Nervous and Auditory    Numbness and tingling of both legs below knees    Relevant Orders    Vitamin B12 (Completed)    Numbness of arm    Overview     5/27/2020 Bernadette Somers MD    Bilateral arm numbness made worse by working on the computer or lying in bed at night.  Arm numbness has been helped by steroid injections per Dr. Martini in the past.    Refer to Dr. Martini.         Relevant Orders    Ambulatory Referral to Neurology (Completed)       Musculoskeletal and Integument    Osteopenia after menopause    Overview     5/27/2020 Bernadette Somers MD    Do some weightbearing exercises daily with walking and small hand weights at home.  Continue calcium and vitamin D.              Hematopoietic and Hemostatic    Low vitamin B12 level    Overview     5/27/2020 Bernadette Somers MD    Resume taking of B12 tablet 1000 mcg daily.                Neck Exercises  Ask your health care provider which exercises are safe for you. Do exercises exactly as told by your health care provider and adjust them as directed. It is normal to feel mild stretching, pulling, tightness, or discomfort as you do these exercises. Stop right away if you feel sudden pain or your pain gets worse. Do not begin  these exercises until told by your health care provider.  Neck exercises can be important for many reasons. They can improve strength and maintain flexibility in your neck, which will help your upper back and prevent neck pain.  Stretching exercises  Rotation neck stretching    3. Sit in a chair or stand up.  4. Place your feet flat on the floor, shoulder width apart.  5. Slowly turn your head (rotate) to the right until a slight stretch is felt. Turn it all the way to the right so you can look over your right shoulder. Do not tilt or tip your head.  6. Hold this position for 10-30 seconds.  7. Slowly turn your head (rotate) to the left until a slight stretch is felt. Turn it all the way to the left so you can look over your left shoulder. Do not tilt or tip your head.  8. Hold this position for 10-30 seconds.  Repeat __________ times. Complete this exercise __________ times a day.  Neck retraction  1. Sit in a sturdy chair or stand up.  2. Look straight ahead. Do not bend your neck.  3. Use your fingers to push your chin backward (retraction). Do not bend your neck for this movement. Continue to face straight ahead. If you are doing the exercise properly, you will feel a slight sensation in your throat and a stretch at the back of your neck.  4. Hold the stretch for 1-2 seconds.  Repeat __________ times. Complete this exercise __________ times a day.  Strengthening exercises  Neck press  1. Lie on your back on a firm bed or on the floor with a pillow under your head.  2. Use your neck muscles to push your head down on the pillow and straighten your spine.  3. Hold the position as well as you can. Keep your head facing up (in a neutral position) and your chin tucked.  4. Slowly count to 5 while holding this position.  Repeat __________ times. Complete this exercise __________ times a day.  Isometrics  These are exercises in which you strengthen the muscles in your neck while keeping your neck still  (isometrics).  1. Sit in a supportive chair and place your hand on your forehead.  2. Keep your head and face facing straight ahead. Do not flex or extend your neck while doing isometrics.  3. Push forward with your head and neck while pushing back with your hand. Hold for 10 seconds.  4. Do the sequence again, this time putting your hand against the back of your head. Use your head and neck to push backward against the hand pressure.  5. Finally, do the same exercise on either side of your head, pushing sideways against the pressure of your hand.  Repeat __________ times. Complete this exercise __________ times a day.  Prone head lifts  1. Lie face-down (prone position), resting on your elbows so that your chest and upper back are raised.  2. Start with your head facing downward, near your chest. Position your chin either on or near your chest.  3. Slowly lift your head upward. Lift until you are looking straight ahead. Then continue lifting your head as far back as you can comfortably stretch.  4. Hold your head up for 5 seconds. Then slowly lower it to your starting position.  Repeat __________ times. Complete this exercise __________ times a day.  Supine head lifts  1. Lie on your back (supine position), bending your knees to point to the ceiling and keeping your feet flat on the floor.  2. Lift your head slowly off the floor, raising your chin toward your chest.  3. Hold for 5 seconds.  Repeat __________ times. Complete this exercise __________ times a day.  Scapular retraction  1. Stand with your arms at your sides. Look straight ahead.  2. Slowly pull both shoulders (scapulae) backward and downward (retraction) until you feel a stretch between your shoulder blades in your upper back.  3. Hold for 10-30 seconds.  4. Relax and repeat.  Repeat __________ times. Complete this exercise __________ times a day.  Contact a health care provider if:  · Your neck pain or discomfort gets much worse when you do an  exercise.  · Your neck pain or discomfort does not improve within 2 hours after you exercise.  If you have any of these problems, stop exercising right away. Do not do the exercises again unless your health care provider says that you can.  Get help right away if:  · You develop sudden, severe neck pain.  If this happens, stop exercising right away. Do not do the exercises again unless your health care provider says that you can.  This information is not intended to replace advice given to you by your health care provider. Make sure you discuss any questions you have with your health care provider.  Document Released: 11/28/2016 Document Revised: 10/16/2019 Document Reviewed: 10/16/2019  Elsevier Patient Education © 2020 [employer]

## 2020-05-27 NOTE — PROGRESS NOTES
[facility]     Serenity Lagos  1940   [id number]      Patient Care Team:  Bernadette Somers MD as PCP - General (Internal Medicine)  Syed Hooker MD as Consulting Physician (Cardiology)  Celio Iniguez MD as Consulting Physician (Ophthalmology)  Ender Jolly MD as Consulting Physician (Dermatology)    Chief Complaint   Patient presents with   • Hypertension     f/u   • Hyperlipidemia   • Referral     needs referral to neurology            HPI  Patient is a 79 y.o. female presents with numbness in both arms, L>R. Onset of symptoms was gradual starting 2 months ago.  Chronicity chronic recurrent. Severity moderate to severe.  Symptoms are associated with neck pain and tight muscles. Pertinent negatives -no pain in hands or arm.   Symptoms are aggravated by working at computer for extended time or lying in bed at night..   Symptoms improve with in past, Dr. Martini gave her steroid shots in L shoulder and numbness resolved..  Context Cervical spine arthritis and spurring.       CHRONIC CONDITIONS  Hypertension well controlled.  Tries to eat low salt.Takes meds regularly.    For hyperlipidemia, Eats low fat healthy diet usually. More comfort food over last couple months. Less active since spider bite on foot.     Hasn't exercised since spider bite on left foot. Was on antibiotic twice for cellulitis.Will f/u with Dr. Ender Jolly tomorrow.     Chronic low back pain is stable.    CAD-no chest pain  or increase in shortness of breath.Occasional mild palpitations that she relates to having more caffeine.    Past Medical History:   Diagnosis Date   • Cervicalgia    • Chronic bronchitis (CMS/HCC)    • Coronary artery disease    • Dysphagia    • Glaucoma, open angle    • Hearing loss    • Hypertension    • Mixed hyperlipidemia    • Osteoarthritis     cervical spine degenerative   • Perforation of tympanic membrane    • Pruritus     chronic general pruritis   • Salivary gland cancer (CMS/HCC)     • Shingles     postherpetic neuralgia-thorax   • Spondylolisthesis      lumbar spine   • Wrist tendonitis        Past Surgical History:   Procedure Laterality Date   • APPENDECTOMY     • BREAST BIOPSY     • BREAST BIOPSY Right 2019   • BREAST CYST EXCISION     • ENDOSCOPY  2017    normal (per pt) EGD   • MOUTH SURGERY     • TONSILLECTOMY         Family History   Problem Relation Age of Onset   • Hypertension Mother    • Heart failure Mother         CHF   • Coronary artery disease Mother         CABG   • Lung cancer Father 59        Heavy smoker and Black Lung   • Rheum arthritis Sister    • Other Sister 76        sepsis due to infected knee replacement   • COPD Sister         heavy smoker    • Alzheimer's disease Maternal Grandmother    • Coronary artery disease Maternal Grandfather    • Cancer Paternal Grandmother    • Heart disease Paternal Grandfather    • Hypertension Other    • Obesity Other    • Stroke Maternal Aunt    • Heart disease Maternal Aunt    • Stroke Maternal Aunt    • Stroke Maternal Aunt    • Coronary artery disease Maternal Uncle    • Stroke Maternal Uncle    • Heart disease Maternal Uncle    • Breast cancer Neg Hx    • Ovarian cancer Neg Hx        Social History     Socioeconomic History   • Marital status:      Spouse name: Not on file   • Number of children: Not on file   • Years of education: Not on file   • Highest education level: Not on file   Tobacco Use   • Smoking status: Former Smoker     Packs/day: 1.00     Years: 20.00     Pack years: 20.00     Types: Cigarettes     Last attempt to quit: 1980     Years since quittin.4   • Smokeless tobacco: Never Used   Substance and Sexual Activity   • Alcohol use: Yes     Alcohol/week: 7.0 standard drinks     Types: 7 Glasses of wine per week     Frequency: 4 or more times a week     Drinks per session: 1 or 2     Binge frequency: Never     Comment:  daily with dinner   • Drug use: No   • Sexual activity: Never   Social  "History Narrative    Caffeine: 1 pot of coffee (8 cups); 1 coke (8 oz can) daily       No Known Allergies    Review of Systems:     Review of Systems   Constitutional: Negative for chills, fatigue and fever.   Respiratory: Negative for cough, shortness of breath and wheezing.    Cardiovascular: Negative for chest pain and palpitations.   Gastrointestinal: Negative for abdominal pain, blood in stool, constipation and diarrhea.   Musculoskeletal: Positive for neck pain. Negative for arthralgias.   Neurological: Positive for numbness.       Vital Signs  Vitals:    05/27/20 1444 05/27/20 1516 05/28/20 1324   BP: 120/78 126/78    BP Location: Left arm Left arm    Patient Position: Sitting Sitting    Cuff Size: Adult Adult    Pulse: 68     Temp: 98.4 °F (36.9 °C)     TempSrc: Temporal     Weight: 89.9 kg (198 lb 3.2 oz)  87.1 kg (192 lb)  Comment: Patient reported   Height: 157.5 cm (62\")     PainSc: 0-No pain       Body mass index is 35.12 kg/m².      Current Outpatient Medications:   •  Ascorbic Acid (VITAMIN C ER) 1000 MG tablet controlled-release, 1 tablet Daily., Disp: , Rfl:   •  aspirin 81 MG EC tablet, Take 1 tablet by mouth Daily., Disp: 90 tablet, Rfl: 3  •  Biotin 5000 MCG sublingual tablet, take 1 by oral route  every day, Disp: , Rfl:   •  Cholecalciferol (VITAMIN D3) 2000 units capsule, take 1 Capsule by Oral daily, Disp: , Rfl:   •  cyproheptadine (PERIACTIN) 4 MG tablet, Take 4 mg by mouth Daily As Needed., Disp: , Rfl:   •  Docusate Sodium 100 MG capsule, take 1 tablet by oral route twice daily, Disp: , Rfl:   •  Glucosamine-Chondroitin (OSTEO BI-FLEX REGULAR STRENGTH PO), 1 daily, Disp: , Rfl:   •  latanoprost (XALATAN) 0.005 % ophthalmic solution, Administer 1 drop to both eyes Every Night., Disp: , Rfl:   •  levothyroxine (SYNTHROID, LEVOTHROID) 125 MCG tablet, Take 1 tablet by mouth Daily., Disp: 90 tablet, Rfl: 1  •  losartan (COZAAR) 100 MG tablet, TAKE 1 TABLET DAILY, Disp: 90 tablet, Rfl: 4  •  " metoprolol succinate XL (TOPROL-XL) 50 MG 24 hr tablet, Take 1 tablet by mouth Daily., Disp: 90 tablet, Rfl: 3  •  omeprazole (priLOSEC) 40 MG capsule, Take 1 capsule by mouth Daily. (Patient taking differently: Take 40 mg by mouth Daily As Needed.), Disp: 30 capsule, Rfl: 5  •  Probiotic Product (PROBIOTIC DAILY PO), One capsule daily, Disp: , Rfl:   •  raloxifene (EVISTA) 60 MG tablet, TAKE 1 TABLET DAILY, Disp: 90 tablet, Rfl: 4  •  Vitamin E 400 units tablet, Vitamin E 400 UNIT Oral Tablet; Patient Sig: Vitamin E 400 UNIT Oral Tablet TAKE 1 TABLET DAILY.; 0; 24-Sep-2013; Active, Disp: , Rfl:     Physical Exam:    Physical Exam   Constitutional: She is oriented to person, place, and time. She appears well-developed and well-nourished. She is obese.  HENT:   Head: Normocephalic.   Eyes: Pupils are equal, round, and reactive to light. Conjunctivae and EOM are normal.   Neck: Normal range of motion. Neck supple. No thyromegaly present.   Cardiovascular: Normal rate, regular rhythm, normal heart sounds and intact distal pulses.   Pulmonary/Chest: Effort normal and breath sounds normal.   Musculoskeletal: Normal range of motion. She exhibits no edema.        Cervical back: She exhibits spasm. She exhibits no tenderness.   Lymphadenopathy:     She has no cervical adenopathy.   Neurological: She is alert and oriented to person, place, and time.   Psychiatric: She has a normal mood and affect. Thought content normal.   Nursing note and vitals reviewed.       ACE III MINI        Results Review:    None    CMP:  Lab Results   Component Value Date    BUN 14 05/21/2020    CREATININE 0.75 05/21/2020    EGFRIFNONA 75 05/21/2020    BCR 18.7 05/21/2020     05/21/2020    K 4.5 05/21/2020    CO2 26.4 05/21/2020    CALCIUM 9.3 05/21/2020    ALBUMIN 3.80 05/21/2020    BILITOT 0.3 05/21/2020    ALKPHOS 49 05/21/2020    AST 16 05/21/2020    ALT 15 05/21/2020     HbA1c:  No results found for: HGBA1C  Microalbumin:  Lab Results    Component Value Date    MICROALBUR <1.2 05/20/2019     Lipid Panel  Lab Results   Component Value Date    CHOL 174 05/21/2020    TRIG 111 05/21/2020    HDL 62 (H) 05/21/2020    LDL 90 05/21/2020    AST 16 05/21/2020    ALT 15 05/21/2020       Medication Review: Medications reviewed and noted  Patient Instructions   Problem List Items Addressed This Visit        Cardiovascular and Mediastinum    Benign essential hypertension - Primary    Overview     5/27/2020 Bernadette Somers MD    Continue losartan 100mg and metoprolol  50mg  daily.      Try to resume exercise and walking.  Continue to avoid salt in the diet.  Avoid sodas.  Decrease caffeine.             Relevant Medications    losartan (COZAAR) 100 MG tablet    metoprolol succinate XL (TOPROL-XL) 50 MG 24 hr tablet    Other Relevant Orders    CBC & Differential (Completed)    Comprehensive Metabolic Panel (Completed)    Coronary artery disease of native artery of native heart with stable angina pectoris (CMS/[facility])    Overview     · Myocardial perfusion stress test 9/23/2019 was normal: EF greater than 70%, no ischemia, coronary artery calcification noted on CT portion of stress test.    Echo with mild diastolic dysfunction and mild to moderate TR.    5/27/2020 Bernadette Somers MD     Continue metoprolol and losartan and daily aspirin.    Follow up with cardiologist, Dr. Hooker, as planned.                 Relevant Medications    aspirin 81 MG EC tablet    metoprolol succinate XL (TOPROL-XL) 50 MG 24 hr tablet    Mixed hyperlipidemia    Overview     5/27/2020 Bernadette Somers MD    Continue low-fat diet.  Try to resume regular exercise and walking as the foot pain improves.         Relevant Orders    Lipid Panel (Completed)    Palpitations    Overview     · 14-day heart monitor 9/4/2019: Wear time 12 days.  Average heart rate 82 bpm.  PACs less than 1% brief SVT longest 8 beats.    5/27/2020 Bernadette Somers MD    Avoid too much caffeine and chocolate.  Drink  plenty of water.           Relevant Medications    metoprolol succinate XL (TOPROL-XL) 50 MG 24 hr tablet       Digestive    Class 2 severe obesity due to excess calories with serious comorbidity and body mass index (BMI) of 35.0 to 35.9 in adult (CMS/[facility]) (Chronic)    Overview     5/27/2020 Bernadette Somers MD    Try to increase exercise and physical activity.  Continue to improve low-fat and low sugar diet.  Try to lose about 4 pounds per month.  Weigh once a week on Monday mornings to monitor progress.         Vitamin D deficiency    Overview     5/27/2020 Bernadette Somers MD    Continue current dose of vitamin D3.         Relevant Orders    Vitamin D 25 Hydroxy (Completed)       Endocrine    Acquired hypothyroidism    Overview     5/27/2020 Bernadette Somers MD    Continue current dose of Synthroid.         Relevant Medications    metoprolol succinate XL (TOPROL-XL) 50 MG 24 hr tablet    levothyroxine (SYNTHROID, LEVOTHROID) 125 MCG tablet    Other Relevant Orders    TSH (Completed)    T4, Free (Completed)       Nervous and Auditory    Numbness and tingling of both legs below knees    Relevant Orders    Vitamin B12 (Completed)    Numbness of arm    Overview     5/27/2020 Bernadette Somers MD    Bilateral arm numbness made worse by working on the computer or lying in bed at night.  Arm numbness has been helped by steroid injections per Dr. Martini in the past.    Refer to Dr. Martini.         Relevant Orders    Ambulatory Referral to Neurology (Completed)       Musculoskeletal and Integument    Osteopenia after menopause    Overview     5/27/2020 Bernadette Somers MD    Do some weightbearing exercises daily with walking and small hand weights at home.  Continue calcium and vitamin D.              Hematopoietic and Hemostatic    Low vitamin B12 level    Overview     5/27/2020 Bernadette Somers MD    Resume taking of B12 tablet 1000 mcg daily.                  Diagnosis Plan   1. Benign essential hypertension   CBC & Differential    Comprehensive Metabolic Panel   2. Mixed hyperlipidemia  Lipid Panel   3. Numbness of arm  Ambulatory Referral to Neurology   4. Low vitamin B12 level     5. Class 2 severe obesity due to excess calories with serious comorbidity and body mass index (BMI) of 35.0 to 35.9 in adult (CMS/[facility])     6. Acquired hypothyroidism  TSH    T4, Free   7. Palpitations     8. Osteopenia after menopause     9. Coronary artery disease of native artery of native heart with stable angina pectoris (CMS/[facility])     10. Vitamin D deficiency  Vitamin D 25 Hydroxy   11. Numbness and tingling of both legs below knees  Vitamin B12       Plan of care reviewed with patient at the conclusion of today's visit. Education was provided regarding diagnosis, management, and any prescribed or recommended OTC medications.Patient verbalizes understanding of and agreement with management plan.         Bernadette Somers MD

## 2020-05-28 VITALS
BODY MASS INDEX: 35.33 KG/M2 | WEIGHT: 192 LBS | SYSTOLIC BLOOD PRESSURE: 126 MMHG | HEIGHT: 62 IN | DIASTOLIC BLOOD PRESSURE: 78 MMHG | HEART RATE: 68 BPM | TEMPERATURE: 98.4 F

## 2020-06-19 ENCOUNTER — OFFICE VISIT (OUTPATIENT)
Dept: NEUROLOGY | Facility: CLINIC | Age: 80
End: 2020-06-19

## 2020-06-19 VITALS
TEMPERATURE: 98.2 F | HEIGHT: 64 IN | DIASTOLIC BLOOD PRESSURE: 76 MMHG | HEART RATE: 70 BPM | OXYGEN SATURATION: 98 % | SYSTOLIC BLOOD PRESSURE: 134 MMHG | BODY MASS INDEX: 33.46 KG/M2 | WEIGHT: 196 LBS

## 2020-06-19 DIAGNOSIS — M50.30 DDD (DEGENERATIVE DISC DISEASE), CERVICAL: ICD-10-CM

## 2020-06-19 DIAGNOSIS — M79.18 CERVICAL MYOFASCIAL PAIN SYNDROME: Primary | ICD-10-CM

## 2020-06-19 PROBLEM — H40.10X0 OPEN-ANGLE GLAUCOMA: Status: ACTIVE | Noted: 2017-04-14

## 2020-06-19 PROBLEM — H52.4 PRESBYOPIA: Status: ACTIVE | Noted: 2018-05-29

## 2020-06-19 PROBLEM — H52.229 REGULAR ASTIGMATISM: Status: ACTIVE | Noted: 2018-05-29

## 2020-06-19 PROBLEM — M79.7 FIBROSITIS: Status: ACTIVE | Noted: 2020-06-19

## 2020-06-19 PROBLEM — H52.10 MYOPIA: Status: ACTIVE | Noted: 2018-05-29

## 2020-06-19 PROBLEM — I10 BP (HIGH BLOOD PRESSURE): Status: ACTIVE | Noted: 2020-06-19

## 2020-06-19 PROBLEM — G56.00 CARPAL TUNNEL SYNDROME: Status: ACTIVE | Noted: 2020-06-19

## 2020-06-19 PROBLEM — E53.8 B12 DEFICIENCY: Status: ACTIVE | Noted: 2020-06-19

## 2020-06-19 PROCEDURE — 99214 OFFICE O/P EST MOD 30 MIN: CPT | Performed by: NURSE PRACTITIONER

## 2020-06-19 PROCEDURE — 20552 NJX 1/MLT TRIGGER POINT 1/2: CPT | Performed by: NURSE PRACTITIONER

## 2020-06-19 RX ORDER — METHYLPREDNISOLONE SODIUM SUCCINATE 125 MG/2ML
125 INJECTION, POWDER, LYOPHILIZED, FOR SOLUTION INTRAMUSCULAR; INTRAVENOUS ONCE
Status: COMPLETED | OUTPATIENT
Start: 2020-06-19 | End: 2020-06-19

## 2020-06-19 RX ADMIN — METHYLPREDNISOLONE SODIUM SUCCINATE 125 MG: 125 INJECTION, POWDER, LYOPHILIZED, FOR SOLUTION INTRAMUSCULAR; INTRAVENOUS at 14:35

## 2020-06-19 NOTE — PROGRESS NOTES
Subjective:     Patient ID: Serenity Lagos is a 79 y.o. female.    CC:   Chief Complaint   Patient presents with   • Numbness     mostly left sided along shoulder down to tips of fingers   • Injections     Solu-Medrol 125mg -ndc 4382-2617-26 Lot#[id number] Exp. 02/2022       HPI:   History of Present Illness   This is a 79-year-old female who presents for neurology evaluation of recurrent cervical degenerative disc changes with radiculopathy into the left arm present for over 20 years.  She has had this on and off since 1992.  She has been seen by Dr. Martini intermittently over the past several years (since around 1992) for injections and trigger points.  She was last seen in the office on 3/19/2015.  Dr. Martini completed an EMG and NCVS of bilateral upper extremities and this did show some mild bilateral carpal tunnel syndrome with no acute radiculopathy or other abnormalities.  He gave her a trigger point injection in her left cervical and shoulder region and then she tells me usually her symptoms resolved for a few years and then this returns.  She has had x-rays of her cervical and lumbar spine in the past and was told she has multiple bone spurs in the cervical spine as well as spondylolisthesis in the L-spine.  Saw [facility] and Dr. Paz for L spine changes and was told she has scoliosis. No surgical interventions recommended at that time. Has left neck into shoulder and hand numbness and tingling recurrent for the past few months. Has happened a few times in right arm. No weakness or stroke like symptoms. She has seen a hand specialist in the past for trigger finger with an injection which was not very helpful.  Sometimes her PCP gives her an injection in her left index finger and this does help.  She has tried physical therapy multiple times in the past and really has not seen any improvement.  She is not interested in MRIs or surgical intervention or pain management injections.  She is really here just to  get a trigger point injection in hopes that this will give her a few more years without pain.  No other complaints or symptoms.  I cannot see her imaging since it is been prior to our epic system but she gives me all of her history and I have reviewed Dr. Martini's notes from 2015 which are consistent. Not interested in xrays or PT at this time. Prefers no additional oral medications.    The following portions of the patient's history were reviewed and updated as appropriate: allergies, current medications, past family history, past medical history, past social history, past surgical history and problem list.    Past Medical History:   Diagnosis Date   • Cervicalgia    • Chronic bronchitis (CMS/HCC)    • Coronary artery disease    • Dysphagia    • Glaucoma, open angle    • Hearing loss    • Hypertension    • Mixed hyperlipidemia    • Osteoarthritis     cervical spine degenerative   • Perforation of tympanic membrane    • Pruritus     chronic general pruritis   • Salivary gland cancer (CMS/HCC)    • Shingles     postherpetic neuralgia-thorax   • Spondylolisthesis      lumbar spine   • Wrist tendonitis        Past Surgical History:   Procedure Laterality Date   • APPENDECTOMY     • BREAST BIOPSY     • BREAST BIOPSY Right 2019   • BREAST CYST EXCISION     • ENDOSCOPY  2017    normal (per pt) EGD   • MOUTH SURGERY     • TONSILLECTOMY         Social History     Socioeconomic History   • Marital status:      Spouse name: Not on file   • Number of children: Not on file   • Years of education: Not on file   • Highest education level: Not on file   Tobacco Use   • Smoking status: Former Smoker     Packs/day: 1.00     Years: 20.00     Pack years: 20.00     Types: Cigarettes     Last attempt to quit: 1980     Years since quittin.4   • Smokeless tobacco: Never Used   Substance and Sexual Activity   • Alcohol use: Yes     Alcohol/week: 7.0 standard drinks     Types: 7 Glasses of wine per week      Frequency: 4 or more times a week     Drinks per session: 1 or 2     Binge frequency: Never     Comment:  daily with dinner   • Drug use: No   • Sexual activity: Never   Social History Narrative    Caffeine: 1 pot of coffee (8 cups); 1 coke (8 oz can) daily       Family History   Problem Relation Age of Onset   • Hypertension Mother    • Heart failure Mother         CHF   • Coronary artery disease Mother         CABG   • Lung cancer Father 59        Heavy smoker and Black Lung   • Rheum arthritis Sister    • Other Sister 76        sepsis due to infected knee replacement   • COPD Sister         heavy smoker    • Alzheimer's disease Maternal Grandmother    • Coronary artery disease Maternal Grandfather    • Cancer Paternal Grandmother    • Heart disease Paternal Grandfather    • Hypertension Other    • Obesity Other    • Stroke Maternal Aunt    • Heart disease Maternal Aunt    • Stroke Maternal Aunt    • Stroke Maternal Aunt    • Coronary artery disease Maternal Uncle    • Stroke Maternal Uncle    • Heart disease Maternal Uncle    • Breast cancer Neg Hx    • Ovarian cancer Neg Hx         Review of Systems   Constitutional: Negative for chills, fatigue, fever and unexpected weight change.   HENT: Negative for ear pain, hearing loss, nosebleeds, rhinorrhea and sore throat.    Eyes: Negative for photophobia, pain, discharge, itching and visual disturbance.   Respiratory: Negative for cough, chest tightness, shortness of breath and wheezing.    Cardiovascular: Negative for chest pain, palpitations and leg swelling.   Gastrointestinal: Negative for abdominal pain, blood in stool, constipation, diarrhea, nausea and vomiting.   Genitourinary: Negative for dysuria, frequency, hematuria and urgency.   Musculoskeletal: Negative for arthralgias, back pain, gait problem, joint swelling, myalgias, neck pain and neck stiffness.   Skin: Negative for rash and wound.   Allergic/Immunologic: Negative for environmental allergies and  "food allergies.   Neurological: Negative for dizziness, tremors, seizures, syncope, speech difficulty, weakness, light-headedness and headaches.   Hematological: Negative for adenopathy. Does not bruise/bleed easily.   Psychiatric/Behavioral: Negative for agitation, confusion, decreased concentration, hallucinations, sleep disturbance and suicidal ideas. The patient is not nervous/anxious.    All other systems reviewed and are negative.       Objective:  /76   Pulse 70   Temp 98.2 °F (36.8 °C)   Ht 162.6 cm (64\")   Wt 88.9 kg (196 lb)   SpO2 98%   BMI 33.64 kg/m²     Neurologic Exam     Mental Status   Oriented to person, place, and time.   Registration: recalls 3 of 3 objects. Recall at 5 minutes: recalls 3 of 3 objects. Follows 3 step commands.   Attention: normal. Concentration: normal.   Speech: speech is normal   Level of consciousness: alert  Knowledge: good and consistent with education. Able to perform simple calculations.   Able to name object. Able to read. Able to repeat. Able to write. Normal comprehension.     Cranial Nerves   Cranial nerves II through XII intact.     Motor Exam   Muscle bulk: normal  Overall muscle tone: normal    Strength   Strength 5/5 throughout.     Sensory Exam   Light touch normal.   Vibration normal.   Proprioception normal.   Pinprick normal.     Gait, Coordination, and Reflexes     Gait  Gait: normal    Coordination   Romberg: negative  Finger to nose coordination: normal  Heel to shin coordination: normal    Tremor   Resting tremor: absent  Intention tremor: absent  Action tremor: absent    Reflexes   Right brachioradialis: 2+  Left brachioradialis: 2+  Right biceps: 2+  Left biceps: 2+  Right triceps: 2+  Left triceps: 2+  Right patellar: 1+  Left patellar: 1+  Right achilles: 1+  Left achilles: 1+  Right : 2+  Left : 2+  Right plantar: normal  Left plantar: normal  Right Johnson: absent  Left Johnson: absent  Right ankle clonus: absent  Left ankle " clonus: absent      Physical Exam   Constitutional: She is oriented to person, place, and time. She appears well-developed and well-nourished.   Neck: Muscular tenderness present. No spinous process tenderness present. No neck rigidity. Decreased range of motion present. No edema and no erythema present.   Cardiovascular:     BLE edema 1+ LLE/ trace RLE-chronic per patient   Musculoskeletal:        Left shoulder: She exhibits decreased range of motion, tenderness, pain and spasm. She exhibits no bony tenderness, no swelling, no effusion, no crepitus, no deformity, no laceration, normal pulse and normal strength.        Cervical back: She exhibits decreased range of motion, tenderness, pain and spasm. She exhibits no bony tenderness, no swelling, no edema, no deformity, no laceration and normal pulse.        Back:    Neurological: She is oriented to person, place, and time. She has normal strength. She has a normal Finger-Nose-Finger Test, a normal Heel to Dee Test and a normal Romberg Test. Gait normal.   Reflex Scores:       Tricep reflexes are 2+ on the right side and 2+ on the left side.       Bicep reflexes are 2+ on the right side and 2+ on the left side.       Brachioradialis reflexes are 2+ on the right side and 2+ on the left side.       Patellar reflexes are 1+ on the right side and 1+ on the left side.       Achilles reflexes are 1+ on the right side and 1+ on the left side.  Psychiatric: She has a normal mood and affect. Her speech is normal and behavior is normal. Judgment and thought content normal. Cognition and memory are normal.     Trigger Point Injection     Chief Complaint   Patient presents with   • Numbness     mostly left sided along shoulder down to tips of fingers   • Injections     Solu-Medrol 125mg IM-ndc 5563-4606-26 Lot#[id number] Exp. 02/2022       After risks and benefits were explained including bleeding, infection, worsening of the pain, damage to the area being injected, weakness,  allergic reaction to medications, vascular injection, and nerve damage, signed consent was obtained.  All questions were answered.      The area of the trigger point was identified and the skin prepped three times with alcohol and the alcohol allowed to dry.  Next, a 25 gauge 1 inch needle was placed in the area of the trigger point.  Once reproduction of the pain was elicited and negative aspiration confirmed, the trigger point was injected and the needle removed.      The patient Did tolerate procedure well without complications.      Medication used: 125 mg/2 ml of Solu-Medrol; 1 ml of 2% Lidocaine     Trigger points injected: 1      Trigger point(s) location(s):  left trapezius muscle -Procedure/Injection Witnessed by Carey Cr CMA 6/19/2020 at 235pm    Assessment/Plan:       Serenity was seen today for numbness and injections.    Diagnoses and all orders for this visit:    Cervical myofascial pain syndrome  Comments:  declines PT, NSAIDs, oral steroids or xrays-trigger point given today. F/U PRN with Dr. Martini.  Orders:  -     methylPREDNISolone sodium succinate (SOLU-Medrol) injection 125 mg    DDD (degenerative disc disease), cervical  Comments:  declines PT, NSAIDs, oral steroids or xrays-trigger point given today. F/U PRN with Dr. Martini.  Orders:  -     methylPREDNISolone sodium succinate (SOLU-Medrol) injection 125 mg           Declines PT, additional Xrays or other treatments. Trigger point given on left side per patient request. If not better, return for F/U with xrays/PT and more injections if indicated. Verbalizes understanding and agrees with plan. Recommended she consider coming in every 2-3 years to avoid becoming a new patient.  Reviewed medications, potential side effects and signs and symptoms to report. Discussed risk versus benefits of treatment plan with patient and/or family-including medications, labs and radiology that may be ordered. Addressed questions and concerns during  visit. Patient and/or family verbalized understanding and agree with plan.    During this visit the following were done:  Labs Reviewed []    Labs Ordered []    Radiology Reports Reviewed []    Radiology Ordered []    PCP Records Reviewed [x]    Referring Provider Records Reviewed []    ER Records Reviewed []    Hospital Records Reviewed []    History Obtained From Family []    Radiology Images Reviewed []    Other Reviewed [x]    Records Requested []      SHARMILA Bearden  6/19/2020        Answers for HPI/ROS submitted by the patient on 6/12/2020   What is the primary reason for your visit?: Other  Please describe your symptoms.: A problem (not constant but occuring from time to time) I have had for over 20 years.  A numbness, tingling in my left arm from shoulder to fingers  caused by tightness, constriction of area from shoulder to neck.  Treated several times over those 20+ years by shots (cortisone?) in constricted area by Dr Martini.  Have you had these symptoms before?: Yes  How long have you been having these symptoms?: Greater than 2 weeks  Please list any medications you are currently taking for this condition.: Nothing

## 2020-07-26 ENCOUNTER — APPOINTMENT (OUTPATIENT)
Dept: GENERAL RADIOLOGY | Facility: HOSPITAL | Age: 80
End: 2020-07-26

## 2020-07-26 ENCOUNTER — HOSPITAL ENCOUNTER (EMERGENCY)
Facility: HOSPITAL | Age: 80
Discharge: HOME | End: 2020-07-26
Attending: EMERGENCY MEDICINE | Admitting: EMERGENCY MEDICINE

## 2020-07-26 ENCOUNTER — APPOINTMENT (OUTPATIENT)
Dept: CARDIOLOGY | Facility: HOSPITAL | Age: 80
End: 2020-07-26

## 2020-07-26 VITALS
SYSTOLIC BLOOD PRESSURE: 162 MMHG | WEIGHT: 185 LBS | DIASTOLIC BLOOD PRESSURE: 69 MMHG | OXYGEN SATURATION: 97 % | TEMPERATURE: 99 F | BODY MASS INDEX: 34.04 KG/M2 | HEART RATE: 58 BPM | HEIGHT: 62 IN | RESPIRATION RATE: 18 BRPM

## 2020-07-26 DIAGNOSIS — M79.671 RIGHT FOOT PAIN: ICD-10-CM

## 2020-07-26 DIAGNOSIS — M79.661 RIGHT CALF PAIN: Primary | ICD-10-CM

## 2020-07-26 LAB
BH CV ECHO MEAS - BSA(HAYCOCK): 2 M^2
BH CV ECHO MEAS - BSA: 1.8 M^2
BH CV ECHO MEAS - BZI_BMI: 33.8 KILOGRAMS/M^2
BH CV ECHO MEAS - BZI_METRIC_HEIGHT: 157.5 CM
BH CV ECHO MEAS - BZI_METRIC_WEIGHT: 83.9 KG
BH CV LOWER VASCULAR LEFT COMMON FEMORAL AUGMENT: NORMAL
BH CV LOWER VASCULAR LEFT COMMON FEMORAL COMPETENT: NORMAL
BH CV LOWER VASCULAR LEFT COMMON FEMORAL COMPRESS: NORMAL
BH CV LOWER VASCULAR LEFT COMMON FEMORAL PHASIC: NORMAL
BH CV LOWER VASCULAR LEFT COMMON FEMORAL SPONT: NORMAL
BH CV LOWER VASCULAR RIGHT COMMON FEMORAL AUGMENT: NORMAL
BH CV LOWER VASCULAR RIGHT COMMON FEMORAL COMPETENT: NORMAL
BH CV LOWER VASCULAR RIGHT COMMON FEMORAL COMPRESS: NORMAL
BH CV LOWER VASCULAR RIGHT COMMON FEMORAL PHASIC: NORMAL
BH CV LOWER VASCULAR RIGHT COMMON FEMORAL SPONT: NORMAL
BH CV LOWER VASCULAR RIGHT DISTAL FEMORAL COMPRESS: NORMAL
BH CV LOWER VASCULAR RIGHT GASTRONEMIUS COMPRESS: NORMAL
BH CV LOWER VASCULAR RIGHT GREATER SAPH AK COMPRESS: NORMAL
BH CV LOWER VASCULAR RIGHT GREATER SAPH BK COMPRESS: NORMAL
BH CV LOWER VASCULAR RIGHT LESSER SAPH COMPRESS: NORMAL
BH CV LOWER VASCULAR RIGHT MID FEMORAL AUGMENT: NORMAL
BH CV LOWER VASCULAR RIGHT MID FEMORAL COMPETENT: NORMAL
BH CV LOWER VASCULAR RIGHT MID FEMORAL COMPRESS: NORMAL
BH CV LOWER VASCULAR RIGHT MID FEMORAL PHASIC: NORMAL
BH CV LOWER VASCULAR RIGHT MID FEMORAL SPONT: NORMAL
BH CV LOWER VASCULAR RIGHT PERONEAL COMPRESS: NORMAL
BH CV LOWER VASCULAR RIGHT POPLITEAL AUGMENT: NORMAL
BH CV LOWER VASCULAR RIGHT POPLITEAL COMPETENT: NORMAL
BH CV LOWER VASCULAR RIGHT POPLITEAL COMPRESS: NORMAL
BH CV LOWER VASCULAR RIGHT POPLITEAL PHASIC: NORMAL
BH CV LOWER VASCULAR RIGHT POPLITEAL SPONT: NORMAL
BH CV LOWER VASCULAR RIGHT POSTERIOR TIBIAL COMPRESS: NORMAL
BH CV LOWER VASCULAR RIGHT PROFUNDA FEMORAL COMPRESS: NORMAL
BH CV LOWER VASCULAR RIGHT PROXIMAL FEMORAL COMPRESS: NORMAL
BH CV LOWER VASCULAR RIGHT SAPHENOFEMORAL JUNCTION AUGMENT: NORMAL
BH CV LOWER VASCULAR RIGHT SAPHENOFEMORAL JUNCTION COMPETENT: NORMAL
BH CV LOWER VASCULAR RIGHT SAPHENOFEMORAL JUNCTION COMPRESS: NORMAL
BH CV LOWER VASCULAR RIGHT SAPHENOFEMORAL JUNCTION PHASIC: NORMAL
BH CV LOWER VASCULAR RIGHT SAPHENOFEMORAL JUNCTION SPONT: NORMAL

## 2020-07-26 PROCEDURE — 73590 X-RAY EXAM OF LOWER LEG: CPT

## 2020-07-26 PROCEDURE — 73630 X-RAY EXAM OF FOOT: CPT

## 2020-07-26 PROCEDURE — 99283 EMERGENCY DEPT VISIT LOW MDM: CPT

## 2020-07-26 PROCEDURE — 93971 EXTREMITY STUDY: CPT | Performed by: INTERNAL MEDICINE

## 2020-07-26 PROCEDURE — 93971 EXTREMITY STUDY: CPT

## 2020-07-26 NOTE — ED PROVIDER NOTES
EMERGENCY DEPARTMENT ENCOUNTER      Pt Name: Serenity Lagos  MRN: [id number]  YOB: 1940  Date of evaluation: 7/26/2020  Provider: Norm Bradford DO    CHIEF COMPLAINT       Chief Complaint   Patient presents with   • Foot Pain         HISTORY OF PRESENT ILLNESS  (Location/Symptom, Timing/Onset, Context/Setting, Quality, Duration, Modifying Factors, Severity.)   Serenity Lagos is a 79 y.o. female who presents to the emergency department for evaluation of a pain which she noted on the sole of her right heel earlier today which was short-lived, but this started noticing pain radiating from her right ankle up her right calf, concern for possible blood clot.  She denies any fall, injury or trauma.  Denies any history of PE or DVTs or clotting disorders in the past.  The patient does have a history of venous insufficiency bilateral lower extremities which is chronic in nature, does not usually cause discomfort.  Does have chronic swelling in her bilateral lower extremities as well.  She denies any chest pain, fevers or chills, no recent illness.  She denies any unilateral weakness, numbness or tingling.  Patient states her discomfort has improved on arrival to the emergency department, but she was concerned about a possible DVT which prompted her evaluation to the hospitalist afternoon.  No other acute systemic complaints at this time.      Nursing notes were reviewed.    REVIEW OF SYSTEMS    (2-9 systems for level 4, 10 or more for level 5)   ROS:  General:  No fevers, no chills, no weakness  Cardiovascular:  No chest pain, no palpitations  Respiratory:  No shortness of breath, no cough, no wheezing  Gastrointestinal:  No pain, no nausea, no vomiting, no diarrhea  Musculoskeletal: Positive right heel, right calf pain  Skin:  No rash, no easy bruising  Neurologic:  No speech problems, no headache, no extremity numbness, no extremity tingling, no extremity weakness  Psychiatric:  No  anxiety  Genitourinary:  No dysuria, no hematuria    Except as noted above the remainder of the review of systems was reviewed and negative.       PAST MEDICAL HISTORY     Past Medical History:   Diagnosis Date   • Cervicalgia    • Chronic bronchitis (CMS/HCC)    • Coronary artery disease    • Dysphagia    • Glaucoma, open angle    • Hearing loss    • Hypertension    • Mixed hyperlipidemia    • Osteoarthritis     cervical spine degenerative   • Perforation of tympanic membrane    • Pruritus     chronic general pruritis   • Salivary gland cancer (CMS/HCC)    • Shingles     postherpetic neuralgia-thorax   • Spondylolisthesis      lumbar spine   • Wrist tendonitis          SURGICAL HISTORY       Past Surgical History:   Procedure Laterality Date   • APPENDECTOMY     • BREAST BIOPSY     • BREAST BIOPSY Right 07/29/2019   • BREAST CYST EXCISION     • ENDOSCOPY  2017    normal (per pt) EGD   • MOUTH SURGERY  2000   • TONSILLECTOMY           CURRENT MEDICATIONS     No current facility-administered medications for this encounter.     Current Outpatient Medications:   •  Ascorbic Acid (VITAMIN C ER) 1000 MG tablet controlled-release, 1 tablet Daily., Disp: , Rfl:   •  aspirin 81 MG EC tablet, Take 1 tablet by mouth Daily., Disp: 90 tablet, Rfl: 3  •  Biotin 5000 MCG sublingual tablet, take 1 by oral route  every day, Disp: , Rfl:   •  Cholecalciferol (VITAMIN D3) 2000 units capsule, take 1 Capsule by Oral daily, Disp: , Rfl:   •  cyproheptadine (PERIACTIN) 4 MG tablet, Take 4 mg by mouth Daily As Needed., Disp: , Rfl:   •  Docusate Sodium 100 MG capsule, take 1 tablet by oral route twice daily, Disp: , Rfl:   •  Glucosamine-Chondroitin (OSTEO BI-FLEX REGULAR STRENGTH PO), 1 daily, Disp: , Rfl:   •  latanoprost (XALATAN) 0.005 % ophthalmic solution, Administer 1 drop to both eyes Every Night., Disp: , Rfl:   •  levothyroxine (SYNTHROID, LEVOTHROID) 125 MCG tablet, Take 1 tablet by mouth Daily., Disp: 90 tablet, Rfl: 1  •   losartan (COZAAR) 100 MG tablet, TAKE 1 TABLET DAILY, Disp: 90 tablet, Rfl: 4  •  metoprolol succinate XL (TOPROL-XL) 50 MG 24 hr tablet, Take 1 tablet by mouth Daily., Disp: 90 tablet, Rfl: 3  •  Probiotic Product (PROBIOTIC DAILY PO), One capsule daily, Disp: , Rfl:   •  raloxifene (EVISTA) 60 MG tablet, TAKE 1 TABLET DAILY, Disp: 90 tablet, Rfl: 4  •  Vitamin E 400 units tablet, Vitamin E 400 UNIT Oral Tablet; Patient Sig: Vitamin E 400 UNIT Oral Tablet TAKE 1 TABLET DAILY.; 0; 24-Sep-2013; Active, Disp: , Rfl:     ALLERGIES     Patient has no known allergies.    FAMILY HISTORY       Family History   Problem Relation Age of Onset   • Hypertension Mother    • Heart failure Mother         CHF   • Coronary artery disease Mother         CABG   • Lung cancer Father 59        Heavy smoker and Black Lung   • Rheum arthritis Sister    • Other Sister 76        sepsis due to infected knee replacement   • COPD Sister         heavy smoker    • Alzheimer's disease Maternal Grandmother    • Coronary artery disease Maternal Grandfather    • Cancer Paternal Grandmother    • Heart disease Paternal Grandfather    • Hypertension Other    • Obesity Other    • Stroke Maternal Aunt    • Heart disease Maternal Aunt    • Stroke Maternal Aunt    • Stroke Maternal Aunt    • Coronary artery disease Maternal Uncle    • Stroke Maternal Uncle    • Heart disease Maternal Uncle    • Breast cancer Neg Hx    • Ovarian cancer Neg Hx           SOCIAL HISTORY       Social History     Socioeconomic History   • Marital status:      Spouse name: Not on file   • Number of children: Not on file   • Years of education: Not on file   • Highest education level: Not on file   Tobacco Use   • Smoking status: Former Smoker     Packs/day: 1.00     Years: 20.00     Pack years: 20.00     Types: Cigarettes     Last attempt to quit: 1980     Years since quittin.5   • Smokeless tobacco: Never Used   Substance and Sexual Activity   • Alcohol use: Yes      "Alcohol/week: 7.0 standard drinks     Types: 7 Glasses of wine per week     Frequency: 4 or more times a week     Drinks per session: 1 or 2     Binge frequency: Never     Comment:  daily with dinner   • Drug use: No   • Sexual activity: Defer   Social History Narrative    Caffeine: 1 pot of coffee (8 cups); 1 coke (8 oz can) daily         PHYSICAL EXAM    (up to 7 for level 4, 8 or more for level 5)     Vitals:    07/26/20 1539 07/26/20 1542 07/26/20 1543 07/26/20 1715   BP:  163/72     BP Location:  Left arm     Pulse:  71     Resp:   18    Temp: 99 °F (37.2 °C)      TempSrc: Oral      SpO2:  95%     Weight: 83.9 kg (185 lb)   83.9 kg (185 lb)   Height: 157.5 cm (62\")   157.5 cm (62.01\")       Physical Exam  General :Patient is awake, alert, oriented, in no acute distress, nontoxic appearing  HEENT: Pupils are equally round and reactive to light, EOMI, conjunctivae clear, sclerae white, there is no injection no icterus.  Oral mucosa is moist, no exudate. Uvula is midline  Neck: Neck is supple, full range of motion, trachea midline  Cardiac: Heart regular rate, rhythm, no murmurs, rubs, or gallops  Lungs: Lungs are clear to auscultation, there is no wheezing, rhonchi, or rales. There is no use of accessory muscles.  Abdomen: Abdomen is soft, nontender, nondistended. There is no firm or pulsatile masses, no rebound rigidity or guarding.   Musculoskeletal: Venous stasis changes in the bilateral lower extremities are appreciated.  There is no unilateral swelling, no calf swelling, very mild tenderness along the medial and distal right calf, Khoury's test is normal, there is no abnormality on palpation of the bony structures of the foot or ankle.  Trace edema noted to the bilateral lower extremities.  5 out of 5 strength in all 4 extremities.  No focal muscle deficits are appreciated  Neuro: Motor intact, sensory intact, level of consciousness is normal  Dermatology: Skin is warm and dry  Psych: Mentation is grossly " normal, cognition is grossly normal. Affect is appropriate.      DIAGNOSTIC RESULTS     EKG: All EKG's are interpreted by the Emergency Department Physician who either signs or Co-signs this chart in the absence of a cardiologist.    No orders to display       RADIOLOGY:   Non-plain film images such as CT, Ultrasound and MRI are read by the radiologist. Plain radiographic images are visualized and preliminarily interpreted by the emergency physician with the below findings:      [] Radiologist's Report Reviewed:  XR Tibia Fibula 2 View Right   Preliminary Result   No acute bony abnormality is seen of the right tibia or   fibula.        DICTATED:   07/26/2020   EDITED/ls :   07/26/2020           XR Foot 3+ View Right   Preliminary Result   Prominent calcaneal spurs but no evidence of bony erosive   change or other clearly acute disease. No evidence of acute trauma the   right foot.        DICTATED:   07/26/2020   EDITED/ls :   07/26/2020                 ED BEDSIDE ULTRASOUND:   Performed by ED Physician - none    LABS:    I have reviewed and interpreted all of the currently available lab results from this visit (if applicable):  Results for orders placed or performed during the hospital encounter of 07/26/20   Duplex Venous Lower Extremity - Right CAR   Result Value Ref Range    BSA 1.8 m^2     CV ECHO IDA - BZI_BMI 33.8 kilograms/m^2     CV ECHO IDA - BSA([place]) 2.0 m^2     CV ECHO IDA - BZI_METRIC_WEIGHT 83.9 kg     CV ECHO IDA - BZI_METRIC_HEIGHT 157.5 cm    Right Common Femoral Spont Y     Right Common Femoral Phasic Y     Right Common Femoral Augment Y     Right Common Femoral Competent Y     Right Common Femoral Compress C     Right Saphenofemoral Junction Spont Y     Right Saphenofemoral Junction Phasic Y     Right Saphenofemoral Junction Augment Y     Right Saphenofemoral Junction Competent Y     Right Saphenofemoral Junction Compress C     Right Profunda Femoral Compress C     Right Proximal  "Femoral Compress C     Right Mid Femoral Spont Y     Right Mid Femoral Phasic Y     Right Mid Femoral Augment Y     Right Mid Femoral Competent Y     Right Mid Femoral Compress C     Right Distal Femoral Compress C     Right Popliteal Spont Y     Right Popliteal Phasic Y     Right Popliteal Augment Y     Right Popliteal Competent Y     Right Popliteal Compress C     Right Posterior Tibial Compress C     Right Peroneal Compress C     Right GastronemiusSoleal Compress C     Right Greater Saph AK Compress C     Right Greater Saph BK Compress C     Right Lesser Saph Compress C     Left Common Femoral Spont Y     Left Common Femoral Phasic Y     Left Common Femoral Augment Y     Left Common Femoral Competent Y     Left Common Femoral Compress C         All other labs were within normal range or not returned as of this dictation.      EMERGENCY DEPARTMENT COURSE and DIFFERENTIAL DIAGNOSIS/MDM:   Vitals:    Vitals:    07/26/20 1539 07/26/20 1542 07/26/20 1543 07/26/20 1715   BP:  163/72     BP Location:  Left arm     Pulse:  71     Resp:   18    Temp: 99 °F (37.2 °C)      TempSrc: Oral      SpO2:  95%     Weight: 83.9 kg (185 lb)   83.9 kg (185 lb)   Height: 157.5 cm (62\")   157.5 cm (62.01\")            Patient with pain to the right foot, now radiating from the right calf approximately without any significant swelling, no history of DVT.  Patient said about underlying blood clot.  Her vital signs are stable on arrival, we did obtain a venous Doppler right lower extremity for further evaluation.  DVT study is not negative note, no acute clot or abnormality.  X-ray imaging also unremarkable, some bone spurs.  Patient was updated on these results, we discussed elevation to help with the continued fluid drainage, following up with her PCP in a couple days for reevaluation.  Patient is agreeable to this.  Anti-inflammatories for a few days as needed.  I encouraged the patient to return to the emergency department immediately " for ANY concerns, worsening, new complaints, or if symptoms persist and unable to seek follow-up in a timely fashion.  The patient/family expressed understanding and agreement with this plan.  The patient will follow-up with their PCP in 1-2 days for reevaluation.       MEDICATIONS ADMINISTERED IN ED:  Medications - No data to display    PROCEDURES:  Procedures    CRITICAL CARE TIME    Total Critical Care time was 0 minutes, excluding separately reportable procedures.   There was a high probability of clinically significant/life threatening deterioration in the patient's condition which required my urgent intervention.      FINAL IMPRESSION      1. Right calf pain    2. Right foot pain          DISPOSITION/PLAN     ED Disposition     ED Disposition Condition Comment    Discharge Stable           PATIENT REFERRED TO:  Bernadette Somers MD  [address] [address]  [phone]    In 2 days      [facility] Emergency Department  [address] [phone]    If symptoms worsen      DISCHARGE MEDICATIONS:     Medication List      CONTINUE taking these medications    aspirin 81 MG EC tablet  Take 1 tablet by mouth Daily.     Biotin 5000 MCG sublingual tablet     cyproheptadine 4 MG tablet  Commonly known as:  PERIACTIN     Docusate Sodium 100 MG capsule     latanoprost 0.005 % ophthalmic solution  Commonly known as:  XALATAN     levothyroxine 125 MCG tablet  Commonly known as:  SYNTHROID, LEVOTHROID  Take 1 tablet by mouth Daily.     losartan 100 MG tablet  Commonly known as:  COZAAR  TAKE 1 TABLET DAILY     metoprolol succinate XL 50 MG 24 hr tablet  Commonly known as:  TOPROL-XL  Take 1 tablet by mouth Daily.     OSTEO BI-FLEX REGULAR STRENGTH PO     PROBIOTIC DAILY PO     raloxifene 60 MG tablet  Commonly known as:  EVISTA  TAKE 1 TABLET DAILY     Vitamin C ER 1000 MG tablet controlled-release     Vitamin D3 50 MCG (2000 UT) capsule      Vitamin E 400 units tablet        STOP taking these medications    omeprazole 40 MG capsule  Commonly known as:  priLOSEC     VITAMIN B-12 CR PO              Comment: Please note this report has been produced using speech recognition software.      Norm Bradford DO  Attending Emergency Physician               Norm Bradford DO  07/26/20 [id number]

## 2020-07-28 ENCOUNTER — EPISODE CHANGES (OUTPATIENT)
Dept: CASE MANAGEMENT | Facility: OTHER | Age: 80
End: 2020-07-28

## 2020-07-28 ENCOUNTER — PATIENT OUTREACH (OUTPATIENT)
Dept: CASE MANAGEMENT | Facility: OTHER | Age: 80
End: 2020-07-28

## 2020-07-28 NOTE — OUTREACH NOTE
Care Coordination Assessment    Documented/Reviewed By:  Maria Eugenia Ricks RN Date/time:  7/28/2020 11:33 AM   Assessment completed with:  patient  Living arrangement:  spouse  Support system:  spouse, family  Type of residence:  private residence  Equipment used at home:  none  Bed or wheelchair confined:  No  Inadequate nutrition:  No  Medication adherence problem:  No  Experiencing side effects from current medications:  No  History of fall(s) in last 6 months:  No  Difficulty keeping appointments:  No  Family aware of the patient's advance care planning wishes:  Yes (Comment: States has Living Will )

## 2020-07-28 NOTE — OUTREACH NOTE
"Care Plan Note      Responses   Annual Wellness Visit:   Patient Has Completed   Care Gaps Addressed  Colon Cancer Screening, Flu Shot, Mammogram, Pneumonia Vaccine   Colon Cancer Screening Type  Colonoscopy   Colonoscopy Status  Up to Date (< 10 yrs) [3/1/18 Dr. Villatoro]   Flu Shot Status  Up to Date   Flu Shot Completion at [facility] or Other  [facility]   Mammogram Status  Up to Date [7/29/19]   Pneumonia Vaccine Status  Up to Date   Other Patient Education/Resources   24/7 [facility] Nurse Call Line   24/7 Nurse Call Line Education Method  Verbal   Does patient have depression diagnosis?  No   Advanced Directives:  Patient Has   Ed Visits past 12 months:  2 or 3   Hospitalizations past 12 months  None        The main concerns and/or symptoms the patient would like to address are: Contacted pt regarding ED visit 7/26/2020 with chief c/o foot pain.  States foot is still sore, however no further pain in calf. Was mostly concerned had DVT and \"now that I know I don't, I'm OK.  Is able to walk on foot and ambulates without assistive device.  Denies any recent falls.  States \"I am pretty self sufficient\"  She can drive, however spouse does most of the driving. Independent with ADLs, manages her own medicines, and medical appt.   Denies any food insecurities.  She is active on MyChart and states she does have Living Will.  She denies any needs and voiced her appreciation for the call.     Education/instruction provided by Care Coordinator:  Role of  explained and contact number given.  [facility] 24/7 Nurse line explained and contact number provided.  [employer]  explained and referred to back of card.       Follow Up Outreach Due: as needed     Maria Eugenia Ricks RN  Ambulatory     7/28/2020, 11:36    "

## 2020-08-17 ENCOUNTER — EPISODE CHANGES (OUTPATIENT)
Dept: CASE MANAGEMENT | Facility: OTHER | Age: 80
End: 2020-08-17

## 2020-09-15 ENCOUNTER — LAB (OUTPATIENT)
Dept: LAB | Facility: HOSPITAL | Age: 80
End: 2020-09-15

## 2020-09-15 ENCOUNTER — TRANSCRIBE ORDERS (OUTPATIENT)
Dept: LAB | Facility: HOSPITAL | Age: 80
End: 2020-09-15

## 2020-09-15 DIAGNOSIS — H66.91 RIGHT OTITIS MEDIA WITH SPONTANEOUS RUPTURE OF EARDRUM: Primary | ICD-10-CM

## 2020-09-15 DIAGNOSIS — H66.91 RIGHT OTITIS MEDIA WITH SPONTANEOUS RUPTURE OF EARDRUM: ICD-10-CM

## 2020-09-15 DIAGNOSIS — H72.91 RIGHT OTITIS MEDIA WITH SPONTANEOUS RUPTURE OF EARDRUM: ICD-10-CM

## 2020-09-15 DIAGNOSIS — H72.91 RIGHT OTITIS MEDIA WITH SPONTANEOUS RUPTURE OF EARDRUM: Primary | ICD-10-CM

## 2020-09-15 PROCEDURE — 87070 CULTURE OTHR SPECIMN AEROBIC: CPT

## 2020-09-15 PROCEDURE — 87077 CULTURE AEROBIC IDENTIFY: CPT

## 2020-09-15 PROCEDURE — 87205 SMEAR GRAM STAIN: CPT

## 2020-09-15 PROCEDURE — 87186 SC STD MICRODIL/AGAR DIL: CPT

## 2020-09-18 LAB
BACTERIA SPEC AEROBE CULT: ABNORMAL
GRAM STN SPEC: ABNORMAL
GRAM STN SPEC: ABNORMAL

## 2020-10-20 RX ORDER — LEVOTHYROXINE SODIUM 0.12 MG/1
TABLET ORAL
Qty: 90 TABLET | Refills: 3 | Status: SHIPPED | OUTPATIENT
Start: 2020-10-20 | End: 2021-10-15

## 2020-11-04 PROBLEM — E78.5 HYPERLIPIDEMIA LDL GOAL <70: Status: ACTIVE | Noted: 2019-10-18

## 2020-11-04 NOTE — PROGRESS NOTES
OFFICE VISIT  NOTE  [facility] CARDIOLOGY      Name: Serenity Lagos    Date: 2020  MRN:  [id number]  :  1940      REFERRING/PRIMARY PROVIDER:  Bernadette Somers MD    Chief Complaint   Patient presents with   • Mixed hyperlipidemia       HPI: Serenity Lagos is a 80 y.o. female who presents today for follow-up for coronary artery disease.  Patient presented on 2019 to the ER with complaints of chest pain.  Described as substernal chest discomfort, awoke patient from sleep.  She underwent exercise nuclear stress test 2019 which was normal, although did show coronary calcification on the CT portion.  Doing well from cardiac standpoint, denies chest pain or shortness of breath.  Occasional palpitations awakens her from sleep, usually 1-2 times per month, lasting 1 to 2 minutes, not bothersome.  No PND orthopnea.    Past Medical History:   Diagnosis Date   • Cervicalgia    • Chronic bronchitis (CMS/HCC)    • Coronary artery disease    • Dysphagia    • Glaucoma, open angle    • Hearing loss    • Hypertension    • Mixed hyperlipidemia    • Osteoarthritis     cervical spine degenerative   • Perforation of tympanic membrane    • Pruritus     chronic general pruritis   • Salivary gland cancer (CMS/HCC)    • Shingles     postherpetic neuralgia-thorax   • Spondylolisthesis      lumbar spine   • Wrist tendonitis        Past Surgical History:   Procedure Laterality Date   • APPENDECTOMY     • BREAST BIOPSY     • BREAST BIOPSY Right 2019   • BREAST CYST EXCISION     • ENDOSCOPY  2017    normal (per pt) EGD   • MOUTH SURGERY     • TONSILLECTOMY         Social History     Socioeconomic History   • Marital status:      Spouse name: Not on file   • Number of children: Not on file   • Years of education: Not on file   • Highest education level: Not on file   Social Needs   • Financial resource strain: Not on file   • Food insecurity     Worry: Never true      Inability: Never true   • Transportation needs     Medical: No     Non-medical: No   Tobacco Use   • Smoking status: Former Smoker     Packs/day: 1.00     Years: 20.00     Pack years: 20.00     Types: Cigarettes     Quit date:      Years since quittin.8   • Smokeless tobacco: Never Used   Substance and Sexual Activity   • Alcohol use: Yes     Alcohol/week: 7.0 standard drinks     Types: 7 Glasses of wine per week     Frequency: 4 or more times a week     Drinks per session: 1 or 2     Binge frequency: Never     Comment:  daily with dinner   • Drug use: No   • Sexual activity: Defer   Social History Narrative    Caffeine: 1 pot of coffee (8 cups); 1 coke (8 oz can) daily       Family History   Problem Relation Age of Onset   • Hypertension Mother    • Heart failure Mother         CHF   • Coronary artery disease Mother         CABG   • Lung cancer Father 59        Heavy smoker and Black Lung   • Rheum arthritis Sister    • Other Sister 76        sepsis due to infected knee replacement   • COPD Sister         heavy smoker    • Alzheimer's disease Maternal Grandmother    • Coronary artery disease Maternal Grandfather    • Cancer Paternal Grandmother    • Heart disease Paternal Grandfather    • Hypertension Other    • Obesity Other    • Stroke Maternal Aunt    • Heart disease Maternal Aunt    • Stroke Maternal Aunt    • Stroke Maternal Aunt    • Coronary artery disease Maternal Uncle    • Stroke Maternal Uncle    • Heart disease Maternal Uncle    • Breast cancer Neg Hx    • Ovarian cancer Neg Hx         ROS:   Constitutional no fever,  no weight loss   Skin no rash, no subcutaneous nodules   Otolaryngeal no difficulty swallowing   Cardiovascular See HPI   Pulmonary no cough, no sputum production   Gastrointestinal no constipation, no diarrhea   Genitourinary no dysuria, no hematuria   Hematologic no easy bruisability, no abnormal bleeding   Musculoskeletal no muscle pain   Neurologic no dizziness, no falls  "        No Known Allergies      Current Outpatient Medications:   •  Ascorbic Acid (VITAMIN C ER) 1000 MG tablet controlled-release, 1 tablet Daily., Disp: , Rfl:   •  aspirin 81 MG EC tablet, Take 1 tablet by mouth Daily., Disp: 90 tablet, Rfl: 3  •  Biotin 5000 MCG sublingual tablet, take 1 by oral route  every day, Disp: , Rfl:   •  Cholecalciferol (VITAMIN D3) 2000 units capsule, take 1 Capsule by Oral daily, Disp: , Rfl:   •  cyproheptadine (PERIACTIN) 4 MG tablet, Take 4 mg by mouth Daily., Disp: , Rfl:   •  Docusate Sodium 100 MG capsule, take 1 tablet by oral route twice daily, Disp: , Rfl:   •  Glucosamine-Chondroitin (OSTEO BI-FLEX REGULAR STRENGTH PO), 1 daily, Disp: , Rfl:   •  latanoprost (XALATAN) 0.005 % ophthalmic solution, Administer 1 drop to both eyes Every Night., Disp: , Rfl:   •  levothyroxine (SYNTHROID, LEVOTHROID) 125 MCG tablet, TAKE 1 TABLET DAILY, Disp: 90 tablet, Rfl: 3  •  losartan (COZAAR) 100 MG tablet, TAKE 1 TABLET DAILY, Disp: 90 tablet, Rfl: 4  •  metoprolol succinate XL (TOPROL-XL) 50 MG 24 hr tablet, Take 1 tablet by mouth Daily., Disp: 90 tablet, Rfl: 3  •  Probiotic Product (PROBIOTIC DAILY PO), One capsule daily, Disp: , Rfl:   •  raloxifene (EVISTA) 60 MG tablet, TAKE 1 TABLET DAILY, Disp: 90 tablet, Rfl: 4  •  Vitamin E 400 units tablet, Vitamin E 400 UNIT Oral Tablet; Patient Sig: Vitamin E 400 UNIT Oral Tablet TAKE 1 TABLET DAILY.; 0; 24-Sep-2013; Active, Disp: , Rfl:     Vitals:    11/06/20 1307   BP: 152/74   BP Location: Left arm   Patient Position: Sitting   Cuff Size: Adult   Pulse: 73   SpO2: 98%   Weight: 88 kg (194 lb)   Height: 157.5 cm (62\")     Body mass index is 35.48 kg/m².    PHYSICAL EXAM:    General Appearance:   · well developed  · well nourished  HENT:   · oropharynx moist  · lips not cyanotic  Neck:  · thyroid not enlarged  · supple  Respiratory:  · no respiratory distress  · normal breath sounds  · no rales  Cardiovascular:  · no jugular venous " distention  · regular rhythm  · apical impulse normal  · S1 normal, S2 normal  · no S3, no S4   · no murmur  · no rub, no thrill  · carotid pulses normal; no bruit  · pedal pulses normal  · lower extremity edema: none    Gastrointestinal:   · bowel sounds normal  · non-tender  · no hepatomegaly, no splenomegaly  Musculoskeletal:  · no clubbing of fingers.   · normocephalic, head atraumatic  Skin:   · warm, dry  Psychiatric:  · judgement and insight appropriate  · normal mood and affect    RESULTS:   Procedures    Results for orders placed during the hospital encounter of 11/21/19   Adult Transthoracic Echo Complete W/ Cont if Necessary Per Protocol    Narrative · Calculated EF = 69.0%.  · Left ventricular diastolic dysfunction (grade I a) consistent with   impaired relaxation.  · Left ventricular systolic function is normal.  · Mild to moderate tricuspid valve regurgitation is present.  · Estimated right ventricular systolic pressure from tricuspid   regurgitation is normal (<35 mmHg  · Mild pulmonic valve regurgitation is present.            Labs:  Lab Results   Component Value Date    CHOL 174 05/21/2020    TRIG 111 05/21/2020    HDL 62 (H) 05/21/2020    LDL 90 05/21/2020    AST 16 05/21/2020    ALT 15 05/21/2020     No results found for: HGBA1C  No components found for: CREATINININE  eGFR Non  Amer   Date Value Ref Range Status   05/21/2020 75 >60 mL/min/1.73 Final   11/18/2019 80 >60 mL/min/1.73 Final   08/27/2019 76 >60 mL/min/1.73 Final         ASSESSMENT:  Problem List Items Addressed This Visit        Cardiovascular and Mediastinum    Coronary artery disease of native artery of native heart with stable angina pectoris (CMS/HCC) - Primary    Overview     · Myocardial perfusion stress test 9/23/2019 was normal: EF greater than 70%, no ischemia, coronary artery calcification noted on CT portion of stress test.           Hyperlipidemia LDL goal <70    Overview     5/27/2020 Bernadette Somers,  MD    Continue low-fat diet.  Try to resume regular exercise and walking as the foot pain improves.         Palpitations    Overview     · 14-day heart monitor 9/4/2019: Wear time 12 days.  Average heart rate 82 bpm.  PACs less than 1% brief SVT longest 8 beats.            Respiratory    KING (dyspnea on exertion)    Overview     11/21/19 Echo  · LVEF = 69.0%.  · Left ventricular diastolic dysfunction (grade I a) consistent with impaired relaxation.  · Mild to moderate tricuspid valve regurgitation is present.               PLAN:    1.  Chest pain:  Atypical likely due to palpitations, resolved with increased dose of metoprolol   stress test from 9/23/2019 reviewed, low risk  She does have coronary calcification but they are likely extravascular.  Continue monitoring    2.  Palpitations/paroxysmal SVT:  Advised patient decrease caffeine intake, she currently drinks 1 pot of coffee daily, not interested in cutting down  Decrease caffeine, increase water intake  Continue metoprolol at current dose  Increase exercise as tolerated    3.  Hyperlipidemia:  Recent lipid panel reviewed, as she has no history of CVA or MI, I do not feel strongly that she needs statin therapy.  She does not want to take a statin due to perceived side effects  Work on diet control low cholesterol diet.    4.  Dyspnea on exertion:  Echocardiogram 11/21/19 reviewed, LVEF=69%, grade I diastolic dysfunction.   Improved      Return to clinic in 12 months or sooner as needed    Thank you for the opportunity to share in the care of your patient; please do not hesitate to call me with any questions.     Syed Hooker MD, [facility]  Office: [phone]  [facility]  [address] [place] [address]

## 2020-11-06 ENCOUNTER — OFFICE VISIT (OUTPATIENT)
Dept: CARDIOLOGY | Facility: CLINIC | Age: 80
End: 2020-11-06

## 2020-11-06 VITALS
OXYGEN SATURATION: 98 % | SYSTOLIC BLOOD PRESSURE: 152 MMHG | DIASTOLIC BLOOD PRESSURE: 74 MMHG | WEIGHT: 194 LBS | BODY MASS INDEX: 35.7 KG/M2 | HEIGHT: 62 IN | HEART RATE: 73 BPM

## 2020-11-06 DIAGNOSIS — I25.118 CORONARY ARTERY DISEASE OF NATIVE ARTERY OF NATIVE HEART WITH STABLE ANGINA PECTORIS (HCC): Primary | ICD-10-CM

## 2020-11-06 DIAGNOSIS — R06.09 DOE (DYSPNEA ON EXERTION): ICD-10-CM

## 2020-11-06 DIAGNOSIS — E78.5 HYPERLIPIDEMIA LDL GOAL <70: ICD-10-CM

## 2020-11-06 DIAGNOSIS — R00.2 PALPITATIONS: ICD-10-CM

## 2020-11-06 PROCEDURE — 99214 OFFICE O/P EST MOD 30 MIN: CPT | Performed by: INTERNAL MEDICINE

## 2020-11-23 ENCOUNTER — TELEPHONE (OUTPATIENT)
Dept: INTERNAL MEDICINE | Facility: CLINIC | Age: 80
End: 2020-11-23

## 2020-11-24 ENCOUNTER — LAB (OUTPATIENT)
Dept: LAB | Facility: HOSPITAL | Age: 80
End: 2020-11-24

## 2020-11-24 DIAGNOSIS — E55.9 VITAMIN D DEFICIENCY: ICD-10-CM

## 2020-11-24 DIAGNOSIS — E78.5 HYPERLIPIDEMIA LDL GOAL <70: ICD-10-CM

## 2020-11-24 DIAGNOSIS — R20.0 NUMBNESS AND TINGLING OF BOTH LEGS BELOW KNEES: ICD-10-CM

## 2020-11-24 DIAGNOSIS — E03.9 ACQUIRED HYPOTHYROIDISM: ICD-10-CM

## 2020-11-24 DIAGNOSIS — R20.2 NUMBNESS AND TINGLING OF BOTH LEGS BELOW KNEES: ICD-10-CM

## 2020-11-24 DIAGNOSIS — I10 BENIGN ESSENTIAL HYPERTENSION: ICD-10-CM

## 2020-11-24 LAB
BACTERIA UR QL AUTO: ABNORMAL /HPF
BILIRUB UR QL STRIP: NEGATIVE
CLARITY UR: CLEAR
COLOR UR: YELLOW
GLUCOSE UR STRIP-MCNC: NEGATIVE MG/DL
HGB UR QL STRIP.AUTO: NEGATIVE
HYALINE CASTS UR QL AUTO: ABNORMAL /LPF
KETONES UR QL STRIP: NEGATIVE
LEUKOCYTE ESTERASE UR QL STRIP.AUTO: ABNORMAL
NITRITE UR QL STRIP: NEGATIVE
PH UR STRIP.AUTO: 7 [PH] (ref 5–8)
PROT UR QL STRIP: NEGATIVE
RBC # UR: ABNORMAL /HPF
REF LAB TEST METHOD: ABNORMAL
SP GR UR STRIP: 1.01 (ref 1–1.03)
SQUAMOUS #/AREA URNS HPF: ABNORMAL /HPF
UROBILINOGEN UR QL STRIP: ABNORMAL
WBC UR QL AUTO: ABNORMAL /HPF

## 2020-11-24 PROCEDURE — 82306 VITAMIN D 25 HYDROXY: CPT

## 2020-11-24 PROCEDURE — 82607 VITAMIN B-12: CPT

## 2020-11-24 PROCEDURE — 85025 COMPLETE CBC W/AUTO DIFF WBC: CPT

## 2020-11-24 PROCEDURE — 84439 ASSAY OF FREE THYROXINE: CPT

## 2020-11-24 PROCEDURE — 81001 URINALYSIS AUTO W/SCOPE: CPT

## 2020-11-24 PROCEDURE — 80053 COMPREHEN METABOLIC PANEL: CPT

## 2020-11-24 PROCEDURE — 80061 LIPID PANEL: CPT

## 2020-11-24 PROCEDURE — 82570 ASSAY OF URINE CREATININE: CPT

## 2020-11-24 PROCEDURE — 82043 UR ALBUMIN QUANTITATIVE: CPT

## 2020-11-24 PROCEDURE — 84481 FREE ASSAY (FT-3): CPT

## 2020-11-24 PROCEDURE — 84443 ASSAY THYROID STIM HORMONE: CPT

## 2020-11-25 LAB
25(OH)D3 SERPL-MCNC: 46.4 NG/ML (ref 30–100)
ALBUMIN SERPL-MCNC: 4.1 G/DL (ref 3.5–5.2)
ALBUMIN UR-MCNC: <1.2 MG/DL
ALBUMIN/GLOB SERPL: 1.3 G/DL
ALP SERPL-CCNC: 52 U/L (ref 39–117)
ALT SERPL W P-5'-P-CCNC: 17 U/L (ref 1–33)
ANION GAP SERPL CALCULATED.3IONS-SCNC: 9.8 MMOL/L (ref 5–15)
AST SERPL-CCNC: 19 U/L (ref 1–32)
BASOPHILS # BLD AUTO: 0.05 10*3/MM3 (ref 0–0.2)
BASOPHILS NFR BLD AUTO: 0.6 % (ref 0–1.5)
BILIRUB SERPL-MCNC: 0.3 MG/DL (ref 0–1.2)
BUN SERPL-MCNC: 13 MG/DL (ref 8–23)
BUN/CREAT SERPL: 17.8 (ref 7–25)
CALCIUM SPEC-SCNC: 9.3 MG/DL (ref 8.6–10.5)
CHLORIDE SERPL-SCNC: 101 MMOL/L (ref 98–107)
CHOLEST SERPL-MCNC: 182 MG/DL (ref 0–200)
CO2 SERPL-SCNC: 26.2 MMOL/L (ref 22–29)
CREAT SERPL-MCNC: 0.73 MG/DL (ref 0.57–1)
CREAT UR-MCNC: 76.3 MG/DL
DEPRECATED RDW RBC AUTO: 41.8 FL (ref 37–54)
EOSINOPHIL # BLD AUTO: 0.2 10*3/MM3 (ref 0–0.4)
EOSINOPHIL NFR BLD AUTO: 2.3 % (ref 0.3–6.2)
ERYTHROCYTE [DISTWIDTH] IN BLOOD BY AUTOMATED COUNT: 12.7 % (ref 12.3–15.4)
GFR SERPL CREATININE-BSD FRML MDRD: 77 ML/MIN/1.73
GLOBULIN UR ELPH-MCNC: 3.2 GM/DL
GLUCOSE SERPL-MCNC: 91 MG/DL (ref 65–99)
HCT VFR BLD AUTO: 38.8 % (ref 34–46.6)
HDLC SERPL-MCNC: 77 MG/DL (ref 40–60)
HGB BLD-MCNC: 13 G/DL (ref 12–15.9)
IMM GRANULOCYTES # BLD AUTO: 0.06 10*3/MM3 (ref 0–0.05)
IMM GRANULOCYTES NFR BLD AUTO: 0.7 % (ref 0–0.5)
LDLC SERPL CALC-MCNC: 90 MG/DL (ref 0–100)
LDLC/HDLC SERPL: 1.15 {RATIO}
LYMPHOCYTES # BLD AUTO: 2.32 10*3/MM3 (ref 0.7–3.1)
LYMPHOCYTES NFR BLD AUTO: 26.6 % (ref 19.6–45.3)
MCH RBC QN AUTO: 30.7 PG (ref 26.6–33)
MCHC RBC AUTO-ENTMCNC: 33.5 G/DL (ref 31.5–35.7)
MCV RBC AUTO: 91.5 FL (ref 79–97)
MICROALBUMIN/CREAT UR: NORMAL MG/G{CREAT}
MONOCYTES # BLD AUTO: 0.89 10*3/MM3 (ref 0.1–0.9)
MONOCYTES NFR BLD AUTO: 10.2 % (ref 5–12)
NEUTROPHILS NFR BLD AUTO: 5.19 10*3/MM3 (ref 1.7–7)
NEUTROPHILS NFR BLD AUTO: 59.6 % (ref 42.7–76)
NRBC BLD AUTO-RTO: 0 /100 WBC (ref 0–0.2)
PLATELET # BLD AUTO: 342 10*3/MM3 (ref 140–450)
PMV BLD AUTO: 11.1 FL (ref 6–12)
POTASSIUM SERPL-SCNC: 4.9 MMOL/L (ref 3.5–5.2)
PROT SERPL-MCNC: 7.3 G/DL (ref 6–8.5)
RBC # BLD AUTO: 4.24 10*6/MM3 (ref 3.77–5.28)
SODIUM SERPL-SCNC: 137 MMOL/L (ref 136–145)
T3FREE SERPL-MCNC: 2.55 PG/ML (ref 2–4.4)
T4 FREE SERPL-MCNC: 1.88 NG/DL (ref 0.93–1.7)
TRIGL SERPL-MCNC: 82 MG/DL (ref 0–150)
TSH SERPL DL<=0.05 MIU/L-ACNC: 0.71 UIU/ML (ref 0.27–4.2)
VIT B12 BLD-MCNC: 280 PG/ML (ref 211–946)
VLDLC SERPL-MCNC: 15 MG/DL (ref 5–40)
WBC # BLD AUTO: 8.71 10*3/MM3 (ref 3.4–10.8)

## 2020-11-30 PROBLEM — Z00.00 ANNUAL PHYSICAL EXAM: Status: ACTIVE | Noted: 2020-11-30

## 2020-12-15 ENCOUNTER — OFFICE VISIT (OUTPATIENT)
Dept: INTERNAL MEDICINE | Facility: CLINIC | Age: 80
End: 2020-12-15

## 2020-12-15 VITALS
WEIGHT: 192.4 LBS | SYSTOLIC BLOOD PRESSURE: 136 MMHG | TEMPERATURE: 97.1 F | HEIGHT: 62 IN | BODY MASS INDEX: 35.41 KG/M2 | HEART RATE: 61 BPM | DIASTOLIC BLOOD PRESSURE: 82 MMHG

## 2020-12-15 DIAGNOSIS — E53.8 B12 DEFICIENCY: ICD-10-CM

## 2020-12-15 DIAGNOSIS — R60.0 PEDAL EDEMA: ICD-10-CM

## 2020-12-15 DIAGNOSIS — N95.9 MENOPAUSAL AND POSTMENOPAUSAL DISORDER: ICD-10-CM

## 2020-12-15 DIAGNOSIS — E55.9 VITAMIN D DEFICIENCY: ICD-10-CM

## 2020-12-15 DIAGNOSIS — I10 BENIGN ESSENTIAL HYPERTENSION: ICD-10-CM

## 2020-12-15 DIAGNOSIS — E78.5 HYPERLIPIDEMIA LDL GOAL <70: ICD-10-CM

## 2020-12-15 DIAGNOSIS — E03.9 ACQUIRED HYPOTHYROIDISM: ICD-10-CM

## 2020-12-15 DIAGNOSIS — E66.01 CLASS 2 SEVERE OBESITY DUE TO EXCESS CALORIES WITH SERIOUS COMORBIDITY AND BODY MASS INDEX (BMI) OF 35.0 TO 35.9 IN ADULT (HCC): Chronic | ICD-10-CM

## 2020-12-15 DIAGNOSIS — M85.80 OSTEOPENIA AFTER MENOPAUSE: ICD-10-CM

## 2020-12-15 DIAGNOSIS — I25.10 CORONARY ARTERY CALCIFICATION OF NATIVE ARTERY: Chronic | ICD-10-CM

## 2020-12-15 DIAGNOSIS — Z00.00 ANNUAL PHYSICAL EXAM: ICD-10-CM

## 2020-12-15 DIAGNOSIS — I25.84 CORONARY ARTERY CALCIFICATION OF NATIVE ARTERY: Chronic | ICD-10-CM

## 2020-12-15 DIAGNOSIS — Z78.0 OSTEOPENIA AFTER MENOPAUSE: ICD-10-CM

## 2020-12-15 DIAGNOSIS — Z12.31 SCREENING MAMMOGRAM, ENCOUNTER FOR: ICD-10-CM

## 2020-12-15 DIAGNOSIS — Z00.00 MEDICARE ANNUAL WELLNESS VISIT, SUBSEQUENT: Primary | ICD-10-CM

## 2020-12-15 PROCEDURE — 99397 PER PM REEVAL EST PAT 65+ YR: CPT | Performed by: INTERNAL MEDICINE

## 2020-12-15 PROCEDURE — G0439 PPPS, SUBSEQ VISIT: HCPCS | Performed by: INTERNAL MEDICINE

## 2020-12-15 PROCEDURE — 1126F AMNT PAIN NOTED NONE PRSNT: CPT | Performed by: INTERNAL MEDICINE

## 2020-12-15 PROCEDURE — 1170F FXNL STATUS ASSESSED: CPT | Performed by: INTERNAL MEDICINE

## 2020-12-15 RX ORDER — OMEPRAZOLE 40 MG/1
40 CAPSULE, DELAYED RELEASE ORAL DAILY
COMMUNITY
Start: 2020-09-29 | End: 2020-12-28

## 2020-12-15 NOTE — PATIENT INSTRUCTIONS
Patient Instructions   Problem List Items Addressed This Visit        Cardiovascular and Mediastinum    Coronary artery calcification of native artery (Chronic)    Overview     12/15/2020 Bernadette Somers MD    Stress test in September 2019 showed some calcification of the coronary arteries on the CT portion of the test.  The patient saw Dr. Hooker and was not felt to have significant coronary artery disease.    Continue 81 mg enteric-coated aspirin daily.  Improve blood pressure control.  Improve low-fat diet.  Increase exercise.         Relevant Medications    metoprolol succinate XL (TOPROL-XL) 50 MG 24 hr tablet    Benign essential hypertension    Overview     12/15/2020 Bernadette Somers MD    Continue losartan 100mg and metoprolol  50mg  daily.      Try to resume exercise and walking.  Continue to avoid salt in the diet.  Avoid sodas.  Decrease caffeine.             Relevant Medications    losartan (COZAAR) 100 MG tablet    metoprolol succinate XL (TOPROL-XL) 50 MG 24 hr tablet    Hyperlipidemia LDL goal <70    Overview     12/15/2020 Bernadette Somers MD    Continue low-fat diet.  Resume regular exercise and walking.            Digestive    Class 2 severe obesity due to excess calories with serious comorbidity and body mass index (BMI) of 35.0 to 35.9 in adult (CMS/[facility]) (Chronic)    Overview     12/15/2020 Bernadette Somers MD    Try to increase exercise and physical activity.  Continue to improve low-fat and low sugar diet.  Try to lose about 4 pounds per month.  Weigh once a week on Monday mornings to monitor progress.         Vitamin D deficiency    Overview     12/15/2020 Bernadette Somers MD    Continue current dose of vitamin D3.         B12 deficiency    Overview     12/15/2020 Bernadette Somers MD    Take 1000 mcg B12 tablet daily.            Endocrine    Acquired hypothyroidism    Overview     5/27/2020 Bernadette Somers MD    Continue current dose of Synthroid 125mcg.         Relevant Medications     metoprolol succinate XL (TOPROL-XL) 50 MG 24 hr tablet    levothyroxine (SYNTHROID, LEVOTHROID) 125 MCG tablet       Musculoskeletal and Integument    Osteopenia after menopause    Overview     12/15/2020 Bernadette Somers MD    Continue taking Evista daily.    Do some weightbearing exercises daily with walking and small hand weights at home.  Continue calcium and vitamin D.    DEXA ordered for February.              Other    Pedal edema    Overview     12/15/2020 Bernadette Somers MD    Continue to elevate feet when sitting at home.  Exercise the feet and legs daily.  Avoid salt in the diet.         Annual physical exam    Overview     12/15/2020 Bernadette Somers MD    She is up-to-date on vaccinations.         Medicare annual wellness visit, subsequent - Primary    Overview     12/15/2020 Bernadette Somers MD    DEXA and mammogram have been ordered for February.  She is up-to-date on colonoscopy.           Other Visit Diagnoses     Menopausal and postmenopausal disorder        Relevant Orders    DEXA Bone Density Axial    Screening mammogram, encounter for        Relevant Orders    Mammo Screening Digital Tomosynthesis Bilateral With CAD          Exercising to Stay Healthy  To become healthy and stay healthy, it is recommended that you do moderate-intensity and vigorous-intensity exercise. You can tell that you are exercising at a moderate intensity if your heart starts beating faster and you start breathing faster but can still hold a conversation. You can tell that you are exercising at a vigorous intensity if you are breathing much harder and faster and cannot hold a conversation while exercising.  Exercising regularly is important. It has many health benefits, such as:  · Improving overall fitness, flexibility, and endurance.  · Increasing bone density.  · Helping with weight control.  · Decreasing body fat.  · Increasing muscle strength.  · Reducing stress and tension.  · Improving overall health.  How  often should I exercise?  Choose an activity that you enjoy, and set realistic goals. Your health care provider can help you make an activity plan that works for you.  Exercise regularly as told by your health care provider. This may include:  · Doing strength training two times a week, such as:  ? Lifting weights.  ? Using resistance bands.  ? Push-ups.  ? Sit-ups.  ? Yoga.  · Doing a certain intensity of exercise for a given amount of time. Choose from these options:  ? A total of 150 minutes of moderate-intensity exercise every week.  ? A total of 75 minutes of vigorous-intensity exercise every week.  ? A mix of moderate-intensity and vigorous-intensity exercise every week.  Children, pregnant women, people who have not exercised regularly, people who are overweight, and older adults may need to talk with a health care provider about what activities are safe to do. If you have a medical condition, be sure to talk with your health care provider before you start a new exercise program.  What are some exercise ideas?  Moderate-intensity exercise ideas include:  · Walking 1 mile (1.6 km) in about 15 minutes.  · Biking.  · Hiking.  · Golfing.  · Dancing.  · Water aerobics.  Vigorous-intensity exercise ideas include:  · Walking 4.5 miles (7.2 km) or more in about 1 hour.  · Jogging or running 5 miles (8 km) in about 1 hour.  · Biking 10 miles (16.1 km) or more in about 1 hour.  · Lap swimming.  · Roller-skating or in-line skating.  · Cross-country skiing.  · Vigorous competitive sports, such as football, basketball, and soccer.  · Jumping rope.  · Aerobic dancing.  What are some everyday activities that can help me to get exercise?  · Yard work, such as:  ? Pushing a .  ? Raking and bagging leaves.  · Washing your car.  · Pushing a stroller.  · Shoveling snow.  · Gardening.  · Washing windows or floors.  How can I be more active in my day-to-day activities?  · Use stairs instead of an elevator.  · Take a walk  during your lunch break.  · If you drive, park your car farther away from your work or school.  · If you take public transportation, get off one stop early and walk the rest of the way.  · Stand up or walk around during all of your indoor phone calls.  · Get up, stretch, and walk around every 30 minutes throughout the day.  · Enjoy exercise with a friend. Support to continue exercising will help you keep a regular routine of activity.  What guidelines can I follow while exercising?  · Before you start a new exercise program, talk with your health care provider.  · Do not exercise so much that you hurt yourself, feel dizzy, or get very short of breath.  · Wear comfortable clothes and wear shoes with good support.  · Drink plenty of water while you exercise to prevent dehydration or heat stroke.  · Work out until your breathing and your heartbeat get faster.  Where to find more information  · [place] Department of Health and Human Services: www.hhs.gov  · Centers for Disease Control and Prevention (CDC): www.cdc.gov  Summary  · Exercising regularly is important. It will improve your overall fitness, flexibility, and endurance.  · Regular exercise also will improve your overall health. It can help you control your weight, reduce stress, and improve your bone density.  · Do not exercise so much that you hurt yourself, feel dizzy, or get very short of breath.  · Before you start a new exercise program, talk with your health care provider.  This information is not intended to replace advice given to you by your health care provider. Make sure you discuss any questions you have with your health care provider.  Document Revised: 11/30/2018 Document Reviewed: 11/08/2018  [employer]vier Patient Education © 2020 [employer]

## 2020-12-15 NOTE — PROGRESS NOTES
The ABCs of the Annual Wellness Visit  Initial Medicare Wellness Visit    Chief Complaint   Patient presents with   • Medicare Wellness-subsequent   • Hypertension     f/u       Subjective   History of Present Illness:  Serenity Lagos is a 80 y.o. female who presents for an Initial Medicare Wellness Visit and follow up chronic conditions including hypertension, hyperlipidemia, osteopenia, GERD, hypothyroidism.    CHRONIC CONDITIONS:    BP-taking losartan and metoprolol.  She has not checked blood pressures at home.    For osteopenia, she is taking Evista daily.  Also taking vitamin D3 and calcium.    Hyperlipidemia-tries to eat less fats and sugars.  Not exercising.    GERD better on omeprazole.. She had delayed starting it for awhile but now taking.    Taking vitamin D3 but not taking B12 supplement.    For hypothyroidism, taking levothyroxine daily.    HEALTH RISK ASSESSMENT    Recent Hospitalizations:  No hospitalization(s) within the last year.    Current Medical Providers:  Patient Care Team:  Bernadette Somers MD as PCP - General (Internal Medicine)  Syed Hooker MD as Consulting Physician (Cardiology)  Celio Iniguez MD as Consulting Physician (Ophthalmology)  Ender Jolly MD as Consulting Physician (Dermatology)    Smoking Status:  Social History     Tobacco Use   Smoking Status Former Smoker   • Packs/day: 1.00   • Years: 20.00   • Pack years: 20.00   • Types: Cigarettes   • Quit date:    • Years since quittin.9   Smokeless Tobacco Never Used       Alcohol Consumption:  Social History     Substance and Sexual Activity   Alcohol Use Yes   • Alcohol/week: 7.0 standard drinks   • Types: 7 Glasses of wine per week   • Frequency: 4 or more times a week   • Drinks per session: 1 or 2   • Binge frequency: Never    Comment:  daily with dinner       Depression Screen:   PHQ-2/PHQ-9 Depression Screening 12/15/2020   Little interest or pleasure in doing things 0   Feeling down, depressed, or  hopeless 0   Total Score 0       Fall Risk Screen:  ANTOINETTE Fall Risk Assessment was completed, and patient is at LOW risk for falls.Assessment completed on:12/15/2020    Health Habits and Functional and Cognitive Screening:  Functional & Cognitive Status 12/15/2020   Do you have difficulty preparing food and eating? No   Do you have difficulty bathing yourself, getting dressed or grooming yourself? No   Do you have difficulty using the toilet? No   Do you have difficulty moving around from place to place? No   Do you have trouble with steps or getting out of a bed or a chair? No   Current Diet Well Balanced Diet   Dental Exam Not up to date   Eye Exam Up to date   Exercise (times per week) 0 times per week   Current Exercise Activities Include No Regular Exercise   Do you need help using the phone?  No   Are you deaf or do you have serious difficulty hearing?  No   Do you need help with transportation? No   Do you need help shopping? No   Do you need help preparing meals?  No   Do you need help with housework?  No   Do you need help with laundry? No   Do you need help taking your medications? No   Do you need help managing money? No   Do you ever drive or ride in a car without wearing a seat belt? No   Have you felt unusual stress, anger or loneliness in the last month? No   Who do you live with? Spouse   If you need help, do you have trouble finding someone available to you? No   Have you been bothered in the last four weeks by sexual problems? No   Do you have difficulty concentrating, remembering or making decisions? No       Does the patient have evidence of cognitive impairment? No    Asprin use counseling:Taking ASA appropriately as indicated    Age-appropriate Screening Schedule:  Refer to the list below for future screening recommendations based on patient's age, sex and/or medical conditions. Orders for these recommended tests are listed in the plan section. The patient has been provided with a written  plan.    Age appropriate preventive counseling done including age appropriate vaccines,regular  Mammogram and self breast exam, colonoscopy, regular dental visits, mental health, injury prevention such as wearing seat belt and preventing falls, healthy  nutrition, healthy weight, regular physical exercise. Alcohol use is moderate.  Tobacco history-none. Drug use-none.  STD's-not at risk.          Health Maintenance   Topic Date Due   • TDAP/TD VACCINES (1 - Tdap) 07/31/1959   • ZOSTER VACCINE (3 of 3) 04/24/2019   • LIPID PANEL  11/24/2021   • COLONOSCOPY  03/01/2028   • INFLUENZA VACCINE  Completed        The following portions of the patient's history were reviewed and updated as appropriate: allergies, current medications, past family history, past medical history, past social history, past surgical history and problem list.    Outpatient Medications Prior to Visit   Medication Sig Dispense Refill   • Ascorbic Acid (VITAMIN C ER) 1000 MG tablet controlled-release 1 tablet Daily.     • aspirin 81 MG EC tablet Take 1 tablet by mouth Daily. 90 tablet 3   • Biotin 5000 MCG sublingual tablet take 1 by oral route  every day     • Cholecalciferol (VITAMIN D3) 2000 units capsule take 1 Capsule by Oral daily     • cyproheptadine (PERIACTIN) 4 MG tablet Take 4 mg by mouth Daily.     • Docusate Sodium 100 MG capsule take 1 tablet by oral route twice daily     • Glucosamine-Chondroitin (OSTEO BI-FLEX REGULAR STRENGTH PO) 1 daily     • latanoprost (XALATAN) 0.005 % ophthalmic solution Administer 1 drop to both eyes Every Night.     • levothyroxine (SYNTHROID, LEVOTHROID) 125 MCG tablet TAKE 1 TABLET DAILY 90 tablet 3   • losartan (COZAAR) 100 MG tablet TAKE 1 TABLET DAILY 90 tablet 4   • metoprolol succinate XL (TOPROL-XL) 50 MG 24 hr tablet Take 1 tablet by mouth Daily. 90 tablet 3   • Probiotic Product (PROBIOTIC DAILY PO) One capsule daily     • raloxifene (EVISTA) 60 MG tablet TAKE 1 TABLET DAILY 90 tablet 4   • Vitamin E  400 units tablet Vitamin E 400 UNIT Oral Tablet; Patient Sig: Vitamin E 400 UNIT Oral Tablet TAKE 1 TABLET DAILY.; 0; 24-Sep-2013; Active     • omeprazole (priLOSEC) 40 MG capsule Take 40 mg by mouth Daily.       No facility-administered medications prior to visit.        Patient Active Problem List   Diagnosis   • Osteopenia after menopause   • Chronic right-sided low back pain with right-sided sciatica   • Primary osteoarthritis of both knees   • Numbness and tingling of both legs below knees   • Chronic pruritus   • Acquired hypothyroidism   • Acne rosacea   • Vitamin D deficiency   • Benign essential hypertension   • Right hip pain   • Poor balance   • Pedal edema   • History of benign breast biopsy   • Other chronic pain   • Coronary artery disease of native artery of native heart with stable angina pectoris (CMS/[facility])   • Hyperlipidemia LDL goal <70   • Palpitations   • Class 2 severe obesity due to excess calories with serious comorbidity and body mass index (BMI) of 35.0 to 35.9 in adult (CMS/[facility])   • Coronary artery calcification of native artery   • Breast tenderness in female   • Chest pain   • Bilateral ocular hypertension   • KING (dyspnea on exertion)   • Cervicalgia   • Numbness of arm   • B12 deficiency   • Carpal tunnel syndrome   • Fibrositis   • Myopia   • Open-angle glaucoma   • Presbyopia   • Regular astigmatism   • Vitreous degeneration   • Cervical myofascial pain syndrome   • DDD (degenerative disc disease), cervical   • Annual physical exam   • Medicare annual wellness visit, subsequent       Advanced Care Planning:  ACP discussion was held with the patient during this visit. Patient does not have an advance directive, information provided.    Review of Systems   Constitutional: Negative for chills, fatigue and fever.   HENT: Negative for congestion, ear pain, hearing loss and sinus pressure.    Eyes: Negative for visual disturbance.   Respiratory: Negative for cough, chest tightness,  "shortness of breath and wheezing.    Cardiovascular: Negative for chest pain, palpitations and leg swelling.   Gastrointestinal: Negative for abdominal pain, blood in stool, constipation and diarrhea.   Endocrine: Negative for cold intolerance and heat intolerance.   Genitourinary: Negative for dysuria and frequency.   Musculoskeletal: Negative for arthralgias, back pain and gait problem.   Skin: Negative for color change and rash.   Allergic/Immunologic: Negative for environmental allergies.   Neurological: Negative for dizziness and headaches.   Hematological: Negative for adenopathy. Does not bruise/bleed easily.   Psychiatric/Behavioral: Negative for confusion, dysphoric mood, sleep disturbance and suicidal ideas. The patient is not nervous/anxious.        Compared to one year ago, the patient feels her physical health is the same.  Compared to one year ago, the patient feels her mental health is the same.    Reviewed chart for potential of high risk medication in the elderly: yes  Reviewed chart for potential of harmful drug interactions in the elderly:yes    Objective         Vitals:    12/15/20 1511 12/15/20 1606   BP: (!) 201/80 136/82   BP Location: Left arm    Patient Position: Sitting    Cuff Size: Adult    Pulse: 61    Temp: 97.1 °F (36.2 °C)    TempSrc: Infrared    Weight: 87.3 kg (192 lb 6.4 oz)    Height: 157.5 cm (62\")    PainSc: 0-No pain        Body mass index is 35.19 kg/m².  Discussed the patient's BMI with her. The BMI is above average; BMI management plan is completed.    Physical Exam  Vitals signs and nursing note reviewed.   Constitutional:       Appearance: She is well-developed. She is obese.   HENT:      Head: Normocephalic.   Eyes:      Conjunctiva/sclera: Conjunctivae normal.      Pupils: Pupils are equal, round, and reactive to light.   Neck:      Musculoskeletal: Normal range of motion and neck supple.      Thyroid: No thyromegaly.   Cardiovascular:      Rate and Rhythm: Normal rate " and regular rhythm.      Heart sounds: Normal heart sounds.   Pulmonary:      Effort: Pulmonary effort is normal.      Breath sounds: Normal breath sounds. No wheezing.   Chest:      Breasts:         Right: No inverted nipple, mass, nipple discharge, skin change or tenderness.         Left: No inverted nipple, mass, nipple discharge, skin change or tenderness.   Abdominal:      General: Bowel sounds are normal.      Palpations: Abdomen is soft.      Tenderness: There is no abdominal tenderness.   Musculoskeletal: Normal range of motion.         General: No tenderness.   Lymphadenopathy:      Cervical: No cervical adenopathy.      Upper Body:      Right upper body: No supraclavicular, axillary or pectoral adenopathy.      Left upper body: No supraclavicular, axillary or pectoral adenopathy.   Skin:     General: Skin is warm and dry.      Findings: No rash.   Neurological:      Mental Status: She is alert and oriented to person, place, and time.      Cranial Nerves: No cranial nerve deficit.      Sensory: No sensory deficit.      Coordination: Coordination normal.      Gait: Gait normal.   Psychiatric:         Attention and Perception: Attention normal.         Mood and Affect: Mood and affect normal.         Speech: Speech normal.         Behavior: Behavior normal.         Thought Content: Thought content normal.         Judgment: Judgment normal.         Lab Results   Component Value Date    TRIG 82 11/24/2020    HDL 77 (H) 11/24/2020    LDL 90 11/24/2020    VLDL 15 11/24/2020        Assessment/Plan   Medicare Risks and Personalized Health Plan  CMS Preventative Services Quick Reference  Cardiovascular risk  Obesity/Overweight   Osteoprorosis Risk    The above risks/problems have been discussed with the patient.  Pertinent information has been shared with the patient in the After Visit Summary.  Follow up plans and orders are seen below in the Assessment/Plan Section.  Patient Instructions   Problem List Items  Addressed This Visit        Cardiovascular and Mediastinum    Coronary artery calcification of native artery (Chronic)    Overview     12/15/2020 Bernadette Somers MD    Stress test in September 2019 showed some calcification of the coronary arteries on the CT portion of the test.  The patient saw Dr. Hooker and was not felt to have significant coronary artery disease.    Continue 81 mg enteric-coated aspirin daily.  Improve blood pressure control.  Improve low-fat diet.  Increase exercise.         Relevant Medications    metoprolol succinate XL (TOPROL-XL) 50 MG 24 hr tablet    Benign essential hypertension    Overview     12/15/2020 Bernadette Somers MD    Continue losartan 100mg and metoprolol  50mg  daily.      Try to resume exercise and walking.  Continue to avoid salt in the diet.  Avoid sodas.  Decrease caffeine.             Relevant Medications    losartan (COZAAR) 100 MG tablet    metoprolol succinate XL (TOPROL-XL) 50 MG 24 hr tablet    Hyperlipidemia LDL goal <70    Overview     12/15/2020 Bernadette Somers MD    Continue low-fat diet.  Resume regular exercise and walking.            Digestive    Class 2 severe obesity due to excess calories with serious comorbidity and body mass index (BMI) of 35.0 to 35.9 in adult (CMS/HCC) (Chronic)    Overview     12/15/2020 Bernadette Somers MD    Try to increase exercise and physical activity.  Continue to improve low-fat and low sugar diet.  Try to lose about 4 pounds per month.  Weigh once a week on Monday mornings to monitor progress.         Vitamin D deficiency    Overview     12/15/2020 Bernadette Somers MD    Continue current dose of vitamin D3.         B12 deficiency    Overview     12/15/2020 Bernadette Somers MD    Take 1000 mcg B12 tablet daily.            Endocrine    Acquired hypothyroidism    Overview     5/27/2020 Bernadette Somers MD    Continue current dose of Synthroid 125mcg.         Relevant Medications    metoprolol succinate XL (TOPROL-XL) 50  MG 24 hr tablet    levothyroxine (SYNTHROID, LEVOTHROID) 125 MCG tablet       Musculoskeletal and Integument    Osteopenia after menopause    Overview     12/15/2020 Bernadette Somers MD    Continue taking Evista daily.    Do some weightbearing exercises daily with walking and small hand weights at home.  Continue calcium and vitamin D.    DEXA ordered for February.              Other    Pedal edema    Overview     12/15/2020 Bernadette Somers MD    Continue to elevate feet when sitting at home.  Exercise the feet and legs daily.  Avoid salt in the diet.         Annual physical exam    Overview     12/15/2020 Bernadette Somers MD    She is up-to-date on vaccinations.         Medicare annual wellness visit, subsequent - Primary    Overview     12/15/2020 Bernadette Somers MD    DEXA and mammogram have been ordered for February.  She is up-to-date on colonoscopy.           Other Visit Diagnoses     Menopausal and postmenopausal disorder        Relevant Orders    DEXA Bone Density Axial    Screening mammogram, encounter for        Relevant Orders    Mammo Screening Digital Tomosynthesis Bilateral With CAD           Follow Up:  Return in about 6 months (around 6/15/2021) for Recheck.     An After Visit Summary and PPPS were given to the patient.

## 2020-12-28 RX ORDER — OMEPRAZOLE 40 MG/1
CAPSULE, DELAYED RELEASE ORAL
Qty: 90 CAPSULE | Refills: 1 | Status: SHIPPED | OUTPATIENT
Start: 2020-12-28 | End: 2021-04-12 | Stop reason: SDUPTHER

## 2020-12-30 RX ORDER — LOSARTAN POTASSIUM 100 MG/1
TABLET ORAL
Qty: 90 TABLET | Refills: 3 | Status: SHIPPED | OUTPATIENT
Start: 2020-12-30 | End: 2021-12-09

## 2020-12-30 RX ORDER — RALOXIFENE HYDROCHLORIDE 60 MG/1
TABLET, FILM COATED ORAL
Qty: 90 TABLET | Refills: 3 | Status: SHIPPED | OUTPATIENT
Start: 2020-12-30 | End: 2021-12-09

## 2021-01-22 ENCOUNTER — TELEPHONE (OUTPATIENT)
Dept: NEUROLOGY | Facility: CLINIC | Age: 81
End: 2021-01-22

## 2021-01-29 ENCOUNTER — CLINICAL SUPPORT (OUTPATIENT)
Dept: NEUROLOGY | Facility: CLINIC | Age: 81
End: 2021-01-29

## 2021-01-29 VITALS
BODY MASS INDEX: 34.6 KG/M2 | HEIGHT: 62 IN | WEIGHT: 188 LBS | SYSTOLIC BLOOD PRESSURE: 134 MMHG | OXYGEN SATURATION: 98 % | HEART RATE: 63 BPM | TEMPERATURE: 98.2 F | DIASTOLIC BLOOD PRESSURE: 74 MMHG

## 2021-01-29 DIAGNOSIS — M79.18 CERVICAL MYOFASCIAL PAIN SYNDROME: Primary | ICD-10-CM

## 2021-01-29 PROCEDURE — 20552 NJX 1/MLT TRIGGER POINT 1/2: CPT | Performed by: NURSE PRACTITIONER

## 2021-01-29 RX ORDER — METHYLPREDNISOLONE SODIUM SUCCINATE 125 MG/2ML
125 INJECTION, POWDER, LYOPHILIZED, FOR SOLUTION INTRAMUSCULAR; INTRAVENOUS ONCE
Status: COMPLETED | OUTPATIENT
Start: 2021-01-29 | End: 2021-01-29

## 2021-01-29 RX ADMIN — METHYLPREDNISOLONE SODIUM SUCCINATE 125 MG: 125 INJECTION, POWDER, LYOPHILIZED, FOR SOLUTION INTRAMUSCULAR; INTRAVENOUS at 13:30

## 2021-01-29 NOTE — PROGRESS NOTES
Here for trigger point injection. Last injection 6/19/2020, pain returned to left arm, shoulder and hand about 1 month ago.    Trigger Point Injection     Chief Complaint   Patient presents with   • Injections     trigger point lot [id number] #[id number] exp. 12/2020 solu-medrol       After risks and benefits were explained including bleeding, infection, worsening of the pain, damage to the area being injected, weakness, allergic reaction to medications, vascular injection, and nerve damage, signed consent was obtained.  All questions were answered.      The area of the trigger point was identified and the skin prepped three times with alcohol and the alcohol allowed to dry.  Next, a 25 gauge 1 inch needle was placed in the area of the trigger point.  Once reproduction of the pain was elicited and negative aspiration confirmed, the trigger point was injected and the needle removed.      The patient Did tolerate procedure well without complications.      Medication used: 125 mg/2 ml of Solu-Medrol; 1 ml of 2% Lidocaine     Trigger points injected: 1      Trigger point(s) location(s):  left trapezius muscle

## 2021-02-01 DIAGNOSIS — I10 BENIGN ESSENTIAL HYPERTENSION: ICD-10-CM

## 2021-02-01 DIAGNOSIS — R00.2 PALPITATIONS: ICD-10-CM

## 2021-02-01 RX ORDER — METOPROLOL SUCCINATE 50 MG/1
50 TABLET, EXTENDED RELEASE ORAL DAILY
Qty: 90 TABLET | Refills: 3 | Status: SHIPPED | OUTPATIENT
Start: 2021-02-01 | End: 2021-03-15

## 2021-03-08 ENCOUNTER — APPOINTMENT (OUTPATIENT)
Dept: MAMMOGRAPHY | Facility: HOSPITAL | Age: 81
End: 2021-03-08

## 2021-03-13 DIAGNOSIS — R00.2 PALPITATIONS: ICD-10-CM

## 2021-03-13 DIAGNOSIS — I10 BENIGN ESSENTIAL HYPERTENSION: ICD-10-CM

## 2021-03-15 DIAGNOSIS — I10 BENIGN ESSENTIAL HYPERTENSION: ICD-10-CM

## 2021-03-15 DIAGNOSIS — R00.2 PALPITATIONS: ICD-10-CM

## 2021-03-15 RX ORDER — METOPROLOL SUCCINATE 50 MG/1
TABLET, EXTENDED RELEASE ORAL
Qty: 90 TABLET | Refills: 3 | Status: SHIPPED | OUTPATIENT
Start: 2021-03-15 | End: 2021-03-15 | Stop reason: SDUPTHER

## 2021-03-15 RX ORDER — METOPROLOL SUCCINATE 50 MG/1
50 TABLET, EXTENDED RELEASE ORAL DAILY
Qty: 90 TABLET | Refills: 3 | Status: SHIPPED | OUTPATIENT
Start: 2021-03-15 | End: 2022-06-03

## 2021-03-25 ENCOUNTER — HOSPITAL ENCOUNTER (OUTPATIENT)
Dept: GENERAL RADIOLOGY | Facility: HOSPITAL | Age: 81
Discharge: HOME | End: 2021-03-25
Admitting: INTERNAL MEDICINE

## 2021-03-25 ENCOUNTER — OFFICE VISIT (OUTPATIENT)
Dept: INTERNAL MEDICINE | Facility: CLINIC | Age: 81
End: 2021-03-25

## 2021-03-25 VITALS
HEART RATE: 68 BPM | HEIGHT: 62 IN | BODY MASS INDEX: 36.33 KG/M2 | TEMPERATURE: 96.9 F | SYSTOLIC BLOOD PRESSURE: 130 MMHG | DIASTOLIC BLOOD PRESSURE: 84 MMHG | WEIGHT: 197.4 LBS

## 2021-03-25 DIAGNOSIS — M54.41 CHRONIC RIGHT-SIDED LOW BACK PAIN WITH RIGHT-SIDED SCIATICA: ICD-10-CM

## 2021-03-25 DIAGNOSIS — G89.29 CHRONIC RIGHT-SIDED LOW BACK PAIN WITH RIGHT-SIDED SCIATICA: ICD-10-CM

## 2021-03-25 DIAGNOSIS — E78.5 HYPERLIPIDEMIA LDL GOAL <70: ICD-10-CM

## 2021-03-25 DIAGNOSIS — E03.9 ACQUIRED HYPOTHYROIDISM: ICD-10-CM

## 2021-03-25 DIAGNOSIS — R07.81 RIB PAIN ON RIGHT SIDE: Primary | ICD-10-CM

## 2021-03-25 DIAGNOSIS — I10 BENIGN ESSENTIAL HYPERTENSION: ICD-10-CM

## 2021-03-25 DIAGNOSIS — R07.81 RIB PAIN ON RIGHT SIDE: ICD-10-CM

## 2021-03-25 DIAGNOSIS — I25.118 CORONARY ARTERY DISEASE OF NATIVE ARTERY OF NATIVE HEART WITH STABLE ANGINA PECTORIS (HCC): ICD-10-CM

## 2021-03-25 PROCEDURE — 99214 OFFICE O/P EST MOD 30 MIN: CPT | Performed by: INTERNAL MEDICINE

## 2021-03-25 PROCEDURE — 72072 X-RAY EXAM THORAC SPINE 3VWS: CPT

## 2021-03-25 PROCEDURE — 71101 X-RAY EXAM UNILAT RIBS/CHEST: CPT

## 2021-03-25 NOTE — PATIENT INSTRUCTIONS
Patient Instructions   Problem List Items Addressed This Visit        Cardiac and Vasculature    Benign essential hypertension    Overview     3/25/2021 Bernadette Somers MD    Continue losartan 100mg and metoprolol  50mg  daily.      Try to resume exercise and walking.  Continue to avoid salt in the diet.  Avoid sodas.  Decrease caffeine.             Relevant Medications    losartan (COZAAR) 100 MG tablet    metoprolol succinate XL (TOPROL-XL) 50 MG 24 hr tablet    Coronary artery disease of native artery of native heart with stable angina pectoris (CMS/[facility])    Overview     · Myocardial perfusion stress test 9/23/2019 was normal: EF greater than 70%, no ischemia, coronary artery calcification noted on CT portion of stress test.  3/25/2021 Bernadette Somers MD    Continue daily aspirin, losartan, and metoprolol.    Continue to improve low-fat low sugar diet.  Increase regular exercise.         Relevant Medications    aspirin 81 MG EC tablet    metoprolol succinate XL (TOPROL-XL) 50 MG 24 hr tablet    Hyperlipidemia LDL goal <70    Overview     3/25/2021 Bernadette Somers MD    Continue low-fat diet.  Resume regular exercise and walking.            Endocrine and Metabolic    Acquired hypothyroidism    Overview     5/27/2020 Bernadette Somers MD    Continue current dose of Synthroid 125mcg.         Relevant Medications    levothyroxine (SYNTHROID, LEVOTHROID) 125 MCG tablet    metoprolol succinate XL (TOPROL-XL) 50 MG 24 hr tablet       Musculoskeletal and Injuries    Chronic right-sided low back pain with right-sided sciatica       Pulmonary and Pneumonias    Rib pain on right side - Primary    Overview     3/25/2021 Bernadette Somers MD    Several days of right anterior lower lateral rib area pain.  Tenderness on exam in the same area.  Also mild lower thoracic spine pain.    X-ray right ribs and thoracic spine today.    Use moist heat on the area.  Use Tylenol as needed.         Relevant Orders    XR Ribs Right  With PA Chest    XR Spine Thoracic 3 View

## 2021-03-25 NOTE — PROGRESS NOTES
[facility]     Serenity Lagso  1940   [id number]      Patient Care Team:  Bernadette Somers MD as PCP - General (Internal Medicine)  Syed Hooker MD as Consulting Physician (Cardiology)  Celio Iniguez MD as Consulting Physician (Ophthalmology)  Ender Jolly MD as Consulting Physician (Dermatology)    Chief Complaint   Patient presents with   • Right sided pain     around waist, denies injury, has been hurting for about a month            HPI  Patient is a 80 y.o. female presents with R sided pain at lateral waist.. Onset of symptoms was gradual starting a few days ago.  Chronicity acute. Severity 9/10 at certain times.  Symptoms are associated with movement. Pertinent negatives no nausea,fever chills, diarrhea , constipation.   Symptoms are aggravated by lying on L side at night, shifting weight while sitting, act of sitting down or getting up..   Symptoms improve with nothing.  Context  No known injuries. She does lift groceries out of car and tries to carry a lot at once so doesn't  Have to make as many trips.      CHRONIC CONDITIONS  BP controlled on current meds.  Hyperlipidemia-not exercising, sits a lot. Eats fairly low fat.    Past Medical History:   Diagnosis Date   • Cervicalgia    • Chronic bronchitis (CMS/HCC)    • Coronary artery disease    • Dysphagia    • Glaucoma, open angle    • Hearing loss    • Hypertension    • Mixed hyperlipidemia    • Osteoarthritis     cervical spine degenerative   • Perforation of tympanic membrane    • Pruritus     chronic general pruritis   • Salivary gland cancer (CMS/HCC)    • Shingles     postherpetic neuralgia-thorax   • Spondylolisthesis      lumbar spine   • Wrist tendonitis        Past Surgical History:   Procedure Laterality Date   • APPENDECTOMY     • BREAST BIOPSY     • BREAST BIOPSY Right 07/29/2019   • BREAST CYST EXCISION     • ENDOSCOPY  2017    normal (per pt) EGD   • MOUTH SURGERY  2000   • TONSILLECTOMY         Family  History   Problem Relation Age of Onset   • Hypertension Mother    • Heart failure Mother         CHF   • Coronary artery disease Mother         CABG   • Lung cancer Father 59        Heavy smoker and Black Lung   • Rheum arthritis Sister    • Other Sister 76        sepsis due to infected knee replacement   • COPD Sister         heavy smoker    • Alzheimer's disease Maternal Grandmother    • Coronary artery disease Maternal Grandfather    • Cancer Paternal Grandmother    • Heart disease Paternal Grandfather    • Hypertension Other    • Obesity Other    • Stroke Maternal Aunt    • Heart disease Maternal Aunt    • Stroke Maternal Aunt    • Stroke Maternal Aunt    • Coronary artery disease Maternal Uncle    • Stroke Maternal Uncle    • Heart disease Maternal Uncle    • Breast cancer Neg Hx    • Ovarian cancer Neg Hx        Social History     Socioeconomic History   • Marital status:      Spouse name: Not on file   • Number of children: Not on file   • Years of education: Not on file   • Highest education level: Not on file   Tobacco Use   • Smoking status: Former Smoker     Packs/day: 1.00     Years: 20.00     Pack years: 20.00     Types: Cigarettes     Quit date:      Years since quittin.2   • Smokeless tobacco: Never Used   Substance and Sexual Activity   • Alcohol use: Yes     Alcohol/week: 7.0 standard drinks     Types: 7 Glasses of wine per week     Comment:  daily with dinner   • Drug use: No   • Sexual activity: Defer       No Known Allergies    Review of Systems:     Review of Systems   Constitutional: Negative for chills, fatigue, fever and unexpected weight loss.   Gastrointestinal: Negative for constipation, diarrhea, nausea and vomiting.   Genitourinary: Positive for frequency. Negative for dysuria and hematuria.   Musculoskeletal: Positive for arthralgias and myalgias.        Rib pain       Vital Signs  Vitals:    21 1115   BP: 130/84   BP Location: Left arm   Patient Position: Sitting  "  Cuff Size: Adult   Pulse: 68   Temp: 96.9 °F (36.1 °C)   TempSrc: Infrared   Weight: 89.5 kg (197 lb 6.4 oz)   Height: 157.5 cm (62\")   PainSc:   8   PainLoc: Hip     Body mass index is 36.1 kg/m².      Current Outpatient Medications:   •  Ascorbic Acid (VITAMIN C ER) 1000 MG tablet controlled-release, 1 tablet Daily., Disp: , Rfl:   •  aspirin 81 MG EC tablet, Take 1 tablet by mouth Daily., Disp: 90 tablet, Rfl: 3  •  B Complex Vitamins (VITAMIN B COMPLEX PO), Take  by mouth., Disp: , Rfl:   •  Biotin 5000 MCG sublingual tablet, take 1 by oral route  every day, Disp: , Rfl:   •  Cholecalciferol (VITAMIN D3) 2000 units capsule, take 1 Capsule by Oral daily, Disp: , Rfl:   •  cyproheptadine (PERIACTIN) 4 MG tablet, Take 4 mg by mouth Daily., Disp: , Rfl:   •  Docusate Sodium 100 MG capsule, take 1 tablet by oral route twice daily, Disp: , Rfl:   •  Glucosamine-Chondroitin (OSTEO BI-FLEX REGULAR STRENGTH PO), 1 daily, Disp: , Rfl:   •  latanoprost (XALATAN) 0.005 % ophthalmic solution, Administer 1 drop to both eyes Every Night., Disp: , Rfl:   •  levothyroxine (SYNTHROID, LEVOTHROID) 125 MCG tablet, TAKE 1 TABLET DAILY, Disp: 90 tablet, Rfl: 3  •  losartan (COZAAR) 100 MG tablet, TAKE 1 TABLET DAILY, Disp: 90 tablet, Rfl: 3  •  metoprolol succinate XL (TOPROL-XL) 50 MG 24 hr tablet, Take 1 tablet by mouth Daily., Disp: 90 tablet, Rfl: 3  •  omeprazole (priLOSEC) 40 MG capsule, TAKE 1 CAPSULE BY MOUTH EVERY DAY, Disp: 90 capsule, Rfl: 1  •  Probiotic Product (PROBIOTIC DAILY PO), One capsule daily, Disp: , Rfl:   •  raloxifene (EVISTA) 60 MG tablet, TAKE 1 TABLET DAILY, Disp: 90 tablet, Rfl: 3  •  Vitamin E 400 units tablet, Vitamin E 400 UNIT Oral Tablet; Patient Sig: Vitamin E 400 UNIT Oral Tablet TAKE 1 TABLET DAILY.; 0; 24-Sep-2013; Active, Disp: , Rfl:     Physical Exam:    Physical Exam  Vitals and nursing note reviewed.   Constitutional:       Appearance: She is well-developed.   HENT:      Head: " Normocephalic.   Eyes:      Conjunctiva/sclera: Conjunctivae normal.      Pupils: Pupils are equal, round, and reactive to light.   Neck:      Thyroid: No thyromegaly.   Cardiovascular:      Rate and Rhythm: Normal rate and regular rhythm.      Heart sounds: Normal heart sounds.   Pulmonary:      Effort: Pulmonary effort is normal.      Breath sounds: Normal breath sounds. No wheezing.   Chest:      Chest wall: Tenderness present. No crepitus.       Musculoskeletal:         General: Normal range of motion.      Cervical back: Normal range of motion and neck supple.      Thoracic back: Tenderness present.        Back:    Lymphadenopathy:      Cervical: No cervical adenopathy.   Skin:     General: Skin is warm and dry.   Neurological:      Mental Status: She is alert and oriented to person, place, and time.   Psychiatric:         Thought Content: Thought content normal.          ACE III MINI        Results Review:    None    CMP:  Lab Results   Component Value Date    BUN 13 11/24/2020    CREATININE 0.73 11/24/2020    EGFRIFNONA 77 11/24/2020    BCR 17.8 11/24/2020     11/24/2020    K 4.9 11/24/2020    CO2 26.2 11/24/2020    CALCIUM 9.3 11/24/2020    ALBUMIN 4.10 11/24/2020    BILITOT 0.3 11/24/2020    ALKPHOS 52 11/24/2020    AST 19 11/24/2020    ALT 17 11/24/2020     HbA1c:  No results found for: HGBA1C  Microalbumin:  Lab Results   Component Value Date    MICROALBUR <1.2 11/24/2020     Lipid Panel  Lab Results   Component Value Date    CHOL 182 11/24/2020    TRIG 82 11/24/2020    HDL 77 (H) 11/24/2020    LDL 90 11/24/2020    AST 19 11/24/2020    ALT 17 11/24/2020       Medication Review: Medications reviewed and noted  Patient Instructions   Problem List Items Addressed This Visit        Cardiac and Vasculature    Benign essential hypertension    Overview     3/25/2021 Bernadette Somers MD    Continue losartan 100mg and metoprolol  50mg  daily.      Try to resume exercise and walking.  Continue to avoid salt  in the diet.  Avoid sodas.  Decrease caffeine.             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  3/25/2021 Bernadette Somers MD    Continue daily aspirin, losartan, and metoprolol.    Continue to improve low-fat low sugar diet.  Increase regular exercise.         Relevant Medications    aspirin 81 MG EC tablet    metoprolol succinate XL (TOPROL-XL) 50 MG 24 hr tablet    Hyperlipidemia LDL goal <70    Overview     3/25/2021 Bernadette Somers MD    Continue low-fat diet.  Resume regular exercise and walking.            Endocrine and Metabolic    Acquired hypothyroidism    Overview     5/27/2020 Bernadette Somers MD    Continue current dose of Synthroid 125mcg.         Relevant Medications    levothyroxine (SYNTHROID, LEVOTHROID) 125 MCG tablet    metoprolol succinate XL (TOPROL-XL) 50 MG 24 hr tablet       Musculoskeletal and Injuries    Chronic right-sided low back pain with right-sided sciatica       Pulmonary and Pneumonias    Rib pain on right side - Primary    Overview     3/25/2021 Bernadette Somers MD    Several days of right anterior lower lateral rib area pain.  Tenderness on exam in the same area.  Also mild lower thoracic spine pain.    X-ray right ribs and thoracic spine today.    Use moist heat on the area.  Use Tylenol as needed.         Relevant Orders    XR Ribs Right With PA Chest    XR Spine Thoracic 3 View             Diagnosis Plan   1. Rib pain on right side  XR Ribs Right With PA Chest    XR Spine Thoracic 3 View   2. Benign essential hypertension     3. 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  Hyperlipidemia LDL goal <70     5. Chronic right-sided low back pain with right-sided sciatica     6. Acquired hypothyroidism             Plan of care reviewed with patient at the conclusion of today's visit. Education was provided regarding diagnosis, management, and any prescribed or recommended OTC medications.Patient verbalizes understanding of and agreement with management plan.         Bernadette Somers MD

## 2021-03-26 ENCOUNTER — TELEPHONE (OUTPATIENT)
Dept: INTERNAL MEDICINE | Facility: CLINIC | Age: 81
End: 2021-03-26

## 2021-03-26 ENCOUNTER — HOSPITAL ENCOUNTER (OUTPATIENT)
Dept: BONE DENSITY | Facility: HOSPITAL | Age: 81
Discharge: HOME | End: 2021-03-26
Admitting: INTERNAL MEDICINE

## 2021-03-26 DIAGNOSIS — G89.29 CHRONIC RIGHT-SIDED LOW BACK PAIN WITH RIGHT-SIDED SCIATICA: Primary | ICD-10-CM

## 2021-03-26 DIAGNOSIS — R07.81 RIB PAIN ON RIGHT SIDE: ICD-10-CM

## 2021-03-26 DIAGNOSIS — M54.41 CHRONIC RIGHT-SIDED LOW BACK PAIN WITH RIGHT-SIDED SCIATICA: Primary | ICD-10-CM

## 2021-03-26 PROCEDURE — 77080 DXA BONE DENSITY AXIAL: CPT

## 2021-03-26 NOTE — TELEPHONE ENCOUNTER
PATIENT CALLED CHECKING THE STATUS OF HER X-RAY RESULTS FROM YESTERDAY.    PLEASE CALL PATIENT BACK [phone]

## 2021-04-12 RX ORDER — OMEPRAZOLE 40 MG/1
40 CAPSULE, DELAYED RELEASE ORAL DAILY
Qty: 90 CAPSULE | Refills: 1 | Status: SHIPPED | OUTPATIENT
Start: 2021-04-12 | End: 2021-09-07

## 2021-05-13 ENCOUNTER — HOSPITAL ENCOUNTER (OUTPATIENT)
Dept: MAMMOGRAPHY | Facility: HOSPITAL | Age: 81
Discharge: HOME | End: 2021-05-13
Admitting: INTERNAL MEDICINE

## 2021-05-13 DIAGNOSIS — Z12.31 SCREENING MAMMOGRAM, ENCOUNTER FOR: ICD-10-CM

## 2021-05-13 PROCEDURE — 77063 BREAST TOMOSYNTHESIS BI: CPT

## 2021-05-13 PROCEDURE — 77063 BREAST TOMOSYNTHESIS BI: CPT | Performed by: RADIOLOGY

## 2021-05-13 PROCEDURE — 77067 SCR MAMMO BI INCL CAD: CPT

## 2021-05-13 PROCEDURE — 77067 SCR MAMMO BI INCL CAD: CPT | Performed by: RADIOLOGY

## 2021-06-09 ENCOUNTER — LAB (OUTPATIENT)
Dept: LAB | Facility: HOSPITAL | Age: 81
End: 2021-06-09

## 2021-06-09 DIAGNOSIS — E55.9 VITAMIN D DEFICIENCY: ICD-10-CM

## 2021-06-09 DIAGNOSIS — E03.9 ACQUIRED HYPOTHYROIDISM: ICD-10-CM

## 2021-06-09 DIAGNOSIS — E78.5 HYPERLIPIDEMIA LDL GOAL <70: ICD-10-CM

## 2021-06-09 DIAGNOSIS — I10 BENIGN ESSENTIAL HYPERTENSION: ICD-10-CM

## 2021-06-09 DIAGNOSIS — E53.8 B12 DEFICIENCY: ICD-10-CM

## 2021-06-09 LAB
ALBUMIN SERPL-MCNC: 3.8 G/DL (ref 3.5–5.2)
ALBUMIN/GLOB SERPL: 1.2 G/DL
ALP SERPL-CCNC: 54 U/L (ref 39–117)
ALT SERPL W P-5'-P-CCNC: 21 U/L (ref 1–33)
ANION GAP SERPL CALCULATED.3IONS-SCNC: 7.1 MMOL/L (ref 5–15)
AST SERPL-CCNC: 17 U/L (ref 1–32)
BASOPHILS # BLD AUTO: 0.05 10*3/MM3 (ref 0–0.2)
BASOPHILS NFR BLD AUTO: 0.6 % (ref 0–1.5)
BILIRUB SERPL-MCNC: 0.3 MG/DL (ref 0–1.2)
BUN SERPL-MCNC: 14 MG/DL (ref 8–23)
BUN/CREAT SERPL: 16.3 (ref 7–25)
CALCIUM SPEC-SCNC: 9.1 MG/DL (ref 8.6–10.5)
CHLORIDE SERPL-SCNC: 104 MMOL/L (ref 98–107)
CHOLEST SERPL-MCNC: 187 MG/DL (ref 0–200)
CO2 SERPL-SCNC: 25.9 MMOL/L (ref 22–29)
CREAT SERPL-MCNC: 0.86 MG/DL (ref 0.57–1)
DEPRECATED RDW RBC AUTO: 43.2 FL (ref 37–54)
EOSINOPHIL # BLD AUTO: 0.37 10*3/MM3 (ref 0–0.4)
EOSINOPHIL NFR BLD AUTO: 4.3 % (ref 0.3–6.2)
ERYTHROCYTE [DISTWIDTH] IN BLOOD BY AUTOMATED COUNT: 12.4 % (ref 12.3–15.4)
GFR SERPL CREATININE-BSD FRML MDRD: 64 ML/MIN/1.73
GLOBULIN UR ELPH-MCNC: 3.2 GM/DL
GLUCOSE SERPL-MCNC: 77 MG/DL (ref 65–99)
HCT VFR BLD AUTO: 39.5 % (ref 34–46.6)
HDLC SERPL-MCNC: 67 MG/DL (ref 40–60)
HGB BLD-MCNC: 12.8 G/DL (ref 12–15.9)
IMM GRANULOCYTES # BLD AUTO: 0.03 10*3/MM3 (ref 0–0.05)
IMM GRANULOCYTES NFR BLD AUTO: 0.3 % (ref 0–0.5)
LDLC SERPL CALC-MCNC: 104 MG/DL (ref 0–100)
LDLC/HDLC SERPL: 1.53 {RATIO}
LYMPHOCYTES # BLD AUTO: 2.5 10*3/MM3 (ref 0.7–3.1)
LYMPHOCYTES NFR BLD AUTO: 29 % (ref 19.6–45.3)
MCH RBC QN AUTO: 30.8 PG (ref 26.6–33)
MCHC RBC AUTO-ENTMCNC: 32.4 G/DL (ref 31.5–35.7)
MCV RBC AUTO: 95.2 FL (ref 79–97)
MONOCYTES # BLD AUTO: 1.03 10*3/MM3 (ref 0.1–0.9)
MONOCYTES NFR BLD AUTO: 11.9 % (ref 5–12)
NEUTROPHILS NFR BLD AUTO: 4.64 10*3/MM3 (ref 1.7–7)
NEUTROPHILS NFR BLD AUTO: 53.9 % (ref 42.7–76)
NRBC BLD AUTO-RTO: 0 /100 WBC (ref 0–0.2)
PLATELET # BLD AUTO: 322 10*3/MM3 (ref 140–450)
PMV BLD AUTO: 11.8 FL (ref 6–12)
POTASSIUM SERPL-SCNC: 4.7 MMOL/L (ref 3.5–5.2)
PROT SERPL-MCNC: 7 G/DL (ref 6–8.5)
RBC # BLD AUTO: 4.15 10*6/MM3 (ref 3.77–5.28)
SODIUM SERPL-SCNC: 137 MMOL/L (ref 136–145)
TRIGL SERPL-MCNC: 88 MG/DL (ref 0–150)
VLDLC SERPL-MCNC: 16 MG/DL (ref 5–40)
WBC # BLD AUTO: 8.62 10*3/MM3 (ref 3.4–10.8)

## 2021-06-09 PROCEDURE — 82043 UR ALBUMIN QUANTITATIVE: CPT

## 2021-06-09 PROCEDURE — 85025 COMPLETE CBC W/AUTO DIFF WBC: CPT

## 2021-06-09 PROCEDURE — 80061 LIPID PANEL: CPT

## 2021-06-09 PROCEDURE — 80053 COMPREHEN METABOLIC PANEL: CPT

## 2021-06-09 PROCEDURE — 82570 ASSAY OF URINE CREATININE: CPT

## 2021-06-09 PROCEDURE — 84439 ASSAY OF FREE THYROXINE: CPT

## 2021-06-09 PROCEDURE — 82306 VITAMIN D 25 HYDROXY: CPT

## 2021-06-09 PROCEDURE — 82607 VITAMIN B-12: CPT

## 2021-06-09 PROCEDURE — 84443 ASSAY THYROID STIM HORMONE: CPT

## 2021-06-10 LAB
25(OH)D3 SERPL-MCNC: 43 NG/ML (ref 30–100)
ALBUMIN UR-MCNC: <1.2 MG/DL
CREAT UR-MCNC: 28.9 MG/DL
MICROALBUMIN/CREAT UR: NORMAL MG/G{CREAT}
T4 FREE SERPL-MCNC: 1.79 NG/DL (ref 0.93–1.7)
TSH SERPL DL<=0.05 MIU/L-ACNC: 0.97 UIU/ML (ref 0.27–4.2)
VIT B12 BLD-MCNC: 1040 PG/ML (ref 211–946)

## 2021-06-11 ENCOUNTER — LAB (OUTPATIENT)
Dept: LAB | Facility: HOSPITAL | Age: 81
End: 2021-06-11

## 2021-06-11 DIAGNOSIS — I10 BENIGN ESSENTIAL HYPERTENSION: Primary | ICD-10-CM

## 2021-06-11 LAB
BACTERIA UR QL AUTO: ABNORMAL /HPF
BILIRUB UR QL STRIP: NEGATIVE
CLARITY UR: CLEAR
COLOR UR: YELLOW
GLUCOSE UR STRIP-MCNC: NEGATIVE MG/DL
HGB UR QL STRIP.AUTO: NEGATIVE
HYALINE CASTS UR QL AUTO: ABNORMAL /LPF
KETONES UR QL STRIP: NEGATIVE
LEUKOCYTE ESTERASE UR QL STRIP.AUTO: ABNORMAL
NITRITE UR QL STRIP: NEGATIVE
PH UR STRIP.AUTO: 6 [PH] (ref 5–8)
PROT UR QL STRIP: NEGATIVE
RBC # UR: ABNORMAL /HPF
REF LAB TEST METHOD: ABNORMAL
SP GR UR STRIP: 1.01 (ref 1–1.03)
SQUAMOUS #/AREA URNS HPF: ABNORMAL /HPF
UROBILINOGEN UR QL STRIP: ABNORMAL
WBC UR QL AUTO: ABNORMAL /HPF

## 2021-06-11 PROCEDURE — 81001 URINALYSIS AUTO W/SCOPE: CPT

## 2021-06-14 ENCOUNTER — TELEPHONE (OUTPATIENT)
Dept: INTERNAL MEDICINE | Facility: CLINIC | Age: 81
End: 2021-06-14

## 2021-06-14 NOTE — TELEPHONE ENCOUNTER
----- Message from Bernadette Somers MD sent at 6/13/2021  5:11 PM EDT -----  There are some white blood cells in Ms. Lagos's urine.  If she is having symptoms like dysuria, frequency, or increase in incontinence, we will treat her.  If not having symptoms, she will not need to be treated.

## 2021-06-14 NOTE — TELEPHONE ENCOUNTER
Called and spoke with patient, she states that she is not having symptoms at this time. Informed her of lab results, she stated that she will be in to see Dr. Somers tomorrow. She verbalized understanding and had no further questions.

## 2021-06-15 ENCOUNTER — OFFICE VISIT (OUTPATIENT)
Dept: INTERNAL MEDICINE | Facility: CLINIC | Age: 81
End: 2021-06-15

## 2021-06-15 ENCOUNTER — APPOINTMENT (OUTPATIENT)
Dept: CARDIOLOGY | Facility: HOSPITAL | Age: 81
End: 2021-06-15

## 2021-06-15 VITALS
WEIGHT: 202 LBS | TEMPERATURE: 97.8 F | SYSTOLIC BLOOD PRESSURE: 140 MMHG | DIASTOLIC BLOOD PRESSURE: 88 MMHG | BODY MASS INDEX: 37.17 KG/M2 | HEART RATE: 68 BPM | HEIGHT: 62 IN

## 2021-06-15 DIAGNOSIS — E03.9 ACQUIRED HYPOTHYROIDISM: ICD-10-CM

## 2021-06-15 DIAGNOSIS — E55.9 VITAMIN D DEFICIENCY: ICD-10-CM

## 2021-06-15 DIAGNOSIS — R06.02 SHORTNESS OF BREATH: ICD-10-CM

## 2021-06-15 DIAGNOSIS — R06.09 DOE (DYSPNEA ON EXERTION): ICD-10-CM

## 2021-06-15 DIAGNOSIS — R60.0 PEDAL EDEMA: Primary | ICD-10-CM

## 2021-06-15 DIAGNOSIS — E66.01 CLASS 2 SEVERE OBESITY DUE TO EXCESS CALORIES WITH SERIOUS COMORBIDITY AND BODY MASS INDEX (BMI) OF 36.0 TO 36.9 IN ADULT (HCC): Chronic | ICD-10-CM

## 2021-06-15 DIAGNOSIS — I25.118 CORONARY ARTERY DISEASE OF NATIVE ARTERY OF NATIVE HEART WITH STABLE ANGINA PECTORIS (HCC): ICD-10-CM

## 2021-06-15 DIAGNOSIS — I10 BENIGN ESSENTIAL HYPERTENSION: ICD-10-CM

## 2021-06-15 DIAGNOSIS — E53.8 B12 DEFICIENCY: ICD-10-CM

## 2021-06-15 DIAGNOSIS — E78.5 HYPERLIPIDEMIA LDL GOAL <70: ICD-10-CM

## 2021-06-15 PROCEDURE — 99214 OFFICE O/P EST MOD 30 MIN: CPT | Performed by: INTERNAL MEDICINE

## 2021-06-15 RX ORDER — LANOLIN ALCOHOL/MO/W.PET/CERES
1000 CREAM (GRAM) TOPICAL DAILY
COMMUNITY
End: 2021-06-15 | Stop reason: SDUPTHER

## 2021-06-15 RX ORDER — LANOLIN ALCOHOL/MO/W.PET/CERES
1000 CREAM (GRAM) TOPICAL EVERY OTHER DAY
Start: 2021-06-15 | End: 2021-10-19

## 2021-06-15 RX ORDER — CLOBETASOL PROPIONATE 0.5 MG/G
OINTMENT TOPICAL TAKE AS DIRECTED
COMMUNITY
Start: 2021-05-07 | End: 2021-12-17

## 2021-06-15 RX ORDER — AZELAIC ACID 0.15 G/G
GEL TOPICAL TAKE AS DIRECTED
COMMUNITY
Start: 2021-05-07 | End: 2021-10-19

## 2021-06-15 RX ORDER — FUROSEMIDE 20 MG/1
20 TABLET ORAL DAILY PRN
Qty: 30 TABLET | Refills: 5 | Status: SHIPPED | OUTPATIENT
Start: 2021-06-15 | End: 2022-06-16

## 2021-06-15 NOTE — PROGRESS NOTES
[facility]     Serenity Lagos  1940   [id number]      Patient Care Team:  Bernadette Somers MD as PCP - General (Internal Medicine)  Syed Hooker MD as Consulting Physician (Cardiology)  Celio Iniguez MD as Consulting Physician (Ophthalmology)  Ender Jolly MD as Consulting Physician (Dermatology)    Current complaint: Worsening edema, shortness of breath, follow-up hypertension and hyperlipidemia        HPI  Patient is a 80 y.o. female presents with edema worsening over the last couple months.. Chronicity chronic but worse. Severity severe.  Symptoms are associated with she does notice more shortness of breath.  She states that she is never very active anyway.  Pertinent negatives no chest pain or palpitations.   Symptoms are aggravated by edema is worse with sitting and worse later in the day, shortness of breath is worse with activity.   Symptoms improve with edema is some better first thing in the morning.    Context weight is 5 pounds higher than it was 3 months ago.     CHRONIC CONDITIONS  BPs at home 130s/? (not sure) on wrist cuff.  Taking losartan and metoprolol    Swelling in both feet.  But worse.    Taking levothyroxine daily.    For hyperlipidemia, she eats a fairly low-fat diet.  She does not watch it closely though.  No exercise or walking.  LDL is only mildly elevated at 107.    She is seeing Dr. Ender Jolly for varicose veins.        Past Medical History:   Diagnosis Date   • Cervicalgia    • Chronic bronchitis (CMS/HCC)    • Coronary artery disease    • Dysphagia    • Glaucoma, open angle    • Hearing loss    • Hypertension    • Mixed hyperlipidemia    • Osteoarthritis     cervical spine degenerative   • Perforation of tympanic membrane    • Pruritus     chronic general pruritis   • Salivary gland cancer (CMS/HCC)    • Shingles     postherpetic neuralgia-thorax   • Spondylolisthesis      lumbar spine   • Wrist tendonitis        Past Surgical History:   Procedure  Laterality Date   • APPENDECTOMY     • BREAST BIOPSY     • BREAST BIOPSY Right 2019   • BREAST CYST EXCISION     • ENDOSCOPY  2017    normal (per pt) EGD   • MOUTH SURGERY     • TONSILLECTOMY         Family History   Problem Relation Age of Onset   • Hypertension Mother    • Heart failure Mother         CHF   • Coronary artery disease Mother         CABG   • Lung cancer Father 59        Heavy smoker and Black Lung   • Rheum arthritis Sister    • Other Sister 76        sepsis due to infected knee replacement   • COPD Sister         heavy smoker    • Alzheimer's disease Maternal Grandmother    • Coronary artery disease Maternal Grandfather    • Cancer Paternal Grandmother    • Heart disease Paternal Grandfather    • Hypertension Other    • Obesity Other    • Stroke Maternal Aunt    • Heart disease Maternal Aunt    • Stroke Maternal Aunt    • Stroke Maternal Aunt    • Coronary artery disease Maternal Uncle    • Stroke Maternal Uncle    • Heart disease Maternal Uncle    • Breast cancer Neg Hx    • Ovarian cancer Neg Hx        Social History     Socioeconomic History   • Marital status:      Spouse name: Not on file   • Number of children: Not on file   • Years of education: Not on file   • Highest education level: Not on file   Tobacco Use   • Smoking status: Former Smoker     Packs/day: 1.00     Years: 20.00     Pack years: 20.00     Types: Cigarettes     Quit date:      Years since quittin.4   • Smokeless tobacco: Never Used   Substance and Sexual Activity   • Alcohol use: Yes     Alcohol/week: 7.0 standard drinks     Types: 7 Glasses of wine per week     Comment:  daily with dinner   • Drug use: No   • Sexual activity: Defer       No Known Allergies    Review of Systems:     Review of Systems   Constitutional: Negative for chills, fatigue and fever.   HENT: Negative for congestion, ear pain and sinus pressure.    Respiratory: Positive for shortness of breath. Negative for cough, chest  "tightness and wheezing.    Cardiovascular: Positive for leg swelling. Negative for chest pain and palpitations.   Gastrointestinal: Negative for abdominal pain, blood in stool and constipation.   Skin: Negative for color change.   Allergic/Immunologic: Negative for environmental allergies.   Neurological: Negative for dizziness and headache.   Psychiatric/Behavioral: Negative for decreased concentration and depressed mood. The patient is not nervous/anxious.        Vital Signs  Vitals:    06/15/21 1340 06/15/21 1441   BP: 178/94 140/88   BP Location: Left arm Left arm   Patient Position: Sitting Sitting   Cuff Size: Adult    Pulse: 68    Temp: 97.8 °F (36.6 °C)    TempSrc: Temporal    Weight: 91.6 kg (202 lb)    Height: 157.5 cm (62.01\")    PainSc:   9    PainLoc: Foot      Body mass index is 36.94 kg/m².      Current Outpatient Medications:   •  Ascorbic Acid (VITAMIN C ER) 1000 MG tablet controlled-release, 1 tablet Daily., Disp: , Rfl:   •  aspirin 81 MG EC tablet, Take 1 tablet by mouth Daily., Disp: 90 tablet, Rfl: 3  •  azelaic acid (AZELEX) 15 % gel, Take As Directed., Disp: , Rfl:   •  Biotin 5000 MCG sublingual tablet, take 1 by oral route  every day, Disp: , Rfl:   •  Cholecalciferol (VITAMIN D3) 2000 units capsule, take 1 Capsule by Oral daily, Disp: , Rfl:   •  clobetasol (TEMOVATE) 0.05 % ointment, Take As Directed., Disp: , Rfl:   •  cyproheptadine (PERIACTIN) 4 MG tablet, Take 4 mg by mouth Daily., Disp: , Rfl:   •  Docusate Sodium 100 MG capsule, take 1 tablet by oral route twice daily, Disp: , Rfl:   •  Glucosamine-Chondroitin (OSTEO BI-FLEX REGULAR STRENGTH PO), 1 daily, Disp: , Rfl:   •  latanoprost (XALATAN) 0.005 % ophthalmic solution, Administer 1 drop to both eyes Every Night., Disp: , Rfl:   •  levothyroxine (SYNTHROID, LEVOTHROID) 125 MCG tablet, TAKE 1 TABLET DAILY, Disp: 90 tablet, Rfl: 3  •  losartan (COZAAR) 100 MG tablet, TAKE 1 TABLET DAILY, Disp: 90 tablet, Rfl: 3  •  metoprolol " succinate XL (TOPROL-XL) 50 MG 24 hr tablet, Take 1 tablet by mouth Daily., Disp: 90 tablet, Rfl: 3  •  omeprazole (priLOSEC) 40 MG capsule, Take 1 capsule by mouth Daily., Disp: 90 capsule, Rfl: 1  •  Probiotic Product (PROBIOTIC DAILY PO), One capsule daily, Disp: , Rfl:   •  raloxifene (EVISTA) 60 MG tablet, TAKE 1 TABLET DAILY, Disp: 90 tablet, Rfl: 3  •  vitamin B-12 (CYANOCOBALAMIN) 1000 MCG tablet, Take 1 tablet by mouth Every Other Day., Disp: , Rfl:   •  Vitamin E 400 units tablet, Vitamin E 400 UNIT Oral Tablet; Patient Sig: Vitamin E 400 UNIT Oral Tablet TAKE 1 TABLET DAILY.; 0; -Sep-2013; Active, Disp: , Rfl:   •  furosemide (Lasix) 20 MG tablet, Take 1 tablet by mouth Daily As Needed (edema)., Disp: 30 tablet, Rfl: 5    Physical Exam:    Physical Exam  Vitals and nursing note reviewed.   Constitutional:       Appearance: She is well-developed. She is obese.   HENT:      Head: Normocephalic.   Eyes:      Conjunctiva/sclera: Conjunctivae normal.      Pupils: Pupils are equal, round, and reactive to light.   Neck:      Thyroid: No thyromegaly.   Cardiovascular:      Rate and Rhythm: Normal rate and regular rhythm.      Heart sounds: Normal heart sounds.      Comments: Peppy edema.  No pitting.  No induration of the lower legs.  Pulmonary:      Effort: Pulmonary effort is normal.      Breath sounds: Normal breath sounds. No wheezing or rales.   Musculoskeletal:         General: Normal range of motion.      Cervical back: Normal range of motion and neck supple.      Right lower le+ Edema present.      Left lower le+ Edema present.   Lymphadenopathy:      Cervical: No cervical adenopathy.   Skin:     General: Skin is warm and dry.   Neurological:      Mental Status: She is alert and oriented to person, place, and time.   Psychiatric:         Thought Content: Thought content normal.          ACE III MINI        Results Review:    None    CMP:  Lab Results   Component Value Date    BUN 14 2021     CREATININE 0.86 06/09/2021    EGFRIFNONA 64 06/09/2021    BCR 16.3 06/09/2021     06/09/2021    K 4.7 06/09/2021    CO2 25.9 06/09/2021    CALCIUM 9.1 06/09/2021    ALBUMIN 3.80 06/09/2021    BILITOT 0.3 06/09/2021    ALKPHOS 54 06/09/2021    AST 17 06/09/2021    ALT 21 06/09/2021     HbA1c:  No results found for: HGBA1C  Microalbumin:  Lab Results   Component Value Date    MICROALBUR <1.2 06/09/2021     Lipid Panel  Lab Results   Component Value Date    CHOL 187 06/09/2021    TRIG 88 06/09/2021    HDL 67 (H) 06/09/2021     (H) 06/09/2021    AST 17 06/09/2021    ALT 21 06/09/2021       Medication Review: Medications reviewed and noted  Patient Instructions   Problem List Items Addressed This Visit        Cardiac and Vasculature    Hyperlipidemia LDL goal <70    Overview     6/15/2021 Bernadette Somers MD    LDL is only mildly elevated at 107.      Continue low-fat diet.  Resume regular exercise and walking.         Relevant Orders    Lipid Panel (Completed)    KING (dyspnea on exertion)    Overview     11/21/19 Echo  · LVEF = 69.0%.  · Left ventricular diastolic dysfunction (grade I a) consistent with impaired relaxation.  · Mild to moderate tricuspid valve regurgitation is present.  6/15/2021 Bernadette Somers MD  ·   · Worsening edema and shortness of breath.    Take Lasix 20 mg daily for 3 days then may take as needed if the edema and shortness of breath have improved.  Echo ordered.  Patient will call her cardiologist, Dr. Hooker for appointment.         Benign essential hypertension    Overview     6/15/2021 Bernadette Somers MD    Continue losartan 100mg and metoprolol  50mg  daily.      Try to resume exercise and walking.  Continue to avoid salt in the diet.  Avoid sodas.  Decrease caffeine.    Check blood pressures at home and record the values and let us know how it is running.         Relevant Medications    losartan (COZAAR) 100 MG tablet    metoprolol succinate XL (TOPROL-XL) 50 MG 24 hr  tablet    furosemide (Lasix) 20 MG tablet    Other Relevant Orders    CBC & Differential (Completed)    Comprehensive Metabolic Panel (Completed)    Microalbumin / Creatinine Urine Ratio - Urine, Clean Catch (Completed)    Urinalysis With Culture If Indicated -    Coronary artery disease of native artery of native heart with stable angina pectoris (CMS/[facility])    Overview     · Myocardial perfusion stress test 9/23/2019 was normal: EF greater than 70%, no ischemia, coronary artery calcification noted on CT portion of stress test.  6/15/2021 Bernadette Somers MD    Continue daily aspirin, losartan, and metoprolol.    Continue to improve low-fat low sugar diet.  Increase regular exercise.    Echocardiogram ordered.    Follow-up regularly with Dr. Hooker.         Relevant Medications    aspirin 81 MG EC tablet    metoprolol succinate XL (TOPROL-XL) 50 MG 24 hr tablet       Endocrine and Metabolic    Class 2 severe obesity due to excess calories with serious comorbidity and body mass index (BMI) of 36.0 to 36.9 in adult (CMS/[facility]) (Chronic)    Overview     6/15/2021 Bernadette Somers MD    Try to increase exercise and physical activity.  Continue to improve low-fat and low sugar diet.  Try to lose about 4 pounds per month.  Weigh once a week on Monday mornings to monitor progress.         Acquired hypothyroidism    Overview     6/15/2021 Bernadette Somers MD    Continue current dose of Synthroid 125mcg.         Relevant Medications    levothyroxine (SYNTHROID, LEVOTHROID) 125 MCG tablet    metoprolol succinate XL (TOPROL-XL) 50 MG 24 hr tablet    Other Relevant Orders    TSH (Completed)    T4, Free (Completed)    Vitamin D deficiency    Overview     6/15/2021 Bernadette Somers MD    Continue current dose of vitamin D3.         Relevant Orders    Vitamin D 25 Hydroxy (Completed)    B12 deficiency    Overview     6/15/2021 Bernadette Somers MD    May decrease  1000 mcg B12 tablet to every other day         Relevant Orders     Vitamin B12 (Completed)       Symptoms and Signs    Pedal edema - Primary    Overview     6/15/2021 Bernadette Somers MD    · Worsening edema and shortness of breath.    Take Lasix 20 mg daily for 3 days then may take as needed if the edema and shortness of breath have improved.  Echo ordered.  Patient will call her cardiologist, Dr. Hooker for appointment.    Continue to elevate feet when sitting at home.  Exercise the feet and legs daily.  Avoid salt in the diet.         Relevant Medications    furosemide (Lasix) 20 MG tablet    Other Relevant Orders    Adult Transthoracic Echo Complete W/ Cont if Necessary Per Protocol      Other Visit Diagnoses     Shortness of breath        Relevant Orders    Adult Transthoracic Echo Complete W/ Cont if Necessary Per Protocol             Diagnosis Plan   1. Pedal edema  furosemide (Lasix) 20 MG tablet    Adult Transthoracic Echo Complete W/ Cont if Necessary Per Protocol   2. KING (dyspnea on exertion)     3. Benign essential hypertension  CBC & Differential    Comprehensive Metabolic Panel    Microalbumin / Creatinine Urine Ratio - Urine, Clean Catch    Urinalysis With Culture If Indicated -   4. Coronary artery disease of native artery of native heart with stable angina pectoris (CMS/[facility])     5. Hyperlipidemia LDL goal <70  Lipid Panel   6. Class 2 severe obesity due to excess calories with serious comorbidity and body mass index (BMI) of 36.0 to 36.9 in adult (CMS/[facility])     7. Acquired hypothyroidism  TSH    T4, Free   8. Vitamin D deficiency  Vitamin D 25 Hydroxy   9. B12 deficiency  Vitamin B12   10. Shortness of breath  Adult Transthoracic Echo Complete W/ Cont if Necessary Per Protocol           Plan of care reviewed with patient at the conclusion of today's visit. Education was provided regarding diagnosis, management, and any prescribed or recommended OTC medications.Patient verbalizes understanding of and agreement with management plan.         Bernadette Somers,  MD        Answers for HPI/ROS submitted by the patient on 6/8/2021  Please describe your symptoms.: Regular checkup  Have you had these symptoms before?: Yes  How long have you been having these symptoms?: Greater than 2 weeks  Please list any medications you are currently taking for this condition.: all the medications that I take are available in my chart.  nothing new.  Please describe any probable cause for these symptoms. : I am getting old.  What is the primary reason for your visit?: Other

## 2021-06-16 ENCOUNTER — TELEPHONE (OUTPATIENT)
Dept: INTERNAL MEDICINE | Facility: CLINIC | Age: 81
End: 2021-06-16

## 2021-06-16 NOTE — TELEPHONE ENCOUNTER
She needs to do it where we can get it.  I want to know now what it shows to make sure we are not missing something important with her heart

## 2021-06-16 NOTE — TELEPHONE ENCOUNTER
Pt wants to have the ECHO next door. Per Ayla at [facility], they do ECHOs but given that the pt was worked in yesterday and cancelled they would not schedule it STAT and they are booked out pretty far. See previous call.

## 2021-06-16 NOTE — TELEPHONE ENCOUNTER
Caller: Serenity Lagos    Relationship: Self    Best call back number: [phone]    What orders are you requesting (i.e. lab or imaging): ECHOCARDIOGRAM             Additional notes: THE PATIENT STATES THAT DOCTOR KIKO ORDERED ON ECHOCARDIOGRAM FOR HER TO HAVE IT  DONE AT  [facility] HOWEVER THE PATIENT DOES NOT WANT TO HAVE IT DONE AT THE HOSPITAL. THE PATIENT WOULD LIKE TO HAVE IT DONE AT THE LAB ON [facility]. PLEASE CALL PATIENT TO LET HER KNOW IF THIS CAN BE DONE.

## 2021-06-16 NOTE — TELEPHONE ENCOUNTER
Spoke with pt and she will go where we can get her scheduled, though she does prefer somewhere that isn't the hospital. She is busy the rest of this week but is available at the beginning of next week.

## 2021-06-17 NOTE — PATIENT INSTRUCTIONS
Patient Instructions   Problem List Items Addressed This Visit        Cardiac and Vasculature    Hyperlipidemia LDL goal <70    Overview     6/15/2021 Bernadette Somers MD    LDL is only mildly elevated at 107.      Continue low-fat diet.  Resume regular exercise and walking.         Relevant Orders    Lipid Panel (Completed)    KING (dyspnea on exertion)    Overview     11/21/19 Echo  · LVEF = 69.0%.  · Left ventricular diastolic dysfunction (grade I a) consistent with impaired relaxation.  · Mild to moderate tricuspid valve regurgitation is present.  6/15/2021 Bernadette Somers MD  ·   · Worsening edema and shortness of breath.    Take Lasix 20 mg daily for 3 days then may take as needed if the edema and shortness of breath have improved.  Echo ordered.  Patient will call her cardiologist, Dr. Hooker for appointment.         Benign essential hypertension    Overview     6/15/2021 Bernadette Somers MD    Continue losartan 100mg and metoprolol  50mg  daily.      Try to resume exercise and walking.  Continue to avoid salt in the diet.  Avoid sodas.  Decrease caffeine.    Check blood pressures at home and record the values and let us know how it is running.         Relevant Medications    losartan (COZAAR) 100 MG tablet    metoprolol succinate XL (TOPROL-XL) 50 MG 24 hr tablet    furosemide (Lasix) 20 MG tablet    Other Relevant Orders    CBC & Differential (Completed)    Comprehensive Metabolic Panel (Completed)    Microalbumin / Creatinine Urine Ratio - Urine, Clean Catch (Completed)    Urinalysis With Culture If Indicated -    Coronary artery disease of native artery of native heart with stable angina pectoris (CMS/HCC)    Overview     · Myocardial perfusion stress test 9/23/2019 was normal: EF greater than 70%, no ischemia, coronary artery calcification noted on CT portion of stress test.  6/15/2021 Bernadette Somers MD    Continue daily aspirin, losartan, and metoprolol.    Continue to improve low-fat low sugar  diet.  Increase regular exercise.    Echocardiogram ordered.    Follow-up regularly with Dr. Hooker.         Relevant Medications    aspirin 81 MG EC tablet    metoprolol succinate XL (TOPROL-XL) 50 MG 24 hr tablet       Endocrine and Metabolic    Class 2 severe obesity due to excess calories with serious comorbidity and body mass index (BMI) of 36.0 to 36.9 in adult (CMS/[facility]) (Chronic)    Overview     6/15/2021 Bernadette Somers MD    Try to increase exercise and physical activity.  Continue to improve low-fat and low sugar diet.  Try to lose about 4 pounds per month.  Weigh once a week on Monday mornings to monitor progress.         Acquired hypothyroidism    Overview     6/15/2021 Bernadette Somers MD    Continue current dose of Synthroid 125mcg.         Relevant Medications    levothyroxine (SYNTHROID, LEVOTHROID) 125 MCG tablet    metoprolol succinate XL (TOPROL-XL) 50 MG 24 hr tablet    Other Relevant Orders    TSH (Completed)    T4, Free (Completed)    Vitamin D deficiency    Overview     6/15/2021 Bernadette Somers MD    Continue current dose of vitamin D3.         Relevant Orders    Vitamin D 25 Hydroxy (Completed)    B12 deficiency    Overview     6/15/2021 Bernadette Somers MD    May decrease  1000 mcg B12 tablet to every other day         Relevant Orders    Vitamin B12 (Completed)       Symptoms and Signs    Pedal edema - Primary    Overview     6/15/2021 Bernadette Somers MD    · Worsening edema and shortness of breath.    Take Lasix 20 mg daily for 3 days then may take as needed if the edema and shortness of breath have improved.  Echo ordered.  Patient will call her cardiologist, Dr. Hooker for appointment.    Continue to elevate feet when sitting at home.  Exercise the feet and legs daily.  Avoid salt in the diet.         Relevant Medications    furosemide (Lasix) 20 MG tablet    Other Relevant Orders    Adult Transthoracic Echo Complete W/ Cont if Necessary Per Protocol      Other Visit Diagnoses      Shortness of breath        Relevant Orders    Adult Transthoracic Echo Complete W/ Cont if Necessary Per Protocol             Edema    Edema is an abnormal buildup of fluids in the body tissues and under the skin. Swelling of the legs, feet, and ankles is a common symptom that becomes more likely as you get older. Swelling is also common in looser tissues, like around the eyes. When the affected area is squeezed, the fluid may move out of that spot and leave a dent for a few moments. This dent is called pitting edema.  There are many possible causes of edema. Eating too much salt (sodium) and being on your feet or sitting for a long time can cause edema in your legs, feet, and ankles. Hot weather may make edema worse. Common causes of edema include:  · Heart failure.  · Liver or kidney disease.  · Weak leg blood vessels.  · Cancer.  · An injury.  · Pregnancy.  · Medicines.  · Being obese.  · Low protein levels in the blood.  Edema is usually painless. Your skin may look swollen or shiny.  Follow these instructions at home:  · Keep the affected body part raised (elevated) above the level of your heart when you are sitting or lying down.  · Do not sit still or stand for long periods of time.  · Do not wear tight clothing. Do not wear garters on your upper legs.  · Exercise your legs to get your circulation going. This helps to move the fluid back into your blood vessels, and it may help the swelling go down.  · Wear elastic bandages or support stockings to reduce swelling as told by your health care provider.  · Eat a low-salt (low-sodium) diet to reduce fluid as told by your health care provider.  · Depending on the cause of your swelling, you may need to limit how much fluid you drink (fluid restriction).  · Take over-the-counter and prescription medicines only as told by your health care provider.  Contact a health care provider if:  · Your edema does not get better with treatment.  · You have heart, liver, or  kidney disease and have symptoms of edema.  · You have sudden and unexplained weight gain.  Get help right away if:  · You develop shortness of breath or chest pain.  · You cannot breathe when you lie down.  · You develop pain, redness, or warmth in the swollen areas.  · You have heart, liver, or kidney disease and suddenly get edema.  · You have a fever and your symptoms suddenly get worse.  Summary  · Edema is an abnormal buildup of fluids in the body tissues and under the skin.  · Eating too much salt (sodium) and being on your feet or sitting for a long time can cause edema in your legs, feet, and ankles.  · Keep the affected body part raised (elevated) above the level of your heart when you are sitting or lying down.  This information is not intended to replace advice given to you by your health care provider. Make sure you discuss any questions you have with your health care provider.  Document Revised: 05/06/2020 Document Reviewed: 01/20/2018  [email or web address] Patient Education © 2021 [employer]

## 2021-07-21 ENCOUNTER — HOSPITAL ENCOUNTER (OUTPATIENT)
Dept: CARDIOLOGY | Facility: HOSPITAL | Age: 81
Discharge: HOME | End: 2021-07-21
Admitting: INTERNAL MEDICINE

## 2021-07-21 VITALS — BODY MASS INDEX: 34.6 KG/M2 | WEIGHT: 188 LBS | HEIGHT: 62 IN

## 2021-07-21 DIAGNOSIS — R06.02 SHORTNESS OF BREATH: ICD-10-CM

## 2021-07-21 DIAGNOSIS — R60.0 PEDAL EDEMA: ICD-10-CM

## 2021-07-21 PROCEDURE — 93306 TTE W/DOPPLER COMPLETE: CPT | Performed by: INTERNAL MEDICINE

## 2021-07-21 PROCEDURE — 93306 TTE W/DOPPLER COMPLETE: CPT

## 2021-07-22 LAB
ASCENDING AORTA: 2.9 CM
BH CV ECHO MEAS - AO MAX PG (FULL): 3.8 MMHG
BH CV ECHO MEAS - AO MAX PG: 9 MMHG
BH CV ECHO MEAS - AO MEAN PG (FULL): 1 MMHG
BH CV ECHO MEAS - AO MEAN PG: 4 MMHG
BH CV ECHO MEAS - AO ROOT AREA (BSA CORRECTED): 1.5
BH CV ECHO MEAS - AO ROOT AREA: 6.2 CM^2
BH CV ECHO MEAS - AO ROOT DIAM: 2.8 CM
BH CV ECHO MEAS - AO V2 MAX: 146 CM/SEC
BH CV ECHO MEAS - AO V2 MEAN: 96.3 CM/SEC
BH CV ECHO MEAS - AO V2 VTI: 33.4 CM
BH CV ECHO MEAS - ASC AORTA: 2.9 CM
BH CV ECHO MEAS - AVA(I,A): 2.5 CM^2
BH CV ECHO MEAS - AVA(I,D): 2.5 CM^2
BH CV ECHO MEAS - AVA(V,A): 2.5 CM^2
BH CV ECHO MEAS - AVA(V,D): 2.5 CM^2
BH CV ECHO MEAS - BSA(HAYCOCK): 2 M^2
BH CV ECHO MEAS - BSA: 1.9 M^2
BH CV ECHO MEAS - BZI_BMI: 34.4 KILOGRAMS/M^2
BH CV ECHO MEAS - BZI_METRIC_HEIGHT: 157.5 CM
BH CV ECHO MEAS - BZI_METRIC_WEIGHT: 85.3 KG
BH CV ECHO MEAS - EDV(CUBED): 48.2 ML
BH CV ECHO MEAS - EDV(MOD-SP2): 55 ML
BH CV ECHO MEAS - EDV(MOD-SP4): 47 ML
BH CV ECHO MEAS - EDV(TEICH): 55.9 ML
BH CV ECHO MEAS - EF(CUBED): 71.7 %
BH CV ECHO MEAS - EF(MOD-BP): 65 %
BH CV ECHO MEAS - EF(MOD-SP2): 67.3 %
BH CV ECHO MEAS - EF(MOD-SP4): 61.7 %
BH CV ECHO MEAS - EF(TEICH): 64.3 %
BH CV ECHO MEAS - ESV(CUBED): 13.7 ML
BH CV ECHO MEAS - ESV(MOD-SP2): 18 ML
BH CV ECHO MEAS - ESV(MOD-SP4): 18 ML
BH CV ECHO MEAS - ESV(TEICH): 20 ML
BH CV ECHO MEAS - FS: 34.3 %
BH CV ECHO MEAS - IVS/LVPW: 1
BH CV ECHO MEAS - IVSD: 1.1 CM
BH CV ECHO MEAS - LA DIMENSION: 3.5 CM
BH CV ECHO MEAS - LA/AO: 1.3
BH CV ECHO MEAS - LAD MAJOR: 5.1 CM
BH CV ECHO MEAS - LAT PEAK E' VEL: 6.9 CM/SEC
BH CV ECHO MEAS - LATERAL E/E' RATIO: 15.5
BH CV ECHO MEAS - LV DIASTOLIC VOL/BSA (35-75): 25.2 ML/M^2
BH CV ECHO MEAS - LV IVRT: 0.07 SEC
BH CV ECHO MEAS - LV MASS(C)D: 119.5 GRAMS
BH CV ECHO MEAS - LV MASS(C)DI: 64.2 GRAMS/M^2
BH CV ECHO MEAS - LV MAX PG: 5.2 MMHG
BH CV ECHO MEAS - LV MEAN PG: 3 MMHG
BH CV ECHO MEAS - LV SYSTOLIC VOL/BSA (12-30): 9.7 ML/M^2
BH CV ECHO MEAS - LV V1 MAX: 114 CM/SEC
BH CV ECHO MEAS - LV V1 MEAN: 79.6 CM/SEC
BH CV ECHO MEAS - LV V1 VTI: 26.5 CM
BH CV ECHO MEAS - LVIDD: 3.6 CM
BH CV ECHO MEAS - LVIDS: 2.4 CM
BH CV ECHO MEAS - LVLD AP2: 7.3 CM
BH CV ECHO MEAS - LVLD AP4: 6.9 CM
BH CV ECHO MEAS - LVLS AP2: 6 CM
BH CV ECHO MEAS - LVLS AP4: 6.4 CM
BH CV ECHO MEAS - LVOT AREA (M): 3.1 CM^2
BH CV ECHO MEAS - LVOT AREA: 3.1 CM^2
BH CV ECHO MEAS - LVOT DIAM: 2 CM
BH CV ECHO MEAS - LVPWD: 1.1 CM
BH CV ECHO MEAS - MED PEAK E' VEL: 6.6 CM/SEC
BH CV ECHO MEAS - MEDIAL E/E' RATIO: 16.3
BH CV ECHO MEAS - MV A MAX VEL: 121 CM/SEC
BH CV ECHO MEAS - MV DEC SLOPE: 564 CM/SEC^2
BH CV ECHO MEAS - MV DEC TIME: 0.27 SEC
BH CV ECHO MEAS - MV E MAX VEL: 107 CM/SEC
BH CV ECHO MEAS - MV E/A: 0.88
BH CV ECHO MEAS - MV P1/2T MAX VEL: 153 CM/SEC
BH CV ECHO MEAS - MV P1/2T: 79.5 MSEC
BH CV ECHO MEAS - MVA P1/2T LCG: 1.4 CM^2
BH CV ECHO MEAS - MVA(P1/2T): 2.8 CM^2
BH CV ECHO MEAS - PA ACC SLOPE: 1005 CM/SEC^2
BH CV ECHO MEAS - PA ACC TIME: 0.12 SEC
BH CV ECHO MEAS - PA PR(ACCEL): 26.1 MMHG
BH CV ECHO MEAS - RAP SYSTOLE: 3 MMHG
BH CV ECHO MEAS - RVSP: 32 MMHG
BH CV ECHO MEAS - SI(AO): 110.5 ML/M^2
BH CV ECHO MEAS - SI(CUBED): 18.6 ML/M^2
BH CV ECHO MEAS - SI(LVOT): 44.7 ML/M^2
BH CV ECHO MEAS - SI(MOD-SP2): 19.9 ML/M^2
BH CV ECHO MEAS - SI(MOD-SP4): 15.6 ML/M^2
BH CV ECHO MEAS - SI(TEICH): 19.3 ML/M^2
BH CV ECHO MEAS - SV(AO): 205.7 ML
BH CV ECHO MEAS - SV(CUBED): 34.6 ML
BH CV ECHO MEAS - SV(LVOT): 83.3 ML
BH CV ECHO MEAS - SV(MOD-SP2): 37 ML
BH CV ECHO MEAS - SV(MOD-SP4): 29 ML
BH CV ECHO MEAS - SV(TEICH): 35.9 ML
BH CV ECHO MEAS - TAPSE (>1.6): 2.4 CM
BH CV ECHO MEAS - TR MAX PG: 29 MMHG
BH CV ECHO MEAS - TR MAX VEL: 271 CM/SEC
BH CV ECHO MEASUREMENTS AVERAGE E/E' RATIO: 15.85
BH CV VAS BP RIGHT ARM: NORMAL MMHG
BH CV XLRA - RV BASE: 3.7 CM
BH CV XLRA - RV LENGTH: 5.3 CM
BH CV XLRA - RV MID: 2.9 CM
BH CV XLRA - TDI S': 14.1 CM/SEC
IVRT: 74 MSEC
LEFT ATRIUM VOLUME INDEX: 20.4 ML/M^2
LEFT ATRIUM VOLUME: 38 ML
MAXIMAL PREDICTED HEART RATE: 141 BPM
STRESS TARGET HR: 120 BPM

## 2021-09-07 RX ORDER — OMEPRAZOLE 40 MG/1
CAPSULE, DELAYED RELEASE ORAL
Qty: 90 CAPSULE | Refills: 3 | Status: SHIPPED | OUTPATIENT
Start: 2021-09-07 | End: 2022-08-15

## 2021-10-15 RX ORDER — LEVOTHYROXINE SODIUM 0.12 MG/1
TABLET ORAL
Qty: 90 TABLET | Refills: 3 | Status: SHIPPED | OUTPATIENT
Start: 2021-10-15 | End: 2022-10-10

## 2021-10-19 ENCOUNTER — OFFICE VISIT (OUTPATIENT)
Dept: INTERNAL MEDICINE | Facility: CLINIC | Age: 81
End: 2021-10-19

## 2021-10-19 VITALS
OXYGEN SATURATION: 98 % | BODY MASS INDEX: 36.51 KG/M2 | DIASTOLIC BLOOD PRESSURE: 70 MMHG | SYSTOLIC BLOOD PRESSURE: 132 MMHG | HEIGHT: 62 IN | RESPIRATION RATE: 16 BRPM | TEMPERATURE: 97.7 F | WEIGHT: 198.4 LBS | HEART RATE: 68 BPM

## 2021-10-19 DIAGNOSIS — R60.0 PEDAL EDEMA: ICD-10-CM

## 2021-10-19 DIAGNOSIS — I10 BENIGN ESSENTIAL HYPERTENSION: ICD-10-CM

## 2021-10-19 DIAGNOSIS — E66.01 CLASS 2 SEVERE OBESITY DUE TO EXCESS CALORIES WITH SERIOUS COMORBIDITY AND BODY MASS INDEX (BMI) OF 36.0 TO 36.9 IN ADULT (HCC): Chronic | ICD-10-CM

## 2021-10-19 DIAGNOSIS — N64.4 BREAST TENDERNESS IN FEMALE: Primary | ICD-10-CM

## 2021-10-19 PROCEDURE — 99214 OFFICE O/P EST MOD 30 MIN: CPT | Performed by: INTERNAL MEDICINE

## 2021-10-19 NOTE — PROGRESS NOTES
[facility]     Serenity Lagos  1940   [id number]      Patient Care Team:  Bernadette Somers MD as PCP - General (Internal Medicine)  Syed Hooker MD as Consulting Physician (Cardiology)  Celio Iniguez MD as Consulting Physician (Ophthalmology)  Ender Jolly MD as Consulting Physician (Dermatology)    Chief Complaint   Patient presents with   • Breast Problem     under lt breast tender to touch    • Hypertension     f/u   • Hyperlipidemia            HPI  Patient is a 81 y.o. female presents with accidentally found a tender spot in left lower breast when she was lying in bed a few days ago.  It does not hurt unless it is pressed on it and it is tender.  She does drink a lot of coffee all day long which she is unwilling to give up.    HPI  L 4th toe nonhealing. In spot where had a spider bite almost 2 years ago. Dr. Jolly plans to do biopsy soon.     CHRONIC CONDITIONS   Taking levothyroxine daily and feels well.     Bilateral leg swelling.  Had a procedure on LLE vein by Dr. Jolly.  Furosemide helped a lot with the swelling in both legs, but she only took it a few days. Does not wear supp hose although she says Dr. Jolly has told her she needs to do so.    Blood pressures doing well, running about 130 over 70s on losartan and metoprolol.      Past Medical History:   Diagnosis Date   • Cervicalgia    • Chronic bronchitis (HCC)    • Coronary artery disease    • Dysphagia    • Glaucoma, open angle    • Hearing loss    • Hypertension    • Mixed hyperlipidemia    • Osteoarthritis     cervical spine degenerative   • Perforation of tympanic membrane    • Pruritus     chronic general pruritis   • Salivary gland cancer (HCC)    • Shingles     postherpetic neuralgia-thorax   • Spondylolisthesis      lumbar spine   • Wrist tendonitis        Past Surgical History:   Procedure Laterality Date   • APPENDECTOMY     • BREAST BIOPSY     • BREAST BIOPSY Right 07/29/2019   • BREAST CYST EXCISION     •  ENDOSCOPY  2017    normal (per pt) EGD   • MOUTH SURGERY     • TONSILLECTOMY         Family History   Problem Relation Age of Onset   • Hypertension Mother    • Heart failure Mother         CHF   • Coronary artery disease Mother         CABG   • Lung cancer Father 59        Heavy smoker and Black Lung   • Rheum arthritis Sister    • Other Sister 76        sepsis due to infected knee replacement   • COPD Sister         heavy smoker    • Alzheimer's disease Maternal Grandmother    • Coronary artery disease Maternal Grandfather    • Cancer Paternal Grandmother    • Heart disease Paternal Grandfather    • Hypertension Other    • Obesity Other    • Stroke Maternal Aunt    • Heart disease Maternal Aunt    • Stroke Maternal Aunt    • Stroke Maternal Aunt    • Coronary artery disease Maternal Uncle    • Stroke Maternal Uncle    • Heart disease Maternal Uncle    • Breast cancer Neg Hx    • Ovarian cancer Neg Hx        Social History     Socioeconomic History   • Marital status:    Tobacco Use   • Smoking status: Former Smoker     Packs/day: 1.00     Years: 20.00     Pack years: 20.00     Types: Cigarettes     Quit date:      Years since quittin.8   • Smokeless tobacco: Never Used   Substance and Sexual Activity   • Alcohol use: Yes     Alcohol/week: 7.0 standard drinks     Types: 7 Glasses of wine per week     Comment:  daily with dinner   • Drug use: No   • Sexual activity: Defer       No Known Allergies    Review of Systems:     Review of Systems   Constitutional: Negative for chills, fatigue and fever.   HENT: Negative for congestion, ear pain and sinus pressure.    Respiratory: Negative for cough, chest tightness, shortness of breath and wheezing.    Cardiovascular: Positive for leg swelling. Negative for chest pain and palpitations.   Gastrointestinal: Negative for abdominal pain, blood in stool and constipation.   Skin: Negative for color change.   Allergic/Immunologic: Negative for environmental  "allergies.   Neurological: Negative for dizziness and headache.   Psychiatric/Behavioral: The patient is not nervous/anxious.        Vital Signs  Vitals:    10/19/21 1358   BP: 132/70   BP Location: Left arm   Patient Position: Sitting   Cuff Size: Adult   Pulse: 68   Resp: 16   Temp: 97.7 °F (36.5 °C)   TempSrc: Infrared   SpO2: 98%   Weight: 90 kg (198 lb 6.4 oz)   Height: 157.5 cm (62\")   PainSc: 0-No pain     Body mass index is 36.29 kg/m².  Patient's Body mass index is 36.29 kg/m². indicating that she is morbidly obese (BMI > 40 or > 35 with obesity - related health condition). Obesity-related health conditions include the following: hypertension and dyslipidemias. Obesity is worsening. BMI is is above average; BMI management plan is completed. We discussed low calorie, low carb based diet program, portion control and increasing exercise..        Current Outpatient Medications:   •  Ascorbic Acid (VITAMIN C ER) 1000 MG tablet controlled-release, 1 tablet Daily., Disp: , Rfl:   •  aspirin 81 MG EC tablet, Take 1 tablet by mouth Daily., Disp: 90 tablet, Rfl: 3  •  Biotin 5000 MCG sublingual tablet, take 1 by oral route  every day, Disp: , Rfl:   •  Cholecalciferol (VITAMIN D3) 2000 units capsule, take 1 Capsule by Oral daily, Disp: , Rfl:   •  clobetasol (TEMOVATE) 0.05 % ointment, Take As Directed., Disp: , Rfl:   •  cyproheptadine (PERIACTIN) 4 MG tablet, Take 4 mg by mouth Daily., Disp: , Rfl:   •  Docusate Sodium 100 MG capsule, take 1 tablet by oral route twice daily, Disp: , Rfl:   •  furosemide (Lasix) 20 MG tablet, Take 1 tablet by mouth Daily As Needed (edema)., Disp: 30 tablet, Rfl: 5  •  Glucosamine-Chondroitin (OSTEO BI-FLEX REGULAR STRENGTH PO), 1 daily, Disp: , Rfl:   •  latanoprost (XALATAN) 0.005 % ophthalmic solution, Administer 1 drop to both eyes Every Night., Disp: , Rfl:   •  levothyroxine (SYNTHROID, LEVOTHROID) 125 MCG tablet, TAKE 1 TABLET DAILY, Disp: 90 tablet, Rfl: 3  •  losartan " (COZAAR) 100 MG tablet, TAKE 1 TABLET DAILY, Disp: 90 tablet, Rfl: 3  •  metoprolol succinate XL (TOPROL-XL) 50 MG 24 hr tablet, Take 1 tablet by mouth Daily., Disp: 90 tablet, Rfl: 3  •  omeprazole (priLOSEC) 40 MG capsule, TAKE 1 CAPSULE DAILY, Disp: 90 capsule, Rfl: 3  •  Probiotic Product (PROBIOTIC DAILY PO), One capsule daily, Disp: , Rfl:   •  raloxifene (EVISTA) 60 MG tablet, TAKE 1 TABLET DAILY, Disp: 90 tablet, Rfl: 3  •  Vitamin E 400 units tablet, Vitamin E 400 UNIT Oral Tablet; Patient Sig: Vitamin E 400 UNIT Oral Tablet TAKE 1 TABLET DAILY.; 0; 24-Sep-2013; Active, Disp: , Rfl:     Physical Exam:    Physical Exam  Vitals and nursing note reviewed.   Constitutional:       Appearance: She is well-developed.   HENT:      Head: Normocephalic.   Eyes:      Conjunctiva/sclera: Conjunctivae normal.      Pupils: Pupils are equal, round, and reactive to light.   Neck:      Thyroid: No thyromegaly.   Cardiovascular:      Rate and Rhythm: Normal rate and regular rhythm.      Heart sounds: Normal heart sounds.      Comments: Pitting bilateral.  Pulmonary:      Effort: Pulmonary effort is normal.      Breath sounds: Normal breath sounds. No wheezing.   Chest:   Breasts:      Right: Tenderness present. No bleeding, inverted nipple, mass, nipple discharge or skin change.      Left: Tenderness present. No bleeding, inverted nipple, mass, nipple discharge or skin change.         Musculoskeletal:         General: Normal range of motion.      Cervical back: Normal range of motion and neck supple.      Right lower leg: 3+ Edema present.      Left lower leg: 3+ Edema present.   Lymphadenopathy:      Cervical: No cervical adenopathy.   Skin:     General: Skin is warm and dry.          Neurological:      Mental Status: She is alert and oriented to person, place, and time.   Psychiatric:         Attention and Perception: Attention normal.         Mood and Affect: Mood normal.         Thought Content: Thought content normal.           ACE III MINI        Results Review:    None    CMP:  Lab Results   Component Value Date    BUN 14 06/09/2021    CREATININE 0.86 06/09/2021    EGFRIFNONA 64 06/09/2021    BCR 16.3 06/09/2021     06/09/2021    K 4.7 06/09/2021    CO2 25.9 06/09/2021    CALCIUM 9.1 06/09/2021    ALBUMIN 3.80 06/09/2021    BILITOT 0.3 06/09/2021    ALKPHOS 54 06/09/2021    AST 17 06/09/2021    ALT 21 06/09/2021     HbA1c:  No results found for: HGBA1C  Microalbumin:  Lab Results   Component Value Date    MICROALBUR <1.2 06/09/2021     Lipid Panel  Lab Results   Component Value Date    CHOL 187 06/09/2021    TRIG 88 06/09/2021    HDL 67 (H) 06/09/2021     (H) 06/09/2021    AST 17 06/09/2021    ALT 21 06/09/2021       Medication Review: Medications reviewed and noted  Patient Instructions   Problem List Items Addressed This Visit        Cardiac and Vasculature    Benign essential hypertension    Overview     10/19/2021 Bernadette Somers MD    Continue losartan 100mg and metoprolol  50mg  daily.      Try to resume exercise and walking.  Continue to avoid salt in the diet.  Avoid sodas.  Decrease caffeine.    Check blood pressures at home and record the values and let us know how it is running.         Relevant Medications    losartan (COZAAR) 100 MG tablet    metoprolol succinate XL (TOPROL-XL) 50 MG 24 hr tablet    furosemide (Lasix) 20 MG tablet       Endocrine and Metabolic    Class 2 severe obesity due to excess calories with serious comorbidity and body mass index (BMI) of 36.0 to 36.9 in adult (HCC) (Chronic)    Overview     10/19/2021 Bernadette Somers MD    Try to increase exercise and physical activity.  Continue to improve low-fat and low sugar diet.  Try to lose about 4 pounds per month.  Weigh once a week on Monday mornings to monitor progress.            Genitourinary and Reproductive     Breast tenderness in female - Primary    Overview     10/19/2021 Bernadette Somers MD    Fibrocystic thickening areas  inferiorly in both breast but mainly the left.  She was advised to decrease caffeine.            Symptoms and Signs    Pedal edema    Overview     10/19/2021 Bernadette Somers MD    May take Lasix 20 mg every day as needed for swelling in the lower legs and feet.      Wear support hose daily.  Continue to elevate feet when sitting at home.  Exercise the feet and legs daily.  Avoid salt in the diet.         Relevant Medications    furosemide (Lasix) 20 MG tablet             Diagnosis Plan   1. Breast tenderness in female     2. Pedal edema     3. Benign essential hypertension     4. Class 2 severe obesity due to excess calories with serious comorbidity and body mass index (BMI) of 36.0 to 36.9 in adult (HCC)             Plan of care reviewed with patient at the conclusion of today's visit. Education was provided regarding diagnosis, management, and any prescribed or recommended OTC medications.Patient verbalizes understanding of and agreement with management plan.         Bernadette Somers MD

## 2021-10-19 NOTE — PATIENT INSTRUCTIONS
Patient Instructions   Problem List Items Addressed This Visit        Cardiac and Vasculature    Benign essential hypertension    Overview     10/19/2021 Bernadette Somers MD    Continue losartan 100mg and metoprolol  50mg  daily.      Try to resume exercise and walking.  Continue to avoid salt in the diet.  Avoid sodas.  Decrease caffeine.    Check blood pressures at home and record the values and let us know how it is running.         Relevant Medications    losartan (COZAAR) 100 MG tablet    metoprolol succinate XL (TOPROL-XL) 50 MG 24 hr tablet    furosemide (Lasix) 20 MG tablet       Endocrine and Metabolic    Class 2 severe obesity due to excess calories with serious comorbidity and body mass index (BMI) of 36.0 to 36.9 in adult (HCC) (Chronic)    Overview     10/19/2021 Bernadette Somers MD    Try to increase exercise and physical activity.  Continue to improve low-fat and low sugar diet.  Try to lose about 4 pounds per month.  Weigh once a week on Monday mornings to monitor progress.            Genitourinary and Reproductive     Breast tenderness in female - Primary    Overview     10/19/2021 Bernadette Somers MD    Fibrocystic thickening areas inferiorly in both breast but mainly the left.  She was advised to decrease caffeine.            Symptoms and Signs    Pedal edema    Overview     10/19/2021 Bernadette Somers MD    May take Lasix 20 mg every day as needed for swelling in the lower legs and feet.      Wear support hose daily.  Continue to elevate feet when sitting at home.  Exercise the feet and legs daily.  Avoid salt in the diet.         Relevant Medications    furosemide (Lasix) 20 MG tablet

## 2021-11-10 NOTE — PROGRESS NOTES
OFFICE VISIT  NOTE  [facility] CARDIOLOGY      Name: Serenity Lagos    Date: 2021  MRN:  [id number]  :  1940      REFERRING/PRIMARY PROVIDER:  Bernadette Somers MD    Chief Complaint   Patient presents with   • Coronary Artery Disease       HPI: Serenity Lagos is a 81 y.o. female who presents today for follow-up for coronary artery disease.  Patient presented on 2019 to the ER with complaints of chest pain.  Described as substernal chest discomfort, awoke patient from sleep.  She underwent exercise nuclear stress test 2019 which was normal, although did show coronary calcification on the CT portion.  Doing well from cardiac standpoint, denies chest pain or shortness of breath.  No recent palpitations, takes furosemide as needed Main complaint is shortness of breath which is stable over the past year.  Lower extreme edema is somewhat improved after vein procedure.    Past Medical History:   Diagnosis Date   • Cervicalgia    • Chronic bronchitis (HCC)    • Coronary artery disease    • Dysphagia    • Glaucoma, open angle    • Hearing loss    • Hypertension    • Mixed hyperlipidemia    • Osteoarthritis     cervical spine degenerative   • Perforation of tympanic membrane    • Pruritus     chronic general pruritis   • Salivary gland cancer (HCC)    • Shingles     postherpetic neuralgia-thorax   • Spondylolisthesis      lumbar spine   • Wrist tendonitis        Past Surgical History:   Procedure Laterality Date   • APPENDECTOMY     • BREAST BIOPSY     • BREAST BIOPSY Right 2019   • BREAST CYST EXCISION     • ENDOSCOPY  2017    normal (per pt) EGD   • MOUTH SURGERY     • TONSILLECTOMY         Social History     Socioeconomic History   • Marital status:    Tobacco Use   • Smoking status: Former Smoker     Packs/day: 1.00     Years: 20.00     Pack years: 20.00     Types: Cigarettes     Quit date:      Years since quittin.8   • Smokeless tobacco: Never  Used   Substance and Sexual Activity   • Alcohol use: Yes     Alcohol/week: 7.0 standard drinks     Types: 7 Glasses of wine per week     Comment:  daily with dinner   • Drug use: No   • Sexual activity: Defer       Family History   Problem Relation Age of Onset   • Hypertension Mother    • Heart failure Mother         CHF   • Coronary artery disease Mother         CABG   • Lung cancer Father 59        Heavy smoker and Black Lung   • Rheum arthritis Sister    • Other Sister 76        sepsis due to infected knee replacement   • COPD Sister         heavy smoker    • Alzheimer's disease Maternal Grandmother    • Coronary artery disease Maternal Grandfather    • Cancer Paternal Grandmother    • Heart disease Paternal Grandfather    • Hypertension Other    • Obesity Other    • Stroke Maternal Aunt    • Heart disease Maternal Aunt    • Stroke Maternal Aunt    • Stroke Maternal Aunt    • Coronary artery disease Maternal Uncle    • Stroke Maternal Uncle    • Heart disease Maternal Uncle    • Breast cancer Neg Hx    • Ovarian cancer Neg Hx         ROS:   Constitutional no fever,  no weight loss   Skin no rash, no subcutaneous nodules   Otolaryngeal no difficulty swallowing   Cardiovascular See HPI   Pulmonary no cough, no sputum production   Gastrointestinal no constipation, no diarrhea   Genitourinary no dysuria, no hematuria   Hematologic no easy bruisability, no abnormal bleeding   Musculoskeletal no muscle pain   Neurologic no dizziness, no falls         No Known Allergies      Current Outpatient Medications:   •  Ascorbic Acid (VITAMIN C ER) 1000 MG tablet controlled-release, 1 tablet Daily., Disp: , Rfl:   •  aspirin 81 MG EC tablet, Take 1 tablet by mouth Daily., Disp: 90 tablet, Rfl: 3  •  Biotin 5000 MCG sublingual tablet, take 1 by oral route  every day, Disp: , Rfl:   •  CALCIUM PO, Take 1 tablet by mouth Daily. Calcium 600 + vitamin d, Disp: , Rfl:   •  Cholecalciferol (VITAMIN D3) 2000 units capsule, As Needed.,  "Disp: , Rfl:   •  clobetasol (TEMOVATE) 0.05 % ointment, Take As Directed., Disp: , Rfl:   •  cyproheptadine (PERIACTIN) 4 MG tablet, Take 4 mg by mouth Daily., Disp: , Rfl:   •  Docusate Sodium 100 MG capsule, take 1 tablet by oral route twice daily, Disp: , Rfl:   •  furosemide (Lasix) 20 MG tablet, Take 1 tablet by mouth Daily As Needed (edema)., Disp: 30 tablet, Rfl: 5  •  Glucosamine-Chondroitin (OSTEO BI-FLEX REGULAR STRENGTH PO), 1 daily, Disp: , Rfl:   •  latanoprost (XALATAN) 0.005 % ophthalmic solution, Administer 1 drop to both eyes Every Night., Disp: , Rfl:   •  levothyroxine (SYNTHROID, LEVOTHROID) 125 MCG tablet, TAKE 1 TABLET DAILY, Disp: 90 tablet, Rfl: 3  •  losartan (COZAAR) 100 MG tablet, TAKE 1 TABLET DAILY, Disp: 90 tablet, Rfl: 3  •  metoprolol succinate XL (TOPROL-XL) 50 MG 24 hr tablet, Take 1 tablet by mouth Daily., Disp: 90 tablet, Rfl: 3  •  omeprazole (priLOSEC) 40 MG capsule, TAKE 1 CAPSULE DAILY, Disp: 90 capsule, Rfl: 3  •  Probiotic Product (PROBIOTIC DAILY PO), One capsule daily, Disp: , Rfl:   •  raloxifene (EVISTA) 60 MG tablet, TAKE 1 TABLET DAILY, Disp: 90 tablet, Rfl: 3  •  Vitamin E 400 units tablet, Vitamin E 400 UNIT Oral Tablet; Patient Sig: Vitamin E 400 UNIT Oral Tablet TAKE 1 TABLET DAILY.; 0; 24-Sep-2013; Active, Disp: , Rfl:     Vitals:    11/11/21 1311   BP: 144/78   BP Location: Right arm   Patient Position: Sitting   Pulse: 64   SpO2: 100%   Weight: 88 kg (194 lb)   Height: 157.5 cm (62\")     Body mass index is 35.48 kg/m².    PHYSICAL EXAM:    General Appearance:   · well developed  · well nourished  HENT:   · oropharynx moist  · lips not cyanotic  Neck:  · thyroid not enlarged  · supple  Respiratory:  · no respiratory distress  · normal breath sounds  · no rales  Cardiovascular:  · no jugular venous distention  · regular rhythm  · apical impulse normal  · S1 normal, S2 normal  · no S3, no S4   · no murmur  · no rub, no thrill  · carotid pulses normal; no " bruit  · pedal pulses normal  · lower extremity edema: none    Gastrointestinal:   · bowel sounds normal  · non-tender  · no hepatomegaly, no splenomegaly  Musculoskeletal:  · no clubbing of fingers.   · normocephalic, head atraumatic  Skin:   · warm, dry  Psychiatric:  · judgement and insight appropriate  · normal mood and affect    RESULTS:     ECG 12 Lead    Date/Time: 11/11/2021 1:54 PM  Performed by: Syed Hooker MD  Authorized by: Syed Hooker MD   Comparison: compared with previous ECG from 10/18/2019  Similar to previous ECG  Rhythm: sinus rhythm  Rate: normal  QRS axis: normal    Clinical impression: normal ECG            Results for orders placed during the hospital encounter of 07/21/21    Adult Transthoracic Echo Complete W/ Cont if Necessary Per Protocol    Interpretation Summary  · Left ventricular ejection fraction appears to be 61 - 65%. Left ventricular systolic function is normal.  · Estimated right ventricular systolic pressure from tricuspid regurgitation is normal (<35 mmHg). Calculated right ventricular systolic pressure from tricuspid regurgitation is 32 mmHg.  · Left ventricular diastolic function is consistent with (grade I) impaired relaxation.  · No significant structural or functional valvular disease.        Labs:  Lab Results   Component Value Date    CHOL 187 06/09/2021    TRIG 88 06/09/2021    HDL 67 (H) 06/09/2021     (H) 06/09/2021    AST 17 06/09/2021    ALT 21 06/09/2021     No results found for: HGBA1C  No components found for: CREATINININE  eGFR Non  Amer   Date Value Ref Range Status   06/09/2021 64 >60 mL/min/1.73 Final   11/24/2020 77 >60 mL/min/1.73 Final   05/21/2020 75 >60 mL/min/1.73 Final         ASSESSMENT:  Problem List Items Addressed This Visit        Cardiac and Vasculature    Benign essential hypertension    Overview     10/19/2021 Bernadette Somers MD    Continue losartan 100mg and metoprolol  50mg  daily.      Try to resume exercise and  walking.  Continue to avoid salt in the diet.  Avoid sodas.  Decrease caffeine.    Check blood pressures at home and record the values and let us know how it is running.         Coronary artery disease of native artery of native heart with stable angina pectoris (HCC) - Primary    Overview     · Myocardial perfusion stress test 9/23/2019 was normal: EF greater than 70%, no ischemia, coronary artery calcification noted on CT portion of stress test.  6/15/2021 Bernadette Somers MD    Continue daily aspirin, losartan, and metoprolol.    Continue to improve low-fat low sugar diet.  Increase regular exercise.    Echocardiogram ordered.    Follow-up regularly with Dr. Hooker.         Hyperlipidemia LDL goal <70    Overview     6/15/2021 Bernadette Somers MD    LDL is only mildly elevated at 107.      Continue low-fat diet.  Resume regular exercise and walking.         Palpitations    Overview     · 14-day heart monitor 9/4/2019: Wear time 12 days.  Average heart rate 82 bpm.  PACs less than 1% brief SVT longest 8 beats.         Chest pain    Overview     9/23/2019 Stress test   · LVEF > 70%  · Myocardial perfusion imaging indicates a normal myocardial perfusion study with no evidence of ischemia.  · There was coronary artery calcification noted on the CT portion of the stress test.  · Impressions are consistent with a low risk study.         KING (dyspnea on exertion)    Overview     11/21/19 Echo  · LVEF = 69.0%.  · Left ventricular diastolic dysfunction (grade I a) consistent with impaired relaxation.  · Mild to moderate tricuspid valve regurgitation is present.  6/15/2021 Bernadette Somers MD  ·   · Worsening edema and shortness of breath.    Take Lasix 20 mg daily for 3 days then may take as needed if the edema and shortness of breath have improved.  Echo ordered.  Patient will call her cardiologist, Dr. Hooker for appointment.               PLAN:    1.  Chest pain:  Atypical likely due to palpitations, resolved with  increased dose of metoprolol   stress test from 9/23/2019 reviewed, low risk  She does have coronary calcification but they are likely extravascular.  Continue monitoring    2.  Palpitations/paroxysmal SVT:  Stable symptoms.  Decrease caffeine, increase water intake  Continue metoprolol at current dose  Increase exercise as tolerated    3.  Hyperlipidemia:  Recent lipid panel reviewed, as she has no history of CVA or MI, I do not feel strongly that she needs statin therapy.  She does not want to take a statin due to perceived side effects  Work on diet control low cholesterol diet.    4.  Dyspnea on exertion:  Echocardiogram 11/21/19 reviewed, LVEF=69%, grade I diastolic dysfunction.   Improved  Advised patient to take Lasix 20 mg daily.      Return to clinic in 12 months or sooner as needed    Thank you for the opportunity to share in the care of your patient; please do not hesitate to call me with any questions.     Syed Hooker MD, FACC  Office: [phone]  [facility] [place] [address]

## 2021-11-11 ENCOUNTER — OFFICE VISIT (OUTPATIENT)
Dept: CARDIOLOGY | Facility: CLINIC | Age: 81
End: 2021-11-11

## 2021-11-11 VITALS
HEART RATE: 64 BPM | BODY MASS INDEX: 35.7 KG/M2 | WEIGHT: 194 LBS | DIASTOLIC BLOOD PRESSURE: 78 MMHG | SYSTOLIC BLOOD PRESSURE: 144 MMHG | OXYGEN SATURATION: 100 % | HEIGHT: 62 IN

## 2021-11-11 DIAGNOSIS — R00.2 PALPITATIONS: ICD-10-CM

## 2021-11-11 DIAGNOSIS — I10 BENIGN ESSENTIAL HYPERTENSION: ICD-10-CM

## 2021-11-11 DIAGNOSIS — R07.9 CHEST PAIN, UNSPECIFIED TYPE: ICD-10-CM

## 2021-11-11 DIAGNOSIS — I25.118 CORONARY ARTERY DISEASE OF NATIVE ARTERY OF NATIVE HEART WITH STABLE ANGINA PECTORIS (HCC): Primary | ICD-10-CM

## 2021-11-11 DIAGNOSIS — R06.09 DOE (DYSPNEA ON EXERTION): ICD-10-CM

## 2021-11-11 DIAGNOSIS — E78.5 HYPERLIPIDEMIA LDL GOAL <70: ICD-10-CM

## 2021-11-11 PROCEDURE — 93000 ELECTROCARDIOGRAM COMPLETE: CPT | Performed by: INTERNAL MEDICINE

## 2021-11-11 PROCEDURE — 99214 OFFICE O/P EST MOD 30 MIN: CPT | Performed by: INTERNAL MEDICINE

## 2021-12-08 NOTE — TELEPHONE ENCOUNTER
Rx Refill Note  Requested Prescriptions     Pending Prescriptions Disp Refills   • losartan (COZAAR) 100 MG tablet [Pharmacy Med Name: LOSARTAN TABS 100MG] 90 tablet 3     Sig: TAKE 1 TABLET DAILY   • raloxifene (EVISTA) 60 MG tablet [Pharmacy Med Name: RALOXIFENE HCL TABS 60MG] 90 tablet 3     Sig: TAKE 1 TABLET DAILY      Last office visit with prescribing clinician: 10/19/2021      Next office visit with prescribing clinician: 12/17/2021            Meera Joyce LPN  12/08/21, 08:17 EST

## 2021-12-09 RX ORDER — LOSARTAN POTASSIUM 100 MG/1
TABLET ORAL
Qty: 90 TABLET | Refills: 3 | Status: SHIPPED | OUTPATIENT
Start: 2021-12-09 | End: 2022-06-20 | Stop reason: SDUPTHER

## 2021-12-09 RX ORDER — RALOXIFENE HYDROCHLORIDE 60 MG/1
TABLET, FILM COATED ORAL
Qty: 90 TABLET | Refills: 3 | Status: SHIPPED | OUTPATIENT
Start: 2021-12-09 | End: 2022-12-05

## 2021-12-13 ENCOUNTER — LAB (OUTPATIENT)
Dept: LAB | Facility: HOSPITAL | Age: 81
End: 2021-12-13

## 2021-12-13 DIAGNOSIS — E55.9 VITAMIN D DEFICIENCY: ICD-10-CM

## 2021-12-13 DIAGNOSIS — E78.5 HYPERLIPIDEMIA LDL GOAL <70: ICD-10-CM

## 2021-12-13 DIAGNOSIS — E53.8 B12 DEFICIENCY: ICD-10-CM

## 2021-12-13 DIAGNOSIS — E03.9 ACQUIRED HYPOTHYROIDISM: ICD-10-CM

## 2021-12-13 DIAGNOSIS — I10 BENIGN ESSENTIAL HYPERTENSION: ICD-10-CM

## 2021-12-13 LAB
ALBUMIN SERPL-MCNC: 4 G/DL (ref 3.5–5.2)
ALBUMIN UR-MCNC: <1.2 MG/DL
ALBUMIN/GLOB SERPL: 1.2 G/DL
ALP SERPL-CCNC: 65 U/L (ref 39–117)
ALT SERPL W P-5'-P-CCNC: 14 U/L (ref 1–33)
ANION GAP SERPL CALCULATED.3IONS-SCNC: 5.7 MMOL/L (ref 5–15)
AST SERPL-CCNC: 14 U/L (ref 1–32)
BACTERIA UR QL AUTO: NORMAL /HPF
BILIRUB SERPL-MCNC: 0.2 MG/DL (ref 0–1.2)
BILIRUB UR QL STRIP: NEGATIVE
BUN SERPL-MCNC: 17 MG/DL (ref 8–23)
BUN/CREAT SERPL: 20.2 (ref 7–25)
CALCIUM SPEC-SCNC: 9.7 MG/DL (ref 8.6–10.5)
CHLORIDE SERPL-SCNC: 101 MMOL/L (ref 98–107)
CHOLEST SERPL-MCNC: 182 MG/DL (ref 0–200)
CLARITY UR: CLEAR
CO2 SERPL-SCNC: 27.3 MMOL/L (ref 22–29)
COLOR UR: YELLOW
CREAT SERPL-MCNC: 0.84 MG/DL (ref 0.57–1)
CREAT UR-MCNC: 72.5 MG/DL
GFR SERPL CREATININE-BSD FRML MDRD: 65 ML/MIN/1.73
GLOBULIN UR ELPH-MCNC: 3.4 GM/DL
GLUCOSE SERPL-MCNC: 80 MG/DL (ref 65–99)
GLUCOSE UR STRIP-MCNC: NEGATIVE MG/DL
HDLC SERPL-MCNC: 59 MG/DL (ref 40–60)
HGB UR QL STRIP.AUTO: NEGATIVE
HYALINE CASTS UR QL AUTO: NORMAL /LPF
KETONES UR QL STRIP: NEGATIVE
LDLC SERPL CALC-MCNC: 94 MG/DL (ref 0–100)
LDLC/HDLC SERPL: 1.51 {RATIO}
LEUKOCYTE ESTERASE UR QL STRIP.AUTO: NEGATIVE
MICROALBUMIN/CREAT UR: NORMAL MG/G{CREAT}
NITRITE UR QL STRIP: NEGATIVE
PH UR STRIP.AUTO: 6 [PH] (ref 5–8)
POTASSIUM SERPL-SCNC: 4.9 MMOL/L (ref 3.5–5.2)
PROT SERPL-MCNC: 7.4 G/DL (ref 6–8.5)
PROT UR QL STRIP: NEGATIVE
RBC # UR STRIP: NORMAL /HPF
REF LAB TEST METHOD: NORMAL
SODIUM SERPL-SCNC: 134 MMOL/L (ref 136–145)
SP GR UR STRIP: 1.01 (ref 1–1.03)
SQUAMOUS #/AREA URNS HPF: NORMAL /HPF
T4 FREE SERPL-MCNC: 1.7 NG/DL (ref 0.93–1.7)
TRIGL SERPL-MCNC: 171 MG/DL (ref 0–150)
TSH SERPL DL<=0.05 MIU/L-ACNC: 0.88 UIU/ML (ref 0.27–4.2)
UROBILINOGEN UR QL STRIP: NORMAL
VLDLC SERPL-MCNC: 29 MG/DL (ref 5–40)
WBC # UR STRIP: NORMAL /HPF

## 2021-12-13 PROCEDURE — 80061 LIPID PANEL: CPT

## 2021-12-13 PROCEDURE — 82607 VITAMIN B-12: CPT

## 2021-12-13 PROCEDURE — 80053 COMPREHEN METABOLIC PANEL: CPT

## 2021-12-13 PROCEDURE — 85025 COMPLETE CBC W/AUTO DIFF WBC: CPT

## 2021-12-13 PROCEDURE — 81001 URINALYSIS AUTO W/SCOPE: CPT

## 2021-12-13 PROCEDURE — 84439 ASSAY OF FREE THYROXINE: CPT

## 2021-12-13 PROCEDURE — 84443 ASSAY THYROID STIM HORMONE: CPT

## 2021-12-13 PROCEDURE — 82306 VITAMIN D 25 HYDROXY: CPT

## 2021-12-13 PROCEDURE — 82570 ASSAY OF URINE CREATININE: CPT

## 2021-12-13 PROCEDURE — 82043 UR ALBUMIN QUANTITATIVE: CPT

## 2021-12-14 LAB
25(OH)D3 SERPL-MCNC: 52.1 NG/ML
BASOPHILS # BLD AUTO: 0.04 10*3/MM3 (ref 0–0.2)
BASOPHILS NFR BLD AUTO: 0.4 % (ref 0–1.5)
DEPRECATED RDW RBC AUTO: 39.1 FL (ref 37–54)
EOSINOPHIL # BLD AUTO: 0.26 10*3/MM3 (ref 0–0.4)
EOSINOPHIL NFR BLD AUTO: 2.8 % (ref 0.3–6.2)
ERYTHROCYTE [DISTWIDTH] IN BLOOD BY AUTOMATED COUNT: 12.1 % (ref 12.3–15.4)
HCT VFR BLD AUTO: 36.5 % (ref 34–46.6)
HGB BLD-MCNC: 12.4 G/DL (ref 12–15.9)
IMM GRANULOCYTES # BLD AUTO: 0.04 10*3/MM3 (ref 0–0.05)
IMM GRANULOCYTES NFR BLD AUTO: 0.4 % (ref 0–0.5)
LYMPHOCYTES # BLD AUTO: 2.84 10*3/MM3 (ref 0.7–3.1)
LYMPHOCYTES NFR BLD AUTO: 30.4 % (ref 19.6–45.3)
MCH RBC QN AUTO: 30.3 PG (ref 26.6–33)
MCHC RBC AUTO-ENTMCNC: 34 G/DL (ref 31.5–35.7)
MCV RBC AUTO: 89.2 FL (ref 79–97)
MONOCYTES # BLD AUTO: 0.88 10*3/MM3 (ref 0.1–0.9)
MONOCYTES NFR BLD AUTO: 9.4 % (ref 5–12)
NEUTROPHILS NFR BLD AUTO: 5.29 10*3/MM3 (ref 1.7–7)
NEUTROPHILS NFR BLD AUTO: 56.6 % (ref 42.7–76)
NRBC BLD AUTO-RTO: 0 /100 WBC (ref 0–0.2)
PLATELET # BLD AUTO: 361 10*3/MM3 (ref 140–450)
PMV BLD AUTO: 11.3 FL (ref 6–12)
RBC # BLD AUTO: 4.09 10*6/MM3 (ref 3.77–5.28)
VIT B12 BLD-MCNC: 587 PG/ML (ref 211–946)
WBC NRBC COR # BLD: 9.35 10*3/MM3 (ref 3.4–10.8)

## 2021-12-17 ENCOUNTER — OFFICE VISIT (OUTPATIENT)
Dept: INTERNAL MEDICINE | Facility: CLINIC | Age: 81
End: 2021-12-17

## 2021-12-17 VITALS
HEART RATE: 56 BPM | DIASTOLIC BLOOD PRESSURE: 78 MMHG | RESPIRATION RATE: 20 BRPM | SYSTOLIC BLOOD PRESSURE: 124 MMHG | BODY MASS INDEX: 36.66 KG/M2 | TEMPERATURE: 97.3 F | OXYGEN SATURATION: 98 % | WEIGHT: 199.2 LBS | HEIGHT: 62 IN

## 2021-12-17 DIAGNOSIS — Z78.0 OSTEOPENIA AFTER MENOPAUSE: ICD-10-CM

## 2021-12-17 DIAGNOSIS — Z00.00 ANNUAL PHYSICAL EXAM: ICD-10-CM

## 2021-12-17 DIAGNOSIS — Z00.00 MEDICARE ANNUAL WELLNESS VISIT, SUBSEQUENT: Primary | ICD-10-CM

## 2021-12-17 DIAGNOSIS — E66.01 CLASS 2 SEVERE OBESITY DUE TO EXCESS CALORIES WITH SERIOUS COMORBIDITY AND BODY MASS INDEX (BMI) OF 36.0 TO 36.9 IN ADULT (HCC): Chronic | ICD-10-CM

## 2021-12-17 DIAGNOSIS — E53.8 B12 DEFICIENCY: ICD-10-CM

## 2021-12-17 DIAGNOSIS — M85.80 OSTEOPENIA AFTER MENOPAUSE: ICD-10-CM

## 2021-12-17 DIAGNOSIS — I10 BENIGN ESSENTIAL HYPERTENSION: ICD-10-CM

## 2021-12-17 DIAGNOSIS — E78.5 HYPERLIPIDEMIA LDL GOAL <70: ICD-10-CM

## 2021-12-17 DIAGNOSIS — I25.118 CORONARY ARTERY DISEASE OF NATIVE ARTERY OF NATIVE HEART WITH STABLE ANGINA PECTORIS (HCC): ICD-10-CM

## 2021-12-17 DIAGNOSIS — E55.9 VITAMIN D DEFICIENCY: ICD-10-CM

## 2021-12-17 DIAGNOSIS — R60.0 PEDAL EDEMA: ICD-10-CM

## 2021-12-17 DIAGNOSIS — E03.9 ACQUIRED HYPOTHYROIDISM: ICD-10-CM

## 2021-12-17 PROBLEM — Z91.81 AT RISK FOR FALLS: Chronic | Status: ACTIVE | Noted: 2021-12-17

## 2021-12-17 PROCEDURE — G0439 PPPS, SUBSEQ VISIT: HCPCS | Performed by: INTERNAL MEDICINE

## 2021-12-17 PROCEDURE — 99397 PER PM REEVAL EST PAT 65+ YR: CPT | Performed by: INTERNAL MEDICINE

## 2021-12-17 PROCEDURE — 1170F FXNL STATUS ASSESSED: CPT | Performed by: INTERNAL MEDICINE

## 2021-12-17 PROCEDURE — 1126F AMNT PAIN NOTED NONE PRSNT: CPT | Performed by: INTERNAL MEDICINE

## 2021-12-17 PROCEDURE — 1159F MED LIST DOCD IN RCRD: CPT | Performed by: INTERNAL MEDICINE

## 2021-12-17 RX ORDER — ACETAMINOPHEN 160 MG
2000 TABLET,DISINTEGRATING ORAL DAILY
Start: 2021-12-17

## 2021-12-17 NOTE — PROGRESS NOTES
The ABCs of the Annual Wellness Visit  Initial Medicare Wellness Visit    Chief Complaint   Patient presents with   • Medicare Wellness-subsequent   • Hypertension     f/u   • Hyperlipidemia       Subjective   History of Present Illness:  Serenity Lagos is a 81 y.o. female who presents for an Initial Medicare Wellness Visit.    CHRONIC CONDITIONS:    Gained 11 lb over last 5 mo. she does not exercise.  She does try to eat less fats and sugars.    She is taking levothyroxine daily as prescribed.    BP well controlled.  Taking losartan daily and metoprolol daily.    GERD symptoms are controlled with omeprazole.    HEALTH RISK ASSESSMENT    Recent Hospitalizations:  No hospitalization(s) within the last year.    Current Medical Providers:  Patient Care Team:  Bernadette Somers MD as PCP - General (Internal Medicine)  Syed Hooker MD as Consulting Physician (Cardiology)  Celio Iniguez MD as Consulting Physician (Ophthalmology)  Ender Jolly MD as Consulting Physician (Dermatology)    Smoking Status:  Social History     Tobacco Use   Smoking Status Former Smoker   • Packs/day: 1.00   • Years: 20.00   • Pack years: 20.00   • Types: Cigarettes   • Start date:    • Quit date:    • Years since quittin.9   Smokeless Tobacco Never Used       Alcohol Consumption:  Social History     Substance and Sexual Activity   Alcohol Use Yes   • Alcohol/week: 7.0 standard drinks   • Types: 7 Glasses of wine per week    Comment:  daily with dinner       Depression Screen:   PHQ-2/PHQ-9 Depression Screening 2021   Little interest or pleasure in doing things 0   Feeling down, depressed, or hopeless 0   Total Score 0       Fall Risk Screen:  STEADI Fall Risk Assessment was completed, and patient is at MODERATE risk for falls. Assessment completed on:2021    Health Habits and Functional and Cognitive Screening:  Functional & Cognitive Status 2021   Do you have difficulty preparing food and eating?  No   Do you have difficulty bathing yourself, getting dressed or grooming yourself? No   Do you have difficulty using the toilet? No   Do you have difficulty moving around from place to place? Yes   Do you have trouble with steps or getting out of a bed or a chair? Yes   Current Diet Well Balanced Diet   Dental Exam Up to date        Dental Exam Comment 11/21   Eye Exam (No Data)        Eye Exam Comment 06/21   Exercise (times per week) 0 times per week   Current Exercises Include No Regular Exercise   Current Exercise Activities Include -   Do you need help using the phone?  No   Are you deaf or do you have serious difficulty hearing?  Yes   Do you need help with transportation? No   Do you need help shopping? No   Do you need help preparing meals?  No   Do you need help with housework?  No   Do you need help with laundry? No   Do you need help taking your medications? No   Do you need help managing money? No   Do you ever drive or ride in a car without wearing a seat belt? No   Have you felt unusual stress, anger or loneliness in the last month? No   Who do you live with? Spouse   If you need help, do you have trouble finding someone available to you? No   Have you been bothered in the last four weeks by sexual problems? No   Do you have difficulty concentrating, remembering or making decisions? No       Does the patient have evidence of cognitive impairment? No    Asprin use counseling:Taking ASA appropriately as indicated    Age-appropriate Screening Schedule:  Refer to the list below for future screening recommendations based on patient's age, sex and/or medical conditions. Orders for these recommended tests are listed in the plan section. The patient has been provided with a written plan.    Age appropriate preventive counseling done including age appropriate vaccines,regular  Mammogram and self breast exam,  colonoscopy, regular dental visits, mental health, injury prevention such as wearing seat belt and  preventing falls, healthy  nutrition, healthy weight, regular physical exercise. Alcohol use is moderate.  Tobacco history-none. Drug use-none.  STD's-not at risk.          Health Maintenance   Topic Date Due   • LIPID PANEL  12/13/2022   • DXA SCAN  03/26/2023   • TDAP/TD VACCINES (2 - Td or Tdap) 10/10/2031   • INFLUENZA VACCINE  Completed   • ZOSTER VACCINE  Discontinued        The following portions of the patient's history were reviewed and updated as appropriate: allergies, current medications, past family history, past medical history, past social history, past surgical history and problem list.    Outpatient Medications Prior to Visit   Medication Sig Dispense Refill   • Ascorbic Acid (VITAMIN C ER) 1000 MG tablet controlled-release 1 tablet Daily.     • aspirin 81 MG EC tablet Take 1 tablet by mouth Daily. 90 tablet 3   • Biotin 5000 MCG sublingual tablet take 1 by oral route  every day     • CALCIUM PO Take 1 tablet by mouth Daily. Calcium 600 + vitamin d     • cyproheptadine (PERIACTIN) 4 MG tablet Take 4 mg by mouth Daily.     • Docusate Sodium 100 MG capsule take 1 tablet by oral route twice daily     • furosemide (Lasix) 20 MG tablet Take 1 tablet by mouth Daily As Needed (edema). 30 tablet 5   • Glucosamine-Chondroitin (OSTEO BI-FLEX REGULAR STRENGTH PO) 1 daily     • latanoprost (XALATAN) 0.005 % ophthalmic solution Administer 1 drop to both eyes Every Night.     • levothyroxine (SYNTHROID, LEVOTHROID) 125 MCG tablet TAKE 1 TABLET DAILY 90 tablet 3   • losartan (COZAAR) 100 MG tablet TAKE 1 TABLET DAILY 90 tablet 3   • metoprolol succinate XL (TOPROL-XL) 50 MG 24 hr tablet Take 1 tablet by mouth Daily. 90 tablet 3   • omeprazole (priLOSEC) 40 MG capsule TAKE 1 CAPSULE DAILY 90 capsule 3   • Probiotic Product (PROBIOTIC DAILY PO) One capsule daily     • raloxifene (EVISTA) 60 MG tablet TAKE 1 TABLET DAILY 90 tablet 3   • Vitamin E 400 units tablet Vitamin E 400 UNIT Oral Tablet; Patient Sig: Vitamin E  400 UNIT Oral Tablet TAKE 1 TABLET DAILY.; 0; 24-Sep-2013; Active     • Cholecalciferol (VITAMIN D3) 2000 units capsule As Needed.     • clobetasol (TEMOVATE) 0.05 % ointment Take As Directed.       No facility-administered medications prior to visit.       Patient Active Problem List   Diagnosis   • Osteopenia after menopause   • Chronic right-sided low back pain with right-sided sciatica   • Primary osteoarthritis of both knees   • Numbness and tingling of both legs below knees   • Chronic pruritus   • Acquired hypothyroidism   • Acne rosacea   • Vitamin D deficiency   • Benign essential hypertension   • Right hip pain   • Poor balance   • Pedal edema   • History of benign breast biopsy   • Other chronic pain   • Coronary artery disease of native artery of native heart with stable angina pectoris ([facility])   • Hyperlipidemia LDL goal <70   • Palpitations   • Class 2 severe obesity due to excess calories with serious comorbidity and body mass index (BMI) of 36.0 to 36.9 in adult ([facility])   • Coronary artery calcification of native artery   • Breast tenderness in female   • Chest pain   • Bilateral ocular hypertension   • KING (dyspnea on exertion)   • Cervicalgia   • Numbness of arm   • B12 deficiency   • Carpal tunnel syndrome   • Fibrositis   • Myopia   • Open-angle glaucoma   • Presbyopia   • Regular astigmatism   • Vitreous degeneration   • Cervical myofascial pain syndrome   • DDD (degenerative disc disease), cervical   • Annual physical exam   • Medicare annual wellness visit, subsequent   • Rib pain on right side   • At risk for falls       Advanced Care Planning:  ACP discussion was held with the patient during this visit. Patient has an advance directive (not in EMR), copy requested.    Review of Systems   Constitutional: Negative for chills, fatigue and fever.   HENT: Negative for congestion, ear pain, hearing loss and sinus pressure.    Eyes: Negative for visual disturbance.   Respiratory: Negative for cough,  "chest tightness, shortness of breath and wheezing.    Cardiovascular: Positive for leg swelling. Negative for chest pain and palpitations.   Gastrointestinal: Negative for abdominal pain, blood in stool and constipation.   Endocrine: Negative for cold intolerance and heat intolerance.   Genitourinary: Negative for dysuria and frequency.   Musculoskeletal: Negative for arthralgias, back pain and gait problem.   Skin: Negative for color change and rash.   Allergic/Immunologic: Negative for environmental allergies.   Neurological: Negative for dizziness and headaches.   Hematological: Negative for adenopathy. Does not bruise/bleed easily.   Psychiatric/Behavioral: Negative for dysphoric mood, sleep disturbance and suicidal ideas. The patient is not nervous/anxious.        Compared to one year ago, the patient feels her physical health is the same.  Compared to one year ago, the patient feels her mental health is the same.    Reviewed chart for potential of high risk medication in the elderly: yes  Reviewed chart for potential of harmful drug interactions in the elderly:yes    Objective         Vitals:    12/17/21 1316   BP: 124/78   BP Location: Left arm   Patient Position: Sitting   Cuff Size: Large Adult   Pulse: 56   Resp: 20   Temp: 97.3 °F (36.3 °C)   TempSrc: Infrared   SpO2: 98%   Weight: 90.4 kg (199 lb 3.2 oz)   Height: 157.5 cm (62\")   PainSc: 0-No pain     Patient's Body mass index is 36.43 kg/m². indicating that she is obese (BMI >30). Obesity-related health conditions include the following: hypertension and dyslipidemias. Obesity is worsening. BMI is is above average; BMI management plan is completed. We discussed low calorie, low carb based diet program, portion control and increasing exercise..    Body mass index is 36.43 kg/m².  Discussed the patient's BMI with her. The BMI is above average; BMI management plan is completed.    Physical Exam  Vitals and nursing note reviewed.   Constitutional:       " Appearance: She is well-developed. She is obese.   HENT:      Head: Normocephalic.   Eyes:      Conjunctiva/sclera: Conjunctivae normal.      Pupils: Pupils are equal, round, and reactive to light.   Neck:      Thyroid: No thyromegaly.   Cardiovascular:      Rate and Rhythm: Normal rate and regular rhythm.      Heart sounds: Normal heart sounds.      Comments: Mild bilateral lower extremity edema without pitting.  Pulmonary:      Effort: Pulmonary effort is normal.      Breath sounds: Normal breath sounds. No wheezing.   Chest:   Breasts:      Right: No inverted nipple, mass, nipple discharge, skin change or tenderness.      Left: No inverted nipple, mass, nipple discharge, skin change or tenderness.       Abdominal:      General: Bowel sounds are normal.      Palpations: Abdomen is soft.      Tenderness: There is no abdominal tenderness.   Musculoskeletal:         General: No tenderness. Normal range of motion.      Cervical back: Normal range of motion and neck supple.      Right lower le+ Edema present.      Left lower le+ Edema present.   Lymphadenopathy:      Cervical: No cervical adenopathy.   Skin:     General: Skin is warm and dry.      Findings: No rash.   Neurological:      Mental Status: She is alert and oriented to person, place, and time.      Cranial Nerves: No cranial nerve deficit.      Sensory: No sensory deficit.      Coordination: Coordination normal.      Gait: Gait normal.   Psychiatric:         Attention and Perception: Attention normal.         Mood and Affect: Mood normal.         Speech: Speech normal.         Behavior: Behavior normal.         Thought Content: Thought content normal.         Cognition and Memory: Cognition normal.         Judgment: Judgment normal.         Lab Results   Component Value Date    TRIG 171 (H) 2021    HDL 59 2021    LDL 94 2021    VLDL 29 2021        Assessment/Plan   Medicare Risks and Personalized Health Plan  CMS Preventative  Services Quick Reference  Cardiovascular risk  Obesity/Overweight   Osteoporosis Risk    The above risks/problems have been discussed with the patient.  Pertinent information has been shared with the patient in the After Visit Summary.  Follow up plans and orders are seen below in the Assessment/Plan Section.  Patient Instructions   Problem List Items Addressed This Visit        Cardiac and Vasculature    Benign essential hypertension    Overview     12/17/2021 Bernadette Somers MD    Continue losartan 100mg and metoprolol  50mg  daily.      Try to do more exercise and walking.  Continue to avoid salt in the diet.  Avoid sodas.  Decrease caffeine.    Check blood pressures at home and record the values and let us know how it is running.         Relevant Medications    metoprolol succinate XL (TOPROL-XL) 50 MG 24 hr tablet    furosemide (Lasix) 20 MG tablet    losartan (COZAAR) 100 MG tablet    Other Relevant Orders    CBC & Differential (Completed)    Comprehensive Metabolic Panel (Completed)    Urinalysis With Microscopic - Urine, Clean Catch (Completed)    Microalbumin / Creatinine Urine Ratio - Urine, Clean Catch (Completed)    Coronary artery disease of native artery of native heart with stable angina pectoris (HCC)    Overview     · Myocardial perfusion stress test 9/23/2019 was normal: EF greater than 70%, no ischemia, coronary artery calcification noted on CT portion of stress test.         Echocardiogram showed diastolic dysfunction grade 1.  Ejection               fraction 61 to 65%.    12/17/2021 Bernadette Somers MD    Continue daily aspirin, losartan, and metoprolol.    Continue to improve low-fat low sugar diet.  Increase regular exercise.    Follow-up regularly with Dr. Hooker.         Relevant Medications    aspirin 81 MG EC tablet    metoprolol succinate XL (TOPROL-XL) 50 MG 24 hr tablet    Hyperlipidemia LDL goal <70    Overview     12/17/2021 Bernadette Somers MD    LDL is improved to <100.       Continue low-fat diet.  Resume regular exercise and walking.         Relevant Orders    Lipid Panel (Completed)       Endocrine and Metabolic    Class 2 severe obesity due to excess calories with serious comorbidity and body mass index (BMI) of 36.0 to 36.9 in adult (HCC) (Chronic)    Overview     12/17/2021 Bernadette Somers MD    Try to increase exercise and physical activity.  Continue to improve low-fat and low sugar diet.  Try to lose about 4 pounds per month.      Weigh once a week on Monday mornings to monitor progress.         Acquired hypothyroidism    Overview     12/17/2021 Bernadette Somers MD    Continue current dose of Synthroid 125mcg.         Relevant Medications    metoprolol succinate XL (TOPROL-XL) 50 MG 24 hr tablet    levothyroxine (SYNTHROID, LEVOTHROID) 125 MCG tablet    Other Relevant Orders    TSH (Completed)    T4, Free (Completed)    Vitamin D deficiency    Overview     12/17/2021 Bernadette Somers MD    Continue current dose of vitamin D3.         Relevant Orders    Vitamin D 25 Hydroxy (Completed)    B12 deficiency    Overview     12/17/2021 Bernadette Somers MD           Relevant Orders    Vitamin B12 (Completed)       Health Encounters    Annual physical exam    Overview     12/17/2021 Bernadette Somers MD    She is up-to-date on vaccinations.         Medicare annual wellness visit, subsequent - Primary    Overview     12/17/2021 Bernadette Somers MD    She is up-to-date on colonoscopy, DEXA, and mammogram.            Musculoskeletal and Injuries    Osteopenia after menopause    Overview     12/17/2021 Bernadette Somers MD    DEXA 3/20/2021 showed decline in T-scores in the spine and the hip.  T-scores are still in the osteopenia range at -1.7 in the spine and -2.2 in the hip.      Continue taking Evista daily.    Do more weightbearing exercises daily with walking and small hand weights at home.  Continue taking calcium and vitamin D.    Decreasing caffeine use does help bone  density.    To keep your bones strong and healthy, would encourage weightbearing exercise 30 minutes 3 times a week at minimum.  Incorporate walking into daily activities, 30 minutes a day, 150 minutes a week, spread out over 5 days a week.     Do not smoke.  Maintain a normal body weight.    Eat a diet rich in calcium and vitamin D. Good sources of calcium are low-fat dairy products, dark green leafy vegetables, canned salmon or sardines, tofu, and calcium-fortified orange juice and cereals.    Avoid high protein diets. Protein is important, but too much animal protein can cause bone loss.    Limit caffeine, but moderate amounts of coffee and tea are fine. Avoid carbonated cola drinks as studies show drinking these puts you at greater risk for bone loss.    Recommend bone density study every 2 years.    Take calcium supplements and Vitamin D3.                    Symptoms and Signs    Pedal edema    Overview     12/17/2021 Bernadette Somers MD    Continue Lasix 20 mg every day as needed for swelling in the lower legs and feet.      Wear support hose daily.  Continue to elevate feet when sitting at home.  Exercise the feet and legs daily.  Avoid salt in the diet.         Relevant Medications    furosemide (Lasix) 20 MG tablet           Follow Up:  Return in about 6 months (around 6/17/2022) for recheck fasting.     An After Visit Summary and PPPS were given to the patient.

## 2021-12-17 NOTE — PATIENT INSTRUCTIONS
Patient Instructions   Problem List Items Addressed This Visit        Cardiac and Vasculature    Benign essential hypertension    Overview     12/17/2021 Bernadette Somers MD    Continue losartan 100mg and metoprolol  50mg  daily.      Try to do more exercise and walking.  Continue to avoid salt in the diet.  Avoid sodas.  Decrease caffeine.    Check blood pressures at home and record the values and let us know how it is running.         Relevant Medications    metoprolol succinate XL (TOPROL-XL) 50 MG 24 hr tablet    furosemide (Lasix) 20 MG tablet    losartan (COZAAR) 100 MG tablet    Other Relevant Orders    CBC & Differential (Completed)    Comprehensive Metabolic Panel (Completed)    Urinalysis With Microscopic - Urine, Clean Catch (Completed)    Microalbumin / Creatinine Urine Ratio - Urine, Clean Catch (Completed)    Coronary artery disease of native artery of native heart with stable angina pectoris (HCC)    Overview     · Myocardial perfusion stress test 9/23/2019 was normal: EF greater than 70%, no ischemia, coronary artery calcification noted on CT portion of stress test.         Echocardiogram showed diastolic dysfunction grade 1.  Ejection               fraction 61 to 65%.    12/17/2021 Bernadette Somers MD    Continue daily aspirin, losartan, and metoprolol.    Continue to improve low-fat low sugar diet.  Increase regular exercise.    Follow-up regularly with Dr. Hooker.         Relevant Medications    aspirin 81 MG EC tablet    metoprolol succinate XL (TOPROL-XL) 50 MG 24 hr tablet    Hyperlipidemia LDL goal <70    Overview     12/17/2021 Bernadette Somers MD    LDL is improved to <100.      Continue low-fat diet.  Resume regular exercise and walking.         Relevant Orders    Lipid Panel (Completed)       Endocrine and Metabolic    Class 2 severe obesity due to excess calories with serious comorbidity and body mass index (BMI) of 36.0 to 36.9 in adult (HCC) (Chronic)    Overview     12/17/2021  Bernadette Somers MD    Try to increase exercise and physical activity.  Continue to improve low-fat and low sugar diet.  Try to lose about 4 pounds per month.      Weigh once a week on Monday mornings to monitor progress.         Acquired hypothyroidism    Overview     12/17/2021 Bernadette Somers MD    Continue current dose of Synthroid 125mcg.         Relevant Medications    metoprolol succinate XL (TOPROL-XL) 50 MG 24 hr tablet    levothyroxine (SYNTHROID, LEVOTHROID) 125 MCG tablet    Other Relevant Orders    TSH (Completed)    T4, Free (Completed)    Vitamin D deficiency    Overview     12/17/2021 Bernadette Somers MD    Continue current dose of vitamin D3.         Relevant Orders    Vitamin D 25 Hydroxy (Completed)    B12 deficiency    Overview     12/17/2021 Bernadette Somers MD           Relevant Orders    Vitamin B12 (Completed)       Health Encounters    Annual physical exam    Overview     12/17/2021 Bernadette Somers MD    She is up-to-date on vaccinations.         Medicare annual wellness visit, subsequent - Primary    Overview     12/17/2021 Bernadette Somers MD    She is up-to-date on colonoscopy, DEXA, and mammogram.            Musculoskeletal and Injuries    Osteopenia after menopause    Overview     12/17/2021 Bernadette Somers MD    DEXA 3/20/2021 showed decline in T-scores in the spine and the hip.  T-scores are still in the osteopenia range at -1.7 in the spine and -2.2 in the hip.      Continue taking Evista daily.    Do more weightbearing exercises daily with walking and small hand weights at home.  Continue taking calcium and vitamin D.    Decreasing caffeine use does help bone density.    To keep your bones strong and healthy, would encourage weightbearing exercise 30 minutes 3 times a week at minimum.  Incorporate walking into daily activities, 30 minutes a day, 150 minutes a week, spread out over 5 days a week.     Do not smoke.  Maintain a normal body weight.    Eat a diet rich in  calcium and vitamin D. Good sources of calcium are low-fat dairy products, dark green leafy vegetables, canned salmon or sardines, tofu, and calcium-fortified orange juice and cereals.    Avoid high protein diets. Protein is important, but too much animal protein can cause bone loss.    Limit caffeine, but moderate amounts of coffee and tea are fine. Avoid carbonated cola drinks as studies show drinking these puts you at greater risk for bone loss.    Recommend bone density study every 2 years.    Take calcium supplements and Vitamin D3.                    Symptoms and Signs    Pedal edema    Overview     12/17/2021 Bernadette Somers MD    Continue Lasix 20 mg every day as needed for swelling in the lower legs and feet.      Wear support hose daily.  Continue to elevate feet when sitting at home.  Exercise the feet and legs daily.  Avoid salt in the diet.         Relevant Medications    furosemide (Lasix) 20 MG tablet          BMI for Adults  What is BMI?  Body mass index (BMI) is a number that is calculated from a person's weight and height. BMI can help estimate how much of a person's weight is composed of fat. BMI does not measure body fat directly. Rather, it is an alternative to procedures that directly measure body fat, which can be difficult and expensive.  BMI can help identify people who may be at higher risk for certain medical problems.  What are BMI measurements used for?  BMI is used as a screening tool to identify possible weight problems. It helps determine whether a person is obese, overweight, a healthy weight, or underweight.  BMI is useful for:  · Identifying a weight problem that may be related to a medical condition or may increase the risk for medical problems.  · Promoting changes, such as changes in diet and exercise, to help reach a healthy weight. BMI screening can be repeated to see if these changes are working.  How is BMI calculated?  BMI involves measuring your weight in relation to  "your height. Both height and weight are measured, and the BMI is calculated from those numbers. This can be done either in English ([place] or metric measurements. Note that charts and online BMI calculators are available to help you find your BMI quickly and easily without having to do these calculations yourself.  To calculate your BMI in English ([place] measurements:    1. Measure your weight in pounds (lb).  2. Multiply the number of pounds by 703.  ? For example, for a person who weighs 180 lb, multiply that number by 703, which equals 126,540.  3. Measure your height in inches. Then multiply that number by itself to get a measurement called \"inches squared.\"  ? For example, for a person who is 70 inches tall, the \"inches squared\" measurement is 70 inches x 70 inches, which equals 4,900 inches squared.  4. Divide the total from step 2 (number of lb x 703) by the total from step 3 (inches squared): 126,540 ÷ 4,900 = 25.8. This is your BMI.    To calculate your BMI in metric measurements:  1. Measure your weight in kilograms (kg).  2. Measure your height in meters (m). Then multiply that number by itself to get a measurement called \"meters squared.\"  ? For example, for a person who is 1.75 m tall, the \"meters squared\" measurement is 1.75 m x 1.75 m, which is equal to 3.1 meters squared.  3. Divide the number of kilograms (your weight) by the meters squared number. In this example: 70 ÷ 3.1 = 22.6. This is your BMI.  What do the results mean?  BMI charts are used to identify whether you are underweight, normal weight, overweight, or obese. The following guidelines will be used:  · Underweight: BMI less than 18.5.  · Normal weight: BMI between 18.5 and 24.9.  · Overweight: BMI between 25 and 29.9.  · Obese: BMI of 30 or above.  Keep these notes in mind:  · Weight includes both fat and muscle, so someone with a muscular build, such as an athlete, may have a BMI that is higher than 24.9. In cases like these, BMI is not " an accurate measure of body fat.  · To determine if excess body fat is the cause of a BMI of 25 or higher, further assessments may need to be done by a health care provider.  · BMI is usually interpreted in the same way for men and women.  Where to find more information  For more information about BMI, including tools to quickly calculate your BMI, go to these websites:  · Centers for Disease Control and Prevention: www.cdc.gov  · American Heart Association: www.heart.org  · National Heart, Lung, and Blood [place]: www.nhlbi.nih.gov  Summary  · Body mass index (BMI) is a number that is calculated from a person's weight and height.  · BMI may help estimate how much of a person's weight is composed of fat. BMI can help identify those who may be at higher risk for certain medical problems.  · BMI can be measured using English measurements or metric measurements.  · BMI charts are used to identify whether you are underweight, normal weight, overweight, or obese.  This information is not intended to replace advice given to you by your health care provider. Make sure you discuss any questions you have with your health care provider.  Document Revised: 09/09/2020 Document Reviewed: 07/17/2020  [employer] Patient Education © 2021 [employer]      Calorie Counting for Weight Loss  Calories are units of energy. Your body needs a certain number of calories from food to keep going throughout the day. When you eat or drink more calories than your body needs, your body stores the extra calories mostly as fat. When you eat or drink fewer calories than your body needs, your body burns fat to get the energy it needs.  Calorie counting means keeping track of how many calories you eat and drink each day. Calorie counting can be helpful if you need to lose weight. If you eat fewer calories than your body needs, you should lose weight. Ask your health care provider what a healthy weight is for you.  For calorie counting to work, you  will need to eat the right number of calories each day to lose a healthy amount of weight per week. A dietitian can help you figure out how many calories you need in a day and will suggest ways to reach your calorie goal.  · A healthy amount of weight to lose each week is usually 1-2 lb (0.5-0.9 kg). This usually means that your daily calorie intake should be reduced by 500-750 calories.  · Eating 1,200-1,500 calories a day can help most women lose weight.  · Eating 1,500-1,800 calories a day can help most men lose weight.  What do I need to know about calorie counting?  Work with your health care provider or dietitian to determine how many calories you should get each day. To meet your daily calorie goal, you will need to:  · Find out how many calories are in each food that you would like to eat. Try to do this before you eat.  · Decide how much of the food you plan to eat.  · Keep a food log. Do this by writing down what you ate and how many calories it had.  To successfully lose weight, it is important to balance calorie counting with a healthy lifestyle that includes regular activity.  Where do I find calorie information?    The number of calories in a food can be found on a Nutrition Facts label. If a food does not have a Nutrition Facts label, try to look up the calories online or ask your dietitian for help.  Remember that calories are listed per serving. If you choose to have more than one serving of a food, you will have to multiply the calories per serving by the number of servings you plan to eat. For example, the label on a package of bread might say that a serving size is 1 slice and that there are 90 calories in a serving. If you eat 1 slice, you will have eaten 90 calories. If you eat 2 slices, you will have eaten 180 calories.  How do I keep a food log?  After each time that you eat, record the following in your food log as soon as possible:  · What you ate. Be sure to include toppings, sauces, and  other extras on the food.  · How much you ate. This can be measured in cups, ounces, or number of items.  · How many calories were in each food and drink.  · The total number of calories in the food you ate.  Keep your food log near you, such as in a pocket-sized notebook or on an marlin or website on your mobile phone. Some programs will calculate calories for you and show you how many calories you have left to meet your daily goal.  What are some portion-control tips?  · Know how many calories are in a serving. This will help you know how many servings you can have of a certain food.  · Use a measuring cup to measure serving sizes. You could also try weighing out portions on a kitchen scale. With time, you will be able to estimate serving sizes for some foods.  · Take time to put servings of different foods on your favorite plates or in your favorite bowls and cups so you know what a serving looks like.  · Try not to eat straight from a food's packaging, such as from a bag or box. Eating straight from the package makes it hard to see how much you are eating and can lead to overeating. Put the amount you would like to eat in a cup or on a plate to make sure you are eating the right portion.  · Use smaller plates, glasses, and bowls for smaller portions and to prevent overeating.  · Try not to multitask. For example, avoid watching TV or using your computer while eating. If it is time to eat, sit down at a table and enjoy your food. This will help you recognize when you are full. It will also help you be more mindful of what and how much you are eating.  What are tips for following this plan?  Reading food labels  · Check the calorie count compared with the serving size. The serving size may be smaller than what you are used to eating.  · Check the source of the calories. Try to choose foods that are high in protein, fiber, and vitamins, and low in saturated fat, trans fat, and sodium.  Shopping  · Read nutrition  labels while you shop. This will help you make healthy decisions about which foods to buy.  · Pay attention to nutrition labels for low-fat or fat-free foods. These foods sometimes have the same number of calories or more calories than the full-fat versions. They also often have added sugar, starch, or salt to make up for flavor that was removed with the fat.  · Make a grocery list of lower-calorie foods and stick to it.  Cooking  · Try to cook your favorite foods in a healthier way. For example, try baking instead of frying.  · Use low-fat dairy products.  Meal planning  · Use more fruits and vegetables. One-half of your plate should be fruits and vegetables.  · Include lean proteins, such as chicken, turkey, and fish.  Lifestyle  Each week, aim to do one of the following:  · 150 minutes of moderate exercise, such as walking.  · 75 minutes of vigorous exercise, such as running.  General information  · Know how many calories are in the foods you eat most often. This will help you calculate calorie counts faster.  · Find a way of tracking calories that works for you. Get creative. Try different apps or programs if writing down calories does not work for you.  What foods should I eat?    · Eat nutritious foods. It is better to have a nutritious, high-calorie food, such as an avocado, than a food with few nutrients, such as a bag of potato chips.  · Use your calories on foods and drinks that will fill you up and will not leave you hungry soon after eating.  ? Examples of foods that fill you up are nuts and nut butters, vegetables, lean proteins, and high-fiber foods such as whole grains. High-fiber foods are foods with more than 5 g of fiber per serving.  · Pay attention to calories in drinks. Low-calorie drinks include water and unsweetened drinks.  The items listed above may not be a complete list of foods and beverages you can eat. Contact a dietitian for more information.  What foods should I limit?  Limit foods or  drinks that are not good sources of vitamins, minerals, or protein or that are high in unhealthy fats. These include:  · Candy.  · Other sweets.  · Sodas, specialty coffee drinks, alcohol, and juice.  The items listed above may not be a complete list of foods and beverages you should avoid. Contact a dietitian for more information.  How do I count calories when eating out?  · Pay attention to portions. Often, portions are much larger when eating out. Try these tips to keep portions smaller:  ? Consider sharing a meal instead of getting your own.  ? If you get your own meal, eat only half of it. Before you start eating, ask for a container and put half of your meal into it.  ? When available, consider ordering smaller portions from the menu instead of full portions.  · Pay attention to your food and drink choices. Knowing the way food is cooked and what is included with the meal can help you eat fewer calories.  ? If calories are listed on the menu, choose the lower-calorie options.  ? Choose dishes that include vegetables, fruits, whole grains, low-fat dairy products, and lean proteins.  ? Choose items that are boiled, broiled, grilled, or steamed. Avoid items that are buttered, battered, fried, or served with cream sauce. Items labeled as crispy are usually fried, unless stated otherwise.  ? Choose water, low-fat milk, unsweetened iced tea, or other drinks without added sugar. If you want an alcoholic beverage, choose a lower-calorie option, such as a glass of wine or light beer.  ? Ask for dressings, sauces, and syrups on the side. These are usually high in calories, so you should limit the amount you eat.  ? If you want a salad, choose a garden salad and ask for grilled meats. Avoid extra toppings such as finney, cheese, or fried items. Ask for the dressing on the side, or ask for olive oil and vinegar or lemon to use as dressing.  · Estimate how many servings of a food you are given. Knowing serving sizes will  help you be aware of how much food you are eating at restaurants.  Where to find more information  · Centers for Disease Control and Prevention: www.cdc.gov  · [place]. Department of Agriculture: myplate.gov  Summary  · Calorie counting means keeping track of how many calories you eat and drink each day. If you eat fewer calories than your body needs, you should lose weight.  · A healthy amount of weight to lose per week is usually 1-2 lb (0.5-0.9 kg). This usually means reducing your daily calorie intake by 500-750 calories.  · The number of calories in a food can be found on a Nutrition Facts label. If a food does not have a Nutrition Facts label, try to look up the calories online or ask your dietitian for help.  · Use smaller plates, glasses, and bowls for smaller portions and to prevent overeating.  · Use your calories on foods and drinks that will fill you up and not leave you hungry shortly after a meal.  This information is not intended to replace advice given to you by your health care provider. Make sure you discuss any questions you have with your health care provider.  Document Revised: 01/28/2021 Document Reviewed: 01/28/2021  [employer] Patient Education © 2021 [employer]      Fall Prevention in the Home, Adult  Falls can cause injuries and can affect people from all age groups. There are many simple things that you can do to make your home safe and to help prevent falls. Ask for help when making these changes, if needed.  What actions can I take to prevent falls?  General instructions  · Use good lighting in all rooms. Replace any light bulbs that burn out.  · Turn on lights if it is dark. Use night-lights.  · Place frequently used items in easy-to-reach places. Lower the shelves around your home if necessary.  · Set up furniture so that there are clear paths around it. Avoid moving your furniture around.  · Remove throw rugs and other tripping hazards from the floor.  · Avoid walking on wet  floors.  · Fix any uneven floor surfaces.  · Add color or contrast paint or tape to grab bars and handrails in your home. Place contrasting color strips on the first and last steps of stairways.  · When you use a stepladder, make sure that it is completely opened and that the sides are firmly locked. Have someone hold the ladder while you are using it. Do not climb a closed stepladder.  · Be aware of any and all pets.  What can I do in the bathroom?         · Keep the floor dry. Immediately clean up any water that spills onto the floor.  · Remove soap buildup in the tub or shower on a regular basis.  · Use non-skid mats or decals on the floor of the tub or shower.  · Attach bath mats securely with double-sided, non-slip rug tape.  · If you need to sit down while you are in the shower, use a plastic, non-slip stool.  · Install grab bars by the toilet and in the tub and shower. Do not use towel bars as grab bars.  What can I do in the bedroom?  · Make sure that a bedside light is easy to reach.  · Do not use oversized bedding that drapes onto the floor.  · Have a firm chair that has side arms to use for getting dressed.  What can I do in the kitchen?  · Clean up any spills right away.  · If you need to reach for something above you, use a sturdy step stool that has a grab bar.  · Keep electrical cables out of the way.  · Do not use floor polish or wax that makes floors slippery. If you must use wax, make sure that it is non-skid floor wax.  What can I do in the stairways?  · Do not leave any items on the stairs.  · Make sure that you have a light switch at the top of the stairs and the bottom of the stairs. Have them installed if you do not have them.  · Make sure that there are handrails on both sides of the stairs. Fix handrails that are broken or loose. Make sure that handrails are as long as the stairways.  · Install non-slip stair treads on all stairs in your home.  · Avoid having throw rugs at the top or bottom  of stairways, or secure the rugs with carpet tape to prevent them from moving.  · Choose a carpet design that does not hide the edge of steps on the stairway.  · Check any carpeting to make sure that it is firmly attached to the stairs. Fix any carpet that is loose or worn.  What can I do on the outside of my home?  · Use bright outdoor lighting.  · Regularly repair the edges of walkways and driveways and fix any cracks.  · Remove high doorway thresholds.  · Trim any shrubbery on the main path into your home.  · Regularly check that handrails are securely fastened and in good repair. Both sides of any steps should have handrails.  · Install guardrails along the edges of any raised decks or porches.  · Clear walkways of debris and clutter, including tools and rocks.  · Have leaves, snow, and ice cleared regularly.  · Use sand or salt on walkways during winter months.  · In the garage, clean up any spills right away, including grease or oil spills.  What other actions can I take?  · Wear closed-toe shoes that fit well and support your feet. Wear shoes that have rubber soles or low heels.  · Use mobility aids as needed, such as canes, walkers, scooters, and crutches.  · Review your medicines with your health care provider. Some medicines can cause dizziness or changes in blood pressure, which increase your risk of falling.  Talk with your health care provider about other ways that you can decrease your risk of falls. This may include working with a physical therapist or  to improve your strength, balance, and endurance.  Where to find more information  · Centers for Disease Control and Prevention, [place]: https://www.cdc.gov  · National Chloride on Aging: https://uy0ygkl.josiah.nih.gov  Contact a health care provider if:  · You are afraid of falling at home.  · You feel weak, drowsy, or dizzy at home.  · You fall at home.  Summary  · There are many simple things that you can do to make your home safe and to help  prevent falls.  · Ways to make your home safe include removing tripping hazards and installing grab bars in the bathroom.  · Ask for help when making these changes in your home.  This information is not intended to replace advice given to you by your health care provider. Make sure you discuss any questions you have with your health care provider.  Document Revised: 11/30/2018 Document Reviewed: 08/02/2018  [employer] Patient Education © 2021 [employer]

## 2021-12-29 ENCOUNTER — TELEPHONE (OUTPATIENT)
Dept: INTERNAL MEDICINE | Facility: CLINIC | Age: 81
End: 2021-12-29

## 2021-12-29 NOTE — TELEPHONE ENCOUNTER
[facility] and [facility]. They said because of her age and exposure she would qualify for MAB. They just need last OV note, blair, insurance cards faxed to [phone]

## 2021-12-29 NOTE — TELEPHONE ENCOUNTER
PT CALLED AND STATED WHEN SHE CALLED TO GET HER  SCHEDULED FOR THE ANTIBODIES SHE WAS TOLD THAT SHE NEEDED THEM ALSO.  PT HAS NOT BEEN TESTED AND WANTS A SCRIPT SENT TO THE SAME AS HER .  PT WOULD LIKE A CALL BACK ABOUT THIS.

## 2021-12-29 NOTE — TELEPHONE ENCOUNTER
Patient states she has runny nose and sore throat. Symptoms started 2 days ago. Her  tested positive and I had him set up with [facility]. They told her she needs to be set up as well for MAB and that they were aware she didn't have a covid test. Ok to send over information for MAB?

## 2022-01-18 NOTE — TELEPHONE ENCOUNTER
OK TO SCHEDULE?  
PT CALLED TO REQUEST INJECTION SCHEDULING WITH SHARMILA BHATIA. PT HAD LAST INJECTION COMPLETED ON 6/19/20.    PLEASE REACH OUT TO PT TO SCHEDULE AT [phone].    PLEASE REVIEW AND ADVISE.   
Pt scheduled by Charmaine  
That is fine to schedule. thanks  
spontaneous/unlabored

## 2022-03-21 ENCOUNTER — OFFICE VISIT (OUTPATIENT)
Dept: INTERNAL MEDICINE | Facility: CLINIC | Age: 82
End: 2022-03-21

## 2022-03-21 VITALS
WEIGHT: 201.8 LBS | BODY MASS INDEX: 37.13 KG/M2 | OXYGEN SATURATION: 98 % | SYSTOLIC BLOOD PRESSURE: 158 MMHG | TEMPERATURE: 99.3 F | HEIGHT: 62 IN | DIASTOLIC BLOOD PRESSURE: 90 MMHG | HEART RATE: 69 BPM

## 2022-03-21 DIAGNOSIS — I10 BENIGN ESSENTIAL HYPERTENSION: ICD-10-CM

## 2022-03-21 DIAGNOSIS — J01.40 ACUTE NON-RECURRENT PANSINUSITIS: ICD-10-CM

## 2022-03-21 DIAGNOSIS — R05.9 COUGH IN ADULT: Primary | Chronic | ICD-10-CM

## 2022-03-21 PROBLEM — J02.8 ACUTE PHARYNGITIS DUE TO OTHER SPECIFIED ORGANISMS: Chronic | Status: ACTIVE | Noted: 2022-03-21

## 2022-03-21 LAB
EXPIRATION DATE: NORMAL
EXPIRATION DATE: NORMAL
FLUAV RNA RESP QL NAA+PROBE: NEGATIVE
FLUBV RNA RESP QL NAA+PROBE: NEGATIVE
INTERNAL CONTROL: NORMAL
INTERNAL CONTROL: NORMAL
Lab: NORMAL
Lab: NORMAL
S PYO RRNA THROAT QL PROBE: NEGATIVE

## 2022-03-21 PROCEDURE — 87651 STREP A DNA AMP PROBE: CPT | Performed by: INTERNAL MEDICINE

## 2022-03-21 PROCEDURE — 99214 OFFICE O/P EST MOD 30 MIN: CPT | Performed by: INTERNAL MEDICINE

## 2022-03-21 PROCEDURE — U0004 COV-19 TEST NON-CDC HGH THRU: HCPCS | Performed by: INTERNAL MEDICINE

## 2022-03-21 PROCEDURE — 87502 INFLUENZA DNA AMP PROBE: CPT | Performed by: INTERNAL MEDICINE

## 2022-03-21 RX ORDER — AZITHROMYCIN 250 MG/1
TABLET, FILM COATED ORAL
Qty: 6 TABLET | Refills: 0 | Status: SHIPPED | OUTPATIENT
Start: 2022-03-21 | End: 2022-06-20

## 2022-03-21 NOTE — PROGRESS NOTES
[facility]     Serenity Lagos  1940   [id number]      Patient Care Team:  Bernadette Somers MD as PCP - General (Internal Medicine)  Syed Hooker MD as Consulting Physician (Cardiology)  Celio Iniguez MD as Consulting Physician (Ophthalmology)  Ender Jolly MD as Consulting Physician (Dermatology)    Chief Complaint   Patient presents with   • Cough   • Nasal Congestion   • Sore Throat   • Sinus Problem     Drainage            HPI  Patient is a 81 y.o. female presents with sick symptoms.     Sick symptoms  She complains of sore throat, productive cough with white phlegm, congestion, chest tightness, mild shortness of breath, fatigue, postnasal drainage, facial pressure, mild headaches, eye pain, and mild body aches. Her sore throat began 2 days ago. The patient also feels imbalanced when walking. The patient denies appetite changes, ear pain, fever, and chills. The patient denies her ears feeling blocked up or stuffy. 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 She denies any sick contacts. She has not had a COVID test. The patient took Mucinex with benefit.    CHRONIC CONDITIONS      Past Medical History:   Diagnosis Date   • Cervicalgia    • Chronic bronchitis (HCC)    • Coronary artery disease    • Dysphagia    • Glaucoma, open angle    • Hearing loss    • Hypertension    • Mixed hyperlipidemia    • Osteoarthritis     cervical spine degenerative   • Perforation of tympanic membrane    • Pruritus     chronic general pruritis   • Salivary gland cancer (HCC)    • Shingles     postherpetic neuralgia-thorax   • Spondylolisthesis      lumbar spine   • Wrist tendonitis        Past Surgical History:   Procedure Laterality Date   • APPENDECTOMY     • BREAST BIOPSY     • BREAST BIOPSY Right 07/29/2019   • BREAST CYST EXCISION     • ENDOSCOPY  2017    normal (per pt) EGD   • MOUTH SURGERY  2000   • TONSILLECTOMY         Family History   Problem Relation Age  "of Onset   • Hypertension Mother    • Heart failure Mother         CHF   • Coronary artery disease Mother         CABG   • Lung cancer Father 59        Heavy smoker and Black Lung   • Rheum arthritis Sister    • Other Sister 76        sepsis due to infected knee replacement   • COPD Sister         heavy smoker    • Alzheimer's disease Maternal Grandmother    • Coronary artery disease Maternal Grandfather    • Cancer Paternal Grandmother    • Heart disease Paternal Grandfather    • Hypertension Other    • Obesity Other    • Stroke Maternal Aunt    • Heart disease Maternal Aunt    • Stroke Maternal Aunt    • Stroke Maternal Aunt    • Coronary artery disease Maternal Uncle    • Stroke Maternal Uncle    • Heart disease Maternal Uncle    • Breast cancer Neg Hx    • Ovarian cancer Neg Hx        Social History     Socioeconomic History   • Marital status:    Tobacco Use   • Smoking status: Former Smoker     Packs/day: 1.00     Years: 20.00     Pack years: 20.00     Types: Cigarettes     Start date:      Quit date:      Years since quittin.2   • Smokeless tobacco: Never Used   Substance and Sexual Activity   • Alcohol use: Yes     Alcohol/week: 7.0 standard drinks     Types: 7 Glasses of wine per week     Comment:  daily with dinner   • Drug use: No   • Sexual activity: Defer       No Known Allergies      Vital Signs  Vitals:    22 1528   BP: 158/90   BP Location: Left arm   Patient Position: Sitting   Cuff Size: Adult   Pulse: 69   Temp: 99.3 °F (37.4 °C)   TempSrc: Temporal   SpO2: 98%   Weight: 91.5 kg (201 lb 12.8 oz)   Height: 157.5 cm (62\")     Body mass index is 36.91 kg/m².  Patient's Body mass index is 36.91 kg/m². indicating that she is morbidly obese (BMI > 40 or > 35 with obesity - related health condition). Obesity-related health conditions include the following: hypertension, coronary heart disease and dyslipidemias. Obesity is unchanged. BMI is is above average; BMI management plan " is completed. We discussed low calorie, low carb based diet program, portion control and increasing exercise..        Current Outpatient Medications:   •  Ascorbic Acid (VITAMIN C ER) 1000 MG tablet controlled-release, 1 tablet Daily., Disp: , Rfl:   •  aspirin 81 MG EC tablet, Take 1 tablet by mouth Daily., Disp: 90 tablet, Rfl: 3  •  Biotin 5000 MCG sublingual tablet, take 1 by oral route  every day, Disp: , Rfl:   •  CALCIUM PO, Take 1 tablet by mouth Daily. Calcium 600 + vitamin d, Disp: , Rfl:   •  Cholecalciferol (Vitamin D3) 50 MCG (2000 UT) capsule, Take 1 capsule by mouth Daily., Disp: , Rfl:   •  cyproheptadine (PERIACTIN) 4 MG tablet, Take 4 mg by mouth Daily., Disp: , Rfl:   •  Docusate Sodium 100 MG capsule, take 1 tablet by oral route twice daily, Disp: , Rfl:   •  furosemide (Lasix) 20 MG tablet, Take 1 tablet by mouth Daily As Needed (edema)., Disp: 30 tablet, Rfl: 5  •  Glucosamine-Chondroitin (OSTEO BI-FLEX REGULAR STRENGTH PO), 1 daily, Disp: , Rfl:   •  latanoprost (XALATAN) 0.005 % ophthalmic solution, Administer 1 drop to both eyes Every Night., Disp: , Rfl:   •  levothyroxine (SYNTHROID, LEVOTHROID) 125 MCG tablet, TAKE 1 TABLET DAILY, Disp: 90 tablet, Rfl: 3  •  losartan (COZAAR) 100 MG tablet, TAKE 1 TABLET DAILY, Disp: 90 tablet, Rfl: 3  •  metoprolol succinate XL (TOPROL-XL) 50 MG 24 hr tablet, Take 1 tablet by mouth Daily., Disp: 90 tablet, Rfl: 3  •  omeprazole (priLOSEC) 40 MG capsule, TAKE 1 CAPSULE DAILY, Disp: 90 capsule, Rfl: 3  •  Probiotic Product (PROBIOTIC DAILY PO), One capsule daily, Disp: , Rfl:   •  raloxifene (EVISTA) 60 MG tablet, TAKE 1 TABLET DAILY, Disp: 90 tablet, Rfl: 3  •  Vitamin E 400 units tablet, Vitamin E 400 UNIT Oral Tablet; Patient Sig: Vitamin E 400 UNIT Oral Tablet TAKE 1 TABLET DAILY.; 0; 24-Sep-2013; Active, Disp: , Rfl:   •  azithromycin (Zithromax Z-Keith) 250 MG tablet, Take 2 tablets the first day, then 1 tablet daily for 4 days., Disp: 6 tablet, Rfl:  0    Physical Exam:    Physical Exam  Vitals and nursing note reviewed.   Constitutional:       Appearance: She is well-developed.   HENT:      Head: Normocephalic.      Right Ear: Ear canal and external ear normal. Tympanic membrane is perforated.      Left Ear: Tympanic membrane, ear canal and external ear normal.      Ears:      Comments: Old right TM perforation.     Nose:      Comments: Swelling and redness in bilateral nostrils.  Mild redness at the uvula and soft palate.  Eyes:      Conjunctiva/sclera: Conjunctivae normal.      Pupils: Pupils are equal, round, and reactive to light.   Neck:      Thyroid: No thyromegaly.   Cardiovascular:      Rate and Rhythm: Normal rate and regular rhythm.      Heart sounds: Normal heart sounds.   Pulmonary:      Effort: Pulmonary effort is normal.      Breath sounds: Normal breath sounds. No wheezing.   Musculoskeletal:         General: Normal range of motion.      Cervical back: Normal range of motion and neck supple.   Lymphadenopathy:      Cervical: No cervical adenopathy.   Skin:     General: Skin is warm and dry.   Neurological:      Mental Status: She is alert and oriented to person, place, and time.   Psychiatric:         Thought Content: Thought content normal.          ACE III MINI        Results Review:    None    CMP:  Lab Results   Component Value Date    BUN 17 12/13/2021    CREATININE 0.84 12/13/2021    EGFRIFNONA 65 12/13/2021    BCR 20.2 12/13/2021     (L) 12/13/2021    K 4.9 12/13/2021    CO2 27.3 12/13/2021    CALCIUM 9.7 12/13/2021    ALBUMIN 4.00 12/13/2021    BILITOT 0.2 12/13/2021    ALKPHOS 65 12/13/2021    AST 14 12/13/2021    ALT 14 12/13/2021     HbA1c:  No results found for: HGBA1C  Microalbumin:  Lab Results   Component Value Date    MICROALBUR <1.2 12/13/2021     Lipid Panel  Lab Results   Component Value Date    CHOL 182 12/13/2021    TRIG 171 (H) 12/13/2021    HDL 59 12/13/2021    LDL 94 12/13/2021    AST 14 12/13/2021    ALT 14  12/13/2021       Medication Review: Medications reviewed and noted  Patient Instructions   Problem List Items Addressed This Visit        Cardiac and Vasculature    Benign essential hypertension    Overview     Taking losartan 100mg and metoprolol  50mg  daily.                 Current Assessment & Plan     Continue losartan and metoprolol.  Decrease salt in the diet.           Relevant Medications    metoprolol succinate XL (TOPROL-XL) 50 MG 24 hr tablet    furosemide (Lasix) 20 MG tablet    losartan (COZAAR) 100 MG tablet       ENT    Acute non-recurrent pansinusitis    Relevant Medications    azithromycin (Zithromax Z-Keith) 250 MG tablet       Pulmonary and Pneumonias    Cough in adult - Primary (Chronic)    Current Assessment & Plan     - Flu and strep test were negative. COVID PCR test is pending. She may have COVID but it may be a bacterial sinusitis/upper respiratory infection, so I sent azithromycin to her pharmacy to take. She will also continue Mucinex liquid, as needed for cough, chest congestion, and head congestion. She will take Tylenol as needed for pain and malaise. She will gargle warm salt water twice a day for the sore throat. She will call us if not improving.           Relevant Orders    POCT Flu A&B, Molecular (Completed)    POCT Strep A, molecular (Completed)    COVID-19 PCR, [facility], NP SWAB IN [facility] VIRAL TRANSPORT MEDIA/ORAL SWISH 24-30 HR TAT - Swab, Nasopharynx (Completed)             Diagnosis Plan   1. Cough in adult  POCT Flu A&B, Molecular    POCT Strep A, molecular    COVID-19 PCR, [facility], NP SWAB IN [facility] VIRAL TRANSPORT MEDIA/ORAL SWISH 24-30 HR TAT - Swab, Nasopharynx    COVID-19 PCR, [facility], NP SWAB IN [facility] VIRAL TRANSPORT MEDIA/ORAL SWISH 24-30 HR TAT - Swab, Nasopharynx   2. Benign essential hypertension     3. Acute non-recurrent pansinusitis  azithromycin (Zithromax Z-Keith) 250 MG tablet           Plan of care reviewed with patient at the conclusion of today's visit.  Education was provided regarding diagnosis, management, and any prescribed or recommended OTC medications.Patient verbalizes understanding of and agreement with management plan.         Bernadette Somers MD    Transcribed from ambient dictation for Bernadette Somers MD by Dana Beckman.  03/23/22   15:08 EDT    Patient verbalized consent to the visit recording.  I have personally performed the services described in this document as transcribed by the above individual, and it is both accurate and complete.  Bernadette Somers MD  3/23/2022  21:11 EDT

## 2022-03-22 LAB — SARS-COV-2 RNA NOSE QL NAA+PROBE: NOT DETECTED

## 2022-03-23 NOTE — ASSESSMENT & PLAN NOTE
- Flu and strep test were negative. COVID PCR test is pending. She may have COVID but it may be a bacterial sinusitis/upper respiratory infection, so I sent azithromycin to her pharmacy to take. She will also continue Mucinex liquid, as needed for cough, chest congestion, and head congestion. She will take Tylenol as needed for pain and malaise. She will gargle warm salt water twice a day for the sore throat. She will call us if not improving.

## 2022-03-24 NOTE — PATIENT INSTRUCTIONS
Patient Instructions   Problem List Items Addressed This Visit          Cardiac and Vasculature    Benign essential hypertension    Overview     Taking losartan 100mg and metoprolol  50mg  daily.                 Current Assessment & Plan     Continue losartan and metoprolol.  Decrease salt in the diet.           Relevant Medications    metoprolol succinate XL (TOPROL-XL) 50 MG 24 hr tablet    furosemide (Lasix) 20 MG tablet    losartan (COZAAR) 100 MG tablet       ENT    Acute non-recurrent pansinusitis    Relevant Medications    azithromycin (Zithromax Z-Keith) 250 MG tablet       Pulmonary and Pneumonias    Cough in adult - Primary (Chronic)    Current Assessment & Plan     - Flu and strep test were negative. COVID PCR test is pending. She may have COVID but it may be a bacterial sinusitis/upper respiratory infection, so I sent azithromycin to her pharmacy to take. She will also continue Mucinex liquid, as needed for cough, chest congestion, and head congestion. She will take Tylenol as needed for pain and malaise. She will gargle warm salt water twice a day for the sore throat. She will call us if not improving.           Relevant Orders    POCT Flu A&B, Molecular (Completed)    POCT Strep A, molecular (Completed)    COVID-19 PCR, [facility], NP SWAB IN [facility] VIRAL TRANSPORT MEDIA/ORAL SWISH 24-30 HR TAT - Swab, Nasopharynx (Completed)          BMI for Adults  What is BMI?  Body mass index (BMI) is a number that is calculated from a person's weight and height. BMI can help estimate how much of a person's weight is composed of fat. BMI does not measure body fat directly. Rather, it is an alternative to procedures that directly measure body fat, which can be difficult and expensive.  BMI can help identify people who may be at higher risk for certain medical problems.  What are BMI measurements used for?  BMI is used as a screening tool to identify possible weight problems. It helps determine whether a person is  "obese, overweight, a healthy weight, or underweight.  BMI is useful for:  Identifying a weight problem that may be related to a medical condition or may increase the risk for medical problems.  Promoting changes, such as changes in diet and exercise, to help reach a healthy weight. BMI screening can be repeated to see if these changes are working.  How is BMI calculated?  BMI involves measuring your weight in relation to your height. Both height and weight are measured, and the BMI is calculated from those numbers. This can be done either in English (U.S[place] or metric measurements. Note that charts and online BMI calculators are available to help you find your BMI quickly and easily without having to do these calculations yourself.  To calculate your BMI in English (U.S.) measurements:    Measure your weight in pounds (lb).  Multiply the number of pounds by 703.  For example, for a person who weighs 180 lb, multiply that number by 703, which equals 126,540.  Measure your height in inches. Then multiply that number by itself to get a measurement called \"inches squared.\"  For example, for a person who is 70 inches tall, the \"inches squared\" measurement is 70 inches x 70 inches, which equals 4,900 inches squared.  Divide the total from step 2 (number of lb x 703) by the total from step 3 (inches squared): 126,540 ÷ 4,900 = 25.8. This is your BMI.    To calculate your BMI in metric measurements:  Measure your weight in kilograms (kg).  Measure your height in meters (m). Then multiply that number by itself to get a measurement called \"meters squared.\"  For example, for a person who is 1.75 m tall, the \"meters squared\" measurement is 1.75 m x 1.75 m, which is equal to 3.1 meters squared.  Divide the number of kilograms (your weight) by the meters squared number. In this example: 70 ÷ 3.1 = 22.6. This is your BMI.  What do the results mean?  BMI charts are used to identify whether you are underweight, normal weight, " overweight, or obese. The following guidelines will be used:  Underweight: BMI less than 18.5.  Normal weight: BMI between 18.5 and 24.9.  Overweight: BMI between 25 and 29.9.  Obese: BMI of 30 or above.  Keep these notes in mind:  Weight includes both fat and muscle, so someone with a muscular build, such as an athlete, may have a BMI that is higher than 24.9. In cases like these, BMI is not an accurate measure of body fat.  To determine if excess body fat is the cause of a BMI of 25 or higher, further assessments may need to be done by a health care provider.  BMI is usually interpreted in the same way for men and women.  Where to find more information  For more information about BMI, including tools to quickly calculate your BMI, go to these websites:  Centers for Disease Control and Prevention: www.cdc.gov  American Heart Association: www.heart.org  National Heart, Lung, and Blood [place]: www.nhlbi.nih.gov  Summary  Body mass index (BMI) is a number that is calculated from a person's weight and height.  BMI may help estimate how much of a person's weight is composed of fat. BMI can help identify those who may be at higher risk for certain medical problems.  BMI can be measured using English measurements or metric measurements.  BMI charts are used to identify whether you are underweight, normal weight, overweight, or obese.  This information is not intended to replace advice given to you by your health care provider. Make sure you discuss any questions you have with your health care provider.  Document Revised: 09/09/2020 Document Reviewed: 07/17/2020  [employer] Patient Education © 2021 [employer]

## 2022-03-28 ENCOUNTER — TELEPHONE (OUTPATIENT)
Dept: NEUROLOGY | Facility: CLINIC | Age: 82
End: 2022-03-28

## 2022-03-28 DIAGNOSIS — M79.18 CERVICAL MYOFASCIAL PAIN SYNDROME: Primary | ICD-10-CM

## 2022-03-29 ENCOUNTER — TRANSCRIBE ORDERS (OUTPATIENT)
Dept: ADMINISTRATIVE | Facility: HOSPITAL | Age: 82
End: 2022-03-29

## 2022-03-29 DIAGNOSIS — Z12.31 VISIT FOR SCREENING MAMMOGRAM: Primary | ICD-10-CM

## 2022-04-04 NOTE — TELEPHONE ENCOUNTER
We are no longer offering trigger point injections since Insurance generally requires prior authorizations to cover these now. I would be more than happy to refer her to an interventional pain specialist to get the trigger point injections completed-she would have to have an initial visit with them and then they would get the authorization and schedule. Unfortunately the process for these has changed. Let me know if she would like this referral. Thanks, Jil

## 2022-05-16 ENCOUNTER — APPOINTMENT (OUTPATIENT)
Dept: MAMMOGRAPHY | Facility: HOSPITAL | Age: 82
End: 2022-05-16

## 2022-06-03 DIAGNOSIS — R00.2 PALPITATIONS: ICD-10-CM

## 2022-06-03 DIAGNOSIS — I10 BENIGN ESSENTIAL HYPERTENSION: ICD-10-CM

## 2022-06-03 RX ORDER — METOPROLOL SUCCINATE 50 MG/1
TABLET, EXTENDED RELEASE ORAL
Qty: 90 TABLET | Refills: 3 | Status: SHIPPED | OUTPATIENT
Start: 2022-06-03

## 2022-06-14 ENCOUNTER — HOSPITAL ENCOUNTER (OUTPATIENT)
Dept: MAMMOGRAPHY | Facility: HOSPITAL | Age: 82
Discharge: HOME | End: 2022-06-14
Admitting: INTERNAL MEDICINE

## 2022-06-14 DIAGNOSIS — Z12.31 VISIT FOR SCREENING MAMMOGRAM: ICD-10-CM

## 2022-06-14 PROCEDURE — 77063 BREAST TOMOSYNTHESIS BI: CPT | Performed by: RADIOLOGY

## 2022-06-14 PROCEDURE — 77063 BREAST TOMOSYNTHESIS BI: CPT

## 2022-06-14 PROCEDURE — 77067 SCR MAMMO BI INCL CAD: CPT | Performed by: RADIOLOGY

## 2022-06-14 PROCEDURE — 77067 SCR MAMMO BI INCL CAD: CPT

## 2022-06-15 ENCOUNTER — LAB (OUTPATIENT)
Dept: LAB | Facility: HOSPITAL | Age: 82
End: 2022-06-15

## 2022-06-15 DIAGNOSIS — I10 BENIGN ESSENTIAL HYPERTENSION: ICD-10-CM

## 2022-06-15 DIAGNOSIS — E55.9 VITAMIN D DEFICIENCY: ICD-10-CM

## 2022-06-15 DIAGNOSIS — E53.8 B12 DEFICIENCY: ICD-10-CM

## 2022-06-15 DIAGNOSIS — E03.9 ACQUIRED HYPOTHYROIDISM: ICD-10-CM

## 2022-06-15 DIAGNOSIS — E78.5 HYPERLIPIDEMIA LDL GOAL <70: ICD-10-CM

## 2022-06-15 LAB
25(OH)D3 SERPL-MCNC: 50.3 NG/ML (ref 30–100)
ALBUMIN SERPL-MCNC: 4.2 G/DL (ref 3.5–5.2)
ALBUMIN UR-MCNC: <1.2 MG/DL
ALBUMIN/GLOB SERPL: 1.5 G/DL
ALP SERPL-CCNC: 50 U/L (ref 39–117)
ALT SERPL W P-5'-P-CCNC: 13 U/L (ref 1–33)
ANION GAP SERPL CALCULATED.3IONS-SCNC: 13.9 MMOL/L (ref 5–15)
AST SERPL-CCNC: 18 U/L (ref 1–32)
BACTERIA UR QL AUTO: NORMAL /HPF
BASOPHILS # BLD AUTO: 0.05 10*3/MM3 (ref 0–0.2)
BASOPHILS NFR BLD AUTO: 0.6 % (ref 0–1.5)
BILIRUB SERPL-MCNC: 0.4 MG/DL (ref 0–1.2)
BILIRUB UR QL STRIP: NEGATIVE
BUN SERPL-MCNC: 17 MG/DL (ref 8–23)
BUN/CREAT SERPL: 20 (ref 7–25)
CALCIUM SPEC-SCNC: 9.3 MG/DL (ref 8.6–10.5)
CHLORIDE SERPL-SCNC: 99 MMOL/L (ref 98–107)
CHOLEST SERPL-MCNC: 183 MG/DL (ref 0–200)
CLARITY UR: CLEAR
CO2 SERPL-SCNC: 23.1 MMOL/L (ref 22–29)
COLOR UR: YELLOW
CREAT SERPL-MCNC: 0.85 MG/DL (ref 0.57–1)
CREAT UR-MCNC: 71.5 MG/DL
DEPRECATED RDW RBC AUTO: 41.6 FL (ref 37–54)
EGFRCR SERPLBLD CKD-EPI 2021: 69.4 ML/MIN/1.73
EOSINOPHIL # BLD AUTO: 0.28 10*3/MM3 (ref 0–0.4)
EOSINOPHIL NFR BLD AUTO: 3.2 % (ref 0.3–6.2)
ERYTHROCYTE [DISTWIDTH] IN BLOOD BY AUTOMATED COUNT: 12.6 % (ref 12.3–15.4)
GLOBULIN UR ELPH-MCNC: 2.8 GM/DL
GLUCOSE SERPL-MCNC: 83 MG/DL (ref 65–99)
GLUCOSE UR STRIP-MCNC: NEGATIVE MG/DL
HCT VFR BLD AUTO: 36.3 % (ref 34–46.6)
HDLC SERPL-MCNC: 63 MG/DL (ref 40–60)
HGB BLD-MCNC: 12 G/DL (ref 12–15.9)
HGB UR QL STRIP.AUTO: NEGATIVE
HYALINE CASTS UR QL AUTO: NORMAL /LPF
IMM GRANULOCYTES # BLD AUTO: 0.02 10*3/MM3 (ref 0–0.05)
IMM GRANULOCYTES NFR BLD AUTO: 0.2 % (ref 0–0.5)
KETONES UR QL STRIP: NEGATIVE
LDLC SERPL CALC-MCNC: 102 MG/DL (ref 0–100)
LDLC/HDLC SERPL: 1.59 {RATIO}
LEUKOCYTE ESTERASE UR QL STRIP.AUTO: ABNORMAL
LYMPHOCYTES # BLD AUTO: 2.67 10*3/MM3 (ref 0.7–3.1)
LYMPHOCYTES NFR BLD AUTO: 30.6 % (ref 19.6–45.3)
MCH RBC QN AUTO: 30.2 PG (ref 26.6–33)
MCHC RBC AUTO-ENTMCNC: 33.1 G/DL (ref 31.5–35.7)
MCV RBC AUTO: 91.4 FL (ref 79–97)
MICROALBUMIN/CREAT UR: NORMAL MG/G{CREAT}
MONOCYTES # BLD AUTO: 1.01 10*3/MM3 (ref 0.1–0.9)
MONOCYTES NFR BLD AUTO: 11.6 % (ref 5–12)
NEUTROPHILS NFR BLD AUTO: 4.7 10*3/MM3 (ref 1.7–7)
NEUTROPHILS NFR BLD AUTO: 53.8 % (ref 42.7–76)
NITRITE UR QL STRIP: NEGATIVE
NRBC BLD AUTO-RTO: 0 /100 WBC (ref 0–0.2)
PH UR STRIP.AUTO: 6.5 [PH] (ref 5–8)
PLATELET # BLD AUTO: 324 10*3/MM3 (ref 140–450)
PMV BLD AUTO: 11.2 FL (ref 6–12)
POTASSIUM SERPL-SCNC: 4.6 MMOL/L (ref 3.5–5.2)
PROT SERPL-MCNC: 7 G/DL (ref 6–8.5)
PROT UR QL STRIP: NEGATIVE
RBC # BLD AUTO: 3.97 10*6/MM3 (ref 3.77–5.28)
RBC # UR STRIP: NORMAL /HPF
REF LAB TEST METHOD: NORMAL
SODIUM SERPL-SCNC: 136 MMOL/L (ref 136–145)
SP GR UR STRIP: 1.01 (ref 1–1.03)
SQUAMOUS #/AREA URNS HPF: NORMAL /HPF
T4 FREE SERPL-MCNC: 1.89 NG/DL (ref 0.93–1.7)
TRIGL SERPL-MCNC: 100 MG/DL (ref 0–150)
TSH SERPL DL<=0.05 MIU/L-ACNC: 0.95 UIU/ML (ref 0.27–4.2)
UROBILINOGEN UR QL STRIP: ABNORMAL
VIT B12 BLD-MCNC: 433 PG/ML (ref 211–946)
VLDLC SERPL-MCNC: 18 MG/DL (ref 5–40)
WBC # UR STRIP: NORMAL /HPF
WBC NRBC COR # BLD: 8.73 10*3/MM3 (ref 3.4–10.8)

## 2022-06-15 PROCEDURE — 84439 ASSAY OF FREE THYROXINE: CPT

## 2022-06-15 PROCEDURE — 84443 ASSAY THYROID STIM HORMONE: CPT

## 2022-06-15 PROCEDURE — 82043 UR ALBUMIN QUANTITATIVE: CPT

## 2022-06-15 PROCEDURE — 80061 LIPID PANEL: CPT

## 2022-06-15 PROCEDURE — 82306 VITAMIN D 25 HYDROXY: CPT

## 2022-06-15 PROCEDURE — 82607 VITAMIN B-12: CPT

## 2022-06-15 PROCEDURE — 82570 ASSAY OF URINE CREATININE: CPT

## 2022-06-15 PROCEDURE — 85025 COMPLETE CBC W/AUTO DIFF WBC: CPT

## 2022-06-15 PROCEDURE — 80053 COMPREHEN METABOLIC PANEL: CPT

## 2022-06-15 PROCEDURE — 81001 URINALYSIS AUTO W/SCOPE: CPT

## 2022-06-16 DIAGNOSIS — R60.0 PEDAL EDEMA: ICD-10-CM

## 2022-06-16 RX ORDER — FUROSEMIDE 20 MG/1
20 TABLET ORAL DAILY PRN
Qty: 90 TABLET | Refills: 1 | Status: SHIPPED | OUTPATIENT
Start: 2022-06-16 | End: 2022-12-14

## 2022-06-20 ENCOUNTER — OFFICE VISIT (OUTPATIENT)
Dept: INTERNAL MEDICINE | Facility: CLINIC | Age: 82
End: 2022-06-20

## 2022-06-20 VITALS
DIASTOLIC BLOOD PRESSURE: 82 MMHG | HEIGHT: 62 IN | HEART RATE: 68 BPM | BODY MASS INDEX: 36.84 KG/M2 | WEIGHT: 200.2 LBS | SYSTOLIC BLOOD PRESSURE: 146 MMHG | TEMPERATURE: 98.4 F

## 2022-06-20 DIAGNOSIS — G89.29 CHRONIC BILATERAL LOW BACK PAIN WITHOUT SCIATICA: Chronic | ICD-10-CM

## 2022-06-20 DIAGNOSIS — M54.50 CHRONIC BILATERAL LOW BACK PAIN WITHOUT SCIATICA: Chronic | ICD-10-CM

## 2022-06-20 DIAGNOSIS — E78.5 HYPERLIPIDEMIA LDL GOAL <70: ICD-10-CM
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
## 2022-06-20 DIAGNOSIS — I25.118 CORONARY ARTERY DISEASE OF NATIVE ARTERY OF NATIVE HEART WITH STABLE ANGINA PECTORIS: ICD-10-CM

## 2022-06-20 DIAGNOSIS — E53.8 B12 DEFICIENCY: ICD-10-CM

## 2022-06-20 DIAGNOSIS — E66.01 CLASS 2 SEVERE OBESITY DUE TO EXCESS CALORIES WITH SERIOUS COMORBIDITY AND BODY MASS INDEX (BMI) OF 36.0 TO 36.9 IN ADULT: Chronic | ICD-10-CM

## 2022-06-20 DIAGNOSIS — Z91.81 AT RISK FOR FALLS: Chronic | ICD-10-CM

## 2022-06-20 DIAGNOSIS — R26.89 POOR BALANCE: ICD-10-CM

## 2022-06-20 DIAGNOSIS — M19.042 PRIMARY OSTEOARTHRITIS OF BOTH HANDS: Chronic | ICD-10-CM

## 2022-06-20 DIAGNOSIS — Z23 NEED FOR VACCINATION: ICD-10-CM

## 2022-06-20 DIAGNOSIS — E03.9 ACQUIRED HYPOTHYROIDISM: ICD-10-CM

## 2022-06-20 DIAGNOSIS — M19.041 PRIMARY OSTEOARTHRITIS OF BOTH HANDS: Chronic | ICD-10-CM

## 2022-06-20 DIAGNOSIS — E55.9 VITAMIN D DEFICIENCY: ICD-10-CM

## 2022-06-20 DIAGNOSIS — M25.512 CHRONIC LEFT SHOULDER PAIN: Chronic | ICD-10-CM

## 2022-06-20 DIAGNOSIS — G89.29 CHRONIC LEFT SHOULDER PAIN: Chronic | ICD-10-CM

## 2022-06-20 DIAGNOSIS — I10 BENIGN ESSENTIAL HYPERTENSION: Primary | ICD-10-CM

## 2022-06-20 PROCEDURE — G0009 ADMIN PNEUMOCOCCAL VACCINE: HCPCS | Performed by: INTERNAL MEDICINE

## 2022-06-20 PROCEDURE — 90677 PCV20 VACCINE IM: CPT | Performed by: INTERNAL MEDICINE

## 2022-06-20 PROCEDURE — 99214 OFFICE O/P EST MOD 30 MIN: CPT | Performed by: INTERNAL MEDICINE

## 2022-06-20 RX ORDER — LOSARTAN POTASSIUM 100 MG/1
100 TABLET ORAL DAILY
Qty: 90 TABLET | Refills: 3 | Status: SHIPPED | OUTPATIENT
Start: 2022-06-20 | End: 2022-12-05

## 2022-06-20 NOTE — ASSESSMENT & PLAN NOTE
Education given. We will do PT for balance. Patient advised to wear good supportive shoes at all times.

## 2022-06-20 NOTE — ASSESSMENT & PLAN NOTE
Resume daily 81 mg coated Aspirin. Continue blood pressure control with losartan and metoprolol. Try to improve healthy low fat diet. Follow up with Dr. Hooker.

## 2022-06-20 NOTE — ASSESSMENT & PLAN NOTE
Eat less sugars, and snack foods, and white carbohydrates. Eat a little protein with each meal. Eat small portions at mealtime. Increase exercise, and physical activity.

## 2022-06-20 NOTE — ASSESSMENT & PLAN NOTE
Continue losartan and metoprolol. Decrease salt in the diet. Increase physical activity and exercise.

## 2022-06-20 NOTE — ASSESSMENT & PLAN NOTE
Physical therapy ordered. She will schedule at [facility] at  [place]. Also may use moist heat to relax tight muscles.

## 2022-06-20 NOTE — PATIENT INSTRUCTIONS
Patient Instructions   Problem List Items Addressed This Visit          Advance Directives and General Issues    At risk for falls (Chronic)    Current Assessment & Plan     Education given. We will do PT for balance. Patient advised to wear good supportive shoes at all times.              Cardiac and Vasculature    Benign essential hypertension - Primary    Overview     Taking losartan 100mg and metoprolol  50mg  daily.                 Current Assessment & Plan     Continue losartan and metoprolol. Decrease salt in the diet. Increase physical activity and exercise.           Relevant Medications    metoprolol succinate XL (TOPROL-XL) 50 MG 24 hr tablet    furosemide (LASIX) 20 MG tablet    losartan (COZAAR) 100 MG tablet    Other Relevant Orders    CBC & Differential (Completed)    Comprehensive Metabolic Panel (Completed)    Urinalysis With Microscopic - Urine, Clean Catch (Completed)    Microalbumin / Creatinine Urine Ratio - Urine, Clean Catch (Completed)    Coronary artery disease of native artery of native heart with stable angina pectoris (HCC)    Overview     Myocardial perfusion stress test 9/23/2019 was normal: EF greater than 70%, no ischemia, coronary artery calcification noted on CT portion of stress test.         Echocardiogram showed diastolic dysfunction grade 1.  Ejection               fraction 61 to 65%.    Taking daily aspirin, losartan, and metoprolol.  Follow-up regularly with Dr. Hooker.           Current Assessment & Plan     Resume daily 81 mg coated Aspirin. Continue blood pressure control with losartan and metoprolol. Try to improve healthy low fat diet. Follow up with Dr. Hooker.             Relevant Medications    aspirin 81 MG EC tablet    metoprolol succinate XL (TOPROL-XL) 50 MG 24 hr tablet    Hyperlipidemia LDL goal <70    Current Assessment & Plan     Continue to decrease fats and sugars in the diet. Increase physical activity, and exercise.           Relevant Orders    Lipid Panel  (Completed)       Endocrine and Metabolic    Class 2 severe obesity due to excess calories with serious comorbidity and body mass index (BMI) of 36.0 to 36.9 in adult (HCC) (Chronic)    Current Assessment & Plan     Eat less sugars, and snack foods, and white carbohydrates. Eat a little protein with each meal. Eat small portions at mealtime. Increase exercise, and physical activity.           Acquired hypothyroidism    Overview     Levothyroxine 125mcg.           Current Assessment & Plan       Continue current dose of levothyroxine daily.             Relevant Medications    levothyroxine (SYNTHROID, LEVOTHROID) 125 MCG tablet    metoprolol succinate XL (TOPROL-XL) 50 MG 24 hr tablet    Other Relevant Orders    TSH (Completed)    T4, Free (Completed)    Vitamin D deficiency    Current Assessment & Plan     Continue the current dose of vitamin D3 daily.           Relevant Orders    Vitamin D 25 Hydroxy (Completed)    B12 deficiency    Relevant Orders    Vitamin B12 (Completed)       Musculoskeletal and Injuries    Chronic bilateral low back pain without sciatica (Chronic)    Current Assessment & Plan     Physical therapy ordered. She will schedule at [facility] at  [place]. Also may use moist heat to relax tight muscles.           Relevant Orders    Ambulatory Referral to Physical Therapy Evaluate and treat (Completed)    Primary osteoarthritis of both hands (Chronic)    Current Assessment & Plan     Use Voltaren gel up to 4 times a day.           Fall on same level (Chronic)    Chronic left shoulder pain (Chronic)    Current Assessment & Plan     Follow up with Dr. Anderson.              Symptoms and Signs    Poor balance    Current Assessment & Plan     PT at [facility] [place].           Relevant Orders    Ambulatory Referral to Physical Therapy Evaluate and treat (Completed)          Other Visit Diagnoses       Need for vaccination        Relevant Orders    Pneumococcal Conjugate Vaccine 20-Valent All (Completed)           "BMI for Adults  What is BMI?  Body mass index (BMI) is a number that is calculated from a person's weight and height. BMI can help estimate how much of a person's weight is composed of fat. BMI does not measure body fat directly. Rather, it is an alternative to procedures that directly measure body fat, which can be difficult and expensive.  BMI can help identify people who may be at higher risk for certain medical problems.  What are BMI measurements used for?  BMI is used as a screening tool to identify possible weight problems. It helps determine whether a person is obese, overweight, a healthy weight, or underweight.  BMI is useful for:  Identifying a weight problem that may be related to a medical condition or may increase the risk for medical problems.  Promoting changes, such as changes in diet and exercise, to help reach a healthy weight. BMI screening can be repeated to see if these changes are working.  How is BMI calculated?  BMI involves measuring your weight in relation to your height. Both height and weight are measured, and the BMI is calculated from those numbers. This can be done either in English ([place] or metric measurements. Note that charts and online BMI calculators are available to help you find your BMI quickly and easily without having to do these calculations yourself.  To calculate your BMI in English ([place] measurements:    Measure your weight in pounds (lb).  Multiply the number of pounds by 703.  For example, for a person who weighs 180 lb, multiply that number by 703, which equals [phone].  Measure your height in inches. Then multiply that number by itself to get a measurement called \"inches squared.\"  For example, for a person who is 70 inches tall, the \"inches squared\" measurement is 70 inches x 70 inches, which equals 4,900 inches squared.  Divide the total from step 2 (number of lb x 703) by the total from step 3 (inches squared): 126,540 ÷ 4,900 = 25.8. This is your BMI.    To " "calculate your BMI in metric measurements:  Measure your weight in kilograms (kg).  Measure your height in meters (m). Then multiply that number by itself to get a measurement called \"meters squared.\"  For example, for a person who is 1.75 m tall, the \"meters squared\" measurement is 1.75 m x 1.75 m, which is equal to 3.1 meters squared.  Divide the number of kilograms (your weight) by the meters squared number. In this example: 70 ÷ 3.1 = 22.6. This is your BMI.  What do the results mean?  BMI charts are used to identify whether you are underweight, normal weight, overweight, or obese. The following guidelines will be used:  Underweight: BMI less than 18.5.  Normal weight: BMI between 18.5 and 24.9.  Overweight: BMI between 25 and 29.9.  Obese: BMI of 30 or above.  Keep these notes in mind:  Weight includes both fat and muscle, so someone with a muscular build, such as an athlete, may have a BMI that is higher than 24.9. In cases like these, BMI is not an accurate measure of body fat.  To determine if excess body fat is the cause of a BMI of 25 or higher, further assessments may need to be done by a health care provider.  BMI is usually interpreted in the same way for men and women.  Where to find more information  For more information about BMI, including tools to quickly calculate your BMI, go to these websites:  Centers for Disease Control and Prevention: www.cdc.gov  American Heart Association: www.heart.org  National Heart, Lung, and Blood [place]: www.nhlbi.nih.gov  Summary  Body mass index (BMI) is a number that is calculated from a person's weight and height.  BMI may help estimate how much of a person's weight is composed of fat. BMI can help identify those who may be at higher risk for certain medical problems.  BMI can be measured using English measurements or metric measurements.  BMI charts are used to identify whether you are underweight, normal weight, overweight, or obese.  This information is not " intended to replace advice given to you by your health care provider. Make sure you discuss any questions you have with your health care provider.  Document Revised: 09/09/2020 Document Reviewed: 07/17/2020  Elsevier Patient Education © 2021 [employer]  Calorie Counting for Weight Loss  Calories are units of energy. Your body needs a certain number of calories from food to keep going throughout the day. When you eat or drink more calories than your body needs, your body stores the extra calories mostly as fat. When you eat or drink fewer calories than your body needs, your body burns fat to get the energy it needs.  Calorie counting means keeping track of how many calories you eat and drink each day. Calorie counting can be helpful if you need to lose weight. If you eat fewer calories than your body needs, you should lose weight. Ask your health care provider what a healthy weight is for you.  For calorie counting to work, you will need to eat the right number of calories each day to lose a healthy amount of weight per week. A dietitian can help you figure out how many calories you need in a day and will suggest ways to reach your calorie goal.  A healthy amount of weight to lose each week is usually 1-2 lb (0.5-0.9 kg). This usually means that your daily calorie intake should be reduced by 500-750 calories.  Eating 1,200-1,500 calories a day can help most women lose weight.  Eating 1,500-1,800 calories a day can help most men lose weight.  What do I need to know about calorie counting?  Work with your health care provider or dietitian to determine how many calories you should get each day. To meet your daily calorie goal, you will need to:  Find out how many calories are in each food that you would like to eat. Try to do this before you eat.  Decide how much of the food you plan to eat.  Keep a food log. Do this by writing down what you ate and how many calories it had.  To successfully lose weight, it is  important to balance calorie counting with a healthy lifestyle that includes regular activity.  Where do I find calorie information?    The number of calories in a food can be found on a Nutrition Facts label. If a food does not have a Nutrition Facts label, try to look up the calories online or ask your dietitian for help.  Remember that calories are listed per serving. If you choose to have more than one serving of a food, you will have to multiply the calories per serving by the number of servings you plan to eat. For example, the label on a package of bread might say that a serving size is 1 slice and that there are 90 calories in a serving. If you eat 1 slice, you will have eaten 90 calories. If you eat 2 slices, you will have eaten 180 calories.  How do I keep a food log?  After each time that you eat, record the following in your food log as soon as possible:  What you ate. Be sure to include toppings, sauces, and other extras on the food.  How much you ate. This can be measured in cups, ounces, or number of items.  How many calories were in each food and drink.  The total number of calories in the food you ate.  Keep your food log near you, such as in a pocket-sized notebook or on an marlin or website on your mobile phone. Some programs will calculate calories for you and show you how many calories you have left to meet your daily goal.  What are some portion-control tips?  Know how many calories are in a serving. This will help you know how many servings you can have of a certain food.  Use a measuring cup to measure serving sizes. You could also try weighing out portions on a kitchen scale. With time, you will be able to estimate serving sizes for some foods.  Take time to put servings of different foods on your favorite plates or in your favorite bowls and cups so you know what a serving looks like.  Try not to eat straight from a food's packaging, such as from a bag or box. Eating straight from the package  makes it hard to see how much you are eating and can lead to overeating. Put the amount you would like to eat in a cup or on a plate to make sure you are eating the right portion.  Use smaller plates, glasses, and bowls for smaller portions and to prevent overeating.  Try not to multitask. For example, avoid watching TV or using your computer while eating. If it is time to eat, sit down at a table and enjoy your food. This will help you recognize when you are full. It will also help you be more mindful of what and how much you are eating.  What are tips for following this plan?  Reading food labels  Check the calorie count compared with the serving size. The serving size may be smaller than what you are used to eating.  Check the source of the calories. Try to choose foods that are high in protein, fiber, and vitamins, and low in saturated fat, trans fat, and sodium.  Shopping  Read nutrition labels while you shop. This will help you make healthy decisions about which foods to buy.  Pay attention to nutrition labels for low-fat or fat-free foods. These foods sometimes have the same number of calories or more calories than the full-fat versions. They also often have added sugar, starch, or salt to make up for flavor that was removed with the fat.  Make a grocery list of lower-calorie foods and stick to it.  Cooking  Try to cook your favorite foods in a healthier way. For example, try baking instead of frying.  Use low-fat dairy products.  Meal planning  Use more fruits and vegetables. One-half of your plate should be fruits and vegetables.  Include lean proteins, such as chicken, turkey, and fish.  Lifestyle  Each week, aim to do one of the followin minutes of moderate exercise, such as walking.  75 minutes of vigorous exercise, such as running.  General information  Know how many calories are in the foods you eat most often. This will help you calculate calorie counts faster.  Find a way of tracking calories  that works for you. Get creative. Try different apps or programs if writing down calories does not work for you.  What foods should I eat?    Eat nutritious foods. It is better to have a nutritious, high-calorie food, such as an avocado, than a food with few nutrients, such as a bag of potato chips.  Use your calories on foods and drinks that will fill you up and will not leave you hungry soon after eating.  Examples of foods that fill you up are nuts and nut butters, vegetables, lean proteins, and high-fiber foods such as whole grains. High-fiber foods are foods with more than 5 g of fiber per serving.  Pay attention to calories in drinks. Low-calorie drinks include water and unsweetened drinks.  The items listed above may not be a complete list of foods and beverages you can eat. Contact a dietitian for more information.  What foods should I limit?  Limit foods or drinks that are not good sources of vitamins, minerals, or protein or that are high in unhealthy fats. These include:  Candy.  Other sweets.  Sodas, specialty coffee drinks, alcohol, and juice.  The items listed above may not be a complete list of foods and beverages you should avoid. Contact a dietitian for more information.  How do I count calories when eating out?  Pay attention to portions. Often, portions are much larger when eating out. Try these tips to keep portions smaller:  Consider sharing a meal instead of getting your own.  If you get your own meal, eat only half of it. Before you start eating, ask for a container and put half of your meal into it.  When available, consider ordering smaller portions from the menu instead of full portions.  Pay attention to your food and drink choices. Knowing the way food is cooked and what is included with the meal can help you eat fewer calories.  If calories are listed on the menu, choose the lower-calorie options.  Choose dishes that include vegetables, fruits, whole grains, low-fat dairy products, and  lean proteins.  Choose items that are boiled, broiled, grilled, or steamed. Avoid items that are buttered, battered, fried, or served with cream sauce. Items labeled as crispy are usually fried, unless stated otherwise.  Choose water, low-fat milk, unsweetened iced tea, or other drinks without added sugar. If you want an alcoholic beverage, choose a lower-calorie option, such as a glass of wine or light beer.  Ask for dressings, sauces, and syrups on the side. These are usually high in calories, so you should limit the amount you eat.  If you want a salad, choose a garden salad and ask for grilled meats. Avoid extra toppings such as finney, cheese, or fried items. Ask for the dressing on the side, or ask for olive oil and vinegar or lemon to use as dressing.  Estimate how many servings of a food you are given. Knowing serving sizes will help you be aware of how much food you are eating at restaurants.  Where to find more information  Centers for Disease Control and Prevention: www.cdc.gov  [place] Department of Agriculture: myplate.gov  Summary  Calorie counting means keeping track of how many calories you eat and drink each day. If you eat fewer calories than your body needs, you should lose weight.  A healthy amount of weight to lose per week is usually 1-2 lb (0.5-0.9 kg). This usually means reducing your daily calorie intake by 500-750 calories.  The number of calories in a food can be found on a Nutrition Facts label. If a food does not have a Nutrition Facts label, try to look up the calories online or ask your dietitian for help.  Use smaller plates, glasses, and bowls for smaller portions and to prevent overeating.  Use your calories on foods and drinks that will fill you up and not leave you hungry shortly after a meal.  This information is not intended to replace advice given to you by your health care provider. Make sure you discuss any questions you have with your health care provider.  Document Revised:  01/28/2021 Document Reviewed: 01/28/2021  Elsevier Patient Education © 2021 [employer]

## 2022-06-20 NOTE — PROGRESS NOTES
[facility]     Serenity Lagos  1940   [id number]      Patient Care Team:  Bernadette Somers MD as PCP - General (Internal Medicine)  Syed Hooker MD as Consulting Physician (Cardiology)  Celio Iniguez MD as Consulting Physician (Ophthalmology)  Ender Jolly MD as Consulting Physician (Dermatology)  Jil Arreola APRN as Nurse Practitioner (Nurse Practitioner)  Josh Noyola Jr., MD as Consulting Physician (Hand Surgery)    Chief Complaint   Patient presents with   • Hypertension   • Hyperlipidemia   • Coronary Artery Disease            HPI  Patient is a 81 y.o. female presents today for a follow-up.    Arthritis in hands  The patient complains of arthritis in her hands and fingers. She states that she has had a trigger finger,R 3rd, for approximately 2 years. She states that she had an injection and Dr. Noyola wants to operate, which she is thinking about doing.    Back pain/fall issues  The patient states that her back has really bothered her. She states that years ago, a doctor told her that it was a crooked spine. She states she was told her that it was spondylolisthesis. She states that he told her that at that time, he could give her some shots in her back, but chances are they work a while and then they will not work. She states that Dr. Paz would not recommend any kind of surgery. She states that it has really been bothering her. She states that she is constantly losing her balance. She states that the real pain is in the top of her legs, buttocks, and hips. She states that she had a panic attack. She states that she was in [place] and suddenly realized she did not have anything to hang on to and she could not find a wall to steady herself. She states  she broke out in a cold sweat. She describes that that is the first time in her life that she has ever done that. She states that she can not shop unless she has a buggy to push. She states that she does not  have a cane. She states that she knows she will have to have a walker eventually, but she is not going to do it yet.     The patient states she fell in the [employer] store in 03/2022. She describes she broke her glasses, hit her head, had bruises and bloody knees.  She states that she had pain radiating down her legs. She states that if she has been off of her legs for a while and she gets up, she tends to have tingling that comes up her leg and almost a feeling of that her legs are numb. She states that there is not a lot of feeling in her lower leg. She states that she had a vein procedure on both lower legs done by Dr. Jolly in 09/2021 and it really helped.     The patient states 2 days later she fell in her kitchen. She states [facility].  She states initially she went there to get an injection in her shoulder because her left arm tends to go numb.  She also discussed with him her back pain.  He wanted her to do some injections and physical therapy.  She states she did not want to go through the different regimens and decided not to return.    Ankle edema  The patient states she takes Lasix as needed for ankle edema.  It does work well.    Blood pressure readings  The patient's blood pressure is high today at 146/82 mmHg. She states that she has not checked her blood pressure at home lately. She states that it has been good. She states that when she had it done different offices, it was 158/90 mmHg in 03/2022 and in 12/2021, it was 124/78 mmHg. She states that she is taking losartan and metoprolol. She states that she is not taking baby aspirin, due to reading that it was not good for you.    Vitamin D deficiency  The patient's vitamin D is at 50 ng/dL.  She is taking vitamin D supplement.  She states that she quit taking the B12 pills.    Health maintenance  Overall, the patient's labs are good except for LDL cholesterol which is at 102.  Goal is less than 75.. The patient  states she eats a lot of eggs at least 1 a day and sometimes 2.    The patient is fully vaccinated against COVID-19 with 2 boosters. She has had tetanus and shingles vaccines. She is due for a pneumonia vaccine.    CHRONIC CONDITIONS      Past Medical History:   Diagnosis Date   • Cervicalgia    • Chronic bronchitis (HCC)    • Coronary artery disease    • Dysphagia    • Glaucoma, open angle    • Hearing loss    • Hypertension    • Mixed hyperlipidemia    • Osteoarthritis     cervical spine degenerative   • Perforation of tympanic membrane    • Pruritus     chronic general pruritis   • Salivary gland cancer (HCC)    • Shingles     postherpetic neuralgia-thorax   • Spondylolisthesis      lumbar spine   • Wrist tendonitis        Past Surgical History:   Procedure Laterality Date   • APPENDECTOMY     • BREAST BIOPSY     • BREAST BIOPSY Right 07/29/2019   • BREAST CYST EXCISION     • ENDOSCOPY  2017    normal (per pt) EGD   • MOUTH SURGERY  2000   • TONSILLECTOMY         Family History   Problem Relation Age of Onset   • Hypertension Mother    • Heart failure Mother         CHF   • Coronary artery disease Mother         CABG   • Lung cancer Father 59        Heavy smoker and Black Lung   • Rheum arthritis Sister    • Other Sister 76        sepsis due to infected knee replacement   • COPD Sister         heavy smoker    • Alzheimer's disease Maternal Grandmother    • Coronary artery disease Maternal Grandfather    • Cancer Paternal Grandmother    • Heart disease Paternal Grandfather    • Hypertension Other    • Obesity Other    • Stroke Maternal Aunt    • Heart disease Maternal Aunt    • Stroke Maternal Aunt    • Stroke Maternal Aunt    • Coronary artery disease Maternal Uncle    • Stroke Maternal Uncle    • Heart disease Maternal Uncle    • Breast cancer Neg Hx    • Ovarian cancer Neg Hx        Social History     Socioeconomic History   • Marital status:    Tobacco Use   • Smoking status: Former Smoker      "Packs/day: 1.00     Years: 20.00     Pack years: 20.00     Types: Cigarettes     Start date:      Quit date:      Years since quittin.4   • Smokeless tobacco: Never Used   Substance and Sexual Activity   • Alcohol use: Yes     Alcohol/week: 7.0 standard drinks     Types: 7 Glasses of wine per week     Comment:  daily with dinner   • Drug use: No   • Sexual activity: Defer       No Known Allergies    Review of Systems:     Review of Systems    Vital Signs  Vitals:    22 1417   BP: 146/82   BP Location: Left arm   Patient Position: Sitting   Cuff Size: Large Adult   Pulse: 68   Temp: 98.4 °F (36.9 °C)   Weight: 90.8 kg (200 lb 3.2 oz)   Height: 157.5 cm (62.01\")   PainSc:   9   PainLoc: Back     Body mass index is 36.61 kg/m².  Class 2 Severe Obesity (BMI >=35 and <=39.9). Obesity-related health conditions include the following: hypertension, coronary heart disease and dyslipidemias. Obesity is unchanged. BMI is is above average; BMI management plan is completed. We discussed low calorie, low carb based diet program, portion control and increasing exercise.        Current Outpatient Medications:   •  Ascorbic Acid (VITAMIN C ER) 1000 MG tablet controlled-release, 1 tablet Daily., Disp: , Rfl:   •  Biotin 5000 MCG sublingual tablet, take 1 by oral route  every day, Disp: , Rfl:   •  CALCIUM PO, Take 1 tablet by mouth Daily. Calcium 600 + vitamin d, Disp: , Rfl:   •  Cholecalciferol (Vitamin D3) 50 MCG (2000 UT) capsule, Take 1 capsule by mouth Daily., Disp: , Rfl:   •  cyproheptadine (PERIACTIN) 4 MG tablet, Take 4 mg by mouth Daily., Disp: , Rfl:   •  Docusate Sodium 100 MG capsule, take 1 tablet by oral route twice daily, Disp: , Rfl:   •  furosemide (LASIX) 20 MG tablet, TAKE 1 TABLET BY MOUTH DAILY AS NEEDED (EDEMA)., Disp: 90 tablet, Rfl: 1  •  Glucosamine-Chondroitin (OSTEO BI-FLEX REGULAR STRENGTH PO), 1 daily, Disp: , Rfl:   •  latanoprost (XALATAN) 0.005 % ophthalmic solution, Administer 1 " drop to both eyes Every Night., Disp: , Rfl:   •  levothyroxine (SYNTHROID, LEVOTHROID) 125 MCG tablet, TAKE 1 TABLET DAILY, Disp: 90 tablet, Rfl: 3  •  losartan (COZAAR) 100 MG tablet, Take 1 tablet by mouth Daily., Disp: 90 tablet, Rfl: 3  •  metoprolol succinate XL (TOPROL-XL) 50 MG 24 hr tablet, TAKE 1 TABLET DAILY, Disp: 90 tablet, Rfl: 3  •  omeprazole (priLOSEC) 40 MG capsule, TAKE 1 CAPSULE DAILY, Disp: 90 capsule, Rfl: 3  •  Probiotic Product (PROBIOTIC DAILY PO), One capsule daily, Disp: , Rfl:   •  raloxifene (EVISTA) 60 MG tablet, TAKE 1 TABLET DAILY, Disp: 90 tablet, Rfl: 3  •  Vitamin E 400 units tablet, Vitamin E 400 UNIT Oral Tablet; Patient Sig: Vitamin E 400 UNIT Oral Tablet TAKE 1 TABLET DAILY.; 0; 24-Sep-2013; Active, Disp: , Rfl:   •  aspirin 81 MG EC tablet, Take 1 tablet by mouth Daily., Disp: 90 tablet, Rfl: 3  •  Diclofenac Sodium (VOLTAREN) 1 % gel gel, Apply 4 g topically to the appropriate area as directed 4 (Four) Times a Day As Needed (joint pain)., Disp: 350 g, Rfl: 5    Physical Exam:    Physical Exam  Vitals reviewed.   Constitutional:       Appearance: She is well-developed. She is obese.   HENT:      Head: Normocephalic and atraumatic.   Cardiovascular:      Rate and Rhythm: Normal rate and regular rhythm.      Heart sounds: Normal heart sounds. No murmur heard.  Pulmonary:      Effort: Pulmonary effort is normal.      Breath sounds: Normal breath sounds.   Musculoskeletal:      Comments: Mild edema both ankles, nonpitting.   Neurological:      Mental Status: She is alert and oriented to person, place, and time.          ACE III MINI        Results Review:    I reviewed the patient's new clinical results.    CMP:  Lab Results   Component Value Date    BUN 17 06/15/2022    CREATININE 0.85 06/15/2022    EGFRIFNONA 65 12/13/2021    BCR 20.0 06/15/2022     06/15/2022    K 4.6 06/15/2022    CO2 23.1 06/15/2022    CALCIUM 9.3 06/15/2022    ALBUMIN 4.20 06/15/2022    BILITOT 0.4  06/15/2022    ALKPHOS 50 06/15/2022    AST 18 06/15/2022    ALT 13 06/15/2022     HbA1c:  No results found for: HGBA1C  Microalbumin:  Lab Results   Component Value Date    MICROALBUR <1.2 06/15/2022     Lipid Panel  Lab Results   Component Value Date    CHOL 183 06/15/2022    TRIG 100 06/15/2022    HDL 63 (H) 06/15/2022     (H) 06/15/2022    AST 18 06/15/2022    ALT 13 06/15/2022       Medication Review: Medications reviewed and noted  Patient Instructions   Problem List Items Addressed This Visit        Advance Directives and General Issues    At risk for falls (Chronic)    Current Assessment & Plan     Education given. We will do PT for balance. Patient advised to wear good supportive shoes at all times.              Cardiac and Vasculature    Benign essential hypertension - Primary    Overview     Taking losartan 100mg and metoprolol  50mg  daily.                 Current Assessment & Plan     Continue losartan and metoprolol. Decrease salt in the diet. Increase physical activity and exercise.           Relevant Medications    metoprolol succinate XL (TOPROL-XL) 50 MG 24 hr tablet    furosemide (LASIX) 20 MG tablet    losartan (COZAAR) 100 MG tablet    Other Relevant Orders    CBC & Differential (Completed)    Comprehensive Metabolic Panel (Completed)    Urinalysis With Microscopic - Urine, Clean Catch (Completed)    Microalbumin / Creatinine Urine Ratio - Urine, Clean Catch (Completed)    Coronary artery disease of native artery of native heart with stable angina pectoris (HCC)    Overview     · Myocardial perfusion stress test 9/23/2019 was normal: EF greater than 70%, no ischemia, coronary artery calcification noted on CT portion of stress test.         Echocardiogram showed diastolic dysfunction grade 1.  Ejection               fraction 61 to 65%.    Taking daily aspirin, losartan, and metoprolol.  Follow-up regularly with Dr. Hooker.           Current Assessment & Plan     Resume daily 81 mg coated  Aspirin. Continue blood pressure control with losartan and metoprolol. Try to improve healthy low fat diet. Follow up with Dr. Hooker.             Relevant Medications    aspirin 81 MG EC tablet    metoprolol succinate XL (TOPROL-XL) 50 MG 24 hr tablet    Hyperlipidemia LDL goal <70    Current Assessment & Plan     Continue to decrease fats and sugars in the diet. Increase physical activity, and exercise.           Relevant Orders    Lipid Panel (Completed)       Endocrine and Metabolic    Class 2 severe obesity due to excess calories with serious comorbidity and body mass index (BMI) of 36.0 to 36.9 in adult (HCC) (Chronic)    Current Assessment & Plan     Eat less sugars, and snack foods, and white carbohydrates. Eat a little protein with each meal. Eat small portions at mealtime. Increase exercise, and physical activity.           Acquired hypothyroidism    Overview     Levothyroxine 125mcg.           Current Assessment & Plan       Continue current dose of levothyroxine daily.             Relevant Medications    levothyroxine (SYNTHROID, LEVOTHROID) 125 MCG tablet    metoprolol succinate XL (TOPROL-XL) 50 MG 24 hr tablet    Other Relevant Orders    TSH (Completed)    T4, Free (Completed)    Vitamin D deficiency    Current Assessment & Plan     Continue the current dose of vitamin D3 daily.           Relevant Orders    Vitamin D 25 Hydroxy (Completed)    B12 deficiency    Relevant Orders    Vitamin B12 (Completed)       Musculoskeletal and Injuries    Chronic bilateral low back pain without sciatica (Chronic)    Current Assessment & Plan     Physical therapy ordered. She will schedule at [facility]  [place]. Also may use moist heat to relax tight muscles.           Relevant Orders    Ambulatory Referral to Physical Therapy Evaluate and treat (Completed)    Primary osteoarthritis of both hands (Chronic)    Current Assessment & Plan     Use Voltaren gel up to 4 times a day.           Fall on same level (Chronic)     Chronic left shoulder pain (Chronic)    Current Assessment & Plan     Follow up with Dr. Anderson.              Symptoms and Signs    Poor balance    Current Assessment & Plan     PT at [facility] [place].           Relevant Orders    Ambulatory Referral to Physical Therapy Evaluate and treat (Completed)      Other Visit Diagnoses     Need for vaccination        Relevant Orders    Pneumococcal Conjugate Vaccine 20-Valent All (Completed)             Diagnosis Plan   1. Benign essential hypertension  CBC & Differential    Comprehensive Metabolic Panel    Urinalysis With Microscopic - Urine, Clean Catch    Microalbumin / Creatinine Urine Ratio - Urine, Clean Catch   2. Chronic bilateral low back pain without sciatica  Ambulatory Referral to Physical Therapy Evaluate and treat   3. Coronary artery disease of native artery of native heart with stable angina pectoris ([facility])     4. Hyperlipidemia LDL goal <70  Lipid Panel   5. Primary osteoarthritis of both hands     6. Class 2 severe obesity due to excess calories with serious comorbidity and body mass index (BMI) of 36.0 to 36.9 in adult ([facility])     7. At risk for falls     8. Poor balance  Ambulatory Referral to Physical Therapy Evaluate and treat   9. Fall on same level, initial encounter     10. Acquired hypothyroidism  TSH    T4, Free   11. Vitamin D deficiency  Vitamin D 25 Hydroxy   12. B12 deficiency  Vitamin B12   13. Chronic left shoulder pain     14. Need for vaccination  Pneumococcal Conjugate Vaccine 20-Valent All           Plan of care reviewed with patient at the conclusion of today's visit. Education was provided regarding diagnosis, management, and any prescribed or recommended OTC medications.Patient verbalizes understanding of and agreement with management plan.         Transcribed from ambient dictation for Bernadette Somers MD by TONY NAVARRO.  06/20/22   16:29 EDT    Patient verbalized consent to the visit recording.          Answers for HPI/ROS submitted by  the patient on 6/13/2022  What is the primary reason for your visit?: Back Pain  Chronicity: chronic  Onset: more than 1 year ago  Frequency: constantly  Progression since onset: gradually worsening  Pain location: gluteal, sacro-iliac  Pain quality: aching  Radiates to: left thigh, right thigh  Pain - numeric: 9/10  Pain is: worse during the day  Aggravated by: standing, twisting  Stiffness is present: all day  leg pain: Yes  tingling: Yes  Risk factors: history of osteoporosis, lack of exercise, obesity, poor posture, sedentary lifestyle

## 2022-07-20 RX ORDER — GENTAMICIN SULFATE 3 MG/ML
SOLUTION/ DROPS OPHTHALMIC
Qty: 5 ML | Refills: 0 | Status: SHIPPED | OUTPATIENT
Start: 2022-07-20 | End: 2022-10-25

## 2022-08-15 RX ORDER — OMEPRAZOLE 40 MG/1
CAPSULE, DELAYED RELEASE ORAL
Qty: 90 CAPSULE | Refills: 3 | Status: SHIPPED | OUTPATIENT
Start: 2022-08-15

## 2022-10-10 RX ORDER — LEVOTHYROXINE SODIUM 0.12 MG/1
TABLET ORAL
Qty: 90 TABLET | Refills: 3 | Status: SHIPPED | OUTPATIENT
Start: 2022-10-10

## 2022-10-25 ENCOUNTER — OFFICE VISIT (OUTPATIENT)
Dept: INTERNAL MEDICINE | Facility: CLINIC | Age: 82
End: 2022-10-25

## 2022-10-25 VITALS
SYSTOLIC BLOOD PRESSURE: 150 MMHG | HEIGHT: 62 IN | TEMPERATURE: 98.6 F | DIASTOLIC BLOOD PRESSURE: 92 MMHG | WEIGHT: 195.6 LBS | HEART RATE: 70 BPM | BODY MASS INDEX: 35.99 KG/M2 | OXYGEN SATURATION: 98 %

## 2022-10-25 DIAGNOSIS — M79.651 PAIN OF RIGHT THIGH: Primary | Chronic | ICD-10-CM

## 2022-10-25 DIAGNOSIS — I10 BENIGN ESSENTIAL HYPERTENSION: ICD-10-CM

## 2022-10-25 DIAGNOSIS — Z91.81 AT RISK FOR FALLS: Chronic | ICD-10-CM

## 2022-10-25 DIAGNOSIS — G89.29 CHRONIC BILATERAL LOW BACK PAIN WITHOUT SCIATICA: Chronic | ICD-10-CM

## 2022-10-25 DIAGNOSIS — M54.50 CHRONIC BILATERAL LOW BACK PAIN WITHOUT SCIATICA: Chronic | ICD-10-CM
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
## 2022-10-25 PROCEDURE — 99214 OFFICE O/P EST MOD 30 MIN: CPT | Performed by: INTERNAL MEDICINE

## 2022-10-25 NOTE — ASSESSMENT & PLAN NOTE
Try to do some back stretches and exercises every day. Walk some every day. Use a moist heat pack to relax tight muscles. Take Tylenol as needed.

## 2022-10-25 NOTE — PROGRESS NOTES
"[facility]     Serenity Lagos  1940   [id number]      Patient Care Team:  Bernadette Somers MD as PCP - General (Internal Medicine)  Syed Hooker MD as Consulting Physician (Cardiology)  Celio Iniguez MD as Consulting Physician (Ophthalmology)  Ender Jolly MD as Consulting Physician (Dermatology)  Jil Arreola APRN as Nurse Practitioner (Nurse Practitioner)  Josh Noyola Jr., MD as Consulting Physician (Hand Surgery)    Chief Complaint   Patient presents with   • Right leg swollen and painful     For 4-5 weeks        HPI  The patient is a 82-year-old female who presents today for right leg pain.    Right leg pain  The patient reports that she has been experiencing pain in the posterior area of her right thigh and some tenderness on the lateral area for approximately 1 month. She states that she has always had some pain from her back and then it radiates into her buttocks and into her leg. She notes that the pain got worse approximately 1 month ago when she was walking, she had sudden pain in her back and it felt like \"something was going to collapse.\" She describes the pain as a catching and sharp pain that radiated to the back of the thigh. She reports that her low back pain is always there and is unchanged. She states that she saw Dr. Anderson's PA, who recommended that she see the pain clinic. She notes that she does not believe in relying on pain medicine. She reports that she has taken Tylenol the last 2 days to help with her leg, which did help. She states that she applied heat on her thigh and knee. She notes that the heat did seem to help in combination with Tylenol. She states that it hurts more when she is walking. She reports that she gets up multiple times in the middle of the night and she is always afraid she is going to fall when she is on her way to the bathroom. She notes that she has fallen many times in the past and she thinks she fractured something " "somewhere. She reports that her last fall was either 08/01/2022 or 09/01/2022 at [facility] when she stepped up on the curb that was too high, she lost her balance. She came down on her buttocks like she always does and states this \"jams\" her spine every time. She did not notice an increase in her back pain at that time. She states that she cut her hand during the fall and it got infected. She notes that Dr. Jolly prescribed her ciprofloxacin, which she hated to take because she took it years ago and she got tendinitis from it. Her last x-ray of the spine was done on 03/2021 and this did not reveal any changes. She reports that she did not make another follow-up with Dr. Anderson's office. She states that the PA there wanted her to get into a program for customer care. She notes that she does not want to do exercises or stretches. She reports that her right knee pain is worse than her left. She has tenderness and pain in her right buttocks. She states that she has always been very sensitive to pressure and pain. She notes that just touching her legs has a tendency to hurt. She reports that she has done physical therapy at [facility] previously. She states that she does not have any moist heat packs. She notes that she is not using Voltaren gel because she did not like it when she tried it before.      CHRONIC CONDITIONS      Past Medical History:   Diagnosis Date   • Cervicalgia    • Chronic bronchitis (HCC)    • Coronary artery disease    • Dysphagia    • Glaucoma, open angle    • Hearing loss    • Hypertension    • Mixed hyperlipidemia    • Osteoarthritis     cervical spine degenerative   • Perforation of tympanic membrane    • Pruritus     chronic general pruritis   • Salivary gland cancer (HCC)    • Shingles     postherpetic neuralgia-thorax   • Spondylolisthesis      lumbar spine   • Wrist tendonitis        Past Surgical History:   Procedure Laterality Date   • APPENDECTOMY     • BREAST BIOPSY     • BREAST BIOPSY Right " "2019   • BREAST CYST EXCISION     • ENDOSCOPY  2017    normal (per pt) EGD   • MOUTH SURGERY     • TONSILLECTOMY         Family History   Problem Relation Age of Onset   • Hypertension Mother    • Heart failure Mother         CHF   • Coronary artery disease Mother         CABG   • Lung cancer Father 59        Heavy smoker and Black Lung   • Rheum arthritis Sister    • Other Sister 76        sepsis due to infected knee replacement   • COPD Sister         heavy smoker    • Alzheimer's disease Maternal Grandmother    • Coronary artery disease Maternal Grandfather    • Cancer Paternal Grandmother    • Heart disease Paternal Grandfather    • Hypertension Other    • Obesity Other    • Stroke Maternal Aunt    • Heart disease Maternal Aunt    • Stroke Maternal Aunt    • Stroke Maternal Aunt    • Coronary artery disease Maternal Uncle    • Stroke Maternal Uncle    • Heart disease Maternal Uncle    • Breast cancer Neg Hx    • Ovarian cancer Neg Hx        Social History     Socioeconomic History   • Marital status:    Tobacco Use   • Smoking status: Former     Packs/day: 1.00     Years: 20.00     Pack years: 20.00     Types: Cigarettes     Start date:      Quit date:      Years since quittin.8   • Smokeless tobacco: Never   Substance and Sexual Activity   • Alcohol use: Yes     Alcohol/week: 7.0 standard drinks     Types: 7 Glasses of wine per week     Comment:  daily with dinner   • Drug use: No   • Sexual activity: Defer       No Known Allergies    Review of Systems:     Review of Systems     The appropriate review of systems is included in the HPI.    Vital Signs  Vitals:    10/25/22 1325   BP: 150/92   BP Location: Left arm   Patient Position: Sitting   Cuff Size: Adult   Pulse: 70   Temp: 98.6 °F (37 °C)   TempSrc: Infrared   SpO2: 98%   Weight: 88.7 kg (195 lb 9.6 oz)   Height: 157.5 cm (62.01\")   PainSc:   4   PainLoc: Leg  Comment: Right     Body mass index is 35.76 kg/m².  Class 2 Severe " Obesity (BMI >=35 and <=39.9). Obesity-related health conditions include the following: hypertension, coronary heart disease and dyslipidemias. Obesity is unchanged. BMI is is above average; BMI management plan is completed. We discussed low calorie, low carb based diet program, portion control and increasing exercise.        Current Outpatient Medications:   •  aspirin 81 MG EC tablet, Take 1 tablet by mouth Daily., Disp: 90 tablet, Rfl: 3  •  Biotin 5000 MCG sublingual tablet, take 1 by oral route  every day, Disp: , Rfl:   •  CALCIUM PO, Take 1 tablet by mouth Daily. Calcium 600 + vitamin d, Disp: , Rfl:   •  Cholecalciferol (Vitamin D3) 50 MCG (2000 UT) capsule, Take 1 capsule by mouth Daily., Disp: , Rfl:   •  cyproheptadine (PERIACTIN) 4 MG tablet, Take 4 mg by mouth Daily., Disp: , Rfl:   •  Docusate Sodium 100 MG capsule, take 1 tablet by oral route twice daily, Disp: , Rfl:   •  furosemide (LASIX) 20 MG tablet, TAKE 1 TABLET BY MOUTH DAILY AS NEEDED (EDEMA)., Disp: 90 tablet, Rfl: 1  •  Glucosamine-Chondroitin (OSTEO BI-FLEX REGULAR STRENGTH PO), 1 daily, Disp: , Rfl:   •  latanoprost (XALATAN) 0.005 % ophthalmic solution, Administer 1 drop to both eyes Every Night., Disp: , Rfl:   •  levothyroxine (SYNTHROID, LEVOTHROID) 125 MCG tablet, TAKE 1 TABLET DAILY, Disp: 90 tablet, Rfl: 3  •  losartan (COZAAR) 100 MG tablet, Take 1 tablet by mouth Daily., Disp: 90 tablet, Rfl: 3  •  metoprolol succinate XL (TOPROL-XL) 50 MG 24 hr tablet, TAKE 1 TABLET DAILY, Disp: 90 tablet, Rfl: 3  •  omeprazole (priLOSEC) 40 MG capsule, TAKE 1 CAPSULE DAILY, Disp: 90 capsule, Rfl: 3  •  Probiotic Product (PROBIOTIC DAILY PO), One capsule daily, Disp: , Rfl:   •  raloxifene (EVISTA) 60 MG tablet, TAKE 1 TABLET DAILY, Disp: 90 tablet, Rfl: 3  •  Vitamin E 400 units tablet, Vitamin E 400 UNIT Oral Tablet; Patient Sig: Vitamin E 400 UNIT Oral Tablet TAKE 1 TABLET DAILY.; 0; 24-Sep-2013; Active, Disp: , Rfl:     Physical  Exam:    Physical Exam  Vitals and nursing note reviewed.   Constitutional:       Appearance: She is well-developed. She is obese.   HENT:      Head: Normocephalic.   Eyes:      Conjunctiva/sclera: Conjunctivae normal.      Pupils: Pupils are equal, round, and reactive to light.   Neck:      Thyroid: No thyromegaly.   Cardiovascular:      Rate and Rhythm: Normal rate and regular rhythm.      Heart sounds: Normal heart sounds.   Pulmonary:      Effort: Pulmonary effort is normal.      Breath sounds: Normal breath sounds. No wheezing.   Musculoskeletal:      Cervical back: Normal range of motion and neck supple.      Comments: Tender low right back muscle spasms and tender posterior right thigh and lateral right thigh.   Decreased range of motion of right hip and low back.   Lymphadenopathy:      Cervical: No cervical adenopathy.   Skin:     General: Skin is warm and dry.   Neurological:      Mental Status: She is alert and oriented to person, place, and time.   Psychiatric:         Thought Content: Thought content normal.          ACE III MINI        Results Review:    None    CMP:  Lab Results   Component Value Date    BUN 17 06/15/2022    CREATININE 0.85 06/15/2022    EGFRIFNONA 65 12/13/2021    BCR 20.0 06/15/2022     06/15/2022    K 4.6 06/15/2022    CO2 23.1 06/15/2022    CALCIUM 9.3 06/15/2022    ALBUMIN 4.20 06/15/2022    BILITOT 0.4 06/15/2022    ALKPHOS 50 06/15/2022    AST 18 06/15/2022    ALT 13 06/15/2022     HbA1c:  No results found for: HGBA1C  Microalbumin:  Lab Results   Component Value Date    MICROALBUR <1.2 06/15/2022     Lipid Panel  Lab Results   Component Value Date    CHOL 183 06/15/2022    TRIG 100 06/15/2022    HDL 63 (H) 06/15/2022     (H) 06/15/2022    AST 18 06/15/2022    ALT 13 06/15/2022       Medication Review: Medications reviewed and noted  Patient Instructions   Problem List Items Addressed This Visit        Advance Directives and General Issues    At risk for falls  (Chronic)       Cardiac and Vasculature    Benign essential hypertension    Overview     Taking losartan 100mg and metoprolol  50mg  daily.               Current Assessment & Plan     Continue losartan and metoprolol.         Relevant Medications    metoprolol succinate XL (TOPROL-XL) 50 MG 24 hr tablet    furosemide (LASIX) 20 MG tablet    losartan (COZAAR) 100 MG tablet       Musculoskeletal and Injuries    Chronic bilateral low back pain without sciatica (Chronic)    Current Assessment & Plan     Try to do some back stretches and exercises every day. Walk some every day. Use a moist heat pack to relax tight muscles. Take Tylenol as needed.         Relevant Orders    Ambulatory Referral to Physical Therapy Evaluate and treat (Completed)    Pain of right thigh - Primary (Chronic)    Overview     Muscle strain likely secondary to fall in September while stepping up on a curb.         Current Assessment & Plan     Physical therapy ordered at [facility] in [place]. She will use a moist heat pack on the posterior and right lateral thigh. Continue taking Tylenol as needed. Doing back stretches will also help some.         Relevant Orders    Ambulatory Referral to Physical Therapy Evaluate and treat (Completed)          Diagnosis Plan   1. Pain of right thigh  Ambulatory Referral to Physical Therapy Evaluate and treat      2. Chronic bilateral low back pain without sciatica  Ambulatory Referral to Physical Therapy Evaluate and treat      3. At risk for falls        4. Benign essential hypertension                Plan of care reviewed with patient at the conclusion of today's visit. Education was provided regarding diagnosis, management, and any prescribed or recommended OTC medications.Patient verbalizes understanding of and agreement with management plan.         Bernadette Somers MD     Transcribed from ambient dictation for Bernadette Somers MD by Patricia Gonzales.  10/25/22   16:41 EDT    Patient or patient representative  verbalized consent to the visit recording.  I have personally performed the services described in this document as transcribed by the above individual, and it is both accurate and complete.  Bernadette Somers MD  10/27/2022  14:19 EDT

## 2022-10-25 NOTE — ASSESSMENT & PLAN NOTE
Physical therapy ordered at [facility] in [place]. She will use a moist heat pack on the posterior and right lateral thigh. Continue taking Tylenol as needed. Doing back stretches will also help some.

## 2022-10-25 NOTE — ASSESSMENT & PLAN NOTE
Physical activity on a regular basis is encouraged. Try to be more mindful of her surroundings when walking outside and in the house.

## 2022-10-27 NOTE — PATIENT INSTRUCTIONS
Patient Instructions   Problem List Items Addressed This Visit          Advance Directives and General Issues    At risk for falls (Chronic)       Cardiac and Vasculature    Benign essential hypertension    Overview     Taking losartan 100mg and metoprolol  50mg  daily.               Current Assessment & Plan     Continue losartan and metoprolol.         Relevant Medications    metoprolol succinate XL (TOPROL-XL) 50 MG 24 hr tablet    furosemide (LASIX) 20 MG tablet    losartan (COZAAR) 100 MG tablet       Musculoskeletal and Injuries    Chronic bilateral low back pain without sciatica (Chronic)    Current Assessment & Plan     Try to do some back stretches and exercises every day. Walk some every day. Use a moist heat pack to relax tight muscles. Take Tylenol as needed.         Relevant Orders    Ambulatory Referral to Physical Therapy Evaluate and treat (Completed)    Pain of right thigh - Primary (Chronic)    Overview     Muscle strain likely secondary to fall in September while stepping up on a curb.         Current Assessment & Plan     Physical therapy ordered at [facility] in [place]. She will use a moist heat pack on the posterior and right lateral thigh. Continue taking Tylenol as needed. Doing back stretches will also help some.         Relevant Orders    Ambulatory Referral to Physical Therapy Evaluate and treat (Completed)     BMI for Adults  What is BMI?  Body mass index (BMI) is a number that is calculated from a person's weight and height. BMI can help estimate how much of a person's weight is composed of fat. BMI does not measure body fat directly. Rather, it is an alternative to procedures that directly measure body fat, which can be difficult and expensive.  BMI can help identify people who may be at higher risk for certain medical problems.  What are BMI measurements used for?  BMI is used as a screening tool to identify possible weight problems. It helps determine whether a person is obese, overweight,  "a healthy weight, or underweight.  BMI is useful for:  Identifying a weight problem that may be related to a medical condition or may increase the risk for medical problems.  Promoting changes, such as changes in diet and exercise, to help reach a healthy weight. BMI screening can be repeated to see if these changes are working.  How is BMI calculated?  BMI involves measuring your weight in relation to your height. Both height and weight are measured, and the BMI is calculated from those numbers. This can be done either in English (U.S[place] or metric measurements. Note that charts and online BMI calculators are available to help you find your BMI quickly and easily without having to do these calculations yourself.  To calculate your BMI in English (U.S.) measurements:    Measure your weight in pounds (lb).  Multiply the number of pounds by 703.  For example, for a person who weighs 180 lb, multiply that number by 703, which equals 126,540.  Measure your height in inches. Then multiply that number by itself to get a measurement called \"inches squared.\"  For example, for a person who is 70 inches tall, the \"inches squared\" measurement is 70 inches x 70 inches, which equals 4,900 inches squared.  Divide the total from step 2 (number of lb x 703) by the total from step 3 (inches squared): 126,540 ÷ 4,900 = 25.8. This is your BMI.  To calculate your BMI in metric measurements:  Measure your weight in kilograms (kg).  Measure your height in meters (m). Then multiply that number by itself to get a measurement called \"meters squared.\"  For example, for a person who is 1.75 m tall, the \"meters squared\" measurement is 1.75 m x 1.75 m, which is equal to 3.1 meters squared.  Divide the number of kilograms (your weight) by the meters squared number. In this example: 70 ÷ 3.1 = 22.6. This is your BMI.  What do the results mean?  BMI charts are used to identify whether you are underweight, normal weight, overweight, or obese. The " following guidelines will be used:  Underweight: BMI less than 18.5.  Normal weight: BMI between 18.5 and 24.9.  Overweight: BMI between 25 and 29.9.  Obese: BMI of 30 or above.  Keep these notes in mind:  Weight includes both fat and muscle, so someone with a muscular build, such as an athlete, may have a BMI that is higher than 24.9. In cases like these, BMI is not an accurate measure of body fat.  To determine if excess body fat is the cause of a BMI of 25 or higher, further assessments may need to be done by a health care provider.  BMI is usually interpreted in the same way for men and women.  Where to find more information  For more information about BMI, including tools to quickly calculate your BMI, go to these websites:  Centers for Disease Control and Prevention: www.cdc.gov  American Heart Association: www.heart.org  National Heart, Lung, and Blood [place]: www.nhlbi.nih.gov  Summary  Body mass index (BMI) is a number that is calculated from a person's weight and height.  BMI may help estimate how much of a person's weight is composed of fat. BMI can help identify those who may be at higher risk for certain medical problems.  BMI can be measured using English measurements or metric measurements.  BMI charts are used to identify whether you are underweight, normal weight, overweight, or obese.  This information is not intended to replace advice given to you by your health care provider. Make sure you discuss any questions you have with your health care provider.  Document Revised: 09/09/2020 Document Reviewed: 07/17/2020  [employer] Patient Education © 2022 [employer]

## 2022-11-15 NOTE — PROGRESS NOTES
OFFICE VISIT  NOTE  [facility] CARDIOLOGY MAIN CAMP[facility]      Name: Serenity Lagos    Date: 2022  MRN:  [id number]  :  1940      REFERRING/PRIMARY PROVIDER:  Bernadette Somers MD     Chief Complaint   Patient presents with   • Coronary Artery Disease   • Hypertension   • Hyperlipidemia   • CAC       HPI: Serenity Lagos is a 82 y.o. female who presents today for follow-up for coronary artery disease.  Patient presented on 2019 to the ER with complaints of chest pain.  Described as substernal chest discomfort, awoke patient from sleep. She underwent exercise nuclear stress test 2019 which was normal, although did show coronary calcification on the CT portion. No recent palpitations, takes furosemide as needed.   Reports increasing shortness of breath and chest discomfort feels like an extra beat her heartbeat in her chest but also some slight discomfort with pressure and tightness.  Shortness of breath worse when she is walking up stairs.  She does report some pain in her right hip and she is quite fearful that she will fall.  She is doing physical therapy.    ROS:Pertinent positives as listed in the HPI.  All other systems reviewed and negative.    Past Medical History:   Diagnosis Date   • Cervicalgia    • Chronic bronchitis (HCC)    • Coronary artery disease    • Dysphagia    • Glaucoma, open angle    • Hearing loss    • Hypertension    • Mixed hyperlipidemia    • Osteoarthritis     cervical spine degenerative   • Perforation of tympanic membrane    • Pruritus     chronic general pruritis   • Salivary gland cancer (HCC)    • Shingles     postherpetic neuralgia-thorax   • Spondylolisthesis      lumbar spine   • Wrist tendonitis        Past Surgical History:   Procedure Laterality Date   • APPENDECTOMY     • BREAST BIOPSY     • BREAST BIOPSY Right 2019   • BREAST CYST EXCISION     • ENDOSCOPY      normal (per pt) EGD   • MOUTH SURGERY     • TONSILLECTOMY          Social History     Socioeconomic History   • Marital status:    Tobacco Use   • Smoking status: Former     Packs/day: 1.00     Years: 20.00     Pack years: 20.00     Types: Cigarettes     Start date:      Quit date:      Years since quittin.9   • Smokeless tobacco: Never   Vaping Use   • Vaping Use: Never used   Substance and Sexual Activity   • Alcohol use: Yes     Alcohol/week: 7.0 standard drinks     Types: 7 Glasses of wine per week     Comment:  daily with dinner   • Drug use: No   • Sexual activity: Defer       Family History   Problem Relation Age of Onset   • Hypertension Mother    • Heart failure Mother         CHF   • Coronary artery disease Mother         CABG   • Lung cancer Father 59        Heavy smoker and Black Lung   • Rheum arthritis Sister    • Other Sister 76        sepsis due to infected knee replacement   • COPD Sister         heavy smoker    • Alzheimer's disease Maternal Grandmother    • Coronary artery disease Maternal Grandfather    • Cancer Paternal Grandmother    • Heart disease Paternal Grandfather    • Hypertension Other    • Obesity Other    • Stroke Maternal Aunt    • Heart disease Maternal Aunt    • Stroke Maternal Aunt    • Stroke Maternal Aunt    • Coronary artery disease Maternal Uncle    • Stroke Maternal Uncle    • Heart disease Maternal Uncle    • Breast cancer Neg Hx    • Ovarian cancer Neg Hx         No Known Allergies    Current Outpatient Medications   Medication Instructions   • aspirin 81 mg, Oral, Daily   • Biotin 5000 MCG sublingual tablet take 1 by oral route  every day   • CALCIUM PO 1 tablet, Oral, Daily, Calcium 600 + vitamin d    • Docusate Sodium 100 MG capsule take 1 tablet by oral route twice daily   • furosemide (LASIX) 20 mg, Oral, Daily PRN   • Glucosamine-Chondroitin (OSTEO BI-FLEX REGULAR STRENGTH PO) 1 daily   • latanoprost (XALATAN) 0.005 % ophthalmic solution 1 drop, Both Eyes, Nightly   • levothyroxine (SYNTHROID, LEVOTHROID)  "125 MCG tablet TAKE 1 TABLET DAILY   • losartan (COZAAR) 100 mg, Oral, Daily   • metoprolol succinate XL (TOPROL-XL) 50 MG 24 hr tablet TAKE 1 TABLET DAILY   • omeprazole (priLOSEC) 40 MG capsule TAKE 1 CAPSULE DAILY   • Probiotic Product (PROBIOTIC DAILY PO) One capsule daily   • raloxifene (EVISTA) 60 MG tablet TAKE 1 TABLET DAILY   • Vitamin D3 2,000 Units, Oral, Daily   • Vitamin E 400 units tablet Vitamin E 400 UNIT Oral Tablet; Patient Sig: Vitamin E 400 UNIT Oral Tablet TAKE 1 TABLET DAILY.; 0; 24-Sep-2013; Active       Vitals:    11/17/22 1312 11/17/22 1335   BP: (!) 204/82 142/92   BP Location: Right arm    Patient Position: Sitting    Pulse: 66    SpO2: 99%    Weight: 87.5 kg (193 lb)    Height: 157.5 cm (62.01\")      Body mass index is 35.29 kg/m².    PHYSICAL EXAM:    General Appearance:   · well developed  · well nourished  Neck:  · thyroid not enlarged  · supple  Respiratory:  · no respiratory distress  · normal breath sounds  · no rales  Cardiovascular:  · no jugular venous distention  · regular rhythm  · apical impulse normal  · S1 normal, S2 normal  · no S3, no S4   · no murmur  · no rub, no thrill  · carotid pulses normal; no bruit  · pedal pulses normal  · lower extremity edema: none    Skin:   warm, dry    RESULTS:   Procedures    Results for orders placed during the hospital encounter of 07/21/21    Adult Transthoracic Echo Complete W/ Cont if Necessary Per Protocol    Interpretation Summary  · Left ventricular ejection fraction appears to be 61 - 65%. Left ventricular systolic function is normal.  · Estimated right ventricular systolic pressure from tricuspid regurgitation is normal (<35 mmHg). Calculated right ventricular systolic pressure from tricuspid regurgitation is 32 mmHg.  · Left ventricular diastolic function is consistent with (grade I) impaired relaxation.  · No significant structural or functional valvular disease.        Labs:  Lab Results   Component Value Date    CHOL 183 " 06/15/2022    TRIG 100 06/15/2022    HDL 63 (H) 06/15/2022     (H) 06/15/2022    AST 18 06/15/2022    ALT 13 06/15/2022     No results found for: HGBA1C  Creatinine   Date Value Ref Range Status   06/15/2022 0.85 0.57 - 1.00 mg/dL Final   12/13/2021 0.84 0.57 - 1.00 mg/dL Final   06/09/2021 0.86 0.57 - 1.00 mg/dL Final     eGFR Non  Amer   Date Value Ref Range Status   12/13/2021 65 >60 mL/min/1.73 Final   06/09/2021 64 >60 mL/min/1.73 Final   11/24/2020 77 >60 mL/min/1.73 Final         ASSESSMENT:  Problem List Items Addressed This Visit        Cardiac and Vasculature    Benign essential hypertension    Overview     Taking losartan 100mg and metoprolol  50mg  daily.               Coronary artery disease of native artery of native heart with stable angina pectoris (HCC) - Primary    Overview     · Myocardial perfusion stress test 9/23/2019 was normal: EF greater than 70%, no ischemia, coronary artery calcification noted on CT portion of stress test.         Echocardiogram showed diastolic dysfunction grade 1.  Ejection               fraction 61 to 65%.    Taking daily aspirin, losartan, and metoprolol.  Follow-up regularly with Dr. Hooker.         Relevant Orders    Adult Transthoracic Echo Complete w/ Color, Spectral and Contrast if necessary per protocol    Stress Test With Myocardial Perfusion One Day    Palpitations    Overview     · 14-day heart monitor 9/4/2019: Wear time 12 days.  Average heart rate 82 bpm.  PACs less than 1% brief SVT longest 8 beats.         Relevant Orders    Holter Monitor - 72 Hour Up To 15 Days    Chest pain    Overview     9/23/2019 Stress test   · LVEF > 70%  · Myocardial perfusion imaging indicates a normal myocardial perfusion study with no evidence of ischemia.  · There was coronary artery calcification noted on the CT portion of the stress test.  · Impressions are consistent with a low risk study.         KING (dyspnea on exertion)    Overview     11/21/19  Echo  · LVEF = 69.0%.  · Left ventricular diastolic dysfunction (grade I a) consistent with impaired relaxation.  · Mild to moderate tricuspid valve regurgitation is present.  6/15/2021 Bernadette Somers MD  ·   · Worsening edema and shortness of breath.    Take Lasix 20 mg daily for 3 days then may take as needed if the edema and shortness of breath have improved.  Echo ordered.  Patient will call her cardiologist, Dr. Hooker for appointment.            PLAN:    1.  Chest pain:  Worsening symptoms recently, repeat Lexiscan nuclear stress test and echocardiogram.     2.  Palpitations/paroxysmal SVT:  Worsening symptoms recently, place Holter monitor today  Decrease caffeine, increase water intake  Continue metoprolol at current dose  Increase exercise as tolerated     3.  Hyperlipidemia:  Recent lipid panel reviewed, as she has no history of CVA or MI, I do not feel strongly that she needs statin therapy.  She does not want to take a statin due to perceived side effects  Work on diet control low cholesterol diet.     4.  Dyspnea on exertion:  Echocardiogram 11/21/19 reviewed, LVEF=69%, grade I diastolic dysfunction.   Worsening recently, repeat echo  Advised patient to take Lasix 20 mg daily.  Low-sodium diet and continued exercise recommend      Advance Care Planning   ACP discussion was held with the patient during this visit. Patient does not have an advance directive, information provided.          Follow-up   Return in about 6 months (around 5/17/2023).    Syed Hooker MD, [facility]  Interventional Cardiology

## 2022-11-17 ENCOUNTER — OFFICE VISIT (OUTPATIENT)
Dept: CARDIOLOGY | Facility: CLINIC | Age: 82
End: 2022-11-17

## 2022-11-17 VITALS
OXYGEN SATURATION: 99 % | HEIGHT: 62 IN | BODY MASS INDEX: 35.51 KG/M2 | SYSTOLIC BLOOD PRESSURE: 142 MMHG | WEIGHT: 193 LBS | DIASTOLIC BLOOD PRESSURE: 92 MMHG | HEART RATE: 66 BPM

## 2022-11-17 DIAGNOSIS — I25.118 CORONARY ARTERY DISEASE OF NATIVE ARTERY OF NATIVE HEART WITH STABLE ANGINA PECTORIS: Primary | ICD-10-CM

## 2022-11-17 DIAGNOSIS — R07.9 CHEST PAIN, UNSPECIFIED TYPE: ICD-10-CM

## 2022-11-17 DIAGNOSIS — I10 BENIGN ESSENTIAL HYPERTENSION: ICD-10-CM

## 2022-11-17 DIAGNOSIS — R00.2 PALPITATIONS: ICD-10-CM

## 2022-11-17 DIAGNOSIS — R06.09 DOE (DYSPNEA ON EXERTION): ICD-10-CM

## 2022-11-17 PROCEDURE — 99214 OFFICE O/P EST MOD 30 MIN: CPT | Performed by: INTERNAL MEDICINE

## 2022-12-02 ENCOUNTER — HOSPITAL ENCOUNTER (OUTPATIENT)
Dept: GENERAL RADIOLOGY | Facility: HOSPITAL | Age: 82
Discharge: HOME | End: 2022-12-02
Admitting: INTERNAL MEDICINE

## 2022-12-02 ENCOUNTER — TELEPHONE (OUTPATIENT)
Dept: INTERNAL MEDICINE | Facility: CLINIC | Age: 82
End: 2022-12-02
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
## 2022-12-02 DIAGNOSIS — M79.671 RIGHT FOOT PAIN: ICD-10-CM

## 2022-12-02 DIAGNOSIS — M79.671 RIGHT FOOT PAIN: Primary | ICD-10-CM

## 2022-12-02 PROCEDURE — 73630 X-RAY EXAM OF FOOT: CPT

## 2022-12-02 NOTE — TELEPHONE ENCOUNTER
Caller: Serenity Lagos    Relationship: Self    Best call back number: [phone]        What test was performed: X RAY     When was the test performed: 12/02/2022 AT 1:00 PM     Where was the test performed: [facility]     Additional notes: THE PATIENT WOULD LIKE TO KNOW IF THE RESULTS ARE IN YET

## 2022-12-02 NOTE — TELEPHONE ENCOUNTER
Pt fell yesterday & hit her foot.  Thinks she broke pinky toe on right foot.     She has trouble walking & would like an xray order.

## 2022-12-05 RX ORDER — RALOXIFENE HYDROCHLORIDE 60 MG/1
TABLET, FILM COATED ORAL
Qty: 90 TABLET | Refills: 3 | Status: SHIPPED | OUTPATIENT
Start: 2022-12-05

## 2022-12-05 RX ORDER — LOSARTAN POTASSIUM 100 MG/1
TABLET ORAL
Qty: 90 TABLET | Refills: 3 | Status: SHIPPED | OUTPATIENT
Start: 2022-12-05

## 2022-12-12 ENCOUNTER — TELEPHONE (OUTPATIENT)
Dept: INTERNAL MEDICINE | Facility: CLINIC | Age: 82
End: 2022-12-12

## 2022-12-14 ENCOUNTER — LAB (OUTPATIENT)
Dept: LAB | Facility: HOSPITAL | Age: 82
End: 2022-12-14

## 2022-12-14 DIAGNOSIS — E53.8 B12 DEFICIENCY: ICD-10-CM

## 2022-12-14 DIAGNOSIS — E03.9 ACQUIRED HYPOTHYROIDISM: ICD-10-CM

## 2022-12-14 DIAGNOSIS — I10 BENIGN ESSENTIAL HYPERTENSION: ICD-10-CM

## 2022-12-14 DIAGNOSIS — E55.9 VITAMIN D DEFICIENCY: ICD-10-CM

## 2022-12-14 DIAGNOSIS — R60.0 PEDAL EDEMA: ICD-10-CM

## 2022-12-14 DIAGNOSIS — E78.5 HYPERLIPIDEMIA LDL GOAL <70: ICD-10-CM

## 2022-12-14 LAB
25(OH)D3 SERPL-MCNC: 56.9 NG/ML (ref 30–100)
ALBUMIN SERPL-MCNC: 3.9 G/DL (ref 3.5–5.2)
ALBUMIN/GLOB SERPL: 1.4 G/DL
ALP SERPL-CCNC: 48 U/L (ref 39–117)
ALT SERPL W P-5'-P-CCNC: 16 U/L (ref 1–33)
ANION GAP SERPL CALCULATED.3IONS-SCNC: 9 MMOL/L (ref 5–15)
AST SERPL-CCNC: 21 U/L (ref 1–32)
BACTERIA UR QL AUTO: NORMAL /HPF
BASOPHILS # BLD AUTO: 0.06 10*3/MM3 (ref 0–0.2)
BASOPHILS NFR BLD AUTO: 0.6 % (ref 0–1.5)
BILIRUB SERPL-MCNC: 0.3 MG/DL (ref 0–1.2)
BILIRUB UR QL STRIP: NEGATIVE
BUN SERPL-MCNC: 16 MG/DL (ref 8–23)
BUN/CREAT SERPL: 18 (ref 7–25)
CALCIUM SPEC-SCNC: 9.4 MG/DL (ref 8.6–10.5)
CHLORIDE SERPL-SCNC: 98 MMOL/L (ref 98–107)
CHOLEST SERPL-MCNC: 173 MG/DL (ref 0–200)
CLARITY UR: CLEAR
CO2 SERPL-SCNC: 26 MMOL/L (ref 22–29)
COLOR UR: YELLOW
CREAT SERPL-MCNC: 0.89 MG/DL (ref 0.57–1)
DEPRECATED RDW RBC AUTO: 41.1 FL (ref 37–54)
EGFRCR SERPLBLD CKD-EPI 2021: 65.2 ML/MIN/1.73
EOSINOPHIL # BLD AUTO: 0.15 10*3/MM3 (ref 0–0.4)
EOSINOPHIL NFR BLD AUTO: 1.6 % (ref 0.3–6.2)
ERYTHROCYTE [DISTWIDTH] IN BLOOD BY AUTOMATED COUNT: 12.4 % (ref 12.3–15.4)
GLOBULIN UR ELPH-MCNC: 2.8 GM/DL
GLUCOSE SERPL-MCNC: 87 MG/DL (ref 65–99)
GLUCOSE UR STRIP-MCNC: NEGATIVE MG/DL
HCT VFR BLD AUTO: 36.2 % (ref 34–46.6)
HDLC SERPL-MCNC: 69 MG/DL (ref 40–60)
HGB BLD-MCNC: 12.2 G/DL (ref 12–15.9)
HGB UR QL STRIP.AUTO: NEGATIVE
HYALINE CASTS UR QL AUTO: NORMAL /LPF
IMM GRANULOCYTES # BLD AUTO: 0.03 10*3/MM3 (ref 0–0.05)
IMM GRANULOCYTES NFR BLD AUTO: 0.3 % (ref 0–0.5)
KETONES UR QL STRIP: NEGATIVE
LDLC SERPL CALC-MCNC: 86 MG/DL (ref 0–100)
LDLC/HDLC SERPL: 1.22 {RATIO}
LEUKOCYTE ESTERASE UR QL STRIP.AUTO: NEGATIVE
LYMPHOCYTES # BLD AUTO: 2.99 10*3/MM3 (ref 0.7–3.1)
LYMPHOCYTES NFR BLD AUTO: 31 % (ref 19.6–45.3)
MCH RBC QN AUTO: 30.4 PG (ref 26.6–33)
MCHC RBC AUTO-ENTMCNC: 33.7 G/DL (ref 31.5–35.7)
MCV RBC AUTO: 90.3 FL (ref 79–97)
MONOCYTES # BLD AUTO: 1.02 10*3/MM3 (ref 0.1–0.9)
MONOCYTES NFR BLD AUTO: 10.6 % (ref 5–12)
NEUTROPHILS NFR BLD AUTO: 5.41 10*3/MM3 (ref 1.7–7)
NEUTROPHILS NFR BLD AUTO: 55.9 % (ref 42.7–76)
NITRITE UR QL STRIP: NEGATIVE
NRBC BLD AUTO-RTO: 0.1 /100 WBC (ref 0–0.2)
PH UR STRIP.AUTO: 7.5 [PH] (ref 5–8)
PLATELET # BLD AUTO: 335 10*3/MM3 (ref 140–450)
PMV BLD AUTO: 11.4 FL (ref 6–12)
POTASSIUM SERPL-SCNC: 4.9 MMOL/L (ref 3.5–5.2)
PROT SERPL-MCNC: 6.7 G/DL (ref 6–8.5)
PROT UR QL STRIP: NEGATIVE
RBC # BLD AUTO: 4.01 10*6/MM3 (ref 3.77–5.28)
RBC # UR STRIP: NORMAL /HPF
REF LAB TEST METHOD: NORMAL
SODIUM SERPL-SCNC: 133 MMOL/L (ref 136–145)
SP GR UR STRIP: 1.01 (ref 1–1.03)
SQUAMOUS #/AREA URNS HPF: NORMAL /HPF
T4 FREE SERPL-MCNC: 1.74 NG/DL (ref 0.93–1.7)
TRIGL SERPL-MCNC: 99 MG/DL (ref 0–150)
TSH SERPL DL<=0.05 MIU/L-ACNC: 0.98 UIU/ML (ref 0.27–4.2)
UROBILINOGEN UR QL STRIP: NORMAL
VIT B12 BLD-MCNC: 403 PG/ML (ref 211–946)
VLDLC SERPL-MCNC: 18 MG/DL (ref 5–40)
WBC # UR STRIP: NORMAL /HPF
WBC NRBC COR # BLD: 9.66 10*3/MM3 (ref 3.4–10.8)

## 2022-12-14 PROCEDURE — 85025 COMPLETE CBC W/AUTO DIFF WBC: CPT

## 2022-12-14 PROCEDURE — 82306 VITAMIN D 25 HYDROXY: CPT

## 2022-12-14 PROCEDURE — 82570 ASSAY OF URINE CREATININE: CPT

## 2022-12-14 PROCEDURE — 84443 ASSAY THYROID STIM HORMONE: CPT

## 2022-12-14 PROCEDURE — 80061 LIPID PANEL: CPT

## 2022-12-14 PROCEDURE — 82607 VITAMIN B-12: CPT

## 2022-12-14 PROCEDURE — 80053 COMPREHEN METABOLIC PANEL: CPT

## 2022-12-14 PROCEDURE — 82043 UR ALBUMIN QUANTITATIVE: CPT

## 2022-12-14 PROCEDURE — 84439 ASSAY OF FREE THYROXINE: CPT

## 2022-12-14 PROCEDURE — 81001 URINALYSIS AUTO W/SCOPE: CPT

## 2022-12-14 RX ORDER — FUROSEMIDE 20 MG/1
20 TABLET ORAL DAILY PRN
Qty: 90 TABLET | Refills: 1 | Status: SHIPPED | OUTPATIENT
Start: 2022-12-14

## 2022-12-15 LAB
ALBUMIN UR-MCNC: <1.2 MG/DL
CREAT UR-MCNC: 60.8 MG/DL
MICROALBUMIN/CREAT UR: NORMAL MG/G{CREAT}

## 2022-12-19 ENCOUNTER — OFFICE VISIT (OUTPATIENT)
Dept: INTERNAL MEDICINE | Facility: CLINIC | Age: 82
End: 2022-12-19

## 2022-12-19 VITALS
HEIGHT: 63 IN | OXYGEN SATURATION: 100 % | HEART RATE: 54 BPM | DIASTOLIC BLOOD PRESSURE: 68 MMHG | BODY MASS INDEX: 33.91 KG/M2 | WEIGHT: 191.4 LBS | TEMPERATURE: 97.8 F | SYSTOLIC BLOOD PRESSURE: 130 MMHG

## 2022-12-19 DIAGNOSIS — J30.2 PERENNIAL ALLERGIC RHINITIS WITH SEASONAL VARIATION: Chronic | ICD-10-CM

## 2022-12-19 DIAGNOSIS — M54.50 CHRONIC BILATERAL LOW BACK PAIN WITHOUT SCIATICA: Chronic | ICD-10-CM

## 2022-12-19 DIAGNOSIS — G89.29 CHRONIC BILATERAL LOW BACK PAIN WITHOUT SCIATICA: Chronic | ICD-10-CM

## 2022-12-19 DIAGNOSIS — S76.311A STRAIN OF RIGHT HAMSTRING, INITIAL ENCOUNTER: ICD-10-CM

## 2022-12-19 DIAGNOSIS — R06.09 DOE (DYSPNEA ON EXERTION): ICD-10-CM

## 2022-12-19 DIAGNOSIS — Z00.00 MEDICARE ANNUAL WELLNESS VISIT, SUBSEQUENT: Primary | ICD-10-CM

## 2022-12-19 DIAGNOSIS — E78.5 HYPERLIPIDEMIA LDL GOAL <70: ICD-10-CM

## 2022-12-19 DIAGNOSIS — E66.09 CLASS 1 OBESITY DUE TO EXCESS CALORIES WITH SERIOUS COMORBIDITY AND BODY MASS INDEX (BMI) OF 33.0 TO 33.9 IN ADULT: ICD-10-CM

## 2022-12-19 DIAGNOSIS — J30.89 PERENNIAL ALLERGIC RHINITIS WITH SEASONAL VARIATION: Chronic | ICD-10-CM

## 2022-12-19 DIAGNOSIS — E03.9 ACQUIRED HYPOTHYROIDISM: ICD-10-CM

## 2022-12-19 DIAGNOSIS — I25.118 CORONARY ARTERY DISEASE OF NATIVE ARTERY OF NATIVE HEART WITH STABLE ANGINA PECTORIS: ICD-10-CM

## 2022-12-19 DIAGNOSIS — M25.612 DECREASED RANGE OF MOTION OF LEFT SHOULDER: ICD-10-CM

## 2022-12-19 DIAGNOSIS — Z00.00 ANNUAL PHYSICAL EXAM: ICD-10-CM

## 2022-12-19 DIAGNOSIS — Z91.81 AT RISK FOR FALLS: Chronic | ICD-10-CM

## 2022-12-19 DIAGNOSIS — R05.9 COUGH IN ADULT: Chronic | ICD-10-CM

## 2022-12-19 DIAGNOSIS — I10 BENIGN ESSENTIAL HYPERTENSION: ICD-10-CM

## 2022-12-19 DIAGNOSIS — K59.01 SLOW TRANSIT CONSTIPATION: Chronic | ICD-10-CM

## 2022-12-19 PROBLEM — E66.811 CLASS 1 OBESITY DUE TO EXCESS CALORIES WITH BODY MASS INDEX (BMI) OF 33.0 TO 33.9 IN ADULT: Status: ACTIVE | Noted: 2019-11-27

## 2022-12-19 PROCEDURE — 93000 ELECTROCARDIOGRAM COMPLETE: CPT | Performed by: INTERNAL MEDICINE

## 2022-12-19 PROCEDURE — 1170F FXNL STATUS ASSESSED: CPT | Performed by: INTERNAL MEDICINE

## 2022-12-19 PROCEDURE — G0439 PPPS, SUBSEQ VISIT: HCPCS | Performed by: INTERNAL MEDICINE

## 2022-12-19 PROCEDURE — 1159F MED LIST DOCD IN RCRD: CPT | Performed by: INTERNAL MEDICINE

## 2022-12-19 PROCEDURE — 1126F AMNT PAIN NOTED NONE PRSNT: CPT | Performed by: INTERNAL MEDICINE

## 2022-12-19 PROCEDURE — 99397 PER PM REEVAL EST PAT 65+ YR: CPT | Performed by: INTERNAL MEDICINE

## 2022-12-19 PROCEDURE — 99213 OFFICE O/P EST LOW 20 MIN: CPT | Performed by: INTERNAL MEDICINE

## 2022-12-19 RX ORDER — FLUTICASONE PROPIONATE 50 MCG
1 SPRAY, SUSPENSION (ML) NASAL 2 TIMES DAILY
Qty: 16 G | Refills: 5 | Status: SHIPPED | OUTPATIENT
Start: 2022-12-19 | End: 2023-02-21

## 2022-12-19 RX ORDER — WHEAT DEXTRIN 5 G/7.4 G
1 POWDER (GRAM) ORAL DAILY
Qty: 248 G | Refills: 5 | Status: SHIPPED | OUTPATIENT
Start: 2022-12-19

## 2022-12-19 NOTE — ASSESSMENT & PLAN NOTE
- Continue daily aspirin, losartan, and metoprolol.  - Follow up with Dr. Hooker after the echo stress test is completed.

## 2022-12-19 NOTE — ASSESSMENT & PLAN NOTE
- Continue to decrease portion sizes at mealtime and avoid snacking.  - Continue trying to do some physical activity every day.

## 2022-12-19 NOTE — ASSESSMENT & PLAN NOTE
- Referral for physical therapy placed, which she will do at [facility] at [place].  - Moist heat pack on the low back and the right hamstring will help the tight muscles.  - Doing some stretches everyday will help.

## 2022-12-19 NOTE — ASSESSMENT & PLAN NOTE
- Referral for physical therapy placed, which she will do at [facility] at [place].  - Moist heat pack on the low back and the right hamstring will help the tight muscles.  - Doing some stretches everyday will help.

## 2022-12-19 NOTE — PROGRESS NOTES
The ABCs of the Annual Wellness Visit  Initial Medicare Wellness Visit    Chief Complaint   Patient presents with   • Hyperlipidemia   • Hypertension   • Medicare Wellness-subsequent       Subjective   History of Present Illness:  Serenity Lagos is a 82 y.o. female who presents for an Initial Medicare Wellness Visit.    Constipation   She reports experiencing constipation and admits she likely does not drink enough water nor does she consume enough fiber. For this, she takes 2 stool softeners per day along with a probiotic. Denies having used a fiber supplement recently.      Hypertension  Her blood pressure today is 130/68 mmHg.  Taking medications regularly.  Usually avoids salt in the diet.    Fall   The patient states her last fall was on 12/01/2022, and this resulted in a fracture of her right fifth toe, which she states is improving. Pain is noted to be worse at the MTP joint. Per her report, she is scheduled to return to physical therapy at [facility] in [place], [place] on 12/21/2022. She confirms she is walking gingerly at this time and feels she is doing well.    Result review  Fasting blood work was completed on 12/14/2022. Her CMP revealed a blood glucose of 87 mg/dL, normal kidney function, and slightly low sodium level. She denies drinking any electrolyte drinks. Her CMP also showed normal liver enzymes. Her lipid panel showed an LDL of 86 mg/dL, HDL of 69 mg/dL, and triglycerides at 99 mg/dL. The urine microalbumin was normal, her thyroid level was within normal range, her vitamin B12 was within the normal range but below 500 pg/mL, and her vitamin D level was also within normal range. Her CBC revealed all blood counts were within normal range and her urinalysis was normal with no evidence of red blood cells, leukocytes, or infection.     Health maintenance   Her EKG today was stable with no significant changes from previous. Per medical records, her last colonoscopy was completed in  2018, which revealed 2 tubular adenomatous polyps and a 5-year colonoscopy recall was recommended. She does complain of constant bilateral breast soreness and reports decreasing her caffeine consumption from 8 cups to 6 cups per day. Over the last 6 months, she has lost 9 pounds.    Vaccinations  She has received the influenza vaccine and the COVID-19 bivalent booster vaccine. Up to date on her pneumonia vaccine, shingles vaccine, and the tetanus vaccine.     CHRONIC CONDITIONS:    HEALTH RISK ASSESSMENT    Recent Hospitalizations:  She was not admitted to the hospital during the last year.       Current Medical Providers:  Patient Care Team:  Bernadette Somers MD as PCP - General (Internal Medicine)  Syed Hooker MD as Consulting Physician (Cardiology)  Celio Iniguez MD as Consulting Physician (Ophthalmology)  Ender Jolly MD as Consulting Physician (Dermatology)  Jil Arreola APRN as Nurse Practitioner (Nurse Practitioner)  Josh Noyola Jr., MD as Consulting Physician (Hand Surgery)    Smoking Status:  Social History     Tobacco Use   Smoking Status Former   • Packs/day: 1.00   • Years: 20.00   • Pack years: 20.00   • Types: Cigarettes   • Start date:    • Quit date:    • Years since quittin.0   Smokeless Tobacco Never       Alcohol Consumption:  Social History     Substance and Sexual Activity   Alcohol Use Yes   • Alcohol/week: 7.0 standard drinks   • Types: 7 Glasses of wine per week    Comment:  daily with dinner       Depression Screen:   PHQ-2/PHQ-9 Depression Screening 2022   Retired PHQ-9 Total Score -   Retired Total Score -   Little Interest or Pleasure in Doing Things 0-->not at all   Feeling Down, Depressed or Hopeless 0-->not at all   PHQ-9: Brief Depression Severity Measure Score 0       Fall Risk Screen:  ANTOINETTE Fall Risk Assessment was completed, and patient is at HIGH risk for falls. Assessment completed on:2022    Health Habits and Functional  and Cognitive Screening:  Functional & Cognitive Status 12/17/2021   Do you have difficulty preparing food and eating? No   Do you have difficulty bathing yourself, getting dressed or grooming yourself? No   Do you have difficulty using the toilet? No   Do you have difficulty moving around from place to place? Yes   Do you have trouble with steps or getting out of a bed or a chair? Yes   Current Diet Well Balanced Diet   Dental Exam Up to date        Dental Exam Comment 11/21   Eye Exam (No Data)        Eye Exam Comment 06/21   Exercise (times per week) 0 times per week   Current Exercises Include No Regular Exercise   Current Exercise Activities Include -   Do you need help using the phone?  No   Are you deaf or do you have serious difficulty hearing?  Yes   Do you need help with transportation? No   Do you need help shopping? No   Do you need help preparing meals?  No   Do you need help with housework?  No   Do you need help with laundry? No   Do you need help taking your medications? No   Do you need help managing money? No   Do you ever drive or ride in a car without wearing a seat belt? No   Have you felt unusual stress, anger or loneliness in the last month? No   Who do you live with? Spouse   If you need help, do you have trouble finding someone available to you? No   Have you been bothered in the last four weeks by sexual problems? No   Do you have difficulty concentrating, remembering or making decisions? No         Age-appropriate Screening Schedule:  Refer to the list below for future screening recommendations based on patient's age, sex and/or medical conditions. Orders for these recommended tests are listed in the plan section. The patient has been provided with a written plan.    Health Maintenance   Topic Date Due   • DXA SCAN  03/26/2023   • LIPID PANEL  12/14/2023   • TDAP/TD VACCINES (2 - Td or Tdap) 10/10/2031   • INFLUENZA VACCINE  Completed   • ZOSTER VACCINE  Discontinued        The following  portions of the patient's history were reviewed and updated as appropriate: allergies, current medications, past family history, past medical history, past social history, past surgical history and problem list.    Does the patient have evidence of cognitive impairment? No    Aspirin is on active medication list. Aspirin use is indicated based on review of current medical condition/s. Pros and cons of this therapy have been discussed today. Benefits of this medication outweigh potential harm.  Patient has been encouraged to continue taking this medication.  .      Outpatient Medications Prior to Visit   Medication Sig Dispense Refill   • aspirin 81 MG EC tablet Take 1 tablet by mouth Daily. 90 tablet 3   • Biotin 5000 MCG sublingual tablet take 1 by oral route  every day     • CALCIUM PO Take 1 tablet by mouth Daily. Calcium 600 + vitamin d     • Cholecalciferol (Vitamin D3) 50 MCG (2000 UT) capsule Take 1 capsule by mouth Daily.     • Docusate Sodium 100 MG capsule take 1 tablet by oral route twice daily     • furosemide (LASIX) 20 MG tablet TAKE 1 TABLET BY MOUTH DAILY AS NEEDED (EDEMA). 90 tablet 1   • Glucosamine-Chondroitin (OSTEO BI-FLEX REGULAR STRENGTH PO) 1 daily     • latanoprost (XALATAN) 0.005 % ophthalmic solution Administer 1 drop to both eyes Every Night.     • levothyroxine (SYNTHROID, LEVOTHROID) 125 MCG tablet TAKE 1 TABLET DAILY 90 tablet 3   • losartan (COZAAR) 100 MG tablet TAKE 1 TABLET DAILY 90 tablet 3   • metoprolol succinate XL (TOPROL-XL) 50 MG 24 hr tablet TAKE 1 TABLET DAILY 90 tablet 3   • omeprazole (priLOSEC) 40 MG capsule TAKE 1 CAPSULE DAILY 90 capsule 3   • Probiotic Product (PROBIOTIC DAILY PO) One capsule daily     • raloxifene (EVISTA) 60 MG tablet TAKE 1 TABLET DAILY 90 tablet 3   • Vitamin E 400 units tablet Vitamin E 400 UNIT Oral Tablet; Patient Sig: Vitamin E 400 UNIT Oral Tablet TAKE 1 TABLET DAILY.; 0; 24-Sep-2013; Active       No facility-administered medications prior  to visit.       Patient Active Problem List   Diagnosis   • Osteopenia after menopause   • Chronic bilateral low back pain without sciatica   • Primary osteoarthritis of both knees   • Numbness and tingling of both legs below knees   • Chronic pruritus   • Acquired hypothyroidism   • Acne rosacea   • Vitamin D deficiency   • Benign essential hypertension   • Right hip pain   • Poor balance   • Pedal edema   • History of benign breast biopsy   • Other chronic pain   • Coronary artery disease of native artery of native heart with stable angina pectoris (HCC)   • Hyperlipidemia LDL goal <70   • Palpitations   • Class 1 obesity due to excess calories with body mass index (BMI) of 33.0 to 33.9 in adult   • Coronary artery calcification of native artery   • Breast tenderness in female   • Chest pain   • Bilateral ocular hypertension   • KING (dyspnea on exertion)   • Cervicalgia   • Numbness of arm   • B12 deficiency   • Carpal tunnel syndrome   • Fibrositis   • Myopia   • Open-angle glaucoma   • Presbyopia   • Regular astigmatism   • Vitreous degeneration   • Cervical myofascial pain syndrome   • DDD (degenerative disc disease), cervical   • Annual physical exam   • Medicare annual wellness visit, subsequent   • Rib pain on right side   • At risk for falls   • Cough in adult   • Primary osteoarthritis of both hands   • Fall on same level   • Chronic left shoulder pain   • Pain of right thigh   • Fall (on)(from) sidewalk curb, initial encounter   • Perennial allergic rhinitis with seasonal variation   • Slow transit constipation   • Strain of right hamstring   • Decreased range of motion of left shoulder       Advanced Care Planning:  Advance Directive is not on file.  ACP discussion was held with the patient during this visit. Patient does not have an advance directive, information provided.    Review of Systems   The appropriate review of systems is included in the HPI.    Compared to one year ago, the patient feels her  "physical health is the same.  Compared to one year ago, the patient feels her mental health is the same.    Reviewed chart for potential of high risk medication in the elderly: yes  Reviewed chart for potential of harmful drug interactions in the elderly:yes    Objective         Vitals:    12/19/22 1254   BP: 130/68   BP Location: Left arm   Patient Position: Sitting   Cuff Size: Adult   Pulse: 54   Temp: 97.8 °F (36.6 °C)   TempSrc: Infrared   SpO2: 100%   Weight: 86.8 kg (191 lb 6.4 oz)   Height: 160 cm (62.99\")   PainSc: 0-No pain     BMI is >= 30 and <35. (Class 1 Obesity). The following options were offered after discussion;: weight loss educational material (shared in after visit summary), exercise counseling/recommendations and nutrition counseling/recommendations    Body mass index is 33.91 kg/m².  Discussed the patient's BMI with her. The BMI is above average; BMI management plan is completed.    Physical Exam  Vitals and nursing note reviewed.   Constitutional:       Appearance: She is well-developed. She is obese.   HENT:      Head: Normocephalic.   Eyes:      Conjunctiva/sclera: Conjunctivae normal.      Pupils: Pupils are equal, round, and reactive to light.   Neck:      Thyroid: No thyromegaly.   Cardiovascular:      Rate and Rhythm: Normal rate and regular rhythm.      Heart sounds: Normal heart sounds.   Pulmonary:      Effort: Pulmonary effort is normal.      Breath sounds: Normal breath sounds. No wheezing.   Chest:   Breasts:     Right: No inverted nipple, mass, nipple discharge, skin change or tenderness.      Left: No inverted nipple, mass, nipple discharge, skin change or tenderness.   Abdominal:      General: Bowel sounds are normal.      Palpations: Abdomen is soft.      Tenderness: There is no abdominal tenderness.   Musculoskeletal:      Left shoulder: Decreased range of motion.      Cervical back: Normal range of motion and neck supple.      Right foot: Tenderness present.      Comments: " Right fifth toe is not swollen or red. Mild tenderness currently at the MTP joint. Right hamstring tightness. Decreased internal rotation of the left shoulder.   Lymphadenopathy:      Cervical: No cervical adenopathy.   Skin:     General: Skin is warm and dry.      Findings: No rash.   Neurological:      Mental Status: She is alert and oriented to person, place, and time.      Cranial Nerves: No cranial nerve deficit.      Sensory: No sensory deficit.      Coordination: Coordination normal.      Gait: Gait normal.   Psychiatric:         Speech: Speech normal.         Behavior: Behavior normal.         Thought Content: Thought content normal.         Judgment: Judgment normal.         Lab Results   Component Value Date    TRIG 99 12/14/2022    HDL 69 (H) 12/14/2022    LDL 86 12/14/2022    VLDL 18 12/14/2022        Assessment & Plan   Medicare Risks and Personalized Health Plan  CMS Preventative Services Quick Reference  Cardiovascular risk  Obesity/Overweight   Osteoporosis Risk    The above risks/problems have been discussed with the patient.  Pertinent information has been shared with the patient in the After Visit Summary.  Follow up plans and orders are seen below in the Assessment/Plan Section.  Patient Instructions   Problem List Items Addressed This Visit        Advance Directives and General Issues    At risk for falls (Chronic)       Allergies and Adverse Reactions    Perennial allergic rhinitis with seasonal variation (Chronic)    Overview     Sinus drainage and cough         Current Assessment & Plan     - We will try fluticasone nasal spray twice per day.  - May still use saline nasal spray as needed.            Cardiac and Vasculature    Benign essential hypertension    Overview     Taking losartan 100mg and metoprolol  50mg  daily.               Current Assessment & Plan     - Continue losartan and metoprolol.  - Decrease salt in the diet.         Relevant Medications    metoprolol succinate XL  (TOPROL-XL) 50 MG 24 hr tablet    losartan (COZAAR) 100 MG tablet    furosemide (LASIX) 20 MG tablet    Coronary artery disease of native artery of native heart with stable angina pectoris (HCC)    Overview     · Myocardial perfusion stress test 9/23/2019 was normal: EF greater than 70%, no ischemia, coronary artery calcification noted on CT portion of stress test.         Echocardiogram showed diastolic dysfunction grade 1.  Ejection               fraction 61 to 65%.    Taking daily aspirin, losartan, and metoprolol.  Follow-up regularly with Dr. Hooker.         Current Assessment & Plan     - Continue daily aspirin, losartan, and metoprolol.  - Follow up with Dr. Hooker after the echo stress test is completed.         Relevant Medications    aspirin 81 MG EC tablet    metoprolol succinate XL (TOPROL-XL) 50 MG 24 hr tablet    Other Relevant Orders    ECG 12 Lead    Hyperlipidemia LDL goal <70    Current Assessment & Plan     - Continue to decrease fats and sugars in the diet.  - Try to increase physical activity and exercise.  - Her LDL is pretty well controlled under 100 mg/dL. Ideally, we would get it down to less than 75 mg/dL.         KING (dyspnea on exertion)    Overview     11/21/19 Echo  · LVEF = 69.0%.  · Left ventricular diastolic dysfunction (grade I a) consistent with impaired relaxation.  · Mild to moderate tricuspid valve regurgitation is present.  6/15/2021 Bernadette Somers MD  ·   · Worsening edema and shortness of breath.    Taking Lasix 20 mg jojo as needed if the edema and shortness of breath have improved.  Stress Echo sheduled for 12/29/22.  Will see her cardiologist, Dr. Hooker for appointment.         Current Assessment & Plan     - Continue Lasix daily as needed.  - She has a stress echo scheduled for 12/29/2023.  - She will follow up with the cardiologist, Dr. Hooker.            Endocrine and Metabolic    Acquired hypothyroidism    Overview     Levothyroxine 125mcg.         Current  Assessment & Plan     - Continue current dose of levothyroxine daily.         Relevant Medications    metoprolol succinate XL (TOPROL-XL) 50 MG 24 hr tablet    levothyroxine (SYNTHROID, LEVOTHROID) 125 MCG tablet    Class 1 obesity due to excess calories with body mass index (BMI) of 33.0 to 33.9 in adult    Current Assessment & Plan     - Continue to decrease portion sizes at mealtime and avoid snacking.  - Continue trying to do some physical activity every day.            Gastrointestinal Abdominal     Slow transit constipation (Chronic)    Current Assessment & Plan     - Continue using the stool softener and add Benefiber 1 scoop full daily.         Relevant Medications    Wheat Dextrin (Benefiber Healthy Shape) powder       Health Encounters    Annual physical exam    Current Assessment & Plan     - Up to date on all vaccinations.  - Colonoscopy will be due in 2023.         Medicare annual wellness visit, subsequent - Primary    Current Assessment & Plan     - Up to date on all vaccinations.  - Colonoscopy will be due in 2023.            Musculoskeletal and Injuries    Chronic bilateral low back pain without sciatica (Chronic)    Current Assessment & Plan     - Referral for physical therapy placed, which she will do at [facility] at [place].  - Moist heat pack on the low back and the right hamstring will help the tight muscles.  - Doing some stretches everyday will help.         Strain of right hamstring    Current Assessment & Plan     - Referral for physical therapy placed, which she will do at [facility] at [place].  - Moist heat pack on the low back and the right hamstring will help the tight muscles.  - Doing some stretches everyday will help.         Decreased range of motion of left shoulder    Current Assessment & Plan     - Refer to [facility] at [place].            Pulmonary and Pneumonias    Cough in adult (Chronic)    Overview     Due to sinus drainage.         Current  Assessment & Plan     - We will try fluticasone nasal spray twice per day.   - May still use saline nasal spray as needed.             Follow Up:  Next Medicare Wellness visit to be scheduled in 1 year.    Return in about 6 months (around 6/19/2023) for recheck fasting.    An After Visit Summary and PPPS were given to the patient.       Additional E&M Note during same encounter follows:  Patient has multiple medical problems which are significant and separately identifiable that require additional work above and beyond the Medicare Wellness Visit.      Chief Complaint  Hyperlipidemia, Hypertension, and Medicare Wellness-subsequent    Subjective        Serenity Lagos is also being seen today for right hamstring pain, bilateral low back pain,, cough due to allergies,shortness of breath, and decreased motion of L arm when reaching across her body.    Spasms of the right lower extremity  Ms. Lagos complains of continued cramping to the posterior aspect of her right lower extremity localized to the area of the hamstring. Per her report, the spasm is triggered by initiation of ambulation after sitting for a period of time and randomly occurs while ambulating at times, which causes her to flinch forward and nearly fall. Though she is going to formal physical therapy, she does not recall PT working on the hamstrings very much.    Low back pain  She does also experience low back pain, which she attributes to some of her right leg pain. Denies PT working on her low back currently.     Left shoulder decreased range of motion  Additionally, she complains of decreased range of motion of the left shoulder causing difficulty with activities such as drying and styling her hair. She would like an order for PT to work on this as well.     Cough  The patient also reports coughing a lot, which she attributes to nearly year-round sinus drainage that is worse with lying down and causes her to feel as though she is drowning. She  "denies using any nasal sprays currently but has used saline nasal spray in the past.    Shortness of breath   Regarding shortness of breath on exertion, she states this has slightly worsened. She recalls an incident about 1 week ago at PT, in which she felt a \"heavy\" heartbeat despite not doing any exerting activities. The following day, she saw Cardiology who expressed concern for an irregular heartbeat and significantly elevated blood pressure. Cardiology then recommended she should resume Lasix and daily aspirin. Additionally, she wore a 2-week heart monitor but has not received those results as of yet. A follow up appointment with Cardiology is scheduled for 12/29/2022, where she will undergo an echocardiogram and additional testing.      Objective   Vital Signs:  /68 (BP Location: Left arm, Patient Position: Sitting, Cuff Size: Adult)   Pulse 54   Temp 97.8 °F (36.6 °C) (Infrared)   Ht 160 cm (62.99\")   Wt 86.8 kg (191 lb 6.4 oz)   SpO2 100%   BMI 33.91 kg/m²     The following data was reviewed by: Bernadette Somers MD on 12/19/2022:  CMP    CMP 6/15/22 12/14/22   Glucose 83 87   BUN 17 16   Creatinine 0.85 0.89   Sodium 136 133 (A)   Potassium 4.6 4.9   Chloride 99 98   Calcium 9.3 9.4   Albumin 4.20 3.90   Total Bilirubin 0.4 0.3   Alkaline Phosphatase 50 48   AST (SGOT) 18 21   ALT (SGPT) 13 16   (A) Abnormal value            CBC    CBC 6/15/22 12/14/22   WBC 8.73 9.66   RBC 3.97 4.01   Hemoglobin 12.0 12.2   Hematocrit 36.3 36.2   MCV 91.4 90.3   MCH 30.2 30.4   MCHC 33.1 33.7   RDW 12.6 12.4   Platelets 324 335           Lipid Panel    Lipid Panel 6/15/22 12/14/22   Total Cholesterol 183 173   Triglycerides 100 99   HDL Cholesterol 63 (A) 69 (A)   VLDL Cholesterol 18 18   LDL Cholesterol  102 (A) 86   LDL/HDL Ratio 1.59 1.22   (A) Abnormal value            TSH    TSH 6/15/22 12/14/22   TSH 0.955 0.985           ECG 12 Lead    Date/Time: 12/19/2022 1:56 PM  Performed by: Bernadette Somers, " MD  Authorized by: Bernadette Somers MD   Comparison: compared with previous ECG   Similar to previous ECG  Comparison to previous ECG: No change in Q's in III  Rhythm: sinus bradycardia  Rate: normal  BPM: 53  Conduction: conduction normal  Q waves: III    ST Segments: ST segments normal  T Waves: T waves normal  QRS axis: normal    Clinical impression: non-specific ECG                Assessment and Plan   Diagnoses and all orders for this visit:    1. Medicare annual wellness visit, subsequent (Primary)  Assessment & Plan:  - Up to date on all vaccinations.  - Colonoscopy will be due in 2023.      2. Annual physical exam  Assessment & Plan:  - Up to date on all vaccinations.  - Colonoscopy will be due in 2023.      3. Benign essential hypertension  Assessment & Plan:  - Continue losartan and metoprolol.  - Decrease salt in the diet.      4. Hyperlipidemia LDL goal <70  Assessment & Plan:  - Continue to decrease fats and sugars in the diet.  - Try to increase physical activity and exercise.  - Her LDL is pretty well controlled under 100 mg/dL. Ideally, we would get it down to less than 75 mg/dL.      5. Coronary artery disease of native artery of native heart with stable angina pectoris (HCC)  Assessment & Plan:  - Continue daily aspirin, losartan, and metoprolol.  - Follow up with Dr. Hooker after the echo stress test is completed.    Orders:  -     ECG 12 Lead    6. At risk for falls    7. Class 1 obesity due to excess calories with serious comorbidity and body mass index (BMI) of 33.0 to 33.9 in adult  Assessment & Plan:  - Continue to decrease portion sizes at mealtime and avoid snacking.  - Continue trying to do some physical activity every day.      8. Perennial allergic rhinitis with seasonal variation  Assessment & Plan:  - We will try fluticasone nasal spray twice per day.  - May still use saline nasal spray as needed.      9. Cough in adult  Assessment & Plan:  - We will try fluticasone nasal spray twice  per day.   - May still use saline nasal spray as needed.      10. Chronic bilateral low back pain without sciatica  Assessment & Plan:  - Referral for physical therapy placed, which she will do at [facility] at [place].  - Moist heat pack on the low back and the right hamstring will help the tight muscles.  - Doing some stretches everyday will help.    Orders:  -     Ambulatory Referral to Physical Therapy Evaluate and treat    11. Strain of right hamstring, initial encounter  Assessment & Plan:  - Referral for physical therapy placed, which she will do at [facility] at [place].  - Moist heat pack on the low back and the right hamstring will help the tight muscles.  - Doing some stretches everyday will help.    Orders:  -     Ambulatory Referral to Physical Therapy Evaluate and treat    12. Decreased range of motion of left shoulder  Assessment & Plan:  - Refer to [facility] at [place].    Orders:  -     Ambulatory Referral to Physical Therapy Evaluate and treat    13. KING (dyspnea on exertion)  Assessment & Plan:  - Continue Lasix daily as needed.  - She has a stress echo scheduled for 12/29/2023.  - She will follow up with the cardiologist, Dr. Hooker.      14. Slow transit constipation  Assessment & Plan:  - Continue using the stool softener and add Benefiber 1 scoop full daily.    Orders:  -     Wheat Dextrin (Benefiber Healthy Shape) powder; Take 1 Scoop by mouth Daily.  Dispense: 248 g; Refill: 5    15. Acquired hypothyroidism  Assessment & Plan:  - Continue current dose of levothyroxine daily.      Other orders  -     fluticasone (FLONASE) 50 MCG/ACT nasal spray; 1 spray into the nostril(s) as directed by provider 2 (Two) Times a Day.  Dispense: 16 g; Refill: 5           Follow Up   Return in about 6 months (around 6/19/2023) for recheck fasting.  Patient was given instructions and counseling regarding her condition or for health maintenance advice. Please see specific information pulled  into the AVS if appropriate.     Transcribed from ambient dictation for Bernadette Somers MD by Jil Snowden.  12/19/22   16:39 EST    Patient or patient representative verbalized consent to the visit recording.

## 2022-12-19 NOTE — ASSESSMENT & PLAN NOTE
- We will try fluticasone nasal spray twice per day.   - May still use saline nasal spray as needed.

## 2022-12-19 NOTE — ASSESSMENT & PLAN NOTE
- Continue Lasix daily as needed.  - She has a stress echo scheduled for 12/29/2023.  - She will follow up with the cardiologist, Dr. Hooker.

## 2022-12-19 NOTE — ASSESSMENT & PLAN NOTE
- Continue to decrease fats and sugars in the diet.  - Try to increase physical activity and exercise.  - Her LDL is pretty well controlled under 100 mg/dL. Ideally, we would get it down to less than 75 mg/dL.

## 2022-12-20 ENCOUNTER — TELEPHONE (OUTPATIENT)
Dept: CARDIOLOGY | Facility: CLINIC | Age: 82
End: 2022-12-20

## 2022-12-21 ENCOUNTER — TELEPHONE (OUTPATIENT)
Dept: INTERNAL MEDICINE | Facility: CLINIC | Age: 82
End: 2022-12-21

## 2022-12-21 NOTE — TELEPHONE ENCOUNTER
Called, spoke with patient. Advised she can schedule an appt with PT referred to by Dr. Somers. She verbalized understanding.

## 2022-12-22 NOTE — PATIENT INSTRUCTIONS
Patient Instructions   Problem List Items Addressed This Visit          Advance Directives and General Issues    At risk for falls (Chronic)       Allergies and Adverse Reactions    Perennial allergic rhinitis with seasonal variation (Chronic)    Overview     Sinus drainage and cough         Current Assessment & Plan     - We will try fluticasone nasal spray twice per day.  - May still use saline nasal spray as needed.            Cardiac and Vasculature    Benign essential hypertension    Overview     Taking losartan 100mg and metoprolol  50mg  daily.               Current Assessment & Plan     - Continue losartan and metoprolol.  - Decrease salt in the diet.         Relevant Medications    metoprolol succinate XL (TOPROL-XL) 50 MG 24 hr tablet    losartan (COZAAR) 100 MG tablet    furosemide (LASIX) 20 MG tablet    Coronary artery disease of native artery of native heart with stable angina pectoris (HCC)    Overview     Myocardial perfusion stress test 9/23/2019 was normal: EF greater than 70%, no ischemia, coronary artery calcification noted on CT portion of stress test.         Echocardiogram showed diastolic dysfunction grade 1.  Ejection               fraction 61 to 65%.    Taking daily aspirin, losartan, and metoprolol.  Follow-up regularly with Dr. Hooker.         Current Assessment & Plan     - Continue daily aspirin, losartan, and metoprolol.  - Follow up with Dr. Hooker after the echo stress test is completed.         Relevant Medications    aspirin 81 MG EC tablet    metoprolol succinate XL (TOPROL-XL) 50 MG 24 hr tablet    Other Relevant Orders    ECG 12 Lead    Hyperlipidemia LDL goal <70    Current Assessment & Plan     - Continue to decrease fats and sugars in the diet.  - Try to increase physical activity and exercise.  - Her LDL is pretty well controlled under 100 mg/dL. Ideally, we would get it down to less than 75 mg/dL.         KING (dyspnea on exertion)    Overview     11/21/19 Echo  LVEF =  69.0%.  Left ventricular diastolic dysfunction (grade I a) consistent with impaired relaxation.  Mild to moderate tricuspid valve regurgitation is present.  6/15/2021 Bernadette Somers MD    Worsening edema and shortness of breath.    Taking Lasix 20 mg jojo as needed if the edema and shortness of breath have improved.  Stress Echo sheduled for 12/29/22.  Will see her cardiologist, Dr. Hooker for appointment.         Current Assessment & Plan     - Continue Lasix daily as needed.  - She has a stress echo scheduled for 12/29/2023.  - She will follow up with the cardiologist, Dr. Hooker.            Endocrine and Metabolic    Acquired hypothyroidism    Overview     Levothyroxine 125mcg.         Current Assessment & Plan     - Continue current dose of levothyroxine daily.         Relevant Medications    metoprolol succinate XL (TOPROL-XL) 50 MG 24 hr tablet    levothyroxine (SYNTHROID, LEVOTHROID) 125 MCG tablet    Class 1 obesity due to excess calories with body mass index (BMI) of 33.0 to 33.9 in adult    Current Assessment & Plan     - Continue to decrease portion sizes at mealtime and avoid snacking.  - Continue trying to do some physical activity every day.            Gastrointestinal Abdominal     Slow transit constipation (Chronic)    Current Assessment & Plan     - Continue using the stool softener and add Benefiber 1 scoop full daily.         Relevant Medications    Wheat Dextrin (Benefiber Healthy Shape) powder       Health Encounters    Annual physical exam    Current Assessment & Plan     - Up to date on all vaccinations.  - Colonoscopy will be due in 2023.         Medicare annual wellness visit, subsequent - Primary    Current Assessment & Plan     - Up to date on all vaccinations.  - Colonoscopy will be due in 2023.            Musculoskeletal and Injuries    Chronic bilateral low back pain without sciatica (Chronic)    Current Assessment & Plan     - Referral for physical therapy placed, which she will do  at [facility] [place].  - Moist heat pack on the low back and the right hamstring will help the tight muscles.  - Doing some stretches everyday will help.         Strain of right hamstring    Current Assessment & Plan     - Referral for physical therapy placed, which she will do at [facility] [place].  - Moist heat pack on the low back and the right hamstring will help the tight muscles.  - Doing some stretches everyday will help.         Decreased range of motion of left shoulder    Current Assessment & Plan     - Refer to [facility] [place].            Pulmonary and Pneumonias    Cough in adult (Chronic)    Overview     Due to sinus drainage.         Current Assessment & Plan     - We will try fluticasone nasal spray twice per day.   - May still use saline nasal spray as needed.         BMI for Adults  What is BMI?  Body mass index (BMI) is a number that is calculated from a person's weight and height. BMI can help estimate how much of a person's weight is composed of fat. BMI does not measure body fat directly. Rather, it is an alternative to procedures that directly measure body fat, which can be difficult and expensive.  BMI can help identify people who may be at higher risk for certain medical problems.  What are BMI measurements used for?  BMI is used as a screening tool to identify possible weight problems. It helps determine whether a person is obese, overweight, a healthy weight, or underweight.  BMI is useful for:  Identifying a weight problem that may be related to a medical condition or may increase the risk for medical problems.  Promoting changes, such as changes in diet and exercise, to help reach a healthy weight. BMI screening can be repeated to see if these changes are working.  How is BMI calculated?  BMI involves measuring your weight in relation to your height. Both height and weight are measured, and the BMI is calculated from those numbers. This can be done  "either in English ([place] or metric measurements. Note that charts and online BMI calculators are available to help you find your BMI quickly and easily without having to do these calculations yourself.  To calculate your BMI in English ([place].S[place] measurements:    Measure your weight in pounds (lb).  Multiply the number of pounds by 703.  For example, for a person who weighs 180 lb, multiply that number by 703, which equals 126,540.  Measure your height in inches. Then multiply that number by itself to get a measurement called \"inches squared.\"  For example, for a person who is 70 inches tall, the \"inches squared\" measurement is 70 inches x 70 inches, which equals 4,900 inches squared.  Divide the total from step 2 (number of lb x 703) by the total from step 3 (inches squared): 126,540 ÷ 4,900 = 25.8. This is your BMI.  To calculate your BMI in metric measurements:  Measure your weight in kilograms (kg).  Measure your height in meters (m). Then multiply that number by itself to get a measurement called \"meters squared.\"  For example, for a person who is 1.75 m tall, the \"meters squared\" measurement is 1.75 m x 1.75 m, which is equal to 3.1 meters squared.  Divide the number of kilograms (your weight) by the meters squared number. In this example: 70 ÷ 3.1 = 22.6. This is your BMI.  What do the results mean?  BMI charts are used to identify whether you are underweight, normal weight, overweight, or obese. The following guidelines will be used:  Underweight: BMI less than 18.5.  Normal weight: BMI between 18.5 and 24.9.  Overweight: BMI between 25 and 29.9.  Obese: BMI of 30 or above.  Keep these notes in mind:  Weight includes both fat and muscle, so someone with a muscular build, such as an athlete, may have a BMI that is higher than 24.9. In cases like these, BMI is not an accurate measure of body fat.  To determine if excess body fat is the cause of a BMI of 25 or higher, further assessments may need to be done by a " health care provider.  BMI is usually interpreted in the same way for men and women.  Where to find more information  For more information about BMI, including tools to quickly calculate your BMI, go to these websites:  Centers for Disease Control and Prevention: www.cdc.gov  American Heart Association: www.heart.org  National Heart, Lung, and Blood [place]: www.nhlbi.nih.gov  Summary  Body mass index (BMI) is a number that is calculated from a person's weight and height.  BMI may help estimate how much of a person's weight is composed of fat. BMI can help identify those who may be at higher risk for certain medical problems.  BMI can be measured using English measurements or metric measurements.  BMI charts are used to identify whether you are underweight, normal weight, overweight, or obese.  This information is not intended to replace advice given to you by your health care provider. Make sure you discuss any questions you have with your health care provider.  Document Revised: 09/09/2020 Document Reviewed: 07/17/2020  [employer] Patient Education © 2022 [employer]  Fall Prevention in the Home, Adult  Falls can cause injuries and affect people of all ages. There are many simple things that you can do to make your home safe and to help prevent falls. Ask for help when making these changes, if needed.  What actions can I take to prevent falls?  General instructions  Use good lighting in all rooms. Replace any light bulbs that burn out, turn on lights if it is dark, and use night-lights.  Place frequently used items in easy-to-reach places. Lower the shelves around your home if necessary.  Set up furniture so that there are clear paths around it. Avoid moving your furniture around.  Remove throw rugs and other tripping hazards from the floor.  Avoid walking on wet floors.  Fix any uneven floor surfaces.  Add color or contrast paint or tape to grab bars and handrails in your home. Place contrasting color strips on  the first and last steps of staircases.  When you use a stepladder, make sure that it is completely opened and that the sides and supports are firmly locked. Have someone hold the ladder while you are using it. Do not climb a closed stepladder.  Know where your pets are when moving through your home.  What can I do in the bathroom?     Keep the floor dry. Immediately clean up any water that is on the floor.  Remove soap buildup in the tub or shower regularly.  Use nonskid mats or decals on the floor of the tub or shower.  Attach bath mats securely with double-sided, nonslip rug tape.  If you need to sit down while you are in the shower, use a plastic, nonslip stool.  Install grab bars by the toilet and in the tub and shower. Do not use towel bars as grab bars.  What can I do in the bedroom?  Make sure that a bedside light is easy to reach.  Do not use oversized bedding that reaches the floor.  Have a firm chair that has side arms to use for getting dressed.  What can I do in the kitchen?  Clean up any spills right away.  If you need to reach for something above you, use a sturdy step stool that has a grab bar.  Keep electrical cables out of the way.  Do not use floor polish or wax that makes floors slippery. If you must use wax, make sure that it is non-skid floor wax.  What can I do with my stairs?  Do not leave any items on the stairs.  Make sure that you have a light switch at the top and the bottom of the stairs. Have them installed if you do not have them.  Make sure that there are handrails on both sides of the stairs. Fix handrails that are broken or loose. Make sure that handrails are as long as the staircases.  Install non-slip stair treads on all stairs in your home.  Avoid having throw rugs at the top or bottom of stairs, or secure the rugs with carpet tape to prevent them from moving.  Choose a carpet design that does not hide the edge of steps on the stairs.  Check any carpeting to make sure that it is  firmly attached to the stairs. Fix any carpet that is loose or worn.  What can I do on the outside of my home?  Use bright outdoor lighting.  Regularly repair the edges of walkways and driveways and fix any cracks.  Remove high doorway thresholds.  Trim any shrubbery on the main path into your home.  Regularly check that handrails are securely fastened and in good repair. Both sides of all steps should have handrails.  Install guardrails along the edges of any raised decks or porches.  Clear walkways of debris and clutter, including tools and rocks.  Have leaves, snow, and ice cleared regularly.  Use sand or salt on walkways during winter months.  In the garage, clean up any spills right away, including grease or oil spills.  What other actions can I take?  Wear closed-toe shoes that fit well and support your feet. Wear shoes that have rubber soles or low heels.  Use mobility aids as needed, such as canes, walkers, scooters, and crutches.  Review your medicines with your health care provider. Some medicines can cause dizziness or changes in blood pressure, which increase your risk of falling.  Talk with your health care provider about other ways that you can decrease your risk of falls. This may include working with a physical therapist or  to improve your strength, balance, and endurance.  Where to find more information  Centers for Disease Control and [place]: www.cdc.gov  National [place] on Aging: www.[place].nih.gov  Contact a health care provider if:  You are afraid of falling at home.  You feel weak, drowsy, or dizzy at home.  You fall at home.  Summary  There are many simple things that you can do to make your home safe and to help prevent falls.  Ways to make your home safe include removing tripping hazards and installing grab bars in the bathroom.  Ask for help when making these changes in your home.  This information is not intended to replace advice given to you by your health care  provider. Make sure you discuss any questions you have with your health care provider.  Document Revised: 09/19/2022 Document Reviewed: 07/21/2021  [employer]vier Patient Education © 2022 [employer]  Calorie Counting for Weight Loss  Calories are units of energy. Your body needs a certain number of calories from food to keep going throughout the day. When you eat or drink more calories than your body needs, your body stores the extra calories mostly as fat. When you eat or drink fewer calories than your body needs, your body burns fat to get the energy it needs.  Calorie counting means keeping track of how many calories you eat and drink each day. Calorie counting can be helpful if you need to lose weight. If you eat fewer calories than your body needs, you should lose weight. Ask your health care provider what a healthy weight is for you.  For calorie counting to work, you will need to eat the right number of calories each day to lose a healthy amount of weight per week. A dietitian can help you figure out how many calories you need in a day and will suggest ways to reach your calorie goal.  A healthy amount of weight to lose each week is usually 1-2 lb (0.5-0.9 kg). This usually means that your daily calorie intake should be reduced by 500-750 calories.  Eating 1,200-1,500 calories a day can help most women lose weight.  Eating 1,500-1,800 calories a day can help most men lose weight.  What do I need to know about calorie counting?  Work with your health care provider or dietitian to determine how many calories you should get each day. To meet your daily calorie goal, you will need to:  Find out how many calories are in each food that you would like to eat. Try to do this before you eat.  Decide how much of the food you plan to eat.  Keep a food log. Do this by writing down what you ate and how many calories it had.  To successfully lose weight, it is important to balance calorie counting with a healthy lifestyle  that includes regular activity.  Where do I find calorie information?  The number of calories in a food can be found on a Nutrition Facts label. If a food does not have a Nutrition Facts label, try to look up the calories online or ask your dietitian for help.  Remember that calories are listed per serving. If you choose to have more than one serving of a food, you will have to multiply the calories per serving by the number of servings you plan to eat. For example, the label on a package of bread might say that a serving size is 1 slice and that there are 90 calories in a serving. If you eat 1 slice, you will have eaten 90 calories. If you eat 2 slices, you will have eaten 180 calories.  How do I keep a food log?  After each time that you eat, record the following in your food log as soon as possible:  What you ate. Be sure to include toppings, sauces, and other extras on the food.  How much you ate. This can be measured in cups, ounces, or number of items.  How many calories were in each food and drink.  The total number of calories in the food you ate.  Keep your food log near you, such as in a pocket-sized notebook or on an marlin or website on your mobile phone. Some programs will calculate calories for you and show you how many calories you have left to meet your daily goal.  What are some portion-control tips?  Know how many calories are in a serving. This will help you know how many servings you can have of a certain food.  Use a measuring cup to measure serving sizes. You could also try weighing out portions on a kitchen scale. With time, you will be able to estimate serving sizes for some foods.  Take time to put servings of different foods on your favorite plates or in your favorite bowls and cups so you know what a serving looks like.  Try not to eat straight from a food's packaging, such as from a bag or box. Eating straight from the package makes it hard to see how much you are eating and can lead to  overeating. Put the amount you would like to eat in a cup or on a plate to make sure you are eating the right portion.  Use smaller plates, glasses, and bowls for smaller portions and to prevent overeating.  Try not to multitask. For example, avoid watching TV or using your computer while eating. If it is time to eat, sit down at a table and enjoy your food. This will help you recognize when you are full. It will also help you be more mindful of what and how much you are eating.  What are tips for following this plan?  Reading food labels  Check the calorie count compared with the serving size. The serving size may be smaller than what you are used to eating.  Check the source of the calories. Try to choose foods that are high in protein, fiber, and vitamins, and low in saturated fat, trans fat, and sodium.  Shopping  Read nutrition labels while you shop. This will help you make healthy decisions about which foods to buy.  Pay attention to nutrition labels for low-fat or fat-free foods. These foods sometimes have the same number of calories or more calories than the full-fat versions. They also often have added sugar, starch, or salt to make up for flavor that was removed with the fat.  Make a grocery list of lower-calorie foods and stick to it.  Cooking  Try to cook your favorite foods in a healthier way. For example, try baking instead of frying.  Use low-fat dairy products.  Meal planning  Use more fruits and vegetables. One-half of your plate should be fruits and vegetables.  Include lean proteins, such as chicken, turkey, and fish.  Lifestyle  Each week, aim to do one of the followin minutes of moderate exercise, such as walking.  75 minutes of vigorous exercise, such as running.  General information  Know how many calories are in the foods you eat most often. This will help you calculate calorie counts faster.  Find a way of tracking calories that works for you. Get creative. Try different apps or  programs if writing down calories does not work for you.  What foods should I eat?    Eat nutritious foods. It is better to have a nutritious, high-calorie food, such as an avocado, than a food with few nutrients, such as a bag of potato chips.  Use your calories on foods and drinks that will fill you up and will not leave you hungry soon after eating.  Examples of foods that fill you up are nuts and nut butters, vegetables, lean proteins, and high-fiber foods such as whole grains. High-fiber foods are foods with more than 5 g of fiber per serving.  Pay attention to calories in drinks. Low-calorie drinks include water and unsweetened drinks.  The items listed above may not be a complete list of foods and beverages you can eat. Contact a dietitian for more information.  What foods should I limit?  Limit foods or drinks that are not good sources of vitamins, minerals, or protein or that are high in unhealthy fats. These include:  Candy.  Other sweets.  Sodas, specialty coffee drinks, alcohol, and juice.  The items listed above may not be a complete list of foods and beverages you should avoid. Contact a dietitian for more information.  How do I count calories when eating out?  Pay attention to portions. Often, portions are much larger when eating out. Try these tips to keep portions smaller:  Consider sharing a meal instead of getting your own.  If you get your own meal, eat only half of it. Before you start eating, ask for a container and put half of your meal into it.  When available, consider ordering smaller portions from the menu instead of full portions.  Pay attention to your food and drink choices. Knowing the way food is cooked and what is included with the meal can help you eat fewer calories.  If calories are listed on the menu, choose the lower-calorie options.  Choose dishes that include vegetables, fruits, whole grains, low-fat dairy products, and lean proteins.  Choose items that are boiled, broiled,  grilled, or steamed. Avoid items that are buttered, battered, fried, or served with cream sauce. Items labeled as crispy are usually fried, unless stated otherwise.  Choose water, low-fat milk, unsweetened iced tea, or other drinks without added sugar. If you want an alcoholic beverage, choose a lower-calorie option, such as a glass of wine or light beer.  Ask for dressings, sauces, and syrups on the side. These are usually high in calories, so you should limit the amount you eat.  If you want a salad, choose a garden salad and ask for grilled meats. Avoid extra toppings such as finney, cheese, or fried items. Ask for the dressing on the side, or ask for olive oil and vinegar or lemon to use as dressing.  Estimate how many servings of a food you are given. Knowing serving sizes will help you be aware of how much food you are eating at restaurants.  Where to find more information  Centers for Disease Control and Prevention: www.cdc.gov  [place]. Department of Agriculture: myplate.gov  Summary  Calorie counting means keeping track of how many calories you eat and drink each day. If you eat fewer calories than your body needs, you should lose weight.  A healthy amount of weight to lose per week is usually 1-2 lb (0.5-0.9 kg). This usually means reducing your daily calorie intake by 500-750 calories.  The number of calories in a food can be found on a Nutrition Facts label. If a food does not have a Nutrition Facts label, try to look up the calories online or ask your dietitian for help.  Use smaller plates, glasses, and bowls for smaller portions and to prevent overeating.  Use your calories on foods and drinks that will fill you up and not leave you hungry shortly after a meal.  This information is not intended to replace advice given to you by your health care provider. Make sure you discuss any questions you have with your health care provider.  Document Revised: 01/28/2021 Document Reviewed: 01/28/2021  Elsevier Patient  Education © 2022 [employer]

## 2022-12-29 ENCOUNTER — HOSPITAL ENCOUNTER (OUTPATIENT)
Dept: CARDIOLOGY | Facility: HOSPITAL | Age: 82
Discharge: HOME | End: 2022-12-29

## 2022-12-29 VITALS
HEIGHT: 63 IN | WEIGHT: 191.36 LBS | BODY MASS INDEX: 33.91 KG/M2 | SYSTOLIC BLOOD PRESSURE: 128 MMHG | DIASTOLIC BLOOD PRESSURE: 84 MMHG | HEART RATE: 61 BPM | OXYGEN SATURATION: 100 %

## 2022-12-29 DIAGNOSIS — I25.118 CORONARY ARTERY DISEASE OF NATIVE ARTERY OF NATIVE HEART WITH STABLE ANGINA PECTORIS: ICD-10-CM

## 2022-12-29 PROCEDURE — 93018 CV STRESS TEST I&R ONLY: CPT | Performed by: INTERNAL MEDICINE

## 2022-12-29 PROCEDURE — 93017 CV STRESS TEST TRACING ONLY: CPT

## 2022-12-29 PROCEDURE — 93306 TTE W/DOPPLER COMPLETE: CPT | Performed by: INTERNAL MEDICINE

## 2022-12-29 PROCEDURE — 0 TECHNETIUM SESTAMIBI: Performed by: INTERNAL MEDICINE

## 2022-12-29 PROCEDURE — 78452 HT MUSCLE IMAGE SPECT MULT: CPT | Performed by: INTERNAL MEDICINE

## 2022-12-29 PROCEDURE — 78452 HT MUSCLE IMAGE SPECT MULT: CPT

## 2022-12-29 PROCEDURE — 25010000002 REGADENOSON 0.4 MG/5ML SOLUTION: Performed by: INTERNAL MEDICINE

## 2022-12-29 PROCEDURE — A9500 TC99M SESTAMIBI: HCPCS | Performed by: INTERNAL MEDICINE

## 2022-12-29 PROCEDURE — 93306 TTE W/DOPPLER COMPLETE: CPT

## 2022-12-29 RX ADMIN — TECHNETIUM TC 99M SESTAMIBI 1 DOSE: 1 INJECTION INTRAVENOUS at 09:30

## 2022-12-29 RX ADMIN — TECHNETIUM TC 99M SESTAMIBI 1 DOSE: 1 INJECTION INTRAVENOUS at 07:50

## 2022-12-29 RX ADMIN — REGADENOSON 0.4 MG: 0.08 INJECTION, SOLUTION INTRAVENOUS at 09:21

## 2022-12-30 LAB
BH CV ECHO MEAS - AO MAX PG: 10 MMHG
BH CV ECHO MEAS - AO MEAN PG: 4 MMHG
BH CV ECHO MEAS - AO ROOT DIAM: 3 CM
BH CV ECHO MEAS - AO V2 MAX: 158 CM/SEC
BH CV ECHO MEAS - AO V2 VTI: 32.2 CM
BH CV ECHO MEAS - AVA(I,D): 2.8 CM2
BH CV ECHO MEAS - EDV(CUBED): 46.7 ML
BH CV ECHO MEAS - EDV(MOD-SP2): 72.4 ML
BH CV ECHO MEAS - EDV(MOD-SP4): 99.3 ML
BH CV ECHO MEAS - EF(MOD-BP): 58.5 %
BH CV ECHO MEAS - EF(MOD-SP2): 63.5 %
BH CV ECHO MEAS - EF(MOD-SP4): 54.5 %
BH CV ECHO MEAS - ESV(CUBED): 13.8 ML
BH CV ECHO MEAS - ESV(MOD-SP2): 26.4 ML
BH CV ECHO MEAS - ESV(MOD-SP4): 45.2 ML
BH CV ECHO MEAS - FS: 33.3 %
BH CV ECHO MEAS - IVS/LVPW: 1.13 CM
BH CV ECHO MEAS - IVSD: 0.9 CM
BH CV ECHO MEAS - LA DIMENSION: 3.7 CM
BH CV ECHO MEAS - LAT PEAK E' VEL: 9 CM/SEC
BH CV ECHO MEAS - LV MASS(C)D: 85.6 GRAMS
BH CV ECHO MEAS - LV MAX PG: 6.7 MMHG
BH CV ECHO MEAS - LV MEAN PG: 3 MMHG
BH CV ECHO MEAS - LV V1 MAX: 129 CM/SEC
BH CV ECHO MEAS - LV V1 VTI: 28.5 CM
BH CV ECHO MEAS - LVIDD: 3.6 CM
BH CV ECHO MEAS - LVIDS: 2.4 CM
BH CV ECHO MEAS - LVOT AREA: 3.1 CM2
BH CV ECHO MEAS - LVOT DIAM: 2 CM
BH CV ECHO MEAS - LVPWD: 0.8 CM
BH CV ECHO MEAS - MED PEAK E' VEL: 8.9 CM/SEC
BH CV ECHO MEAS - MV A MAX VEL: 138 CM/SEC
BH CV ECHO MEAS - MV DEC TIME: 0.38 MSEC
BH CV ECHO MEAS - MV E MAX VEL: 108 CM/SEC
BH CV ECHO MEAS - MV E/A: 0.78
BH CV ECHO MEAS - PA ACC TIME: 0.26 SEC
BH CV ECHO MEAS - PA PR(ACCEL): -39.8 MMHG
BH CV ECHO MEAS - PA V2 MAX: 124 CM/SEC
BH CV ECHO MEAS - RAP SYSTOLE: 3 MMHG
BH CV ECHO MEAS - RVSP: 38 MMHG
BH CV ECHO MEAS - SV(LVOT): 89.5 ML
BH CV ECHO MEAS - SV(MOD-SP2): 46 ML
BH CV ECHO MEAS - SV(MOD-SP4): 54.1 ML
BH CV ECHO MEAS - TAPSE (>1.6): 2.6 CM
BH CV ECHO MEAS - TR MAX PG: 30.4 MMHG
BH CV ECHO MEAS - TR MAX VEL: 275.3 CM/SEC
BH CV ECHO MEASUREMENTS AVERAGE E/E' RATIO: 12.07
BH CV REST NUCLEAR ISOTOPE DOSE: 9.9 MCI
BH CV STRESS BP STAGE 2: NORMAL
BH CV STRESS BP STAGE 4: NORMAL
BH CV STRESS COMMENTS STAGE 1: NORMAL
BH CV STRESS DOSE REGADENOSON STAGE 1: 0.4
BH CV STRESS DURATION MIN STAGE 1: 1
BH CV STRESS DURATION MIN STAGE 2: 1
BH CV STRESS DURATION MIN STAGE 3: 1
BH CV STRESS DURATION MIN STAGE 4: 1
BH CV STRESS DURATION SEC STAGE 1: 10
BH CV STRESS DURATION SEC STAGE 2: 0
BH CV STRESS HR STAGE 1: 61
BH CV STRESS HR STAGE 2: 87
BH CV STRESS HR STAGE 3: 88
BH CV STRESS HR STAGE 4: 82
BH CV STRESS NUCLEAR ISOTOPE DOSE: 32.8 MCI
BH CV STRESS O2 STAGE 1: 100
BH CV STRESS O2 STAGE 2: 100
BH CV STRESS O2 STAGE 3: 100
BH CV STRESS O2 STAGE 4: 100
BH CV STRESS PROTOCOL 1: NORMAL
BH CV STRESS RECOVERY BP: NORMAL MMHG
BH CV STRESS RECOVERY HR: 77 BPM
BH CV STRESS RECOVERY O2: 100 %
BH CV STRESS STAGE 1: 1
BH CV STRESS STAGE 2: 2
BH CV STRESS STAGE 3: 3
BH CV STRESS STAGE 4: 4
BH CV XLRA - RV BASE: 3.3 CM
BH CV XLRA - RV LENGTH: 5.3 CM
BH CV XLRA - RV MID: 2.5 CM
BH CV XLRA - TDI S': 18 CM/SEC
LEFT ATRIUM VOLUME INDEX: 30.3 ML/M2
LV EF NUC BP: 88 %
MAXIMAL PREDICTED HEART RATE: 139 BPM
MAXIMAL PREDICTED HEART RATE: 139 BPM
PERCENT MAX PREDICTED HR: 64.75 %
STRESS BASELINE BP: NORMAL MMHG
STRESS BASELINE HR: 56 BPM
STRESS O2 SAT REST: 100 %
STRESS PERCENT HR: 76 %
STRESS POST ESTIMATED WORKLOAD: 1 METS
STRESS POST EXERCISE DUR MIN: 4 MIN
STRESS POST EXERCISE DUR SEC: 0 SEC
STRESS POST O2 SAT PEAK: 100 %
STRESS POST PEAK BP: NORMAL MMHG
STRESS POST PEAK HR: 90 BPM
STRESS TARGET HR: 118 BPM
STRESS TARGET HR: 118 BPM

## 2023-01-04 ENCOUNTER — TELEPHONE (OUTPATIENT)
Dept: CARDIOLOGY | Facility: CLINIC | Age: 83
End: 2023-01-04
Payer: MEDICARE

## 2023-01-04 DIAGNOSIS — Z12.11 SCREEN FOR COLON CANCER: Primary | ICD-10-CM

## 2023-01-04 NOTE — TELEPHONE ENCOUNTER
----- Message from Syed Hooker MD sent at 1/3/2023  1:15 PM EST -----  Please inform the patient of their test results. Thank you.

## 2023-02-21 ENCOUNTER — OFFICE VISIT (OUTPATIENT)
Dept: INTERNAL MEDICINE | Facility: CLINIC | Age: 83
End: 2023-02-21
Payer: MEDICARE

## 2023-02-21 ENCOUNTER — HOSPITAL ENCOUNTER (OUTPATIENT)
Dept: GENERAL RADIOLOGY | Facility: HOSPITAL | Age: 83
Discharge: HOME | End: 2023-02-21
Admitting: INTERNAL MEDICINE
Payer: MEDICARE

## 2023-02-21 VITALS
TEMPERATURE: 97.7 F | HEART RATE: 59 BPM | WEIGHT: 196 LBS | SYSTOLIC BLOOD PRESSURE: 170 MMHG | HEIGHT: 63 IN | OXYGEN SATURATION: 99 % | DIASTOLIC BLOOD PRESSURE: 84 MMHG | BODY MASS INDEX: 34.73 KG/M2

## 2023-02-21 DIAGNOSIS — M54.2 CERVICALGIA: Chronic | ICD-10-CM

## 2023-02-21 DIAGNOSIS — I10 BENIGN ESSENTIAL HYPERTENSION: ICD-10-CM

## 2023-02-21 DIAGNOSIS — M79.601 BILATERAL ARM PAIN: Primary | Chronic | ICD-10-CM

## 2023-02-21 DIAGNOSIS — M79.601 BILATERAL ARM PAIN: Chronic | ICD-10-CM

## 2023-02-21 DIAGNOSIS — M79.602 BILATERAL ARM PAIN: Chronic | ICD-10-CM

## 2023-02-21 DIAGNOSIS — R26.89 POOR BALANCE: ICD-10-CM

## 2023-02-21 DIAGNOSIS — M79.602 BILATERAL ARM PAIN: Primary | Chronic | ICD-10-CM

## 2023-02-21 PROCEDURE — 72050 X-RAY EXAM NECK SPINE 4/5VWS: CPT

## 2023-02-21 PROCEDURE — 73030 X-RAY EXAM OF SHOULDER: CPT

## 2023-02-21 PROCEDURE — 99214 OFFICE O/P EST MOD 30 MIN: CPT | Performed by: INTERNAL MEDICINE

## 2023-02-21 RX ORDER — DESONIDE 0.5 MG/G
OINTMENT TOPICAL
COMMUNITY
Start: 2022-12-21

## 2023-02-21 NOTE — ASSESSMENT & PLAN NOTE
Take Tylenol more often. Use a moist heat pack on the neck and the upper arms. Continue range of motion exercises throughout the day. X-ray both shoulders and cervical spine today. She was given an order for PT at [facility] with [facility].

## 2023-02-21 NOTE — PATIENT INSTRUCTIONS
Patient Instructions   Problem List Items Addressed This Visit          Cardiac and Vasculature    Benign essential hypertension    Overview     Taking losartan 100mg and metoprolol  50mg  daily.               Current Assessment & Plan     Continue losartan and metoprolol.  Continue to avoid salt in the diet.         Relevant Medications    metoprolol succinate XL (TOPROL-XL) 50 MG 24 hr tablet    losartan (COZAAR) 100 MG tablet    furosemide (LASIX) 20 MG tablet       Musculoskeletal and Injuries    Cervicalgia (Chronic)    Overview                Current Assessment & Plan     Take Tylenol more often. Use a moist heat pack on the neck and the upper arms. Continue range of motion exercises throughout the day. X-ray both shoulders and cervical spine today. She was given an order for PT at [facility] with Becca.         Relevant Orders    XR Spine Cervical Complete 4 or 5 View    Ambulatory Referral to Physical Therapy Evaluate and treat (Completed)    Bilateral arm pain - Primary (Chronic)    Overview     Patient took cipro in August, but arm pain did not occur until January.         Current Assessment & Plan     Take Tylenol more often. Use a moist heat pack on the neck and the upper arms. Continue range of motion exercises throughout the day. X-ray both shoulders and cervical spine today. She was given an order for PT at [facility] with Becca.         Relevant Orders    Ambulatory Referral to Physical Therapy Evaluate and treat (Completed)       Symptoms and Signs    Poor balance    Overview     Resume exercises from [facility].         Current Assessment & Plan     She was encouraged to do some of the exercises from PT every day at home.

## 2023-02-21 NOTE — ASSESSMENT & PLAN NOTE
Take Tylenol more often. Use a moist heat pack on the neck and the upper arms. Continue range of motion exercises throughout the day. X-ray both shoulders and cervical spine today. She was given an order for PT at [facility] with Becca.

## 2023-02-21 NOTE — ASSESSMENT & PLAN NOTE
She will continue losartan and metoprolol. She will check blood pressures at home and let us know if it is running more than 130/80 mmHg at home.

## 2023-02-21 NOTE — PROGRESS NOTES
[facility]     Serenity Lagos  1940   [id number]      Patient Care Team:  Bernadette Smoers MD as PCP - General (Internal Medicine)  Syed Hooker MD as Consulting Physician (Cardiology)  Celio Iniguez MD as Consulting Physician (Ophthalmology)  Ender Jolly MD as Consulting Physician (Dermatology)  Jil Arreola APRN as Nurse Practitioner (Nurse Practitioner)  Josh Noyola Jr., MD as Consulting Physician (Hand Surgery)    Chief Complaint   Patient presents with   • Arm Pain     Both arms, pain. Started a month ago            HPI  Patient is a 82 y.o. female presents today for an acute visit.    Bilateral upper arm pain  The patient complains of bilateral upper arm pain. She denies pain in her shoulder joints. The pain is located in the anterior aspect of her bilateral arms. She has pain with flexion of the elbow. She is unable to sleep on her side because it makes it worse. The patient is sleeping on her back. Her arms are stiff when she wakes up. She is unsure if it is coming from her back. She took Cipro in 08/2022. Her current arm pain started approximately 1 month ago. She mentions that she developed tendinitis in her forearm after previously taking levofloxacin. The patient was provided arm exercises by [facility]. She did the exercises at home 1 time with a 5-pound weight by holding the weight in her hands in front of her and moving it to the side. She states that her pain started immediately after that. She did not have much pain when she did the exercise. The patient had a cortisone injection in her shoulder over a month ago.    Neck pain  The patient is having neck pain, which seems worse than usual. She has been sitting with a heating pad around her neck and back. If she does that all day long, it feels fine. The patient takes 1 Tylenol each day with relief.     Low back pain  She has not noticed any low back pain lately.     Balance  The patient  discontinued physical therapy at [facility] in [place] as she did not notice improvement; she was seeing them to improve her balance. She has not continued those exercises. The patient is more unsteady over the last couple of weeks.     Hypertension  The patient's blood pressure was elevated today. She has not taken her medication as she has not eaten.    History of infection of the toe  The patient had an infection in her toe from a previous fall. Dr. Ender Jolly treated her Cipro 5 mg in 08/2022.       CHRONIC CONDITIONS      Past Medical History:   Diagnosis Date   • Cervicalgia    • Chronic bronchitis (HCC)    • Coronary artery disease    • Dysphagia    • Glaucoma, open angle    • Hearing loss    • Hypertension    • Mixed hyperlipidemia    • Osteoarthritis     cervical spine degenerative   • Perforation of tympanic membrane    • Pruritus     chronic general pruritis   • Salivary gland cancer (HCC)    • Shingles     postherpetic neuralgia-thorax   • Spondylolisthesis      lumbar spine   • Wrist tendonitis        Past Surgical History:   Procedure Laterality Date   • APPENDECTOMY     • BREAST BIOPSY     • BREAST BIOPSY Right 07/29/2019   • BREAST CYST EXCISION     • ENDOSCOPY  2017    normal (per pt) EGD   • MOUTH SURGERY  2000   • TONSILLECTOMY         Family History   Problem Relation Age of Onset   • Hypertension Mother    • Heart failure Mother         CHF   • Coronary artery disease Mother         CABG   • Lung cancer Father 59        Heavy smoker and Black Lung   • Rheum arthritis Sister    • Other Sister 76        sepsis due to infected knee replacement   • COPD Sister         heavy smoker    • Alzheimer's disease Maternal Grandmother    • Coronary artery disease Maternal Grandfather    • Cancer Paternal Grandmother    • Heart disease Paternal Grandfather    • Hypertension Other    • Obesity Other    • Stroke Maternal Aunt    • Heart disease Maternal Aunt    • Stroke Maternal Aunt    • Stroke Maternal Aunt  "   • Coronary artery disease Maternal Uncle    • Stroke Maternal Uncle    • Heart disease Maternal Uncle    • Breast cancer Neg Hx    • Ovarian cancer Neg Hx        Social History     Socioeconomic History   • Marital status:    Tobacco Use   • Smoking status: Former     Packs/day: 1.00     Years: 20.00     Pack years: 20.00     Types: Cigarettes     Start date:      Quit date:      Years since quittin.1   • Smokeless tobacco: Never   Vaping Use   • Vaping Use: Never used   Substance and Sexual Activity   • Alcohol use: Yes     Alcohol/week: 7.0 standard drinks     Types: 7 Glasses of wine per week     Comment:  daily with dinner   • Drug use: No   • Sexual activity: Defer       No Known Allergies    Review of Systems:   The appropriate review of systems is included in the HPI.    Vital Signs  Vitals:    23 1039   BP: 170/84   BP Location: Left arm   Patient Position: Sitting   Cuff Size: Adult   Pulse: 59   Temp: 97.7 °F (36.5 °C)   TempSrc: Infrared   SpO2: 99%   Weight: 88.9 kg (196 lb)   Height: 160 cm (62.99\")   PainSc: 10-Worst pain ever   PainLoc: Arm     Body mass index is 34.73 kg/m².  BMI is >= 30 and <35. (Class 1 Obesity). The following options were offered after discussion;: weight loss educational material (shared in after visit summary), exercise counseling/recommendations and nutrition counseling/recommendations        Current Outpatient Medications:   •  aspirin 81 MG EC tablet, Take 1 tablet by mouth Daily., Disp: 90 tablet, Rfl: 3  •  Biotin 5000 MCG sublingual tablet, take 1 by oral route  every day, Disp: , Rfl:   •  CALCIUM PO, Take 1 tablet by mouth Daily. Calcium 600 + vitamin d, Disp: , Rfl:   •  Cholecalciferol (Vitamin D3) 50 MCG (2000 UT) capsule, Take 1 capsule by mouth Daily., Disp: , Rfl:   •  desonide (DESOWEN) 0.05 % ointment, APPLY TO AREAS TWICE A DAY AS NEEDED, Disp: , Rfl:   •  Docusate Sodium 100 MG capsule, take 1 tablet by oral route twice daily, " Disp: , Rfl:   •  furosemide (LASIX) 20 MG tablet, TAKE 1 TABLET BY MOUTH DAILY AS NEEDED (EDEMA)., Disp: 90 tablet, Rfl: 1  •  Glucosamine-Chondroitin (OSTEO BI-FLEX REGULAR STRENGTH PO), 1 daily, Disp: , Rfl:   •  latanoprost (XALATAN) 0.005 % ophthalmic solution, Administer 1 drop to both eyes Every Night., Disp: , Rfl:   •  levothyroxine (SYNTHROID, LEVOTHROID) 125 MCG tablet, TAKE 1 TABLET DAILY, Disp: 90 tablet, Rfl: 3  •  losartan (COZAAR) 100 MG tablet, TAKE 1 TABLET DAILY, Disp: 90 tablet, Rfl: 3  •  metoprolol succinate XL (TOPROL-XL) 50 MG 24 hr tablet, TAKE 1 TABLET DAILY, Disp: 90 tablet, Rfl: 3  •  omeprazole (priLOSEC) 40 MG capsule, TAKE 1 CAPSULE DAILY, Disp: 90 capsule, Rfl: 3  •  Probiotic Product (PROBIOTIC DAILY PO), One capsule daily, Disp: , Rfl:   •  raloxifene (EVISTA) 60 MG tablet, TAKE 1 TABLET DAILY, Disp: 90 tablet, Rfl: 3  •  Vitamin E 400 units tablet, Vitamin E 400 UNIT Oral Tablet; Patient Sig: Vitamin E 400 UNIT Oral Tablet TAKE 1 TABLET DAILY.; 0; 24-Sep-2013; Active, Disp: , Rfl:   •  Wheat Dextrin (Benefiber Healthy Shape) powder, Take 1 Scoop by mouth Daily. (Patient taking differently: Take 1 Scoop by mouth Daily As Needed.), Disp: 248 g, Rfl: 5    Physical Exam:    Physical Exam  Vitals and nursing note reviewed.   Constitutional:       Appearance: Normal appearance. She is well-developed. She is obese.   HENT:      Head: Normocephalic.   Eyes:      Conjunctiva/sclera: Conjunctivae normal.      Pupils: Pupils are equal, round, and reactive to light.   Neck:      Thyroid: No thyromegaly.   Cardiovascular:      Rate and Rhythm: Normal rate and regular rhythm.      Heart sounds: Normal heart sounds.   Pulmonary:      Effort: Pulmonary effort is normal.      Breath sounds: Normal breath sounds. No wheezing.   Musculoskeletal:         General: Normal range of motion.      Cervical back: Normal range of motion and neck supple.      Comments: Pain with raising both arms and the pain  is in the upper arms anteriorly. There is some mild diffuse tenderness of the upper arm muscles. No muscle deficits. No tenderness of the shoulders. There is some diffuse tenderness of the cervical spine and very tight muscle spasms bilateral neck.   Lymphadenopathy:      Cervical: No cervical adenopathy.   Skin:     General: Skin is warm and dry.   Neurological:      Mental Status: She is alert and oriented to person, place, and time.   Psychiatric:         Thought Content: Thought content normal.          ACE III MINI        Results Review:    None    CMP:  Lab Results   Component Value Date    BUN 16 12/14/2022    CREATININE 0.89 12/14/2022    EGFRIFNONA 65 12/13/2021    BCR 18.0 12/14/2022     (L) 12/14/2022    K 4.9 12/14/2022    CO2 26.0 12/14/2022    CALCIUM 9.4 12/14/2022    ALBUMIN 3.90 12/14/2022    BILITOT 0.3 12/14/2022    ALKPHOS 48 12/14/2022    AST 21 12/14/2022    ALT 16 12/14/2022     HbA1c:  No results found for: HGBA1C  Microalbumin:  Lab Results   Component Value Date    MICROALBUR <1.2 12/14/2022     Lipid Panel  Lab Results   Component Value Date    CHOL 173 12/14/2022    TRIG 99 12/14/2022    HDL 69 (H) 12/14/2022    LDL 86 12/14/2022    AST 21 12/14/2022    ALT 16 12/14/2022       Medication Review: Medications reviewed and noted  Patient Instructions   Problem List Items Addressed This Visit        Cardiac and Vasculature    Benign essential hypertension    Overview     Taking losartan 100mg and metoprolol  50mg  daily.               Current Assessment & Plan     Continue losartan and metoprolol.  Continue to avoid salt in the diet.         Relevant Medications    metoprolol succinate XL (TOPROL-XL) 50 MG 24 hr tablet    losartan (COZAAR) 100 MG tablet    furosemide (LASIX) 20 MG tablet       Musculoskeletal and Injuries    Cervicalgia (Chronic)    Overview                Current Assessment & Plan     Take Tylenol more often. Use a moist heat pack on the neck and the upper arms.  Continue range of motion exercises throughout the day. X-ray both shoulders and cervical spine today. She was given an order for PT at [facility] with [facility].         Relevant Orders    XR Spine Cervical Complete 4 or 5 View    Ambulatory Referral to Physical Therapy Evaluate and treat (Completed)    Bilateral arm pain - Primary (Chronic)    Overview     Patient took cipro in August, but arm pain did not occur until January.         Current Assessment & Plan     Take Tylenol more often. Use a moist heat pack on the neck and the upper arms. Continue range of motion exercises throughout the day. X-ray both shoulders and cervical spine today. She was given an order for PT at [facility] with [facility].         Relevant Orders    Ambulatory Referral to Physical Therapy Evaluate and treat (Completed)       Symptoms and Signs    Poor balance    Overview     Resume exercises from [facility].         Current Assessment & Plan     She was encouraged to do some of the exercises from PT every day at home.               Diagnosis Plan   1. Bilateral arm pain  Ambulatory Referral to Physical Therapy Evaluate and treat      2. Cervicalgia  XR Spine Cervical Complete 4 or 5 View    Ambulatory Referral to Physical Therapy Evaluate and treat      3. Poor balance        4. Benign essential hypertension                Plan of care reviewed with patient at the conclusion of today's visit. Education was provided regarding diagnosis, management, and any prescribed or recommended OTC medications.Patient verbalizes understanding of and agreement with management plan.         Transcribed from ambient dictation for Bernadette Somers MD by Dana Beckman.  02/21/23   14:12 EST    Patient or patient representative verbalized consent to the visit recording.  I have personally performed the services described in this document as transcribed by the above individual, and it is both accurate and complete.  Bernadette Somers MD   2/21/2023  17:55 EST

## 2023-03-30 DIAGNOSIS — N95.9 MENOPAUSAL AND POSTMENOPAUSAL DISORDER: Primary | ICD-10-CM

## 2023-03-30 DIAGNOSIS — Z12.31 BREAST CANCER SCREENING BY MAMMOGRAM: ICD-10-CM

## 2023-04-10 RX ORDER — SODIUM, POTASSIUM,MAG SULFATES 17.5-3.13G
1 SOLUTION, RECONSTITUTED, ORAL ORAL TAKE AS DIRECTED
Qty: 354 ML | Refills: 0 | Status: SHIPPED | OUTPATIENT
Start: 2023-04-10

## 2023-04-14 ENCOUNTER — TELEPHONE (OUTPATIENT)
Dept: GASTROENTEROLOGY | Facility: CLINIC | Age: 83
End: 2023-04-14
Payer: MEDICARE

## 2023-04-17 ENCOUNTER — TELEPHONE (OUTPATIENT)
Dept: GASTROENTEROLOGY | Facility: OTHER | Age: 83
End: 2023-04-17
Payer: MEDICARE

## 2023-04-17 RX ORDER — ONDANSETRON 4 MG/1
4 TABLET, ORALLY DISINTEGRATING ORAL EVERY 6 HOURS PRN
Status: SHIPPED | OUTPATIENT
Start: 2023-04-17

## 2023-04-17 NOTE — TELEPHONE ENCOUNTER
Patient called stating she threw up her first dose of Suprep.    I attempted to write for Umanzor tabs but they were not in stock at her pharmacy.  I did send a prescription for Plenvu but her cost is $136 I also called her in some Zofran.  If she is able to afford the prep she will need to call the office tomorrow and reschedule.  I gave the pharmacist instructions to adjust prescription for prep as needed

## 2023-04-18 ENCOUNTER — TELEPHONE (OUTPATIENT)
Dept: GASTROENTEROLOGY | Facility: CLINIC | Age: 83
End: 2023-04-18
Payer: MEDICARE

## 2023-04-18 NOTE — TELEPHONE ENCOUNTER
----- Message from Marcial Saavedra MD sent at 4/17/2023  6:51 PM EDT -----  Regarding: vomited prep.  she wants to reschedule with different prep colonoscopy 4/18/2022  Patient vomited up her prep.  I offered to call her in a different prep.  She wants to reschedule.  I did send her some Zofran to the pharmacy

## 2023-05-17 NOTE — PROGRESS NOTES
OFFICE VISIT  NOTE  [facility]ARDIOLOG[facility]      Name: Serenity Lagos    Date: 2023  MRN:  [id number]  :  1940      REFERRING/PRIMARY PROVIDER:  Bernadette Somers MD     Chief Complaint   Patient presents with   • Coronary artery disease of native artery of native heart wi     HPI: Serenity Lagos is a 82 y.o. female who presents today for follow-up for chest pain/coronary calcium.  Patient presented on 2019 to the ER with complaints of chest pain.  Described as substernal chest discomfort, awoke patient from sleep. She underwent exercise nuclear stress test 2019 which was normal, although did show coronary calcification on the CT portion. Due to worsening chest pain/dyspnea repeat stress MPS 2022 was pursued which was negative for ischemia. Echo showed normal EF, normal diastolic function. 14 day monitor with 6 short SVT episodes, longest 9 beats.   Since last visit she has remained asymptomatic from cardiac standpoint. Denies chest pain, palpitations, or unusual exertional dyspnea. She is very sedentary at home, mostly sits at computer all day. She has had issues with LE edema lately, after a nail infection, better with leg elevation.     ROS:Pertinent positives as listed in the HPI.  All other systems reviewed and negative.    Past Medical History:   Diagnosis Date   • Cervicalgia    • Chronic bronchitis    • Coronary artery disease    • Dysphagia    • Glaucoma, open angle    • Hearing loss    • Hypertension    • Mixed hyperlipidemia    • Osteoarthritis     cervical spine degenerative   • Perforation of tympanic membrane    • Pruritus     chronic general pruritis   • Salivary gland cancer    • Shingles     postherpetic neuralgia-thorax   • Spondylolisthesis      lumbar spine   • Wrist tendonitis        Past Surgical History:   Procedure Laterality Date   • APPENDECTOMY     • BREAST BIOPSY     • BREAST BIOPSY Right 2019   • BREAST CYST EXCISION      • ENDOSCOPY  2017    normal (per pt) EGD   • MOUTH SURGERY     • TONSILLECTOMY         Social History     Socioeconomic History   • Marital status:    Tobacco Use   • Smoking status: Former     Packs/day: 1.00     Years: 20.00     Pack years: 20.00     Types: Cigarettes     Start date: 1960     Quit date: 1980     Years since quittin.4   • Smokeless tobacco: Never   Vaping Use   • Vaping Use: Never used   Substance and Sexual Activity   • Alcohol use: Yes     Alcohol/week: 7.0 standard drinks     Types: 7 Glasses of wine per week     Comment: 2 glasses wine  with dinner every day.  grand total 8-9oz   • Drug use: No   • Sexual activity: Defer       Family History   Problem Relation Age of Onset   • Hypertension Mother    • Heart failure Mother         CHF   • Coronary artery disease Mother         CABG   • Lung cancer Father 59        Heavy smoker and Black Lung   • Rheum arthritis Sister    • Other Sister 76        sepsis due to infected knee replacement   • COPD Sister         heavy smoker    • Alzheimer's disease Maternal Grandmother    • Coronary artery disease Maternal Grandfather    • Cancer Paternal Grandmother    • Heart disease Paternal Grandfather    • Hypertension Other    • Obesity Other    • Stroke Maternal Aunt    • Heart disease Maternal Aunt    • Stroke Maternal Aunt    • Stroke Maternal Aunt    • Coronary artery disease Maternal Uncle    • Stroke Maternal Uncle    • Heart disease Maternal Uncle    • Breast cancer Neg Hx    • Ovarian cancer Neg Hx         No Known Allergies    Current Outpatient Medications   Medication Instructions   • aspirin 81 mg, Oral, Daily   • Biotin 5000 MCG sublingual tablet take 1 by oral route  every day   • CALCIUM PO 1 tablet, Oral, Daily, Calcium 600 + vitamin d    • ciclopirox (PENLAC) 8 % solution APPLY TO AFFECTED NAILS DAILY UNTIL GONE.   • desonide (DESOWEN) 0.05 % ointment APPLY TO AREAS TWICE A DAY AS NEEDED   • Docusate Sodium 100 MG  "capsule take 1 tablet by oral route twice daily   • furosemide (LASIX) 20 mg, Oral, Daily PRN   • gentamicin (GARAMYCIN) 0.3 % ophthalmic solution Please administer one drop to affected ear 4 times a day. For up to 7d   • Glucosamine-Chondroitin (OSTEO BI-FLEX REGULAR STRENGTH PO) 1 daily   • latanoprost (XALATAN) 0.005 % ophthalmic solution 1 drop, Both Eyes, Nightly   • levothyroxine (SYNTHROID, LEVOTHROID) 125 MCG tablet TAKE 1 TABLET DAILY   • losartan (COZAAR) 100 MG tablet TAKE 1 TABLET DAILY   • metoprolol succinate XL (TOPROL-XL) 50 MG 24 hr tablet TAKE 1 TABLET DAILY   • omeprazole (priLOSEC) 40 MG capsule TAKE 1 CAPSULE DAILY   • Probiotic Product (PROBIOTIC DAILY PO) One capsule daily   • raloxifene (EVISTA) 60 MG tablet TAKE 1 TABLET DAILY   • Vitamin D3 2,000 Units, Oral, Daily   • Vitamin E 400 units tablet Vitamin E 400 UNIT Oral Tablet; Patient Sig: Vitamin E 400 UNIT Oral Tablet TAKE 1 TABLET DAILY.; 0; 24-Sep-2013; Active   • Wheat Dextrin (Benefiber Healthy Shape) powder 1 Scoop, Oral, Daily       Vitals:    05/18/23 1431 05/18/23 1512   BP: 150/70 138/70   BP Location:  Right arm   Patient Position:  Sitting   Pulse: 74    SpO2: 94%    Weight: 86.1 kg (189 lb 12.8 oz)    Height: 160 cm (63\")      Body mass index is 33.62 kg/m².    PHYSICAL EXAM:    General Appearance:   · well developed  · well nourished  Neck:  · thyroid not enlarged  · supple  Respiratory:  · no respiratory distress  · normal breath sounds  · no rales  Cardiovascular:  · no jugular venous distention  · regular rhythm  · apical impulse normal  · S1 normal, S2 normal  · no S3, no S4   · no murmur  · no rub, no thrill  · lower extremity edema: 2+, LLE>RLE   Skin:   warm, dry    RESULTS:   Procedures    Results for orders placed during the hospital encounter of 12/29/22    Adult Transthoracic Echo Complete w/ Color, Spectral and Contrast if necessary per protocol    Interpretation Summary  •  Left ventricular systolic function is " normal. Calculated left ventricular EF = 58.5% Left ventricular ejection fraction appears to be 61 - 65%.  •  Left ventricular diastolic function was normal.  •  Estimated right ventricular systolic pressure from tricuspid regurgitation is mildly elevated (35-45 mmHg).        Labs:  Lab Results   Component Value Date    CHOL 173 12/14/2022    TRIG 99 12/14/2022    HDL 69 (H) 12/14/2022    LDL 86 12/14/2022    AST 21 12/14/2022    ALT 16 12/14/2022     No results found for: HGBA1C  Creatinine   Date Value Ref Range Status   12/14/2022 0.89 0.57 - 1.00 mg/dL Final   06/15/2022 0.85 0.57 - 1.00 mg/dL Final   12/13/2021 0.84 0.57 - 1.00 mg/dL Final     eGFR Non  Amer   Date Value Ref Range Status   12/13/2021 65 >60 mL/min/1.73 Final   06/09/2021 64 >60 mL/min/1.73 Final   11/24/2020 77 >60 mL/min/1.73 Final         ASSESSMENT:  Problem List Items Addressed This Visit        Cardiac and Vasculature    Coronary artery calcification of native artery - Primary (Chronic)    Overview     12/15/2020 Bernadette Somers MD    Stress test in September 2019 showed some calcification of the coronary arteries on the CT portion of the test.  The patient saw Dr. Hooker and was not felt to have significant coronary artery disease.    Continue 81 mg enteric-coated aspirin daily.  Improve blood pressure control.  Improve low-fat diet.  Increase exercise.         Benign essential hypertension    Overview     Taking losartan 100mg and metoprolol  50mg  daily.               Coronary artery disease of native artery of native heart with stable angina pectoris (HCC)    Overview     · Myocardial perfusion stress test 9/23/2019 was normal: EF greater than 70%, no ischemia, coronary artery calcification noted on CT portion of stress test.         Echocardiogram showed diastolic dysfunction grade 1.  Ejection               fraction 61 to 65%.    Taking daily aspirin, losartan, and metoprolol.  Follow-up regularly with Dr. Hooker.          Hyperlipidemia LDL goal <70    Palpitations    Overview     · 14-day heart monitor 9/4/2019: Wear time 12 days.  Average heart rate 82 bpm.  PACs less than 1% brief SVT longest 8 beats.         KING (dyspnea on exertion)    Overview     11/21/19 Echo  · LVEF = 69.0%.  · Left ventricular diastolic dysfunction (grade I a) consistent with impaired relaxation.  · Mild to moderate tricuspid valve regurgitation is present.  6/15/2021 Bernadette Somers MD  ·   · Worsening edema and shortness of breath.    Taking Lasix 20 mg jojo as needed if the edema and shortness of breath have improved.  Stress Echo sheduled for 12/29/22.  Will see her cardiologist, Dr. Hooker for appointment.            PLAN:  1.  Chest pain/ Coronary calcifications  Stress MPS 12/2022 negative for ischemia, LVEF >70%  NO recurrent chest pain or unusual dyspnea at this time   Continue to monitor.  Continue ASA and BB, she refuses statin     2.  Palpitations/paroxysmal SVT:  14 day monitor 11/2022 with 6 brief SVT episodes, longest 9 beats  Decrease caffeine, increase water intake  Continue metoprolol at current dose  No current palpitations  Increase exercise as tolerated     3.  Hyperlipidemia:  Recent lipid panel reviewed, LDL 86.   She does not want to take a statin due to perceived side effects  Work on diet control, low cholesterol diet.     4.  LE edema, dependent    Echo 12/2022 with normal EF, normal diastolic function, RVSP 38mmHg   Continue conservative measures, and may  take Lasix 20 mg daily.    5. Hypertension   Goal <130/80mmHg   BP slightly high on arrival, improved on re-check   Continue current medicines, recommend home monitoring a few times a week and call us if SBP remains consistently >140mmHg  Cut back on Na and increase exercise       Advance Care Planning   ACP discussion was held with the patient during this visit. Patient has an advance directive (not in EMR), copy requested.        Follow-up   Return in about 9 months (around  2/18/2024) for with RDS.      Electronically signed by Yaneth Joiner PA-C, 05/18/23, 3:06 PM EDT.

## 2023-05-18 ENCOUNTER — OFFICE VISIT (OUTPATIENT)
Dept: CARDIOLOGY | Facility: CLINIC | Age: 83
End: 2023-05-18
Payer: MEDICARE

## 2023-05-18 VITALS
BODY MASS INDEX: 33.63 KG/M2 | OXYGEN SATURATION: 94 % | SYSTOLIC BLOOD PRESSURE: 138 MMHG | HEART RATE: 74 BPM | WEIGHT: 189.8 LBS | DIASTOLIC BLOOD PRESSURE: 70 MMHG | HEIGHT: 63 IN

## 2023-05-18 DIAGNOSIS — R00.2 PALPITATIONS: ICD-10-CM

## 2023-05-18 DIAGNOSIS — I25.84 CORONARY ARTERY CALCIFICATION OF NATIVE ARTERY: Primary | Chronic | ICD-10-CM

## 2023-05-18 DIAGNOSIS — I10 BENIGN ESSENTIAL HYPERTENSION: ICD-10-CM

## 2023-05-18 DIAGNOSIS — I25.10 CORONARY ARTERY CALCIFICATION OF NATIVE ARTERY: Primary | Chronic | ICD-10-CM

## 2023-05-18 DIAGNOSIS — R06.09 DOE (DYSPNEA ON EXERTION): ICD-10-CM

## 2023-05-18 DIAGNOSIS — E78.5 HYPERLIPIDEMIA LDL GOAL <70: ICD-10-CM

## 2023-05-18 DIAGNOSIS — I25.118 CORONARY ARTERY DISEASE OF NATIVE ARTERY OF NATIVE HEART WITH STABLE ANGINA PECTORIS: ICD-10-CM

## 2023-05-29 DIAGNOSIS — I10 BENIGN ESSENTIAL HYPERTENSION: ICD-10-CM

## 2023-05-29 DIAGNOSIS — R00.2 PALPITATIONS: ICD-10-CM

## 2023-05-30 RX ORDER — METOPROLOL SUCCINATE 50 MG/1
TABLET, EXTENDED RELEASE ORAL
Qty: 90 TABLET | Refills: 1 | Status: SHIPPED | OUTPATIENT
Start: 2023-05-30

## 2023-06-15 ENCOUNTER — HOSPITAL ENCOUNTER (OUTPATIENT)
Dept: MAMMOGRAPHY | Facility: HOSPITAL | Age: 83
Discharge: HOME | End: 2023-06-15
Payer: MEDICARE

## 2023-06-15 ENCOUNTER — LAB (OUTPATIENT)
Dept: LAB | Facility: HOSPITAL | Age: 83
End: 2023-06-15
Payer: MEDICARE

## 2023-06-15 ENCOUNTER — HOSPITAL ENCOUNTER (OUTPATIENT)
Dept: BONE DENSITY | Facility: HOSPITAL | Age: 83
Discharge: HOME | End: 2023-06-15
Payer: MEDICARE

## 2023-06-15 DIAGNOSIS — E53.8 B12 DEFICIENCY: ICD-10-CM

## 2023-06-15 DIAGNOSIS — E03.9 ACQUIRED HYPOTHYROIDISM: ICD-10-CM

## 2023-06-15 DIAGNOSIS — I10 BENIGN ESSENTIAL HYPERTENSION: ICD-10-CM

## 2023-06-15 DIAGNOSIS — Z12.31 BREAST CANCER SCREENING BY MAMMOGRAM: ICD-10-CM

## 2023-06-15 DIAGNOSIS — N95.9 MENOPAUSAL AND POSTMENOPAUSAL DISORDER: ICD-10-CM

## 2023-06-15 DIAGNOSIS — E78.5 HYPERLIPIDEMIA LDL GOAL <70: ICD-10-CM

## 2023-06-15 DIAGNOSIS — E55.9 VITAMIN D DEFICIENCY: ICD-10-CM

## 2023-06-15 PROCEDURE — 82607 VITAMIN B-12: CPT

## 2023-06-15 PROCEDURE — 80053 COMPREHEN METABOLIC PANEL: CPT

## 2023-06-15 PROCEDURE — 82306 VITAMIN D 25 HYDROXY: CPT

## 2023-06-15 PROCEDURE — 85025 COMPLETE CBC W/AUTO DIFF WBC: CPT

## 2023-06-15 PROCEDURE — 84439 ASSAY OF FREE THYROXINE: CPT

## 2023-06-15 PROCEDURE — 80061 LIPID PANEL: CPT

## 2023-06-15 PROCEDURE — 77067 SCR MAMMO BI INCL CAD: CPT

## 2023-06-15 PROCEDURE — 84443 ASSAY THYROID STIM HORMONE: CPT

## 2023-06-15 PROCEDURE — 77080 DXA BONE DENSITY AXIAL: CPT

## 2023-06-15 PROCEDURE — 77063 BREAST TOMOSYNTHESIS BI: CPT

## 2023-06-16 LAB
25(OH)D3 SERPL-MCNC: 62.7 NG/ML (ref 30–100)
ALBUMIN SERPL-MCNC: 3.8 G/DL (ref 3.5–5.2)
ALBUMIN/GLOB SERPL: 1.2 G/DL
ALP SERPL-CCNC: 48 U/L (ref 39–117)
ALT SERPL W P-5'-P-CCNC: 15 U/L (ref 1–33)
ANION GAP SERPL CALCULATED.3IONS-SCNC: 13.3 MMOL/L (ref 5–15)
AST SERPL-CCNC: 17 U/L (ref 1–32)
BASOPHILS # BLD AUTO: 0.05 10*3/MM3 (ref 0–0.2)
BASOPHILS NFR BLD AUTO: 0.5 % (ref 0–1.5)
BILIRUB SERPL-MCNC: 0.3 MG/DL (ref 0–1.2)
BUN SERPL-MCNC: 14 MG/DL (ref 8–23)
BUN/CREAT SERPL: 16.3 (ref 7–25)
CALCIUM SPEC-SCNC: 9.7 MG/DL (ref 8.6–10.5)
CHLORIDE SERPL-SCNC: 96 MMOL/L (ref 98–107)
CHOLEST SERPL-MCNC: 167 MG/DL (ref 0–200)
CO2 SERPL-SCNC: 22.7 MMOL/L (ref 22–29)
CREAT SERPL-MCNC: 0.86 MG/DL (ref 0.57–1)
DEPRECATED RDW RBC AUTO: 38.9 FL (ref 37–54)
EGFRCR SERPLBLD CKD-EPI 2021: 68 ML/MIN/1.73
EOSINOPHIL # BLD AUTO: 0.08 10*3/MM3 (ref 0–0.4)
EOSINOPHIL NFR BLD AUTO: 0.8 % (ref 0.3–6.2)
ERYTHROCYTE [DISTWIDTH] IN BLOOD BY AUTOMATED COUNT: 12 % (ref 12.3–15.4)
GLOBULIN UR ELPH-MCNC: 3.2 GM/DL
GLUCOSE SERPL-MCNC: 90 MG/DL (ref 65–99)
HCT VFR BLD AUTO: 36.1 % (ref 34–46.6)
HDLC SERPL-MCNC: 66 MG/DL (ref 40–60)
HGB BLD-MCNC: 12.1 G/DL (ref 12–15.9)
IMM GRANULOCYTES # BLD AUTO: 0.03 10*3/MM3 (ref 0–0.05)
IMM GRANULOCYTES NFR BLD AUTO: 0.3 % (ref 0–0.5)
LDLC SERPL CALC-MCNC: 83 MG/DL (ref 0–100)
LDLC/HDLC SERPL: 1.23 {RATIO}
LYMPHOCYTES # BLD AUTO: 2.3 10*3/MM3 (ref 0.7–3.1)
LYMPHOCYTES NFR BLD AUTO: 24.1 % (ref 19.6–45.3)
MCH RBC QN AUTO: 30 PG (ref 26.6–33)
MCHC RBC AUTO-ENTMCNC: 33.5 G/DL (ref 31.5–35.7)
MCV RBC AUTO: 89.6 FL (ref 79–97)
MONOCYTES # BLD AUTO: 1 10*3/MM3 (ref 0.1–0.9)
MONOCYTES NFR BLD AUTO: 10.5 % (ref 5–12)
NEUTROPHILS NFR BLD AUTO: 6.09 10*3/MM3 (ref 1.7–7)
NEUTROPHILS NFR BLD AUTO: 63.8 % (ref 42.7–76)
NRBC BLD AUTO-RTO: 0 /100 WBC (ref 0–0.2)
PLATELET # BLD AUTO: 344 10*3/MM3 (ref 140–450)
PMV BLD AUTO: 10.9 FL (ref 6–12)
POTASSIUM SERPL-SCNC: 4.9 MMOL/L (ref 3.5–5.2)
PROT SERPL-MCNC: 7 G/DL (ref 6–8.5)
RBC # BLD AUTO: 4.03 10*6/MM3 (ref 3.77–5.28)
SODIUM SERPL-SCNC: 132 MMOL/L (ref 136–145)
T4 FREE SERPL-MCNC: 1.79 NG/DL (ref 0.93–1.7)
TRIGL SERPL-MCNC: 100 MG/DL (ref 0–150)
TSH SERPL DL<=0.05 MIU/L-ACNC: 0.61 UIU/ML (ref 0.27–4.2)
VIT B12 BLD-MCNC: 355 PG/ML (ref 211–946)
VLDLC SERPL-MCNC: 18 MG/DL (ref 5–40)
WBC NRBC COR # BLD: 9.55 10*3/MM3 (ref 3.4–10.8)

## 2023-06-19 PROBLEM — M79.672 LEFT FOOT PAIN: Chronic | Status: ACTIVE | Noted: 2023-06-19

## 2023-08-10 RX ORDER — OMEPRAZOLE 40 MG/1
CAPSULE, DELAYED RELEASE ORAL
Qty: 90 CAPSULE | Refills: 3 | Status: SHIPPED | OUTPATIENT
Start: 2023-08-10

## 2023-10-05 RX ORDER — LEVOTHYROXINE SODIUM 0.12 MG/1
TABLET ORAL
Qty: 90 TABLET | Refills: 3 | Status: SHIPPED | OUTPATIENT
Start: 2023-10-05

## 2023-11-14 ENCOUNTER — LAB (OUTPATIENT)
Dept: LAB | Facility: HOSPITAL | Age: 83
End: 2023-11-14
Payer: MEDICARE

## 2023-11-14 DIAGNOSIS — I10 BENIGN ESSENTIAL HYPERTENSION: ICD-10-CM

## 2023-11-14 DIAGNOSIS — E55.9 VITAMIN D DEFICIENCY: ICD-10-CM

## 2023-11-14 DIAGNOSIS — E78.5 HYPERLIPIDEMIA LDL GOAL <70: ICD-10-CM

## 2023-11-14 DIAGNOSIS — E53.8 B12 DEFICIENCY: ICD-10-CM

## 2023-11-14 LAB
ALBUMIN UR-MCNC: <1.2 MG/DL
BASOPHILS # BLD AUTO: 0.05 10*3/MM3 (ref 0–0.2)
BASOPHILS NFR BLD AUTO: 0.5 % (ref 0–1.5)
CREAT UR-MCNC: 164.5 MG/DL
DEPRECATED RDW RBC AUTO: 39.9 FL (ref 37–54)
EOSINOPHIL # BLD AUTO: 0.18 10*3/MM3 (ref 0–0.4)
EOSINOPHIL NFR BLD AUTO: 1.9 % (ref 0.3–6.2)
ERYTHROCYTE [DISTWIDTH] IN BLOOD BY AUTOMATED COUNT: 12.1 % (ref 12.3–15.4)
HCT VFR BLD AUTO: 37.9 % (ref 34–46.6)
HGB BLD-MCNC: 12.7 G/DL (ref 12–15.9)
HOLD SPECIMEN: NORMAL
IMM GRANULOCYTES # BLD AUTO: 0.03 10*3/MM3 (ref 0–0.05)
IMM GRANULOCYTES NFR BLD AUTO: 0.3 % (ref 0–0.5)
LYMPHOCYTES # BLD AUTO: 2.74 10*3/MM3 (ref 0.7–3.1)
LYMPHOCYTES NFR BLD AUTO: 28.8 % (ref 19.6–45.3)
MCH RBC QN AUTO: 30.5 PG (ref 26.6–33)
MCHC RBC AUTO-ENTMCNC: 33.5 G/DL (ref 31.5–35.7)
MCV RBC AUTO: 91.1 FL (ref 79–97)
MICROALBUMIN/CREAT UR: NORMAL MG/G{CREAT}
MONOCYTES # BLD AUTO: 1.01 10*3/MM3 (ref 0.1–0.9)
MONOCYTES NFR BLD AUTO: 10.6 % (ref 5–12)
NEUTROPHILS NFR BLD AUTO: 5.5 10*3/MM3 (ref 1.7–7)
NEUTROPHILS NFR BLD AUTO: 57.9 % (ref 42.7–76)
NRBC BLD AUTO-RTO: 0 /100 WBC (ref 0–0.2)
PLATELET # BLD AUTO: 329 10*3/MM3 (ref 140–450)
PMV BLD AUTO: 11 FL (ref 6–12)
RBC # BLD AUTO: 4.16 10*6/MM3 (ref 3.77–5.28)
WBC NRBC COR # BLD: 9.51 10*3/MM3 (ref 3.4–10.8)

## 2023-11-14 PROCEDURE — 81001 URINALYSIS AUTO W/SCOPE: CPT

## 2023-11-14 PROCEDURE — 80053 COMPREHEN METABOLIC PANEL: CPT

## 2023-11-14 PROCEDURE — 84481 FREE ASSAY (FT-3): CPT

## 2023-11-14 PROCEDURE — 80061 LIPID PANEL: CPT

## 2023-11-14 PROCEDURE — 82607 VITAMIN B-12: CPT

## 2023-11-14 PROCEDURE — 85025 COMPLETE CBC W/AUTO DIFF WBC: CPT

## 2023-11-14 PROCEDURE — 82043 UR ALBUMIN QUANTITATIVE: CPT

## 2023-11-14 PROCEDURE — 82570 ASSAY OF URINE CREATININE: CPT

## 2023-11-14 PROCEDURE — 84443 ASSAY THYROID STIM HORMONE: CPT

## 2023-11-14 PROCEDURE — 84439 ASSAY OF FREE THYROXINE: CPT

## 2023-11-14 PROCEDURE — 82306 VITAMIN D 25 HYDROXY: CPT

## 2023-11-15 LAB
25(OH)D3 SERPL-MCNC: 47.9 NG/ML (ref 30–100)
ALBUMIN SERPL-MCNC: 3.9 G/DL (ref 3.5–5.2)
ALBUMIN/GLOB SERPL: 1.2 G/DL
ALP SERPL-CCNC: 52 U/L (ref 39–117)
ALT SERPL W P-5'-P-CCNC: 14 U/L (ref 1–33)
ANION GAP SERPL CALCULATED.3IONS-SCNC: 14.2 MMOL/L (ref 5–15)
AST SERPL-CCNC: 19 U/L (ref 1–32)
BACTERIA UR QL AUTO: ABNORMAL /HPF
BILIRUB SERPL-MCNC: 0.3 MG/DL (ref 0–1.2)
BILIRUB UR QL STRIP: NEGATIVE
BUN SERPL-MCNC: 14 MG/DL (ref 8–23)
BUN/CREAT SERPL: 14.9 (ref 7–25)
CALCIUM SPEC-SCNC: 9.7 MG/DL (ref 8.6–10.5)
CHLORIDE SERPL-SCNC: 98 MMOL/L (ref 98–107)
CHOLEST SERPL-MCNC: 183 MG/DL (ref 0–200)
CLARITY UR: CLEAR
CO2 SERPL-SCNC: 22.8 MMOL/L (ref 22–29)
COLOR UR: YELLOW
CREAT SERPL-MCNC: 0.94 MG/DL (ref 0.57–1)
EGFRCR SERPLBLD CKD-EPI 2021: 60.7 ML/MIN/1.73
GLOBULIN UR ELPH-MCNC: 3.3 GM/DL
GLUCOSE SERPL-MCNC: 87 MG/DL (ref 65–99)
GLUCOSE UR STRIP-MCNC: NEGATIVE MG/DL
HDLC SERPL-MCNC: 69 MG/DL (ref 40–60)
HGB UR QL STRIP.AUTO: NEGATIVE
HYALINE CASTS UR QL AUTO: ABNORMAL /LPF
KETONES UR QL STRIP: ABNORMAL
LDLC SERPL CALC-MCNC: 97 MG/DL (ref 0–100)
LDLC/HDLC SERPL: 1.37 {RATIO}
LEUKOCYTE ESTERASE UR QL STRIP.AUTO: ABNORMAL
NITRITE UR QL STRIP: NEGATIVE
PH UR STRIP.AUTO: 6.5 [PH] (ref 5–8)
POTASSIUM SERPL-SCNC: 4.9 MMOL/L (ref 3.5–5.2)
PROT SERPL-MCNC: 7.2 G/DL (ref 6–8.5)
PROT UR QL STRIP: ABNORMAL
RBC # UR STRIP: ABNORMAL /HPF
REF LAB TEST METHOD: ABNORMAL
SODIUM SERPL-SCNC: 135 MMOL/L (ref 136–145)
SP GR UR STRIP: 1.02 (ref 1–1.03)
SQUAMOUS #/AREA URNS HPF: ABNORMAL /HPF
T3FREE SERPL-MCNC: 2.51 PG/ML (ref 2–4.4)
T4 FREE SERPL-MCNC: 1.94 NG/DL (ref 0.93–1.7)
TRIGL SERPL-MCNC: 97 MG/DL (ref 0–150)
TSH SERPL DL<=0.05 MIU/L-ACNC: 0.52 UIU/ML (ref 0.27–4.2)
UROBILINOGEN UR QL STRIP: ABNORMAL
VIT B12 BLD-MCNC: 277 PG/ML (ref 211–946)
VLDLC SERPL-MCNC: 17 MG/DL (ref 5–40)
WBC # UR STRIP: ABNORMAL /HPF

## 2023-11-21 ENCOUNTER — OFFICE VISIT (OUTPATIENT)
Dept: INTERNAL MEDICINE | Facility: CLINIC | Age: 83
End: 2023-11-21
Payer: MEDICARE

## 2023-11-21 VITALS
WEIGHT: 191.2 LBS | HEART RATE: 63 BPM | BODY MASS INDEX: 33.88 KG/M2 | TEMPERATURE: 98 F | DIASTOLIC BLOOD PRESSURE: 80 MMHG | HEIGHT: 63 IN | SYSTOLIC BLOOD PRESSURE: 144 MMHG | OXYGEN SATURATION: 98 %

## 2023-11-21 DIAGNOSIS — L29.9 CHRONIC PRURITUS: ICD-10-CM

## 2023-11-21 DIAGNOSIS — E03.9 ACQUIRED HYPOTHYROIDISM: ICD-10-CM

## 2023-11-21 DIAGNOSIS — H40.053 BILATERAL OCULAR HYPERTENSION: ICD-10-CM

## 2023-11-21 DIAGNOSIS — Z91.81 AT RISK FOR FALLS: Chronic | ICD-10-CM

## 2023-11-21 DIAGNOSIS — I25.84 CORONARY ARTERY CALCIFICATION OF NATIVE ARTERY: Chronic | ICD-10-CM

## 2023-11-21 DIAGNOSIS — E66.09 CLASS 1 OBESITY DUE TO EXCESS CALORIES WITH SERIOUS COMORBIDITY AND BODY MASS INDEX (BMI) OF 33.0 TO 33.9 IN ADULT: ICD-10-CM

## 2023-11-21 DIAGNOSIS — I10 BENIGN ESSENTIAL HYPERTENSION: ICD-10-CM

## 2023-11-21 DIAGNOSIS — E55.9 VITAMIN D DEFICIENCY: ICD-10-CM

## 2023-11-21 DIAGNOSIS — Z00.00 ANNUAL PHYSICAL EXAM: ICD-10-CM

## 2023-11-21 DIAGNOSIS — K59.01 SLOW TRANSIT CONSTIPATION: Chronic | ICD-10-CM

## 2023-11-21 DIAGNOSIS — I25.119 CORONARY ARTERY DISEASE INVOLVING NATIVE CORONARY ARTERY OF NATIVE HEART WITH ANGINA PECTORIS: ICD-10-CM

## 2023-11-21 DIAGNOSIS — Z78.0 OSTEOPENIA AFTER MENOPAUSE: ICD-10-CM

## 2023-11-21 DIAGNOSIS — R00.2 PALPITATIONS: ICD-10-CM

## 2023-11-21 DIAGNOSIS — E78.5 HYPERLIPIDEMIA LDL GOAL <70: ICD-10-CM

## 2023-11-21 DIAGNOSIS — E53.8 B12 DEFICIENCY: ICD-10-CM

## 2023-11-21 DIAGNOSIS — Z00.00 MEDICARE ANNUAL WELLNESS VISIT, SUBSEQUENT: Primary | ICD-10-CM

## 2023-11-21 DIAGNOSIS — I25.10 CORONARY ARTERY CALCIFICATION OF NATIVE ARTERY: Chronic | ICD-10-CM

## 2023-11-21 DIAGNOSIS — M85.80 OSTEOPENIA AFTER MENOPAUSE: ICD-10-CM

## 2023-11-21 RX ORDER — METOPROLOL SUCCINATE 50 MG/1
50 TABLET, EXTENDED RELEASE ORAL DAILY
Qty: 90 TABLET | Refills: 1 | Status: SHIPPED | OUTPATIENT
Start: 2023-11-21 | End: 2023-11-27

## 2023-11-21 RX ORDER — ASPIRIN 81 MG/1
81 TABLET ORAL DAILY
Qty: 90 TABLET | Refills: 3 | Status: SHIPPED | OUTPATIENT
Start: 2023-11-21

## 2023-11-21 RX ORDER — LANOLIN ALCOHOL/MO/W.PET/CERES
1000 CREAM (GRAM) TOPICAL DAILY
Qty: 90 TABLET | Refills: 3 | Status: SHIPPED | OUTPATIENT
Start: 2023-11-21

## 2023-11-21 RX ORDER — LOSARTAN POTASSIUM 100 MG/1
100 TABLET ORAL DAILY
Qty: 90 TABLET | Refills: 3 | Status: SHIPPED | OUTPATIENT
Start: 2023-11-21 | End: 2023-11-27 | Stop reason: SDUPTHER

## 2023-11-21 RX ORDER — RALOXIFENE HYDROCHLORIDE 60 MG/1
60 TABLET, FILM COATED ORAL DAILY
Qty: 90 TABLET | Refills: 3 | Status: SHIPPED | OUTPATIENT
Start: 2023-11-21 | End: 2023-11-27 | Stop reason: SDUPTHER

## 2023-11-21 NOTE — PROGRESS NOTES
The ABCs of the Annual Wellness Visit  Initial Medicare Wellness Visit    Chief Complaint   Patient presents with    Medicare Wellness-subsequent    Hypertension    Sneezing     More often lately       Subjective   History of Present Illness:    The patient presents for an Annual Wellness Visit.    Fall risk  The patient did have one fall at home. She always has pain when she lands on her knees, but no lasting pain. The patient lost her balance. She does not drink a lot of water. The patient gets protein at each meal. Her meals are well-balanced. The patient denies dizziness. She avoids climbing on step stools or ladders. The patient always holds onto the banister. She tries to wear good supportive shoes as much as possible. The patient does not wear high heels.    Health maintenance  Dr. Iniguez did the patient's eye exam and everything was about the same. She occasionally has some dyspnea, but no chest pain. She denies any ear pain. The patient needs to get a hearing aid as her hearing is getting worse. The patient has not had a hearing test lately. She denies any swelling in her feet. The patient must get up 3 times during the night to urinate, but she is able to go back to sleep. The patient denies any trouble with any of her medications. She takes aspirin occasionally. The patient does not use Benefiber. She takes 2 stool softeners a day and 1 probiotic. The patient has been trying to eat cereal and strawberries. She urinates too much. Sometimes she cannot make it to the bathroom in time. It has been a gradual thing. The patient denies any burning with urination. When she went to do her labs, she was unable to urinate very much. It was a very small amount. She does not like electrolytes. She just got V8 juice yesterday. Her kidney function is good. Her liver enzymes are all normal. Her thyroid looks good. Her vitamin D is 47.9 ng/mL. Her B12 is low. The patient has tried taking the pills, but she thinks they are  "constipating her. She would like to try the B12 pill again.    Hypertension  The patient's blood pressure was a little elevated today at 144/80 mmHg. In 2023 it was 122/60 mmHg.     Breast soreness  The patient notes her breasts are sore. She has cut down on her caffeine intake. She has pain right at her rib cage and it feels \"lumpy\".     Hyperlipidemia  Her LDL is 97 mg/dL. Her HDL is 69 mg/dL.    Palpitations  She takes aspirin occasionally.    Vitamin D deficiency  Her vitamin D is at 47.9 ng/mL.    Chronic pruritus  The patient has eczema and psoriasis. She saw the dermatologist this year. She was given something to put on her skin, but nothing seemed to help. She did the light treatment for a while at his office. She was given hydroxyzine to take at night. She does not want to be dizzy from that. The patient does not want to take it for sleep.      CHRONIC CONDITIONS:    HEALTH RISK ASSESSMENT    Recent Hospitalizations:  She was not admitted to the hospital during the last year.       Current Medical Providers:  Patient Care Team:  Bernadette Somers MD as PCP - General (Internal Medicine)  Syed Hooker MD as Consulting Physician (Cardiology)  Celio Iniguez MD as Consulting Physician (Ophthalmology)  Ender Jolly MD as Consulting Physician (Dermatology)  Jil Arreola APRN as Nurse Practitioner (Nurse Practitioner)  Josh Noyola Jr., MD as Consulting Physician (Hand Surgery)    Smoking Status:  Social History     Tobacco Use   Smoking Status Former    Packs/day: 1.00    Years: 20.00    Additional pack years: 0.00    Total pack years: 20.00    Types: Cigarettes    Start date: 1960    Quit date: 1980    Years since quittin.9   Smokeless Tobacco Never       Alcohol Consumption:  Social History     Substance and Sexual Activity   Alcohol Use Yes    Alcohol/week: 7.0 standard drinks of alcohol    Types: 7 Glasses of wine per week    Comment: 2 glasses wine  with dinner every " day.  grand total 8-9oz       Depression Screen:       2023     1:51 PM   PHQ-2/PHQ-9 Depression Screening   Little Interest or Pleasure in Doing Things 0-->not at all   Feeling Down, Depressed or Hopeless 1-->several days   PHQ-9: Brief Depression Severity Measure Score 1       Fall Risk Screen:  ANTOINETTE Fall Risk Assessment was completed, and patient is at MODERATE risk for falls. Assessment completed on:2023    Health Habits and Functional and Cognitive Screenin/21/2023     1:51 PM   Functional & Cognitive Status   Do you have difficulty preparing food and eating? No   Do you have difficulty bathing yourself, getting dressed or grooming yourself? No   Do you have difficulty using the toilet? No   Do you have difficulty moving around from place to place? Yes   Do you have trouble with steps or getting out of a bed or a chair? Yes   Current Diet Well Balanced Diet   Dental Exam Up to date   Eye Exam Up to date   Exercise (times per week) 0 times per week   Current Exercises Include No Regular Exercise   Do you need help using the phone?  No   Are you deaf or do you have serious difficulty hearing?  Yes   Do you need help to go to places out of walking distance? No   Do you need help shopping? No   Do you need help preparing meals?  No   Do you need help with housework?  No   Do you need help with laundry? No   Do you need help taking your medications? No   Do you need help managing money? No   Do you ever drive or ride in a car without wearing a seat belt? No   Have you felt unusual stress, anger or loneliness in the last month? No   Who do you live with? Spouse   If you need help, do you have trouble finding someone available to you? No   Have you been bothered in the last four weeks by sexual problems? No   Do you have difficulty concentrating, remembering or making decisions? No         Age-appropriate Screening Schedule:  Refer to the list below for future screening recommendations based on  patient's age, sex and/or medical conditions. Orders for these recommended tests are listed in the plan section. The patient has been provided with a written plan.    Health Maintenance   Topic Date Due    COLORECTAL CANCER SCREENING  03/01/2023    ANNUAL WELLNESS VISIT  12/19/2023    BMI FOLLOWUP  02/21/2024    LIPID PANEL  11/14/2024    DXA SCAN  06/15/2025    TDAP/TD VACCINES (2 - Td or Tdap) 10/10/2031    COVID-19 Vaccine  Completed    INFLUENZA VACCINE  Completed    Pneumococcal Vaccine 65+  Completed    ZOSTER VACCINE  Discontinued        The following portions of the patient's history were reviewed and updated as appropriate: allergies, current medications, past family history, past medical history, past social history, past surgical history, and problem list.    Does the patient have evidence of cognitive impairment? No    Aspirin is on active medication list. Aspirin use is indicated based on review of current medical condition/s. Pros and cons of this therapy have been discussed today. Benefits of this medication outweigh potential harm.  Patient has been encouraged to continue taking this medication.  .      Outpatient Medications Prior to Visit   Medication Sig Dispense Refill    Biotin 5000 MCG sublingual tablet take 1 by oral route  every day      CALCIUM PO Take 1 tablet by mouth Daily. Calcium 600 + vitamin d      Cholecalciferol (Vitamin D3) 50 MCG (2000 UT) capsule Take 1 capsule by mouth Daily.      desonide (DESOWEN) 0.05 % ointment APPLY TO AREAS TWICE A DAY AS NEEDED      Docusate Sodium 100 MG capsule take 1 tablet by oral route twice daily      furosemide (LASIX) 20 MG tablet TAKE 1 TABLET BY MOUTH DAILY AS NEEDED (EDEMA). 90 tablet 1    Glucosamine-Chondroitin (OSTEO BI-FLEX REGULAR STRENGTH PO) 1 daily      latanoprost (XALATAN) 0.005 % ophthalmic solution Administer 1 drop to both eyes Every Night.      levothyroxine (SYNTHROID, LEVOTHROID) 125 MCG tablet TAKE 1 TABLET DAILY 90 tablet 3     meloxicam (MOBIC) 7.5 MG tablet Take 1 tablet by mouth Daily As Needed.      omeprazole (priLOSEC) 40 MG capsule TAKE 1 CAPSULE DAILY 90 capsule 3    Probiotic Product (PROBIOTIC DAILY PO) One capsule daily      Vitamin E 400 units tablet Daily.      aspirin 81 MG EC tablet Take 1 tablet by mouth Daily. (Patient taking differently: Take 1 tablet by mouth Daily. Pt takes rarely on occasion) 90 tablet 3    losartan (COZAAR) 100 MG tablet TAKE 1 TABLET DAILY 90 tablet 3    metoprolol succinate XL (TOPROL-XL) 50 MG 24 hr tablet TAKE 1 TABLET DAILY 90 tablet 1    raloxifene (EVISTA) 60 MG tablet TAKE 1 TABLET DAILY 90 tablet 3    gentamicin (GARAMYCIN) 0.3 % ophthalmic solution Please administer one drop to affected ear 4 times a day. For up to 7d (Patient not taking: Reported on 11/21/2023) 5 mL 0    Wheat Dextrin (Benefiber Healthy Shape) powder Take 1 Scoop by mouth Daily. (Patient taking differently: Take 1 Scoop by mouth Daily As Needed.) 248 g 5     Facility-Administered Medications Prior to Visit   Medication Dose Route Frequency Provider Last Rate Last Admin    ondansetron ODT (ZOFRAN-ODT) disintegrating tablet 4 mg  4 mg Oral Q6H PRN Marcial Saavedra MD           Patient Active Problem List   Diagnosis    Osteopenia after menopause    Chronic bilateral low back pain without sciatica    Primary osteoarthritis of both knees    Numbness and tingling of both legs below knees    Chronic pruritus    Acquired hypothyroidism    Acne rosacea    Vitamin D deficiency    Benign essential hypertension    Right hip pain    Poor balance    Pedal edema    History of benign breast biopsy    Other chronic pain    Coronary artery disease of native artery of native heart with stable angina pectoris    Hyperlipidemia LDL goal <70    Palpitations    Class 1 obesity due to excess calories with body mass index (BMI) of 33.0 to 33.9 in adult    Coronary artery calcification of native artery    Breast tenderness in female    Chest pain  "   Bilateral ocular hypertension    KING (dyspnea on exertion)    Cervicalgia    Numbness of arm    B12 deficiency    Carpal tunnel syndrome    Fibrositis    Myopia    Open-angle glaucoma    Presbyopia    Regular astigmatism    Vitreous degeneration    Cervical myofascial pain syndrome    DDD (degenerative disc disease), cervical    Annual physical exam    Medicare annual wellness visit, subsequent    Rib pain on right side    At risk for falls    Cough in adult    Primary osteoarthritis of both hands    Fall on same level    Chronic left shoulder pain    Pain of right thigh    Perennial allergic rhinitis with seasonal variation    Slow transit constipation    Strain of right hamstring    Decreased range of motion of left shoulder    Bilateral arm pain    Left foot pain       Advanced Care Planning:  Advance Directive is not on file.  ACP discussion was held with the patient during this visit. Patient does not have an advance directive, information provided.    Review of Systems    Compared to one year ago, the patient feels her physical health is the same.  Compared to one year ago, the patient feels her mental health is the same.    Reviewed chart for potential of high risk medication in the elderly: yes  Reviewed chart for potential of harmful drug interactions in the elderly:yes    Objective         Vitals:    11/21/23 1348   BP: 144/80   BP Location: Left arm   Patient Position: Sitting   Cuff Size: Adult   Pulse: 63   Temp: 98 °F (36.7 °C)   TempSrc: Infrared   SpO2: 98%   Weight: 86.7 kg (191 lb 3.2 oz)   Height: 160 cm (62.99\")     BMI is >= 30 and <35. (Class 1 Obesity). The following options were offered after discussion;: weight loss educational material (shared in after visit summary), exercise counseling/recommendations, nutrition counseling/recommendations, and Information on healthy weight added to patient's after visit summary.    Body mass index is 33.88 kg/m².  Discussed the patient's BMI with her. " The BMI is above average; BMI management plan is completed.    Physical Exam  Vitals and nursing note reviewed.   Constitutional:       Appearance: She is well-developed.      Comments: Obese.   Eyes:      Conjunctiva/sclera: Conjunctivae normal.      Pupils: Pupils are equal, round, and reactive to light.   Neck:      Thyroid: No thyromegaly.   Cardiovascular:      Rate and Rhythm: Normal rate and regular rhythm.      Heart sounds: Normal heart sounds. No murmur heard.  Pulmonary:      Effort: Pulmonary effort is normal.      Breath sounds: Normal breath sounds. No wheezing.   Chest:   Breasts:     Right: No inverted nipple, mass, nipple discharge, skin change or tenderness.      Left: No inverted nipple, mass, nipple discharge, skin change or tenderness.   Abdominal:      General: Bowel sounds are normal. There is no distension.      Palpations: Abdomen is soft. There is no mass.      Tenderness: There is no abdominal tenderness.   Musculoskeletal:         General: No tenderness. Normal range of motion.      Cervical back: Normal range of motion and neck supple.   Lymphadenopathy:      Cervical: No cervical adenopathy.   Skin:     General: Skin is warm and dry.      Findings: No rash.   Neurological:      Mental Status: She is alert and oriented to person, place, and time.      Cranial Nerves: No cranial nerve deficit.      Sensory: No sensory deficit.      Coordination: Coordination normal.      Gait: Gait normal.   Psychiatric:         Speech: Speech normal.         Behavior: Behavior normal.         Thought Content: Thought content normal.         Judgment: Judgment normal.           ECG 12 Lead    Date/Time: 11/21/2023 2:33 PM  Performed by: Bernadette Somers MD    Authorized by: Bernadette Somers MD  Comparison: compared with previous ECG   Similar to previous ECG  Rhythm: sinus rhythm  Rate: normal  BPM: 61  Conduction: conduction normal  ST Segments: ST segments normal  T Waves: T waves normal  QRS axis:  normal    Clinical impression: normal ECG          Lab Results   Component Value Date    TRIG 97 11/14/2023    HDL 69 (H) 11/14/2023    LDL 97 11/14/2023    VLDL 17 11/14/2023        Assessment & Plan   Medicare Risks and Personalized Health Plan  CMS Preventative Services Quick Reference  Cardiovascular risk  Obesity/Overweight   Osteoporosis Risk    The above risks/problems have been discussed with the patient.  Pertinent information has been shared with the patient in the After Visit Summary.  Follow up plans and orders are seen below in the Assessment/Plan Section.  Patient Instructions   Problem List Items Addressed This Visit          Advance Directives and General Issues    At risk for falls (Chronic)    Current Assessment & Plan     - Staying well hydrated and doing some regular exercise does help prevent falls.   - Eat some protein 3 times per day with meals.   - Losing some weight would also help improve body weight balance.              Cardiac and Vasculature    Coronary artery calcification of native artery (Chronic)    Overview     Stress test in September 2019 showed some calcification of the coronary arteries on the CT portion of the test.  The patient saw Dr. Hooker and was not felt to have significant coronary artery disease.    Not taking 81 mg enteric-coated aspirin daily.  Improve blood pressure control.  Improve low-fat diet.  Increase exercise.         Current Assessment & Plan     - Continue risk reduction with good control of blood pressure and cholesterol.   - Do some regular exercise and increase physical activity.   - Decrease fat in the diet.   - Take 81 mg of coated aspirin every day.         Relevant Medications    metoprolol succinate XL (TOPROL-XL) 50 MG 24 hr tablet    Other Relevant Orders    ECG 12 Lead    Benign essential hypertension    Overview     Taking losartan 100mg and metoprolol  50mg  daily.               Current Assessment & Plan     - Continue taking losartan and  metoprolol.   - Continue to avoid salt in the diet.         Relevant Medications    furosemide (LASIX) 20 MG tablet    metoprolol succinate XL (TOPROL-XL) 50 MG 24 hr tablet    losartan (COZAAR) 100 MG tablet    Other Relevant Orders    CBC & Differential (Completed)    Comprehensive Metabolic Panel (Completed)    Microalbumin / Creatinine Urine Ratio - Urine, Clean Catch (Completed)    Urinalysis With Culture If Indicated - (Completed)    Hyperlipidemia LDL goal <70    Overview     Lifestyle control.         Current Assessment & Plan     - Decrease fat and carbohydrates in the diet.   - Increase physical activity.           Relevant Orders    Lipid Panel (Completed)    TSH (Completed)    T4, Free (Completed)    T3, Free (Completed)    Palpitations    Overview     14-day heart monitor 9/4/2019: Wear time 12 days.  Average heart rate 82 bpm.  PACs less than 1% brief SVT longest 8 beats.         Current Assessment & Plan     - She will continue taking metoprolol 1 time per day.         Relevant Medications    metoprolol succinate XL (TOPROL-XL) 50 MG 24 hr tablet       Endocrine and Metabolic    B12 deficiency    Relevant Orders    Vitamin B12 (Completed)    Acquired hypothyroidism    Overview     Levothyroxine 125mcg.         Current Assessment & Plan     - Continue taking the current dose of levothyroxine daily.         Relevant Medications    levothyroxine (SYNTHROID, LEVOTHROID) 125 MCG tablet    metoprolol succinate XL (TOPROL-XL) 50 MG 24 hr tablet    Vitamin D deficiency    Current Assessment & Plan     - Continue taking vitamin D3 daily.         Relevant Orders    Vitamin D,25-Hydroxy (Completed)    Class 1 obesity due to excess calories with body mass index (BMI) of 33.0 to 33.9 in adult    Current Assessment & Plan     - Decrease fat and carbohydrates in the diet.   - Eat smaller portions at mealtimes.   - Avoid snacking in between meals.   - Weigh at home once per week to monitor progress.            Eye     Bilateral ocular hypertension    Overview     She will follow-up with Dr. Iniguez as planned.         Current Assessment & Plan     - Continue with regular follow-up with Dr. Iniguez.         Relevant Medications    latanoprost (XALATAN) 0.005 % ophthalmic solution       Gastrointestinal Abdominal     Slow transit constipation (Chronic)    Overview     Taking probiotic and stool softeners daily.         Current Assessment & Plan     - She will continue taking a daily probiotic and stool softener.   - Drink more fluids, especially water.            Health Encounters    Annual physical exam    Current Assessment & Plan     - She is up to date on immunizations.   - She is up to date on the DEXA scan.   - She is encouraged to do a colonoscopy.           Medicare annual wellness visit, subsequent - Primary    Current Assessment & Plan     - She is up to date on immunizations.   - She is up to date on the DEXA scan.   - She is encouraged to do a colonoscopy.              Musculoskeletal and Injuries    Osteopenia after menopause    Overview     DEXA 3/20/2021 showed decline in T-scores in the spine and the hip.  T-scores are still in the osteopenia range at -1.7 in the spine and -2.2 in the hip.    DEXA 5/6/2023 showed some improvement in T-scores of the spine and the hip.  The lowest T-score is in the hip at -1.6.    Taking Evista daily.             Current Assessment & Plan     - Continue taking Evista daily.   - Continue doing weightbearing exercises including walking and any activities done on the feet.  - She will use small hand weights at home.   - Continue taking vitamin D3 and calcium.            Skin    Chronic pruritus    Overview     Sees Dr. Jolly.         Current Assessment & Plan     - She will continue with regular follow-up with Dr. Jolly.          Other Visit Diagnoses       Coronary artery disease involving native coronary artery of native heart with angina pectoris        Relevant Medications    metoprolol  succinate XL (TOPROL-XL) 50 MG 24 hr tablet    aspirin 81 MG EC tablet             Follow Up:  Next Medicare Wellness visit to be scheduled in 1 year.      She will follow-up with me for a fasting follow-up in 6 months.    An After Visit Summary and PPPS were given to the patient.        Follow Up   Return in about 6 months (around 5/21/2024) for recheck fasting.  Patient was given instructions and counseling regarding her condition or for health maintenance advice. Please see specific information pulled into the AVS if appropriate.     Transcribed from ambient dictation for Bernadette Somers MD by Estefany Wright.  11/21/23   15:47 EST    Patient or patient representative verbalized consent to the visit recording.  I have personally performed the services described in this document as transcribed by the above individual, and it is both accurate and complete.

## 2023-11-21 NOTE — ASSESSMENT & PLAN NOTE
- Decrease fat and carbohydrates in the diet.   - Eat smaller portions at mealtimes.   - Avoid snacking in between meals.   - Weigh at home once per week to monitor progress.

## 2023-11-21 NOTE — ASSESSMENT & PLAN NOTE
- Continue risk reduction with good control of blood pressure and cholesterol.   - Do some regular exercise and increase physical activity.   - Decrease fat in the diet.   - Take 81 mg of coated aspirin every day.

## 2023-11-21 NOTE — ASSESSMENT & PLAN NOTE
- She is up to date on immunizations.   - She is up to date on the DEXA scan.   - She is encouraged to do a colonoscopy.

## 2023-11-21 NOTE — ASSESSMENT & PLAN NOTE
- She will continue taking a daily probiotic and stool softener.   - Drink more fluids, especially water.

## 2023-11-21 NOTE — ASSESSMENT & PLAN NOTE
- Staying well hydrated and doing some regular exercise does help prevent falls.   - Eat some protein 3 times per day with meals.   - Losing some weight would also help improve body weight balance.

## 2023-11-21 NOTE — ASSESSMENT & PLAN NOTE
- Continue taking Evista daily.   - Continue doing weightbearing exercises including walking and any activities done on the feet.  - She will use small hand weights at home.   - Continue taking vitamin D3 and calcium.

## 2023-11-22 NOTE — PATIENT INSTRUCTIONS
Patient Instructions  Problem List Items Addressed This Visit          Advance Directives and General Issues    At risk for falls (Chronic)    Current Assessment & Plan     - Staying well hydrated and doing some regular exercise does help prevent falls.   - Eat some protein 3 times per day with meals.   - Losing some weight would also help improve body weight balance.              Cardiac and Vasculature    Coronary artery calcification of native artery (Chronic)    Overview     Stress test in September 2019 showed some calcification of the coronary arteries on the CT portion of the test.  The patient saw Dr. Hooker and was not felt to have significant coronary artery disease.    Not taking 81 mg enteric-coated aspirin daily.  Improve blood pressure control.  Improve low-fat diet.  Increase exercise.         Current Assessment & Plan     - Continue risk reduction with good control of blood pressure and cholesterol.   - Do some regular exercise and increase physical activity.   - Decrease fat in the diet.   - Take 81 mg of coated aspirin every day.         Relevant Medications    metoprolol succinate XL (TOPROL-XL) 50 MG 24 hr tablet    Other Relevant Orders    ECG 12 Lead    Benign essential hypertension    Overview     Taking losartan 100mg and metoprolol  50mg  daily.               Current Assessment & Plan     - Continue taking losartan and metoprolol.   - Continue to avoid salt in the diet.         Relevant Medications    furosemide (LASIX) 20 MG tablet    metoprolol succinate XL (TOPROL-XL) 50 MG 24 hr tablet    losartan (COZAAR) 100 MG tablet    Other Relevant Orders    CBC & Differential (Completed)    Comprehensive Metabolic Panel (Completed)    Microalbumin / Creatinine Urine Ratio - Urine, Clean Catch (Completed)    Urinalysis With Culture If Indicated - (Completed)    Hyperlipidemia LDL goal <70    Overview     Lifestyle control.         Current Assessment & Plan     - Decrease fat and carbohydrates in  the diet.   - Increase physical activity.           Relevant Orders    Lipid Panel (Completed)    TSH (Completed)    T4, Free (Completed)    T3, Free (Completed)    Palpitations    Overview     14-day heart monitor 9/4/2019: Wear time 12 days.  Average heart rate 82 bpm.  PACs less than 1% brief SVT longest 8 beats.         Current Assessment & Plan     - She will continue taking metoprolol 1 time per day.         Relevant Medications    metoprolol succinate XL (TOPROL-XL) 50 MG 24 hr tablet       Endocrine and Metabolic    B12 deficiency    Relevant Orders    Vitamin B12 (Completed)    Acquired hypothyroidism    Overview     Levothyroxine 125mcg.         Current Assessment & Plan     - Continue taking the current dose of levothyroxine daily.         Relevant Medications    levothyroxine (SYNTHROID, LEVOTHROID) 125 MCG tablet    metoprolol succinate XL (TOPROL-XL) 50 MG 24 hr tablet    Vitamin D deficiency    Current Assessment & Plan     - Continue taking vitamin D3 daily.         Relevant Orders    Vitamin D,25-Hydroxy (Completed)    Class 1 obesity due to excess calories with body mass index (BMI) of 33.0 to 33.9 in adult    Current Assessment & Plan     - Decrease fat and carbohydrates in the diet.   - Eat smaller portions at mealtimes.   - Avoid snacking in between meals.   - Weigh at home once per week to monitor progress.            Eye    Bilateral ocular hypertension    Overview     She will follow-up with Dr. Iniguez as planned.         Current Assessment & Plan     - Continue with regular follow-up with Dr. Iniguez.         Relevant Medications    latanoprost (XALATAN) 0.005 % ophthalmic solution       Gastrointestinal Abdominal     Slow transit constipation (Chronic)    Overview     Taking probiotic and stool softeners daily.         Current Assessment & Plan     - She will continue taking a daily probiotic and stool softener.   - Drink more fluids, especially water.            Health Encounters    Annual  physical exam    Current Assessment & Plan     - She is up to date on immunizations.   - She is up to date on the DEXA scan.   - She is encouraged to do a colonoscopy.           Medicare annual wellness visit, subsequent - Primary    Current Assessment & Plan     - She is up to date on immunizations.   - She is up to date on the DEXA scan.   - She is encouraged to do a colonoscopy.              Musculoskeletal and Injuries    Osteopenia after menopause    Overview     DEXA 3/20/2021 showed decline in T-scores in the spine and the hip.  T-scores are still in the osteopenia range at -1.7 in the spine and -2.2 in the hip.    DEXA 5/6/2023 showed some improvement in T-scores of the spine and the hip.  The lowest T-score is in the hip at -1.6.    Taking Evista daily.             Current Assessment & Plan     - Continue taking Evista daily.   - Continue doing weightbearing exercises including walking and any activities done on the feet.  - She will use small hand weights at home.   - Continue taking vitamin D3 and calcium.            Skin    Chronic pruritus    Overview     Sees Dr. Jolly.         Current Assessment & Plan     - She will continue with regular follow-up with Dr. Jolly.          Other Visit Diagnoses       Coronary artery disease involving native coronary artery of native heart with angina pectoris        Relevant Medications    metoprolol succinate XL (TOPROL-XL) 50 MG 24 hr tablet    aspirin 81 MG EC tablet           Exercising to Stay Healthy  To become healthy and stay healthy, it is recommended that you do moderate-intensity and vigorous-intensity exercise. You can tell that you are exercising at a moderate intensity if your heart starts beating faster and you start breathing faster but can still hold a conversation. You can tell that you are exercising at a vigorous intensity if you are breathing much harder and faster and cannot hold a conversation while exercising.  How can exercise benefit  me?  Exercising regularly is important. It has many health benefits, such as:  Improving overall fitness, flexibility, and endurance.  Increasing bone density.  Helping with weight control.  Decreasing body fat.  Increasing muscle strength and endurance.  Reducing stress and tension, anxiety, depression, or anger.  Improving overall health.  What guidelines should I follow while exercising?  Before you start a new exercise program, talk with your health care provider.  Do not exercise so much that you hurt yourself, feel dizzy, or get very short of breath.  Wear comfortable clothes and wear shoes with good support.  Drink plenty of water while you exercise to prevent dehydration or heat stroke.  Work out until your breathing and your heartbeat get faster (moderate intensity).  How often should I exercise?  Choose an activity that you enjoy, and set realistic goals. Your health care provider can help you make an activity plan that is individually designed and works best for you.  Exercise regularly as told by your health care provider. This may include:  Doing strength training two times a week, such as:  Lifting weights.  Using resistance bands.  Push-ups.  Sit-ups.  Yoga.  Doing a certain intensity of exercise for a given amount of time. Choose from these options:  A total of 150 minutes of moderate-intensity exercise every week.  A total of 75 minutes of vigorous-intensity exercise every week.  A mix of moderate-intensity and vigorous-intensity exercise every week.  Children, pregnant women, people who have not exercised regularly, people who are overweight, and older adults may need to talk with a health care provider about what activities are safe to perform. If you have a medical condition, be sure to talk with your health care provider before you start a new exercise program.  What are some exercise ideas?  Moderate-intensity exercise ideas include:  Walking 1 mile (1.6 km) in about 15  minutes.  Biking.  Hiking.  Golfing.  Dancing.  Water aerobics.  Vigorous-intensity exercise ideas include:  Walking 4.5 miles (7.2 km) or more in about 1 hour.  Jogging or running 5 miles (8 km) in about 1 hour.  Biking 10 miles (16.1 km) or more in about 1 hour.  Lap swimming.  Roller-skating or in-line skating.  Cross-country skiing.  Vigorous competitive sports, such as football, basketball, and soccer.  Jumping rope.  Aerobic dancing.  What are some everyday activities that can help me get exercise?  Yard work, such as:  Pushing a .  Raking and bagging leaves.  Washing your car.  Pushing a stroller.  Shoveling snow.  Gardening.  Washing windows or floors.  How can I be more active in my day-to-day activities?  Use stairs instead of an elevator.  Take a walk during your lunch break.  If you drive, park your car farther away from your work or school.  If you take public transportation, get off one stop early and walk the rest of the way.  Stand up or walk around during all of your indoor phone calls.  Get up, stretch, and walk around every 30 minutes throughout the day.  Enjoy exercise with a friend. Support to continue exercising will help you keep a regular routine of activity.  Where to find more information  You can find more information about exercising to stay healthy from:  [place] Department of Health and Human Services: www.hhs.gov  Centers for Disease Control and Prevention (CDC): www.cdc.gov  Summary  Exercising regularly is important. It will improve your overall fitness, flexibility, and endurance.  Regular exercise will also improve your overall health. It can help you control your weight, reduce stress, and improve your bone density.  Do not exercise so much that you hurt yourself, feel dizzy, or get very short of breath.  Before you start a new exercise program, talk with your health care provider.  This information is not intended to replace advice given to you by your health care  provider. Make sure you discuss any questions you have with your health care provider.  Document Revised: 04/15/2022 Document Reviewed: 04/15/2022  Elsevier Patient Education © 2023 [employer] Fall Prevention in the Home, Adult  Falls can cause injuries and affect people of all ages. There are many simple things that you can do to make your home safe and to help prevent falls. Ask for help when making these changes, if needed.  What actions can I take to prevent falls?  General instructions  Use good lighting in all rooms. Replace any light bulbs that burn out, turn on lights if it is dark, and use night-lights.  Place frequently used items in easy-to-reach places. Lower the shelves around your home if necessary.  Set up furniture so that there are clear paths around it. Avoid moving your furniture around.  Remove throw rugs and other tripping hazards from the floor.  Avoid walking on wet floors.  Fix any uneven floor surfaces.  Add color or contrast paint or tape to grab bars and handrails in your home. Place contrasting color strips on the first and last steps of staircases.  When you use a stepladder, make sure that it is completely opened and that the sides and supports are firmly locked. Have someone hold the ladder while you are using it. Do not climb a closed stepladder.  Know where your pets are when moving through your home.  What can I do in the bathroom?         Keep the floor dry. Immediately clean up any water that is on the floor.  Remove soap buildup in the tub or shower regularly.  Use nonskid mats or decals on the floor of the tub or shower.  Attach bath mats securely with double-sided, nonslip rug tape.  If you need to sit down while you are in the shower, use a plastic, nonslip stool.  Install grab bars by the toilet and in the tub and shower. Do not use towel bars as grab bars.  What can I do in the bedroom?  Make sure that a bedside light is easy to reach.  Do not use oversized bedding that  reaches the floor.  Have a firm chair that has side arms to use for getting dressed.  What can I do in the kitchen?  Clean up any spills right away.  If you need to reach for something above you, use a sturdy step stool that has a grab bar.  Keep electrical cables out of the way.  Do not use floor polish or wax that makes floors slippery. If you must use wax, make sure that it is non-skid floor wax.  What can I do with my stairs?  Do not leave any items on the stairs.  Make sure that you have a light switch at the top and the bottom of the stairs. Have them installed if you do not have them.  Make sure that there are handrails on both sides of the stairs. Fix handrails that are broken or loose. Make sure that handrails are as long as the staircases.  Install non-slip stair treads on all stairs in your home.  Avoid having throw rugs at the top or bottom of stairs, or secure the rugs with carpet tape to prevent them from moving.  Choose a carpet design that does not hide the edge of steps on the stairs.  Check any carpeting to make sure that it is firmly attached to the stairs. Fix any carpet that is loose or worn.  What can I do on the outside of my home?  Use bright outdoor lighting.  Regularly repair the edges of walkways and driveways and fix any cracks.  Remove high doorway thresholds.  Trim any shrubbery on the main path into your home.  Regularly check that handrails are securely fastened and in good repair. Both sides of all steps should have handrails.  Install guardrails along the edges of any raised decks or porches.  Clear walkways of debris and clutter, including tools and rocks.  Have leaves, snow, and ice cleared regularly.  Use sand or salt on walkways during winter months.  In the garage, clean up any spills right away, including grease or oil spills.  What other actions can I take?  Wear closed-toe shoes that fit well and support your feet. Wear shoes that have rubber soles or low heels.  Use  mobility aids as needed, such as canes, walkers, scooters, and crutches.  Review your medicines with your health care provider. Some medicines can cause dizziness or changes in blood pressure, which increase your risk of falling.  Talk with your health care provider about other ways that you can decrease your risk of falls. This may include working with a physical therapist or  to improve your strength, balance, and endurance.  Where to find more information  Centers for Disease Control and Prevention, [place]: www.cdc.gov  National May on Aging: www.josiah.nih.gov  Contact a health care provider if:  You are afraid of falling at home.  You feel weak, drowsy, or dizzy at home.  You fall at home.  Summary  There are many simple things that you can do to make your home safe and to help prevent falls.  Ways to make your home safe include removing tripping hazards and installing grab bars in the bathroom.  Ask for help when making these changes in your home.  This information is not intended to replace advice given to you by your health care provider. Make sure you discuss any questions you have with your health care provider.  Document Revised: 09/19/2022 Document Reviewed: 07/21/2021  [employer] Patient Education © 2023 [employer]  BMI for Adults  What is BMI?  Body mass index (BMI) is a number that is calculated from a person's weight and height. BMI can help estimate how much of a person's weight is composed of fat. BMI does not measure body fat directly. Rather, it is an alternative to procedures that directly measure body fat, which can be difficult and expensive.  BMI can help identify people who may be at higher risk for certain medical problems.  What are BMI measurements used for?  BMI is used as a screening tool to identify possible weight problems. It helps determine whether a person is obese, overweight, a healthy weight, or underweight.  BMI is useful for:  Identifying a weight problem that may  "be related to a medical condition or may increase the risk for medical problems.  Promoting changes, such as changes in diet and exercise, to help reach a healthy weight. BMI screening can be repeated to see if these changes are working.  How is BMI calculated?  BMI involves measuring your weight in relation to your height. Both height and weight are measured, and the BMI is calculated from those numbers. This can be done either in English ([place] or metric measurements. Note that charts and online BMI calculators are available to help you find your BMI quickly and easily without having to do these calculations yourself.  To calculate your BMI in English ([place] measurements:    Measure your weight in pounds (lb).  Multiply the number of pounds by 703.  For example, for a person who weighs 180 lb, multiply that number by 703, which equals [phone].  Measure your height in inches. Then multiply that number by itself to get a measurement called \"inches squared.\"  For example, for a person who is 70 inches tall, the \"inches squared\" measurement is 70 inches x 70 inches, which equals 4,900 inches squared.  Divide the total from step 2 (number of lb x 703) by the total from step 3 (inches squared): 126,540 ÷ 4,900 = 25.8. This is your BMI.  To calculate your BMI in metric measurements:  Measure your weight in kilograms (kg).  Measure your height in meters (m). Then multiply that number by itself to get a measurement called \"meters squared.\"  For example, for a person who is 1.75 m tall, the \"meters squared\" measurement is 1.75 m x 1.75 m, which is equal to 3.1 meters squared.  Divide the number of kilograms (your weight) by the meters squared number. In this example: 70 ÷ 3.1 = 22.6. This is your BMI.  What do the results mean?  BMI charts are used to identify whether you are underweight, normal weight, overweight, or obese. The following guidelines will be used:  Underweight: BMI less than 18.5.  Normal weight: BMI between " 18.5 and 24.9.  Overweight: BMI between 25 and 29.9.  Obese: BMI of 30 or above.  Keep these notes in mind:  Weight includes both fat and muscle, so someone with a muscular build, such as an athlete, may have a BMI that is higher than 24.9. In cases like these, BMI is not an accurate measure of body fat.  To determine if excess body fat is the cause of a BMI of 25 or higher, further assessments may need to be done by a health care provider.  BMI is usually interpreted in the same way for men and women.  Where to find more information  For more information about BMI, including tools to quickly calculate your BMI, go to these websites:  Centers for Disease Control and Prevention: www.cdc.gov  American Heart Association: www.heart.org  National Heart, Lung, and Blood [place]: www.nhlbi.nih.gov  Summary  Body mass index (BMI) is a number that is calculated from a person's weight and height.  BMI may help estimate how much of a person's weight is composed of fat. BMI can help identify those who may be at higher risk for certain medical problems.  BMI can be measured using English measurements or metric measurements.  BMI charts are used to identify whether you are underweight, normal weight, overweight, or obese.  This information is not intended to replace advice given to you by your health care provider. Make sure you discuss any questions you have with your health care provider.  Document Revised: 05/31/2023 Document Reviewed: 07/17/2020  [employer] Patient Education © 2023 [employer]

## 2023-11-26 ENCOUNTER — PATIENT MESSAGE (OUTPATIENT)
Dept: INTERNAL MEDICINE | Facility: CLINIC | Age: 83
End: 2023-11-26
Payer: MEDICARE

## 2023-11-26 RX ORDER — RALOXIFENE HYDROCHLORIDE 60 MG/1
60 TABLET, FILM COATED ORAL DAILY
Qty: 90 TABLET | Refills: 3 | Status: CANCELLED | OUTPATIENT
Start: 2023-11-26

## 2023-11-26 RX ORDER — LOSARTAN POTASSIUM 100 MG/1
100 TABLET ORAL DAILY
Qty: 90 TABLET | Refills: 3 | Status: CANCELLED | OUTPATIENT
Start: 2023-11-26

## 2023-11-27 DIAGNOSIS — R00.2 PALPITATIONS: ICD-10-CM

## 2023-11-27 DIAGNOSIS — I10 BENIGN ESSENTIAL HYPERTENSION: ICD-10-CM

## 2023-11-27 RX ORDER — METOPROLOL SUCCINATE 50 MG/1
50 TABLET, EXTENDED RELEASE ORAL DAILY
Qty: 90 TABLET | Refills: 3 | Status: SHIPPED | OUTPATIENT
Start: 2023-11-27

## 2023-11-27 RX ORDER — RALOXIFENE HYDROCHLORIDE 60 MG/1
60 TABLET, FILM COATED ORAL DAILY
Qty: 90 TABLET | Refills: 3 | OUTPATIENT
Start: 2023-11-27

## 2023-11-27 RX ORDER — LOSARTAN POTASSIUM 100 MG/1
100 TABLET ORAL DAILY
Qty: 90 TABLET | Refills: 3 | Status: SHIPPED | OUTPATIENT
Start: 2023-11-27

## 2023-11-27 RX ORDER — LOSARTAN POTASSIUM 100 MG/1
100 TABLET ORAL DAILY
Qty: 90 TABLET | Refills: 3 | OUTPATIENT
Start: 2023-11-27

## 2023-11-27 RX ORDER — RALOXIFENE HYDROCHLORIDE 60 MG/1
60 TABLET, FILM COATED ORAL DAILY
Qty: 90 TABLET | Refills: 3 | Status: SHIPPED | OUTPATIENT
Start: 2023-11-27

## 2023-11-27 NOTE — TELEPHONE ENCOUNTER
From: Serenity Lagos  To: Bernadette oSmers  Sent: 11/26/2023 12:30 PM EST  Subject: ROLA    My RX for Losartan and Raloxifene sent to [facility] mistakenly. They need to be sent to [facility]. Thank you.

## 2024-02-21 NOTE — PROGRESS NOTES
OFFICE VISIT  NOTE  [facility] CARDIOLOGY      Name: Serenity Lagos    Date: 2024  MRN:  [id number]  :  1940      REFERRING/PRIMARY PROVIDER:  Bernadette Somers MD     Chief Complaint   Patient presents with    Coronary Artery Disease     Coronary artery disease of native artery of native heart with stable angina pectoris         HPI: Serenity Lagos is a 83 y.o. female who presents today for follow-up for chest pain/coronary calcium.  Patient presented on 2019 to the ER with complaints of chest pain.  Described as substernal chest discomfort, awoke patient from sleep. She underwent exercise nuclear stress test 2019 which was normal, although did show coronary calcification on the CT portion. Had repeat stress MPS 2022  for recurrent chest pain, this was also negative for ischemia. Echo showed normal EF, normal diastolic function. 14 day monitor with 6 short SVT episodes, longest 9 beats. Patient remains asymptomatic from cardiac standpoint, denies any recurrent chest pain since last visit. She is sedentary at home, does computer work all day, not much housework.         ROS:Pertinent positives as listed in the HPI.  All other systems reviewed and negative.    Past Medical History:   Diagnosis Date    Cervicalgia     Chronic bronchitis     Coronary artery disease     Dysphagia     Glaucoma, open angle     Hearing loss     Hypertension     Hyperthyroidism     thyroid gland killed off with MD treatment    Hypothyroidism     regulated by Levothyroxine    Low back pain     Medicare annual wellness visit, subsequent 12/15/2020    Mixed hyperlipidemia     Osteoarthritis     cervical spine degenerative    Osteopenia     Perforation of tympanic membrane     Pruritus     chronic general pruritis    Salivary gland cancer     Scoliosis     discovered in x-rays by Orthopedist    Shingles     postherpetic neuralgia-thorax    Spondylolisthesis      lumbar spine    Wrist  tendonitis        Past Surgical History:   Procedure Laterality Date    APPENDECTOMY      BREAST BIOPSY      BREAST BIOPSY Right 2019    BREAST CYST EXCISION      ENDOSCOPY  2017    normal (per pt) EGD    MOUTH SURGERY  2000    TONSILLECTOMY         Social History     Socioeconomic History    Marital status:    Tobacco Use    Smoking status: Former     Packs/day: 1.00     Years: 20.00     Additional pack years: 0.00     Total pack years: 20.00     Types: Cigarettes     Start date: 1960     Quit date: 1980     Years since quittin.1    Smokeless tobacco: Never   Vaping Use    Vaping Use: Never used   Substance and Sexual Activity    Alcohol use: Yes     Alcohol/week: 7.0 standard drinks of alcohol     Types: 7 Glasses of wine per week     Comment: 2 glasses wine  with dinner every day.  grand total 8-9oz    Drug use: No    Sexual activity: Not Currently     Partners: Male       Family History   Problem Relation Age of Onset    Hypertension Mother     Heart failure Mother         CHF    Coronary artery disease Mother         CABG    Arthritis Mother         osteo arthritis    Lung cancer Father 59        Heavy smoker and Black Lung    Alcohol abuse Father     Rheum arthritis Sister     Other Sister 76        sepsis due to infected knee replacement    COPD Sister         heavy smoker     Alcohol abuse Sister     Arthritis Sister         severe rheumatoid arthritis    Alzheimer's disease Maternal Grandmother     Coronary artery disease Maternal Grandfather     Cancer Paternal Grandmother         stomach    Heart disease Paternal Grandfather     Hypertension Other     Obesity Other     Stroke Maternal Aunt     Heart disease Maternal Aunt     Stroke Maternal Aunt     Stroke Maternal Aunt     Coronary artery disease Maternal Uncle     Stroke Maternal Uncle     Heart disease Maternal Uncle     Breast cancer Neg Hx     Ovarian cancer Neg Hx         No Known Allergies    Current Outpatient Medications  "  Medication Instructions    aspirin 81 mg, Oral, Daily    Biotin 5000 MCG sublingual tablet take 1 by oral route  every day    CALCIUM PO 1 tablet, Oral, Daily, Calcium 600 + vitamin d     desonide (DESOWEN) 0.05 % ointment APPLY TO AREAS TWICE A DAY AS NEEDED    Docusate Sodium 100 MG capsule take 1 tablet by oral route twice daily    furosemide (LASIX) 20 mg, Oral, Daily PRN    Glucosamine-Chondroitin (OSTEO BI-FLEX REGULAR STRENGTH PO) 1 daily    latanoprost (XALATAN) 0.005 % ophthalmic solution 1 drop, Both Eyes, Nightly    levothyroxine (SYNTHROID, LEVOTHROID) 125 MCG tablet TAKE 1 TABLET DAILY    losartan (COZAAR) 100 mg, Oral, Daily    meloxicam (MOBIC) 7.5 mg, Oral, Daily PRN    metoprolol succinate XL (TOPROL-XL) 50 mg, Oral, Daily    omeprazole (priLOSEC) 40 MG capsule TAKE 1 CAPSULE DAILY    Probiotic Product (PROBIOTIC DAILY PO) One capsule daily    raloxifene (EVISTA) 60 mg, Oral, Daily    vitamin B-12 (CYANOCOBALAMIN) 1,000 mcg, Oral, Daily    Vitamin D3 2,000 Units, Oral, Daily    Vitamin E 400 units tablet Daily.       Vitals:    02/22/24 1509   BP: 138/90   BP Location: Left arm   Patient Position: Sitting   Cuff Size: Adult   Pulse: 58   SpO2: 100%   Weight: 88.7 kg (195 lb 9.6 oz)   Height: 162.6 cm (64\")     Body mass index is 33.57 kg/m².    PHYSICAL EXAM:    General Appearance:   well developed  well nourished  Neck:  thyroid not enlarged  supple  Respiratory:  no respiratory distress  normal breath sounds  no rales  Cardiovascular:  no jugular venous distention  regular rhythm  apical impulse normal  S1 normal, S2 normal  no S3, no S4   no murmur  no rub, no thrill  lower extremity edema: trace   Skin:   warm, dry    RESULTS:   Procedures    Results for orders placed during the hospital encounter of 12/29/22    Adult Transthoracic Echo Complete w/ Color, Spectral and Contrast if necessary per protocol    Interpretation Summary    Left ventricular systolic function is normal. Calculated left " "ventricular EF = 58.5% Left ventricular ejection fraction appears to be 61 - 65%.    Left ventricular diastolic function was normal.    Estimated right ventricular systolic pressure from tricuspid regurgitation is mildly elevated (35-45 mmHg).    Labs:  Lab Results   Component Value Date    CHOL 183 11/14/2023    TRIG 97 11/14/2023    HDL 69 (H) 11/14/2023    LDL 97 11/14/2023    AST 19 11/14/2023    ALT 14 11/14/2023     No results found for: \"HGBA1C\"  Creatinine   Date Value Ref Range Status   11/14/2023 0.94 0.57 - 1.00 mg/dL Final   06/15/2023 0.86 0.57 - 1.00 mg/dL Final   12/14/2022 0.89 0.57 - 1.00 mg/dL Final     eGFR Non  Amer   Date Value Ref Range Status   12/13/2021 65 >60 mL/min/1.73 Final   06/09/2021 64 >60 mL/min/1.73 Final   11/24/2020 77 >60 mL/min/1.73 Final         ASSESSMENT:  Problem List Items Addressed This Visit       Benign essential hypertension    Overview     Taking losartan 100mg and metoprolol  50mg  daily.               Coronary artery disease of native artery of native heart with stable angina pectoris    Overview     Myocardial perfusion stress test 9/23/2019 was normal: EF greater than 70%, no ischemia, coronary artery calcification noted on CT portion of stress test.         Echocardiogram showed diastolic dysfunction grade 1.  Ejection               fraction 61 to 65%.    Taking daily aspirin, losartan, and metoprolol.  Follow-up regularly with Dr. Hooker.         Hyperlipidemia LDL goal <70    Overview     Lifestyle control.         Coronary artery calcification of native artery - Primary (Chronic)    Overview     Stress test in September 2019 showed some calcification of the coronary arteries on the CT portion of the test.  The patient saw Dr. Hooker and was not felt to have significant coronary artery disease.    Not taking 81 mg enteric-coated aspirin daily.  Improve blood pressure control.  Improve low-fat diet.  Increase exercise.         Chest pain    Overview     " 9/23/2019 Stress test   LVEF > 70%  Myocardial perfusion imaging indicates a normal myocardial perfusion study with no evidence of ischemia.  There was coronary artery calcification noted on the CT portion of the stress test.  Impressions are consistent with a low risk study.         KING (dyspnea on exertion)    Overview     11/21/19 Echo  LVEF = 69.0%.  Left ventricular diastolic dysfunction (grade I a) consistent with impaired relaxation.  Mild to moderate tricuspid valve regurgitation is present.  6/15/2021 Bernadette Somers MD    Worsening edema and shortness of breath.    Taking Lasix 20 mg jojo as needed if the edema and shortness of breath have improved.  Stress Echo sheduled for 12/29/22.  Will see her cardiologist, Dr. Hooker for appointment.            PLAN:  1.  Chest pain/ Coronary calcifications  Stress MPS 12/2022 negative for ischemia, LVEF >70%  Asymptomatic, continue to monitor for recurrent symptoms   Continue ASA and BB, she refuses statin      2.  Palpitations/paroxysmal SVT:  14 day monitor 11/2022 with 6 brief SVT episodes, longest 9 beats  Decrease caffeine, increase water intake  Continue metoprolol at current dose  Increase exercise as tolerated     3.  Hyperlipidemia:  Recent lipid panel reviewed, LDL 97.   She does not want to take a statin due to perceived side effects  Work on diet control, low cholesterol diet.     4.  LE edema, dependent    Echo 12/2022 with normal EF, normal diastolic function, RVSP 38mmHg   Continue conservative measures, and may take Lasix 20 mg daily.     5. Hypertension   Goal <130/80mmHg   BP overall controlled       Advance Care Planning   ACP discussion was held with the patient during this visit. Patient has an advance directive (not in EMR), copy requested.          Follow-up   Return in about 1 year (around 2/22/2025).        Scribed for Syed Hooker MD by Yaneth Joiner PA-C. 2/23/2024  15:32 [place] GRIFFIN,Syed Hooker M.D., personally performed the  services described in this documentation as scribed by the above named individual in my presence, and it is both accurate and complete.    Syed Hooker MD, [facility], [facility]  02/23/24  15:32 EST

## 2024-02-22 ENCOUNTER — OFFICE VISIT (OUTPATIENT)
Dept: CARDIOLOGY | Facility: CLINIC | Age: 84
End: 2024-02-22
Payer: MEDICARE

## 2024-02-22 VITALS
SYSTOLIC BLOOD PRESSURE: 138 MMHG | WEIGHT: 195.6 LBS | OXYGEN SATURATION: 100 % | HEIGHT: 64 IN | BODY MASS INDEX: 33.39 KG/M2 | HEART RATE: 58 BPM | DIASTOLIC BLOOD PRESSURE: 90 MMHG

## 2024-02-22 DIAGNOSIS — I25.84 CORONARY ARTERY CALCIFICATION OF NATIVE ARTERY: Primary | Chronic | ICD-10-CM

## 2024-02-22 DIAGNOSIS — R07.9 CHEST PAIN, UNSPECIFIED TYPE: ICD-10-CM

## 2024-02-22 DIAGNOSIS — E78.5 HYPERLIPIDEMIA LDL GOAL <70: ICD-10-CM

## 2024-02-22 DIAGNOSIS — I10 BENIGN ESSENTIAL HYPERTENSION: ICD-10-CM

## 2024-02-22 DIAGNOSIS — I25.10 CORONARY ARTERY CALCIFICATION OF NATIVE ARTERY: Primary | Chronic | ICD-10-CM

## 2024-02-22 DIAGNOSIS — R06.09 DOE (DYSPNEA ON EXERTION): ICD-10-CM

## 2024-02-22 DIAGNOSIS — I25.118 CORONARY ARTERY DISEASE OF NATIVE ARTERY OF NATIVE HEART WITH STABLE ANGINA PECTORIS: ICD-10-CM

## 2024-02-22 PROCEDURE — 3080F DIAST BP >= 90 MM HG: CPT | Performed by: INTERNAL MEDICINE

## 2024-02-22 PROCEDURE — 99214 OFFICE O/P EST MOD 30 MIN: CPT | Performed by: INTERNAL MEDICINE

## 2024-02-22 PROCEDURE — 3075F SYST BP GE 130 - 139MM HG: CPT | Performed by: INTERNAL MEDICINE

## 2024-03-11 RX ORDER — LOSARTAN POTASSIUM 100 MG/1
100 TABLET ORAL DAILY
Qty: 90 TABLET | Refills: 3 | Status: SHIPPED | OUTPATIENT
Start: 2024-03-11

## 2024-03-11 RX ORDER — LEVOTHYROXINE SODIUM 0.12 MG/1
125 TABLET ORAL DAILY
Qty: 90 TABLET | Refills: 3 | Status: SHIPPED | OUTPATIENT
Start: 2024-03-11

## 2024-03-11 RX ORDER — RALOXIFENE HYDROCHLORIDE 60 MG/1
60 TABLET, FILM COATED ORAL DAILY
Qty: 90 TABLET | Refills: 3 | Status: SHIPPED | OUTPATIENT
Start: 2024-03-11

## 2024-03-11 NOTE — TELEPHONE ENCOUNTER
Rx Refill Note  Requested Prescriptions     Pending Prescriptions Disp Refills    levothyroxine (SYNTHROID, LEVOTHROID) 125 MCG tablet 90 tablet 3     Sig: Take 1 tablet by mouth Daily.    losartan (COZAAR) 100 MG tablet 90 tablet 3     Sig: Take 1 tablet by mouth Daily.    raloxifene (EVISTA) 60 MG tablet 90 tablet 3     Sig: Take 1 tablet by mouth Daily.      Last office visit with prescribing clinician: 11/21/2023   Last telemedicine visit with prescribing clinician: Visit date not found   Next office visit with prescribing clinician: 5/21/2024                         Would you like a call back once the refill request has been completed: [] Yes [] No    If the office needs to give you a call back, can they leave a voicemail: [] Yes [] No    Rebecca Pena LPN  03/11/24, 10:31 EDT

## 2024-05-14 ENCOUNTER — TELEPHONE (OUTPATIENT)
Dept: INTERNAL MEDICINE | Facility: CLINIC | Age: 84
End: 2024-05-14
Payer: MEDICARE

## 2024-05-14 NOTE — TELEPHONE ENCOUNTER
PATIENT HAS CALLED REQUESTING A CALL BACK IN REGARDS TO UPCOMING APPOINTMENT.    CALL BACK NUMBER [phone]

## 2024-05-14 NOTE — TELEPHONE ENCOUNTER
Pt needed to reschedule her appt on 5/21, rescheduled to 6/5. She would like lab orders put in so she can get them done before.

## 2024-05-31 ENCOUNTER — LAB (OUTPATIENT)
Dept: LAB | Facility: HOSPITAL | Age: 84
End: 2024-05-31
Payer: MEDICARE

## 2024-05-31 DIAGNOSIS — E53.8 B12 DEFICIENCY: ICD-10-CM

## 2024-05-31 DIAGNOSIS — E55.9 VITAMIN D DEFICIENCY: ICD-10-CM

## 2024-05-31 DIAGNOSIS — E78.5 HYPERLIPIDEMIA LDL GOAL <70: ICD-10-CM

## 2024-05-31 DIAGNOSIS — E03.9 ACQUIRED HYPOTHYROIDISM: ICD-10-CM

## 2024-05-31 LAB
25(OH)D3 SERPL-MCNC: 54.3 NG/ML (ref 30–100)
ALBUMIN SERPL-MCNC: 3.8 G/DL (ref 3.5–5.2)
ALBUMIN UR-MCNC: <1.2 MG/DL
ALBUMIN/GLOB SERPL: 1.3 G/DL
ALP SERPL-CCNC: 61 U/L (ref 39–117)
ALT SERPL W P-5'-P-CCNC: 12 U/L (ref 1–33)
ANION GAP SERPL CALCULATED.3IONS-SCNC: 13.7 MMOL/L (ref 5–15)
AST SERPL-CCNC: 11 U/L (ref 1–32)
BACTERIA UR QL AUTO: NORMAL /HPF
BASOPHILS # BLD AUTO: 0.03 10*3/MM3 (ref 0–0.2)
BASOPHILS NFR BLD AUTO: 0.3 % (ref 0–1.5)
BILIRUB SERPL-MCNC: 0.3 MG/DL (ref 0–1.2)
BILIRUB UR QL STRIP: NEGATIVE
BUN SERPL-MCNC: 15 MG/DL (ref 8–23)
BUN/CREAT SERPL: 14.9 (ref 7–25)
CALCIUM SPEC-SCNC: 9.4 MG/DL (ref 8.6–10.5)
CHLORIDE SERPL-SCNC: 101 MMOL/L (ref 98–107)
CHOLEST SERPL-MCNC: 168 MG/DL (ref 0–200)
CLARITY UR: CLEAR
CO2 SERPL-SCNC: 22.3 MMOL/L (ref 22–29)
COLOR UR: YELLOW
CREAT SERPL-MCNC: 1.01 MG/DL (ref 0.57–1)
CREAT UR-MCNC: 82.5 MG/DL
DEPRECATED RDW RBC AUTO: 38 FL (ref 37–54)
EGFRCR SERPLBLD CKD-EPI 2021: 55.7 ML/MIN/1.73
EOSINOPHIL # BLD AUTO: 0.17 10*3/MM3 (ref 0–0.4)
EOSINOPHIL NFR BLD AUTO: 1.9 % (ref 0.3–6.2)
ERYTHROCYTE [DISTWIDTH] IN BLOOD BY AUTOMATED COUNT: 11.8 % (ref 12.3–15.4)
GLOBULIN UR ELPH-MCNC: 3 GM/DL
GLUCOSE SERPL-MCNC: 86 MG/DL (ref 65–99)
GLUCOSE UR STRIP-MCNC: NEGATIVE MG/DL
HCT VFR BLD AUTO: 36.9 % (ref 34–46.6)
HCYS SERPL-MCNC: 12.2 UMOL/L (ref 0–15)
HDLC SERPL-MCNC: 63 MG/DL (ref 40–60)
HGB BLD-MCNC: 12.7 G/DL (ref 12–15.9)
HGB UR QL STRIP.AUTO: NEGATIVE
HYALINE CASTS UR QL AUTO: NORMAL /LPF
IMM GRANULOCYTES # BLD AUTO: 0.04 10*3/MM3 (ref 0–0.05)
IMM GRANULOCYTES NFR BLD AUTO: 0.4 % (ref 0–0.5)
KETONES UR QL STRIP: NEGATIVE
LDLC SERPL CALC-MCNC: 85 MG/DL (ref 0–100)
LDLC/HDLC SERPL: 1.3 {RATIO}
LEUKOCYTE ESTERASE UR QL STRIP.AUTO: ABNORMAL
LYMPHOCYTES # BLD AUTO: 2.5 10*3/MM3 (ref 0.7–3.1)
LYMPHOCYTES NFR BLD AUTO: 28.1 % (ref 19.6–45.3)
MCH RBC QN AUTO: 30.8 PG (ref 26.6–33)
MCHC RBC AUTO-ENTMCNC: 34.4 G/DL (ref 31.5–35.7)
MCV RBC AUTO: 89.6 FL (ref 79–97)
MICROALBUMIN/CREAT UR: NORMAL MG/G{CREAT}
MONOCYTES # BLD AUTO: 0.97 10*3/MM3 (ref 0.1–0.9)
MONOCYTES NFR BLD AUTO: 10.9 % (ref 5–12)
NEUTROPHILS NFR BLD AUTO: 5.19 10*3/MM3 (ref 1.7–7)
NEUTROPHILS NFR BLD AUTO: 58.4 % (ref 42.7–76)
NITRITE UR QL STRIP: NEGATIVE
NRBC BLD AUTO-RTO: 0 /100 WBC (ref 0–0.2)
PH UR STRIP.AUTO: 6.5 [PH] (ref 5–8)
PLATELET # BLD AUTO: 341 10*3/MM3 (ref 140–450)
PMV BLD AUTO: 11.1 FL (ref 6–12)
POTASSIUM SERPL-SCNC: 4.7 MMOL/L (ref 3.5–5.2)
PROT SERPL-MCNC: 6.8 G/DL (ref 6–8.5)
PROT UR QL STRIP: NEGATIVE
RBC # BLD AUTO: 4.12 10*6/MM3 (ref 3.77–5.28)
RBC # UR STRIP: NORMAL /HPF
REF LAB TEST METHOD: NORMAL
SODIUM SERPL-SCNC: 137 MMOL/L (ref 136–145)
SP GR UR STRIP: 1.01 (ref 1–1.03)
SQUAMOUS #/AREA URNS HPF: NORMAL /HPF
T4 FREE SERPL-MCNC: 1.79 NG/DL (ref 0.92–1.68)
TRIGL SERPL-MCNC: 114 MG/DL (ref 0–150)
TSH SERPL DL<=0.05 MIU/L-ACNC: 0.25 UIU/ML (ref 0.27–4.2)
UROBILINOGEN UR QL STRIP: ABNORMAL
VIT B12 BLD-MCNC: 1089 PG/ML (ref 211–946)
VLDLC SERPL-MCNC: 20 MG/DL (ref 5–40)
WBC # UR STRIP: NORMAL /HPF
WBC NRBC COR # BLD AUTO: 8.9 10*3/MM3 (ref 3.4–10.8)

## 2024-05-31 PROCEDURE — 84443 ASSAY THYROID STIM HORMONE: CPT

## 2024-05-31 PROCEDURE — 82570 ASSAY OF URINE CREATININE: CPT

## 2024-05-31 PROCEDURE — 82043 UR ALBUMIN QUANTITATIVE: CPT

## 2024-05-31 PROCEDURE — 82306 VITAMIN D 25 HYDROXY: CPT

## 2024-05-31 PROCEDURE — 82607 VITAMIN B-12: CPT

## 2024-05-31 PROCEDURE — 85025 COMPLETE CBC W/AUTO DIFF WBC: CPT

## 2024-05-31 PROCEDURE — 83090 ASSAY OF HOMOCYSTEINE: CPT

## 2024-05-31 PROCEDURE — 84439 ASSAY OF FREE THYROXINE: CPT

## 2024-05-31 PROCEDURE — 80053 COMPREHEN METABOLIC PANEL: CPT

## 2024-05-31 PROCEDURE — 81001 URINALYSIS AUTO W/SCOPE: CPT

## 2024-05-31 PROCEDURE — 80061 LIPID PANEL: CPT

## 2024-06-05 ENCOUNTER — OFFICE VISIT (OUTPATIENT)
Dept: INTERNAL MEDICINE | Facility: CLINIC | Age: 84
End: 2024-06-05
Payer: MEDICARE

## 2024-06-05 VITALS
BODY MASS INDEX: 33.29 KG/M2 | TEMPERATURE: 98 F | HEART RATE: 56 BPM | DIASTOLIC BLOOD PRESSURE: 72 MMHG | WEIGHT: 195 LBS | SYSTOLIC BLOOD PRESSURE: 132 MMHG | HEIGHT: 64 IN

## 2024-06-05 DIAGNOSIS — E53.8 B12 DEFICIENCY: ICD-10-CM

## 2024-06-05 DIAGNOSIS — E66.09 CLASS 1 OBESITY DUE TO EXCESS CALORIES WITH SERIOUS COMORBIDITY AND BODY MASS INDEX (BMI) OF 33.0 TO 33.9 IN ADULT: ICD-10-CM

## 2024-06-05 DIAGNOSIS — I25.118 CORONARY ARTERY DISEASE OF NATIVE ARTERY OF NATIVE HEART WITH STABLE ANGINA PECTORIS: ICD-10-CM

## 2024-06-05 DIAGNOSIS — Z91.81 AT RISK FOR FALLS: Chronic | ICD-10-CM

## 2024-06-05 DIAGNOSIS — M79.89 LEFT LEG SWELLING: Primary | ICD-10-CM

## 2024-06-05 DIAGNOSIS — I10 BENIGN ESSENTIAL HYPERTENSION: ICD-10-CM
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
## 2024-06-05 DIAGNOSIS — E03.9 ACQUIRED HYPOTHYROIDISM: ICD-10-CM

## 2024-06-05 DIAGNOSIS — E78.5 HYPERLIPIDEMIA LDL GOAL <70: ICD-10-CM

## 2024-06-05 DIAGNOSIS — E55.9 VITAMIN D DEFICIENCY: ICD-10-CM

## 2024-06-05 PROCEDURE — 3078F DIAST BP <80 MM HG: CPT | Performed by: INTERNAL MEDICINE

## 2024-06-05 PROCEDURE — 3075F SYST BP GE 130 - 139MM HG: CPT | Performed by: INTERNAL MEDICINE

## 2024-06-05 PROCEDURE — 1125F AMNT PAIN NOTED PAIN PRSNT: CPT | Performed by: INTERNAL MEDICINE

## 2024-06-05 PROCEDURE — 1160F RVW MEDS BY RX/DR IN RCRD: CPT | Performed by: INTERNAL MEDICINE

## 2024-06-05 PROCEDURE — 99214 OFFICE O/P EST MOD 30 MIN: CPT | Performed by: INTERNAL MEDICINE

## 2024-06-05 PROCEDURE — 1159F MED LIST DOCD IN RCRD: CPT | Performed by: INTERNAL MEDICINE

## 2024-06-05 RX ORDER — LANOLIN ALCOHOL/MO/W.PET/CERES
1000 CREAM (GRAM) TOPICAL EVERY OTHER DAY
Qty: 90 TABLET | Refills: 2 | Status: SHIPPED | OUTPATIENT
Start: 2024-06-05

## 2024-06-05 RX ORDER — LEVOTHYROXINE SODIUM 112 UG/1
112 TABLET ORAL DAILY
Qty: 90 TABLET | Refills: 3 | Status: SHIPPED | OUTPATIENT
Start: 2024-06-05

## 2024-06-05 RX ORDER — MELOXICAM 7.5 MG/1
7.5 TABLET ORAL DAILY PRN
Qty: 30 TABLET | Refills: 0 | Status: SHIPPED | OUTPATIENT
Start: 2024-06-05

## 2024-06-05 NOTE — PATIENT INSTRUCTIONS
Patient Instructions  Problem List Items Addressed This Visit          Advance Directives and General Issues    At risk for falls (Chronic)       Cardiac and Vasculature    Coronary artery disease of native artery of native heart with stable angina pectoris    Overview     Myocardial perfusion stress test 9/23/2019 was normal: EF greater than 70%, no ischemia, coronary artery calcification noted on CT portion of stress test.         Echocardiogram showed diastolic dysfunction grade 1.  Ejection               fraction 61 to 65%.    Taking daily aspirin, losartan, and metoprolol.  Follow-up regularly with Dr. Hooker.         Relevant Medications    aspirin 81 MG EC tablet    metoprolol succinate XL (TOPROL-XL) 50 MG 24 hr tablet    Benign essential hypertension    Overview     Taking losartan 100mg and metoprolol  50mg  daily.               Relevant Medications    furosemide (LASIX) 20 MG tablet    metoprolol succinate XL (TOPROL-XL) 50 MG 24 hr tablet    losartan (COZAAR) 100 MG tablet    Hyperlipidemia LDL goal <70    Overview     Lifestyle control.         Relevant Orders    CBC & Differential (Completed)    Comprehensive Metabolic Panel (Completed)    Homocysteine (Completed)    Lipid Panel (Completed)    Microalbumin / Creatinine Urine Ratio - Urine, Clean Catch (Completed)    Urinalysis With Microscopic - Urine, Clean Catch (Completed)       Endocrine and Metabolic    B12 deficiency    Relevant Orders    Vitamin B12 (Completed)    Acquired hypothyroidism    Overview     Levothyroxine 125mcg.         Relevant Medications    metoprolol succinate XL (TOPROL-XL) 50 MG 24 hr tablet    levothyroxine (SYNTHROID, LEVOTHROID) 112 MCG tablet    Other Relevant Orders    TSH (Completed)    T4, Free (Completed)    Vitamin D deficiency    Relevant Orders    Vitamin D,25-Hydroxy (Completed)    Class 1 obesity due to excess calories with body mass index (BMI) of 33.0 to 33.9 in adult       Musculoskeletal and Injuries    Fall     Left leg swelling - Primary    Relevant Orders    Duplex Venous Lower Extremity - Left CAR       Exercising to Stay Healthy  To become healthy and stay healthy, it is recommended that you do moderate-intensity and vigorous-intensity exercise. You can tell that you are exercising at a moderate intensity if your heart starts beating faster and you start breathing faster but can still hold a conversation. You can tell that you are exercising at a vigorous intensity if you are breathing much harder and faster and cannot hold a conversation while exercising.  How can exercise benefit me?  Exercising regularly is important. It has many health benefits, such as:  Improving overall fitness, flexibility, and endurance.  Increasing bone density.  Helping with weight control.  Decreasing body fat.  Increasing muscle strength and endurance.  Reducing stress and tension, anxiety, depression, or anger.  Improving overall health.  What guidelines should I follow while exercising?  Before you start a new exercise program, talk with your health care provider.  Do not exercise so much that you hurt yourself, feel dizzy, or get very short of breath.  Wear comfortable clothes and wear shoes with good support.  Drink plenty of water while you exercise to prevent dehydration or heat stroke.  Work out until your breathing and your heartbeat get faster (moderate intensity).  How often should I exercise?  Choose an activity that you enjoy, and set realistic goals. Your health care provider can help you make an activity plan that is individually designed and works best for you.  Exercise regularly as told by your health care provider. This may include:  Doing strength training two times a week, such as:  Lifting weights.  Using resistance bands.  Push-ups.  Sit-ups.  Yoga.  Doing a certain intensity of exercise for a given amount of time. Choose from these options:  A total of 150 minutes of moderate-intensity exercise every week.  A  total of 75 minutes of vigorous-intensity exercise every week.  A mix of moderate-intensity and vigorous-intensity exercise every week.  Children, pregnant women, people who have not exercised regularly, people who are overweight, and older adults may need to talk with a health care provider about what activities are safe to perform. If you have a medical condition, be sure to talk with your health care provider before you start a new exercise program.  What are some exercise ideas?  Moderate-intensity exercise ideas include:  Walking 1 mile (1.6 km) in about 15 minutes.  Biking.  Hiking.  Golfing.  Dancing.  Water aerobics.  Vigorous-intensity exercise ideas include:  Walking 4.5 miles (7.2 km) or more in about 1 hour.  Jogging or running 5 miles (8 km) in about 1 hour.  Biking 10 miles (16.1 km) or more in about 1 hour.  Lap swimming.  Roller-skating or in-line skating.  Cross-country skiing.  Vigorous competitive sports, such as football, basketball, and soccer.  Jumping rope.  Aerobic dancing.  What are some everyday activities that can help me get exercise?  Yard work, such as:  Pushing a .  Raking and bagging leaves.  Washing your car.  Pushing a stroller.  Shoveling snow.  Gardening.  Washing windows or floors.  How can I be more active in my day-to-day activities?  Use stairs instead of an elevator.  Take a walk during your lunch break.  If you drive, park your car farther away from your work or school.  If you take public transportation, get off one stop early and walk the rest of the way.  Stand up or walk around during all of your indoor phone calls.  Get up, stretch, and walk around every 30 minutes throughout the day.  Enjoy exercise with a friend. Support to continue exercising will help you keep a regular routine of activity.  Where to find more information  You can find more information about exercising to stay healthy from:  [place] Department of Health and Human Services:  www.hhs.gov  Centers for Disease Control and Prevention (CDC): www.cdc.gov  Summary  Exercising regularly is important. It will improve your overall fitness, flexibility, and endurance.  Regular exercise will also improve your overall health. It can help you control your weight, reduce stress, and improve your bone density.  Do not exercise so much that you hurt yourself, feel dizzy, or get very short of breath.  Before you start a new exercise program, talk with your health care provider.  This information is not intended to replace advice given to you by your health care provider. Make sure you discuss any questions you have with your health care provider.  Document Revised: 04/15/2022 Document Reviewed: 04/15/2022  [employer] Patient Education © 2023 [employer] BMI for Adults  Body mass index (BMI) is a number found using a person's weight and height. BMI can help tell how much of a person's weight is made up of fat. BMI does not measure body fat directly. It is used instead of tests that directly measure body fat, which can be difficult and expensive.  What are BMI measurements used for?  BMI is useful to:  Find out if your weight puts you at higher risk for medical problems.  Help recommend changes, such as in diet and exercise. This can help you reach a healthy weight. BMI screening can be done again to see if these changes are working.  How is BMI calculated?  Your height and weight are measured. The BMI is found from those numbers. This can be done with [place] or metric measurements. Note that charts and online BMI calculators are available to help you find your BMI quickly and easily without doing these calculations.  To calculate your BMI in [place] measurements:  Measure your weight in pounds (lb).  Multiply the number of pounds by 703.  So, for an adult who weighs 150 lb, multiply that number by 703: 150 x 703, which equals 105,450.  Measure your height in inches. Then multiply that number by itself to get a  "measurement called \"inches squared.\"  So, for an adult who is 70 inches tall, the \"inches squared\" measurement is 70 inches x 70 inches, which equals 4,900 inches squared.  Divide the total from step 2 (number of lb x 703) by the total from step 3 (inches squared): 105,450 ÷ 4,900 = 21.5. This is your BMI.  To calculate your BMI in metric measurements:    Measure your weight in kilograms (kg).  For this example, the weight is 70 kg.  Measure your height in meters (m). Then multiply that number by itself to get a measurement called \"meters squared.\"  So, for an adult who is 1.75 m tall, the \"meters squared\" measurement is 1.75 m x 1.75 m, which equals 3.1 meters squared.  Divide the number of kilograms (your weight) by the meters squared number. In this example: 70 ÷ 3.1 = 22.6. This is your BMI.  What do the results mean?  BMI charts are used to see if you are underweight, normal weight, overweight, or obese. The following guidelines will be used:  Underweight: BMI less than 18.5.  Normal weight: BMI between 18.5 and 24.9.  Overweight: BMI between 25 and 29.9.  Obese: BMI of 30 or above.  BMI is a tool and cannot diagnose a condition. Talk with your health care provider about what your BMI means for you. Keep these notes in mind:  Weight includes fat and muscle. Someone with a muscular build, such as an athlete, may have a BMI that is higher than 24.9. In cases like these, BMI is not a correct measure of body fat.  If you have a BMI of 25 or higher, your provider may need to do more testing to find out if excess body fat is the cause.  BMI is measured the same way for males and females. Females usually have more body fat than males of the same height and weight.  Where to find more information  For more information about BMI, including tools to quickly find your BMI, go to:  Centers for Disease Control and Prevention: cdc.gov  American Heart Association: heart.org  National Heart, Lung, and Blood [place]: " nhlbi.nih.gov  This information is not intended to replace advice given to you by your health care provider. Make sure you discuss any questions you have with your health care provider.  Document Revised: 09/07/2023 Document Reviewed: 08/31/2023  [employer] Patient Education © 2024 [employer]  Heart-Healthy Eating Plan  Many factors influence your heart (coronary) health, including eating and exercise habits. Coronary risk increases with abnormal blood fat (lipid) levels. Heart-healthy meal planning includes limiting unhealthy fats, increasing healthy fats, and making other diet and lifestyle changes.  What is my plan?  Your health care provider may recommend that you:  Limit your fat intake to _________% or less of your total calories each day.  Limit your saturated fat intake to _________% or less of your total calories each day.  Limit the amount of cholesterol in your diet to less than _________ mg per day.  What are tips for following this plan?  Cooking  Cook foods using methods other than frying. Baking, boiling, grilling, and broiling are all good options. Other ways to reduce fat include:  Removing the skin from poultry.  Removing all visible fats from meats.  Steaming vegetables in water or broth.  Meal planning  A plate with examples of foods in a healthy diet.      At meals, imagine dividing your plate into fourths:  Fill one-half of your plate with vegetables and green salads.  Fill one-fourth of your plate with whole grains.  Fill one-fourth of your plate with lean protein foods.  Eat 4-5 servings of vegetables per day. One serving equals 1 cup raw or cooked vegetable, or 2 cups raw leafy greens.  Eat 4-5 servings of fruit per day. One serving equals 1 medium whole fruit, ¼ cup dried fruit, ½ cup fresh, frozen, or canned fruit, or ½ cup 100% fruit juice.  Eat more foods that contain soluble fiber. Examples include apples, broccoli, carrots, beans, peas, and barley. Aim to get 25-30 g of fiber per  day.  Increase your consumption of legumes, nuts, and seeds to 4-5 servings per week. One serving of dried beans or legumes equals ½ cup cooked, 1 serving of nuts is ¼ cup, and 1 serving of seeds equals 1 tablespoon.  Fats  Choose healthy fats more often. Choose monounsaturated and polyunsaturated fats, such as olive and canola oils, flaxseeds, walnuts, almonds, and seeds.  Eat more omega-3 fats. Choose salmon, mackerel, sardines, tuna, flaxseed oil, and ground flaxseeds. Aim to eat fish at least 2 times each week.  Check food labels carefully to identify foods with trans fats or high amounts of saturated fat.  Limit saturated fats. These are found in animal products, such as meats, butter, and cream. Plant sources of saturated fats include palm oil, palm kernel oil, and coconut oil.  Avoid foods with partially hydrogenated oils in them. These contain trans fats. Examples are stick margarine, some tub margarines, cookies, crackers, and other baked goods.  Avoid fried foods.  General information  Eat more home-cooked food and less restaurant, buffet, and fast food.  Limit or avoid alcohol.  Limit foods that are high in starch and sugar.  Lose weight if you are overweight. Losing just 5-10% of your body weight can help your overall health and prevent diseases such as diabetes and heart disease.  Monitor your salt (sodium) intake, especially if you have high blood pressure. Talk with your health care provider about your sodium intake.  Try to incorporate more vegetarian meals weekly.  What foods can I eat?  Fruits  All fresh, canned (in natural juice), or frozen fruits.  Vegetables  Fresh or frozen vegetables (raw, steamed, roasted, or grilled). Green salads.  Grains  Most grains. Choose whole wheat and whole grains most of the time. Rice and pasta, including brown rice and pastas made with whole wheat.  Meats and other proteins  Lean, well-trimmed beef, veal, pork, and lamb. Chicken and turkey without skin. All fish  and shellfish. Wild duck, rabbit, pheasant, and venison. Egg whites or low-cholesterol egg substitutes. Dried beans, peas, lentils, and tofu. Seeds and most nuts.  Dairy  Low-fat or nonfat cheeses, including ricotta and mozzarella. Skim or 1% milk (liquid, powdered, or evaporated). Buttermilk made with low-fat milk. Nonfat or low-fat yogurt.  Fats and oils  Non-hydrogenated (trans-free) margarines. Vegetable oils, including soybean, sesame, sunflower, olive, peanut, safflower, corn, canola, and cottonseed. Salad dressings or mayonnaise made with a vegetable oil.  Beverages  Water (mineral or sparkling). Coffee and tea. Diet carbonated beverages.  Sweets and desserts  Sherbet, gelatin, and fruit ice. Small amounts of dark chocolate.  Limit all sweets and desserts.  Seasonings and condiments  All seasonings and condiments.  The items listed above may not be a complete list of foods and beverages you can eat. Contact a dietitian for more options.  What foods are not recommended?  Fruits  Canned fruit in heavy syrup. Fruit in cream or butter sauce. Fried fruit. Limit coconut.  Vegetables  Vegetables cooked in cheese, cream, or butter sauce. Fried vegetables.  Grains  Breads made with saturated or trans fats, oils, or whole milk. Croissants. Sweet rolls. Donuts. High-fat crackers, such as cheese crackers.  Meats and other proteins  Fatty meats, such as hot dogs, ribs, sausage, finney, rib-eye roast or steak. High-fat deli meats, such as salami and bologna. Caviar. Domestic duck and goose. Organ meats, such as liver.  Dairy  Cream, sour cream, cream cheese, and creamed cottage cheese. Whole-milk cheeses. Whole or 2% milk (liquid, evaporated, or condensed). Whole buttermilk. Cream sauce or high-fat cheese sauce. Whole-milk yogurt.  Fats and oils  Meat fat, or shortening. Cocoa butter, hydrogenated oils, palm oil, coconut oil, palm kernel oil. Solid fats and shortenings, including finney fat, salt pork, lard, and butter.  Nondairy cream substitutes. Salad dressings with cheese or sour cream.  Beverages  Regular sodas and any drinks with added sugar.  Sweets and desserts  Frosting. Pudding. Cookies. Cakes. Pies. Milk chocolate or white chocolate. Buttered syrups. Full-fat ice cream or ice cream drinks.  The items listed above may not be a complete list of foods and beverages to avoid. Contact a dietitian for more information.  Summary  Heart-healthy meal planning includes limiting unhealthy fats, increasing healthy fats, and making other diet and lifestyle changes.  Lose weight if you are overweight. Losing just 5-10% of your body weight can help your overall health and prevent diseases such as diabetes and heart disease.  Focus on eating a balance of foods, including fruits and vegetables, low-fat or nonfat dairy, lean protein, nuts and legumes, whole grains, and heart-healthy oils and fats.  This information is not intended to replace advice given to you by your health care provider. Make sure you discuss any questions you have with your health care provider.  Document Revised: 04/28/2022 Document Reviewed: 04/28/2022  [employer] Patient Education © 2022 [employer]

## 2024-06-05 NOTE — PROGRESS NOTES
[facility]     Serenity Lagos  1940   [id number]      Patient Care Team:  Bernadette Somers MD as PCP - General (Internal Medicine)  Syed Hooker MD as Consulting Physician (Cardiology)  Celio Iniguez MD as Consulting Physician (Ophthalmology)  Ender Jolly MD as Consulting Physician (Dermatology)  Jil Arreola APRN as Nurse Practitioner (Nurse Practitioner)  Josh Noyola Jr., MD as Consulting Physician (Hand Surgery)    Chief Complaint   Patient presents with    Hypertension     6mo fu        Patient is a 83 y.o. female.   History of Present Illness  The patient is here for a follow-up.    The patient has been experiencing significant edema in her feet for approximately 3 weeks, with the left side being more affected than the right.  She did not take furosemide today, but she has been taking it almost daily. She also reports persistent left calf pain, even when in a supine position at night. Her physical activity is now less than usual.  She is sedentary. Several years ago, she underwent a venous procedure performed by Dr. Jolly to address venous issues in her legs.    On Mother's Day, the patient experienced a fall in her kitchen due to an incorrect movement, resulting in a laceration on her arm caused by falling against her 's walker. She has been managing the laceration with mupirocin ointment, and it has healed. The patient has a history of psoriasis.    The patient denies experiencing dizziness, lightheadedness, or fainting episodes. She maintains a generally healthy individual. She reports mild dyspnea, particularly after prolonged walking, but denies any chest pain. The patient is currently on a regimen of levothyroxine 125 mcg, which she takes daily at approximately 8 a.m.    Supplemental Information  She would like a refill of her meloxicam. She has not taken it in over a year, but she would like to have them on hand for a day when her arthritis is  really painful. Dr. Amaya is the one who wrote the prescription. She has been taking 2 Tylenol most days and it does help some.         CHRONIC CONDITIONS      Past Medical History:   Diagnosis Date    Cervicalgia     Chronic bronchitis     Coronary artery disease     Dysphagia     Glaucoma, open angle     Hearing loss     Hypertension     Hyperthyroidism     thyroid gland killed off with MD treatment    Hypothyroidism     regulated by Levothyroxine    Low back pain     Medicare annual wellness visit, subsequent 12/15/2020    Mixed hyperlipidemia     Osteoarthritis     cervical spine degenerative    Osteopenia     Perforation of tympanic membrane     Pruritus     chronic general pruritis    Salivary gland cancer     Scoliosis 2005    discovered in x-rays by Orthopedist    Shingles     postherpetic neuralgia-thorax    Spondylolisthesis      lumbar spine    Wrist tendonitis        Past Surgical History:   Procedure Laterality Date    APPENDECTOMY      BREAST BIOPSY      BREAST BIOPSY Right 07/29/2019    BREAST CYST EXCISION      ENDOSCOPY  2017    normal (per pt) EGD    MOUTH SURGERY  2000    TONSILLECTOMY         Family History   Problem Relation Age of Onset    Hypertension Mother     Heart failure Mother         CHF    Coronary artery disease Mother         CABG    Arthritis Mother         osteo arthritis    Lung cancer Father 59        Heavy smoker and Black Lung    Alcohol abuse Father     Rheum arthritis Sister     Other Sister 76        sepsis due to infected knee replacement    COPD Sister         heavy smoker     Alcohol abuse Sister     Arthritis Sister         severe rheumatoid arthritis    Alzheimer's disease Maternal Grandmother     Coronary artery disease Maternal Grandfather     Cancer Paternal Grandmother         stomach    Heart disease Paternal Grandfather     Hypertension Other     Obesity Other     Stroke Maternal Aunt     Heart disease Maternal Aunt     Stroke Maternal Aunt     Stroke Maternal  "Aunt     Coronary artery disease Maternal Uncle     Stroke Maternal Uncle     Heart disease Maternal Uncle     Breast cancer Neg Hx     Ovarian cancer Neg Hx        Social History     Socioeconomic History    Marital status:    Tobacco Use    Smoking status: Former     Current packs/day: 0.00     Average packs/day: 1 pack/day for 20.0 years (20.0 ttl pk-yrs)     Types: Cigarettes     Start date: 1960     Quit date: 1980     Years since quittin.4    Smokeless tobacco: Never   Vaping Use    Vaping status: Never Used   Substance and Sexual Activity    Alcohol use: Yes     Alcohol/week: 7.0 standard drinks of alcohol     Types: 7 Glasses of wine per week     Comment: 2 glasses wine  with dinner every day.  grand total 8-9oz    Drug use: No    Sexual activity: Not Currently     Partners: Male       No Known Allergies    Review of Systems:     Review of Systems    Vital Signs  Vitals:    24 1420   BP: 132/72   BP Location: Left arm   Patient Position: Sitting   Cuff Size: Adult   Pulse: 56   Temp: 98 °F (36.7 °C)   TempSrc: Infrared   Weight: 88.5 kg (195 lb)   Height: 162.6 cm (64.02\")   PainSc:   6   PainLoc: Hip  Comment: back     Body mass index is 33.45 kg/m².  BMI is >= 30 and <35. (Class 1 Obesity). The following options were offered after discussion;: weight loss educational material (shared in after visit summary), exercise counseling/recommendations, and nutrition counseling/recommendations          Current Outpatient Medications:     aspirin 81 MG EC tablet, Take 1 tablet by mouth Daily., Disp: 90 tablet, Rfl: 3    Biotin 5000 MCG sublingual tablet, take 1 by oral route  every day, Disp: , Rfl:     CALCIUM PO, Take 1 tablet by mouth Daily. Calcium 600 + vitamin d, Disp: , Rfl:     Cholecalciferol (Vitamin D3) 50 MCG (2000 UT) capsule, Take 1 capsule by mouth Daily., Disp: , Rfl:     desonide (DESOWEN) 0.05 % ointment, APPLY TO AREAS TWICE A DAY AS NEEDED, Disp: , Rfl:     Docusate " Sodium 100 MG capsule, take 1 tablet by oral route twice daily, Disp: , Rfl:     furosemide (LASIX) 20 MG tablet, TAKE 1 TABLET BY MOUTH DAILY AS NEEDED (EDEMA)., Disp: 90 tablet, Rfl: 1    Glucosamine-Chondroitin (OSTEO BI-FLEX REGULAR STRENGTH PO), 1 daily, Disp: , Rfl:     latanoprost (XALATAN) 0.005 % ophthalmic solution, Administer 1 drop to both eyes Every Night., Disp: , Rfl:     levothyroxine (SYNTHROID, LEVOTHROID) 112 MCG tablet, Take 1 tablet by mouth Daily., Disp: 90 tablet, Rfl: 3    losartan (COZAAR) 100 MG tablet, Take 1 tablet by mouth Daily., Disp: 90 tablet, Rfl: 3    meloxicam (MOBIC) 7.5 MG tablet, Take 1 tablet by mouth Daily As Needed (severe pain)., Disp: 30 tablet, Rfl: 0    metoprolol succinate XL (TOPROL-XL) 50 MG 24 hr tablet, TAKE 1 TABLET DAILY, Disp: 90 tablet, Rfl: 3    omeprazole (priLOSEC) 40 MG capsule, TAKE 1 CAPSULE DAILY, Disp: 90 capsule, Rfl: 3    Probiotic Product (PROBIOTIC DAILY PO), One capsule daily, Disp: , Rfl:     raloxifene (EVISTA) 60 MG tablet, Take 1 tablet by mouth Daily., Disp: 90 tablet, Rfl: 3    vitamin B-12 (CYANOCOBALAMIN) 1000 MCG tablet, Take 1 tablet by mouth Every Other Day., Disp: 90 tablet, Rfl: 2    Vitamin E 400 units tablet, Daily., Disp: , Rfl:     Current Facility-Administered Medications:     ondansetron ODT (ZOFRAN-ODT) disintegrating tablet 4 mg, 4 mg, Oral, Q6H PRN, Marcial Saavedra MD    Physical Exam:    Physical Exam  Constitutional:       Appearance: She is obese.   Cardiovascular:      Comments: Swelling of left lower leg and foot with mild redness on the shin and pain.  No pitting.  Only trace swelling of right ankle.  No pitting.  Psychiatric:         Attention and Perception: Attention normal.         Mood and Affect: Mood normal.          ACE III MINI        Results Review:    I reviewed the patient's new clinical results.  Results  Laboratory Studies  Sodium level is now normal at 137. Creatinine is slightly high showing kidney  "function is not quite as good as usual.. Liver function is normal. No protein loss in urine.  Depressed TSH.  B12 level is high now. Vitamin D level is good. LDL cholesterol is very good at 85. HDL cholesterol is 63.       CMP:  Lab Results   Component Value Date    BUN 15 05/31/2024    CREATININE 1.01 (H) 05/31/2024    EGFRIFNONA 65 12/13/2021    BCR 14.9 05/31/2024     05/31/2024    K 4.7 05/31/2024    CO2 22.3 05/31/2024    CALCIUM 9.4 05/31/2024    ALBUMIN 3.8 05/31/2024    BILITOT 0.3 05/31/2024    ALKPHOS 61 05/31/2024    AST 11 05/31/2024    ALT 12 05/31/2024     HbA1c:  No results found for: \"HGBA1C\"  Microalbumin:  Lab Results   Component Value Date    MICROALBUR <1.2 05/31/2024     Lipid Panel  Lab Results   Component Value Date    CHOL 168 05/31/2024    TRIG 114 05/31/2024    HDL 63 (H) 05/31/2024    LDL 85 05/31/2024    AST 11 05/31/2024    ALT 12 05/31/2024       Medication Review: Medications reviewed and noted  Patient Instructions   Problem List Items Addressed This Visit          Advance Directives and General Issues    At risk for falls (Chronic)       Cardiac and Vasculature    Coronary artery disease of native artery of native heart with stable angina pectoris    Overview     Myocardial perfusion stress test 9/23/2019 was normal: EF greater than 70%, no ischemia, coronary artery calcification noted on CT portion of stress test.         Echocardiogram showed diastolic dysfunction grade 1.  Ejection               fraction 61 to 65%.    Taking daily aspirin, losartan, and metoprolol.  Follow-up regularly with Dr. Hooker.         Relevant Medications    aspirin 81 MG EC tablet    metoprolol succinate XL (TOPROL-XL) 50 MG 24 hr tablet    Benign essential hypertension    Overview     Taking losartan 100mg and metoprolol  50mg  daily.               Relevant Medications    furosemide (LASIX) 20 MG tablet    metoprolol succinate XL (TOPROL-XL) 50 MG 24 hr tablet    losartan (COZAAR) 100 MG tablet    " Hyperlipidemia LDL goal <70    Overview     Lifestyle control.         Relevant Orders    CBC & Differential (Completed)    Comprehensive Metabolic Panel (Completed)    Homocysteine (Completed)    Lipid Panel (Completed)    Microalbumin / Creatinine Urine Ratio - Urine, Clean Catch (Completed)    Urinalysis With Microscopic - Urine, Clean Catch (Completed)       Endocrine and Metabolic    B12 deficiency    Relevant Orders    Vitamin B12 (Completed)    Acquired hypothyroidism    Overview     Levothyroxine 125mcg.         Relevant Medications    metoprolol succinate XL (TOPROL-XL) 50 MG 24 hr tablet    levothyroxine (SYNTHROID, LEVOTHROID) 112 MCG tablet    Other Relevant Orders    TSH (Completed)    T4, Free (Completed)    Vitamin D deficiency    Relevant Orders    Vitamin D,25-Hydroxy (Completed)    Class 1 obesity due to excess calories with body mass index (BMI) of 33.0 to 33.9 in adult       Musculoskeletal and Injuries    Fall    Left leg swelling - Primary    Relevant Orders    Duplex Venous Lower Extremity - Left CAR          Diagnosis Plan   1. Left leg swelling  Duplex Venous Lower Extremity - Left CAR      2. Hyperlipidemia LDL goal <70  CBC & Differential    Comprehensive Metabolic Panel    Homocysteine    Lipid Panel    Microalbumin / Creatinine Urine Ratio - Urine, Clean Catch    Urinalysis With Microscopic - Urine, Clean Catch      3. Benign essential hypertension        4. Acquired hypothyroidism  TSH    T4, Free      5. Fall, initial encounter        6. B12 deficiency  Vitamin B12      7. Coronary artery disease of native artery of native heart with stable angina pectoris        8. Vitamin D deficiency  Vitamin D,25-Hydroxy      9. Class 1 obesity due to excess calories with serious comorbidity and body mass index (BMI) of 33.0 to 33.9 in adult        10. At risk for falls          Assessment & Plan  1. Swelling, pain, and redness in the left lower leg.  The symptoms could potentially be superficial  phlebitis. An immediate venous duplex will be conducted to exclude the possibility of a blood clot. The patient is advised to elevate her legs, maintain a daily intake of aspirin, ensure adequate hydration, and reduce her salt intake.    2. Hyperlipidemia.  The patient is advised to continue reducing her intake of fats and engage in increased physical activity within her home.    3. Coronary artery disease.  The The patient is advised to continue her current regimen of aspirin, losartan, and metoprolol. Regular follow-up appointments with Dr. Carlton are also recommended.    4. Hypertension.  The patient is to continue her current regimen of losartan and metoprolol, and reduce her salt intake.    5. Vitamin D deficiency.  The patient The patient is advised to continue her daily dose of vitamin D3.    6. B12 deficiency.  . The patient is advised to decrease her B12 tablet to every other day.    7. Hypothyroidism.  The The patient's levothyroxine dosage will be reduced from 125 mcg tablet to 112 mcg tablet every morning.    Fall at home and risk of falls   she is encouraged to stay well-hydrated and eat some protein in the diet  3 times a day.  Doing some daily exercises also helps improve balance and decrease fall risk.    8. Obesity.  The patient is encouraged to reduce her intake of fats and sugars, engage in increased physical activity, and lose weight.    Follow-up  The patient is scheduled for a follow-up visit on 06/26/2025.         Plan of care reviewed with patient at the conclusion of today's visit. Education was provided regarding diagnosis, management, and any prescribed or recommended OTC medications. Patient verbalizes understanding of and agreement with management plan.         06/05/24   14:29 EDT    Patient or patient representative verbalized consent for the use of Ambient Listening during the visit with  Bernadette Somers MD for chart documentation. 6/5/2024  15:18 EDT

## 2024-06-06 ENCOUNTER — HOSPITAL ENCOUNTER (OUTPATIENT)
Dept: CARDIOLOGY | Facility: HOSPITAL | Age: 84
Discharge: HOME | End: 2024-06-06
Admitting: INTERNAL MEDICINE
Payer: MEDICARE

## 2024-06-06 VITALS — WEIGHT: 195.11 LBS | HEIGHT: 64 IN | BODY MASS INDEX: 33.31 KG/M2

## 2024-06-06 DIAGNOSIS — M79.89 LEFT LEG SWELLING: ICD-10-CM

## 2024-06-06 LAB
BH CV LOWER VASCULAR LEFT COMMON FEMORAL AUGMENT: NORMAL
BH CV LOWER VASCULAR LEFT COMMON FEMORAL COMPRESS: NORMAL
BH CV LOWER VASCULAR LEFT COMMON FEMORAL PHASIC: NORMAL
BH CV LOWER VASCULAR LEFT COMMON FEMORAL SPONT: NORMAL
BH CV LOWER VASCULAR LEFT DISTAL FEMORAL AUGMENT: NORMAL
BH CV LOWER VASCULAR LEFT DISTAL FEMORAL COMPRESS: NORMAL
BH CV LOWER VASCULAR LEFT DISTAL FEMORAL PHASIC: NORMAL
BH CV LOWER VASCULAR LEFT DISTAL FEMORAL SPONT: NORMAL
BH CV LOWER VASCULAR LEFT GASTRONEMIUS COMPRESS: NORMAL
BH CV LOWER VASCULAR LEFT GREATER SAPH BK COMPRESS: NORMAL
BH CV LOWER VASCULAR LEFT LESSER SAPH COMPRESS: NORMAL
BH CV LOWER VASCULAR LEFT MID FEMORAL AUGMENT: NORMAL
BH CV LOWER VASCULAR LEFT MID FEMORAL COMPRESS: NORMAL
BH CV LOWER VASCULAR LEFT MID FEMORAL PHASIC: NORMAL
BH CV LOWER VASCULAR LEFT MID FEMORAL SPONT: NORMAL
BH CV LOWER VASCULAR LEFT PERONEAL COMPRESS: NORMAL
BH CV LOWER VASCULAR LEFT POPLITEAL AUGMENT: NORMAL
BH CV LOWER VASCULAR LEFT POPLITEAL COMPRESS: NORMAL
BH CV LOWER VASCULAR LEFT POPLITEAL PHASIC: NORMAL
BH CV LOWER VASCULAR LEFT POPLITEAL SPONT: NORMAL
BH CV LOWER VASCULAR LEFT POSTERIOR TIBIAL COMPRESS: NORMAL
BH CV LOWER VASCULAR LEFT PROFUNDA FEMORAL PHASIC: NORMAL
BH CV LOWER VASCULAR LEFT PROFUNDA FEMORAL SPONT: NORMAL
BH CV LOWER VASCULAR LEFT PROXIMAL FEMORAL AUGMENT: NORMAL
BH CV LOWER VASCULAR LEFT PROXIMAL FEMORAL COMPRESS: NORMAL
BH CV LOWER VASCULAR LEFT PROXIMAL FEMORAL PHASIC: NORMAL
BH CV LOWER VASCULAR LEFT PROXIMAL FEMORAL SPONT: NORMAL
BH CV LOWER VASCULAR LEFT SAPHENOFEMORAL JUNCTION COMPRESS: NORMAL
BH CV LOWER VASCULAR RIGHT COMMON FEMORAL PHASIC: NORMAL
BH CV LOWER VASCULAR RIGHT COMMON FEMORAL SPONT: NORMAL

## 2024-06-06 PROCEDURE — 93971 EXTREMITY STUDY: CPT

## 2024-06-07 RX ORDER — CEPHALEXIN 500 MG/1
500 TABLET ORAL 2 TIMES DAILY
Qty: 14 TABLET | Refills: 0 | Status: SHIPPED | OUTPATIENT
Start: 2024-06-07 | End: 2024-06-14

## 2024-06-26 ENCOUNTER — OFFICE VISIT (OUTPATIENT)
Dept: INTERNAL MEDICINE | Facility: CLINIC | Age: 84
End: 2024-06-26
Payer: MEDICARE

## 2024-06-26 VITALS
HEART RATE: 60 BPM | SYSTOLIC BLOOD PRESSURE: 138 MMHG | TEMPERATURE: 98.2 F | HEIGHT: 64 IN | WEIGHT: 193 LBS | DIASTOLIC BLOOD PRESSURE: 72 MMHG | BODY MASS INDEX: 32.95 KG/M2

## 2024-06-26 DIAGNOSIS — I10 BENIGN ESSENTIAL HYPERTENSION: ICD-10-CM

## 2024-06-26 DIAGNOSIS — M54.50 CHRONIC BILATERAL LOW BACK PAIN WITHOUT SCIATICA: Chronic | ICD-10-CM

## 2024-06-26 DIAGNOSIS — G89.29 CHRONIC BILATERAL LOW BACK PAIN WITHOUT SCIATICA: Chronic | ICD-10-CM

## 2024-06-26 DIAGNOSIS — M79.89 LEFT LEG SWELLING: Primary | ICD-10-CM

## 2024-06-26 DIAGNOSIS — E03.9 ACQUIRED HYPOTHYROIDISM: ICD-10-CM

## 2024-06-26 PROCEDURE — 1160F RVW MEDS BY RX/DR IN RCRD: CPT | Performed by: INTERNAL MEDICINE

## 2024-06-26 PROCEDURE — 1159F MED LIST DOCD IN RCRD: CPT | Performed by: INTERNAL MEDICINE

## 2024-06-26 PROCEDURE — 3075F SYST BP GE 130 - 139MM HG: CPT | Performed by: INTERNAL MEDICINE

## 2024-06-26 PROCEDURE — 3078F DIAST BP <80 MM HG: CPT | Performed by: INTERNAL MEDICINE

## 2024-06-26 PROCEDURE — 1126F AMNT PAIN NOTED NONE PRSNT: CPT | Performed by: INTERNAL MEDICINE

## 2024-06-26 PROCEDURE — 99214 OFFICE O/P EST MOD 30 MIN: CPT | Performed by: INTERNAL MEDICINE

## 2024-06-26 NOTE — PROGRESS NOTES
[facility]     Serenity Lagos  1940   [id number]      Patient Care Team:  Bernadette Somers MD as PCP - General (Internal Medicine)  Syed Hooker MD as Consulting Physician (Cardiology)  Celio Iniguez MD as Consulting Physician (Ophthalmology)  Ender Jolly MD as Consulting Physician (Dermatology)  Jil Arreola APRN as Nurse Practitioner (Nurse Practitioner)  Josh Noyola Jr., MD as Consulting Physician (Hand Surgery)    Chief Complaint   Patient presents with    Leg Swelling     3 week follow-up        Patient is a 83 y.o. female.   History of Present Illness  The patient is here for follow-up.    The patient has been under significant stress due to her 's recent  illness. She reports  swelling left lower leg and redness improved.she finished the antibiotic.  She has Lasix that she could take for swelling as needed.  LLE duplex negative for DVT or phebitis.  She states that she always has some pretibial tenderness. She denies experiencing any pain now when  sitting or walking. she reports persistent low back pain in her back and hips, which she believes is unrelated to the swelling.  It has been going on several years.  She does not feel that physical therapy helps.  She has some stretches and exercises at home from past PT.  The pain in her back and hips has remained consistent recently. She denies any symptoms of shortness of breath or chest pain. She has not undergone physical therapy for her back recently, but recalls undergoing physical therapy several years ago.She has Lasix at home, but has not taken it in the past 10 to 15 days due to her busy schedule with her 's illness. She has been taking Tylenol Extra Strength for her back pain, which she reports as beneficial.    The patient's levothyroxine dosage was recently decreased from 125 mcg to 112 mcg.         CHRONIC CONDITIONS      Past Medical History:   Diagnosis Date    Cervicalgia      Chronic bronchitis     Coronary artery disease     Dysphagia     Glaucoma, open angle     Hearing loss     Hypertension     Hyperthyroidism     thyroid gland killed off with MD treatment    Hypothyroidism     regulated by Levothyroxine    Low back pain     Medicare annual wellness visit, subsequent 12/15/2020    Mixed hyperlipidemia     Osteoarthritis     cervical spine degenerative    Osteopenia     Perforation of tympanic membrane     Pruritus     chronic general pruritis    Salivary gland cancer     Scoliosis 2005    discovered in x-rays by Orthopedist    Shingles     postherpetic neuralgia-thorax    Spondylolisthesis      lumbar spine    Wrist tendonitis        Past Surgical History:   Procedure Laterality Date    APPENDECTOMY      BREAST BIOPSY      BREAST BIOPSY Right 07/29/2019    BREAST CYST EXCISION      ENDOSCOPY  2017    normal (per pt) EGD    MOUTH SURGERY  2000    TONSILLECTOMY         Family History   Problem Relation Age of Onset    Hypertension Mother     Heart failure Mother         CHF    Coronary artery disease Mother         CABG    Arthritis Mother         osteo arthritis    Lung cancer Father 59        Heavy smoker and Black Lung    Alcohol abuse Father     Rheum arthritis Sister     Other Sister 76        sepsis due to infected knee replacement    COPD Sister         heavy smoker     Alcohol abuse Sister     Arthritis Sister         severe rheumatoid arthritis    Alzheimer's disease Maternal Grandmother     Coronary artery disease Maternal Grandfather     Cancer Paternal Grandmother         stomach    Heart disease Paternal Grandfather     Hypertension Other     Obesity Other     Stroke Maternal Aunt     Heart disease Maternal Aunt     Stroke Maternal Aunt     Stroke Maternal Aunt     Coronary artery disease Maternal Uncle     Stroke Maternal Uncle     Heart disease Maternal Uncle     Breast cancer Neg Hx     Ovarian cancer Neg Hx        Social History     Socioeconomic History    Marital  "status:    Tobacco Use    Smoking status: Former     Current packs/day: 0.00     Average packs/day: 1 pack/day for 20.0 years (20.0 ttl pk-yrs)     Types: Cigarettes     Start date: 1960     Quit date: 1980     Years since quittin.5    Smokeless tobacco: Never   Vaping Use    Vaping status: Never Used   Substance and Sexual Activity    Alcohol use: Yes     Alcohol/week: 7.0 standard drinks of alcohol     Types: 7 Glasses of wine per week     Comment: 2 glasses wine  with dinner every day.  grand total 8-9oz    Drug use: No    Sexual activity: Not Currently     Partners: Male       No Known Allergies    Review of Systems:     Review of Systems    Vital Signs  Vitals:    24 1353   BP: 138/72   BP Location: Left arm   Patient Position: Sitting   Cuff Size: Adult   Pulse: 60   Temp: 98.2 °F (36.8 °C)   TempSrc: Infrared   Weight: 87.5 kg (193 lb)   Height: 162.6 cm (64.02\")   PainSc: 0-No pain     Body mass index is 33.11 kg/m².  BMI is >= 30 and <35. (Class 1 Obesity). The following options were offered after discussion;: exercise counseling/recommendations and nutrition counseling/recommendations          Current Outpatient Medications:     aspirin 81 MG EC tablet, Take 1 tablet by mouth Daily., Disp: 90 tablet, Rfl: 3    Biotin 5000 MCG sublingual tablet, take 1 by oral route  every day, Disp: , Rfl:     CALCIUM PO, Take 1 tablet by mouth Daily. Calcium 600 + vitamin d, Disp: , Rfl:     Cholecalciferol (Vitamin D3) 50 MCG (2000 UT) capsule, Take 1 capsule by mouth Daily., Disp: , Rfl:     desonide (DESOWEN) 0.05 % ointment, APPLY TO AREAS TWICE A DAY AS NEEDED, Disp: , Rfl:     Docusate Sodium 100 MG capsule, take 1 tablet by oral route twice daily, Disp: , Rfl:     furosemide (LASIX) 20 MG tablet, TAKE 1 TABLET BY MOUTH DAILY AS NEEDED (EDEMA)., Disp: 90 tablet, Rfl: 1    Glucosamine-Chondroitin (OSTEO BI-FLEX REGULAR STRENGTH PO), 1 daily, Disp: , Rfl:     latanoprost (XALATAN) 0.005 % " ophthalmic solution, Administer 1 drop to both eyes Every Night., Disp: , Rfl:     levothyroxine (SYNTHROID, LEVOTHROID) 112 MCG tablet, Take 1 tablet by mouth Daily., Disp: 90 tablet, Rfl: 3    losartan (COZAAR) 100 MG tablet, Take 1 tablet by mouth Daily., Disp: 90 tablet, Rfl: 3    meloxicam (MOBIC) 7.5 MG tablet, Take 1 tablet by mouth Daily As Needed (severe pain)., Disp: 30 tablet, Rfl: 0    metoprolol succinate XL (TOPROL-XL) 50 MG 24 hr tablet, TAKE 1 TABLET DAILY, Disp: 90 tablet, Rfl: 3    omeprazole (priLOSEC) 40 MG capsule, TAKE 1 CAPSULE DAILY, Disp: 90 capsule, Rfl: 3    Probiotic Product (PROBIOTIC DAILY PO), One capsule daily, Disp: , Rfl:     raloxifene (EVISTA) 60 MG tablet, Take 1 tablet by mouth Daily., Disp: 90 tablet, Rfl: 3    vitamin B-12 (CYANOCOBALAMIN) 1000 MCG tablet, Take 1 tablet by mouth Every Other Day., Disp: 90 tablet, Rfl: 2    Vitamin E 400 units tablet, Daily., Disp: , Rfl:     Current Facility-Administered Medications:     ondansetron ODT (ZOFRAN-ODT) disintegrating tablet 4 mg, 4 mg, Oral, Q6H PRN, Marcial Saavedra MD    Physical Exam:    Physical Exam  Vitals and nursing note reviewed.   Constitutional:       Appearance: She is well-developed. She is obese.   HENT:      Head: Normocephalic.   Eyes:      Conjunctiva/sclera: Conjunctivae normal.      Pupils: Pupils are equal, round, and reactive to light.   Neck:      Thyroid: No thyromegaly.   Cardiovascular:      Rate and Rhythm: Normal rate and regular rhythm.      Heart sounds: Normal heart sounds.      Comments: Swelling and redness and tenderness of L lower leg improved.  Pulmonary:      Effort: Pulmonary effort is normal.      Breath sounds: Normal breath sounds. No wheezing.   Musculoskeletal:         General: Normal range of motion.      Cervical back: Normal range of motion and neck supple.      Right lower leg: No edema.      Left lower leg: No edema.   Lymphadenopathy:      Cervical: No cervical adenopathy.   Skin:    "  General: Skin is warm and dry.   Neurological:      Mental Status: She is alert and oriented to person, place, and time.   Psychiatric:         Attention and Perception: Attention normal.         Mood and Affect: Mood normal.         Thought Content: Thought content normal.          ACE III MINI        Results Review:    I reviewed the patient's new clinical results.  Results  Laboratory Studies  TSH was low.    Imaging  Venous duplex did not show any clots and not even any superficial inflammation in the blood vessels.       CMP:  Lab Results   Component Value Date    BUN 15 05/31/2024    CREATININE 1.01 (H) 05/31/2024    EGFRIFNONA 65 12/13/2021    BCR 14.9 05/31/2024     05/31/2024    K 4.7 05/31/2024    CO2 22.3 05/31/2024    CALCIUM 9.4 05/31/2024    ALBUMIN 3.8 05/31/2024    BILITOT 0.3 05/31/2024    ALKPHOS 61 05/31/2024    AST 11 05/31/2024    ALT 12 05/31/2024     HbA1c:  No results found for: \"HGBA1C\"  Microalbumin:  Lab Results   Component Value Date    MICROALBUR <1.2 05/31/2024     Lipid Panel  Lab Results   Component Value Date    CHOL 168 05/31/2024    TRIG 114 05/31/2024    HDL 63 (H) 05/31/2024    LDL 85 05/31/2024    AST 11 05/31/2024    ALT 12 05/31/2024       Medication Review: Medications reviewed and noted  Patient Instructions   Problem List Items Addressed This Visit          Cardiac and Vasculature    Benign essential hypertension    Overview     Taking losartan 100mg and metoprolol  XL 50mg  daily.               Relevant Medications    furosemide (LASIX) 20 MG tablet    metoprolol succinate XL (TOPROL-XL) 50 MG 24 hr tablet    losartan (COZAAR) 100 MG tablet       Endocrine and Metabolic    Acquired hypothyroidism    Overview     Levothyroxine 125mcg.         Relevant Medications    metoprolol succinate XL (TOPROL-XL) 50 MG 24 hr tablet    levothyroxine (SYNTHROID, LEVOTHROID) 112 MCG tablet       Musculoskeletal and Injuries    Chronic bilateral low back pain without sciatica " (Chronic)    Left leg swelling - Primary          Diagnosis Plan   1. Left leg swelling        2. Benign essential hypertension        3. Chronic bilateral low back pain without sciatica        4. Acquired hypothyroidism          Assessment & Plan  1. Left leg swelling.  The swelling in the left lower extremity has shown improvement, and the soft tissue redness has also shown improvement. A venous duplex was conducted, yielding negative results, and the patient was treated with antibiotics due to the suspicion of cellulitis. DVT and superficial phlebitis were ruled out. The patient is advised to continue elevating her feet while sitting at home and to monitor for any recurrence of swelling and redness.    2. Chronic low back pain.  The patient is encouraged to engage in daily back stretches, starting with a few before getting out of bed in the mornings.    3. Hypertension.  The patient will maintain her current regimen of losartan and metoprolol.    4. Hypothyroidism.  She will maintain her current regimen of levothyroxine 112 mcg daily.    Follow-up  The patient is scheduled for an annual wellness visit in 11/2023.         Plan of care reviewed with patient at the conclusion of today's visit. Education was provided regarding diagnosis, management, and any prescribed or recommended OTC medications. Patient verbalizes understanding of and agreement with management plan.         06/26/24   14:01 EDT    Patient or patient representative verbalized consent for the use of Ambient Listening during the visit with  Bernadette Somers MD for chart documentation. 6/26/2024  14:38 EDT

## 2024-06-26 NOTE — PATIENT INSTRUCTIONS
Patient Instructions  Problem List Items Addressed This Visit          Cardiac and Vasculature    Benign essential hypertension    Overview     Taking losartan 100mg and metoprolol  XL 50mg  daily.               Relevant Medications    furosemide (LASIX) 20 MG tablet    metoprolol succinate XL (TOPROL-XL) 50 MG 24 hr tablet    losartan (COZAAR) 100 MG tablet       Endocrine and Metabolic    Acquired hypothyroidism    Overview     Levothyroxine 125mcg.         Relevant Medications    metoprolol succinate XL (TOPROL-XL) 50 MG 24 hr tablet    levothyroxine (SYNTHROID, LEVOTHROID) 112 MCG tablet       Musculoskeletal and Injuries    Chronic bilateral low back pain without sciatica (Chronic)    Left leg swelling - Primary       Exercising to Stay Healthy  To become healthy and stay healthy, it is recommended that you do moderate-intensity and vigorous-intensity exercise. You can tell that you are exercising at a moderate intensity if your heart starts beating faster and you start breathing faster but can still hold a conversation. You can tell that you are exercising at a vigorous intensity if you are breathing much harder and faster and cannot hold a conversation while exercising.  How can exercise benefit me?  Exercising regularly is important. It has many health benefits, such as:  Improving overall fitness, flexibility, and endurance.  Increasing bone density.  Helping with weight control.  Decreasing body fat.  Increasing muscle strength and endurance.  Reducing stress and tension, anxiety, depression, or anger.  Improving overall health.  What guidelines should I follow while exercising?  Before you start a new exercise program, talk with your health care provider.  Do not exercise so much that you hurt yourself, feel dizzy, or get very short of breath.  Wear comfortable clothes and wear shoes with good support.  Drink plenty of water while you exercise to prevent dehydration or heat stroke.  Work out until your  breathing and your heartbeat get faster (moderate intensity).  How often should I exercise?  Choose an activity that you enjoy, and set realistic goals. Your health care provider can help you make an activity plan that is individually designed and works best for you.  Exercise regularly as told by your health care provider. This may include:  Doing strength training two times a week, such as:  Lifting weights.  Using resistance bands.  Push-ups.  Sit-ups.  Yoga.  Doing a certain intensity of exercise for a given amount of time. Choose from these options:  A total of 150 minutes of moderate-intensity exercise every week.  A total of 75 minutes of vigorous-intensity exercise every week.  A mix of moderate-intensity and vigorous-intensity exercise every week.  Children, pregnant women, people who have not exercised regularly, people who are overweight, and older adults may need to talk with a health care provider about what activities are safe to perform. If you have a medical condition, be sure to talk with your health care provider before you start a new exercise program.  What are some exercise ideas?  Moderate-intensity exercise ideas include:  Walking 1 mile (1.6 km) in about 15 minutes.  Biking.  Hiking.  Golfing.  Dancing.  Water aerobics.  Vigorous-intensity exercise ideas include:  Walking 4.5 miles (7.2 km) or more in about 1 hour.  Jogging or running 5 miles (8 km) in about 1 hour.  Biking 10 miles (16.1 km) or more in about 1 hour.  Lap swimming.  Roller-skating or in-line skating.  Cross-country skiing.  Vigorous competitive sports, such as football, basketball, and soccer.  Jumping rope.  Aerobic dancing.  What are some everyday activities that can help me get exercise?  Yard work, such as:  Pushing a .  Raking and bagging leaves.  Washing your car.  Pushing a stroller.  Shoveling snow.  Gardening.  Washing windows or floors.  How can I be more active in my day-to-day activities?  Use stairs  instead of an elevator.  Take a walk during your lunch break.  If you drive, park your car farther away from your work or school.  If you take public transportation, get off one stop early and walk the rest of the way.  Stand up or walk around during all of your indoor phone calls.  Get up, stretch, and walk around every 30 minutes throughout the day.  Enjoy exercise with a friend. Support to continue exercising will help you keep a regular routine of activity.  Where to find more information  You can find more information about exercising to stay healthy from:  [place] Department of Health and Human Services: www.hhs.gov  Centers for Disease Control and Prevention (CDC): www.cdc.gov  Summary  Exercising regularly is important. It will improve your overall fitness, flexibility, and endurance.  Regular exercise will also improve your overall health. It can help you control your weight, reduce stress, and improve your bone density.  Do not exercise so much that you hurt yourself, feel dizzy, or get very short of breath.  Before you start a new exercise program, talk with your health care provider.  This information is not intended to replace advice given to you by your health care provider. Make sure you discuss any questions you have with your health care provider.  Document Revised: 04/15/2022 Document Reviewed: 04/15/2022  Elsevier Patient Education © 2023 [employer]

## 2024-07-01 RX ORDER — GENTAMICIN SULFATE 3 MG/ML
SOLUTION/ DROPS OPHTHALMIC
Qty: 5 ML | Refills: 0 | Status: SHIPPED | OUTPATIENT
Start: 2024-07-01

## 2024-07-25 ENCOUNTER — TRANSCRIBE ORDERS (OUTPATIENT)
Dept: ADMINISTRATIVE | Facility: HOSPITAL | Age: 84
End: 2024-07-25
Payer: MEDICARE

## 2024-07-25 DIAGNOSIS — Z12.31 VISIT FOR SCREENING MAMMOGRAM: Primary | ICD-10-CM

## 2024-07-30 ENCOUNTER — HOSPITAL ENCOUNTER (OUTPATIENT)
Dept: MAMMOGRAPHY | Facility: HOSPITAL | Age: 84
Discharge: HOME | End: 2024-07-30
Admitting: INTERNAL MEDICINE
Payer: MEDICARE

## 2024-07-30 DIAGNOSIS — Z12.31 VISIT FOR SCREENING MAMMOGRAM: ICD-10-CM

## 2024-07-30 PROCEDURE — 77063 BREAST TOMOSYNTHESIS BI: CPT

## 2024-07-30 PROCEDURE — 77067 SCR MAMMO BI INCL CAD: CPT

## 2024-08-01 PROCEDURE — 77063 BREAST TOMOSYNTHESIS BI: CPT | Performed by: RADIOLOGY

## 2024-08-01 PROCEDURE — 77067 SCR MAMMO BI INCL CAD: CPT | Performed by: RADIOLOGY

## 2024-08-05 RX ORDER — OMEPRAZOLE 40 MG/1
CAPSULE, DELAYED RELEASE ORAL
Qty: 90 CAPSULE | Refills: 3 | Status: SHIPPED | OUTPATIENT
Start: 2024-08-05

## 2024-08-07 LAB
NCCN CRITERIA FLAG: NORMAL
TYRER CUZICK SCORE: 1.1

## 2024-08-27 ENCOUNTER — TRANSCRIBE ORDERS (OUTPATIENT)
Dept: ADMINISTRATIVE | Facility: HOSPITAL | Age: 84
End: 2024-08-27
Payer: MEDICARE

## 2024-08-27 ENCOUNTER — HOSPITAL ENCOUNTER (OUTPATIENT)
Dept: MAMMOGRAPHY | Facility: HOSPITAL | Age: 84
Discharge: HOME | End: 2024-08-27
Payer: MEDICARE

## 2024-08-27 ENCOUNTER — HOSPITAL ENCOUNTER (OUTPATIENT)
Dept: ULTRASOUND IMAGING | Facility: HOSPITAL | Age: 84
Discharge: HOME | End: 2024-08-27
Payer: MEDICARE

## 2024-08-27 ENCOUNTER — TELEPHONE (OUTPATIENT)
Dept: MAMMOGRAPHY | Facility: HOSPITAL | Age: 84
End: 2024-08-27
Payer: MEDICARE

## 2024-08-27 DIAGNOSIS — R92.8 ABNORMAL MAMMOGRAM: ICD-10-CM

## 2024-08-27 DIAGNOSIS — R92.8 ABNORMAL MAMMOGRAM: Primary | ICD-10-CM

## 2024-08-27 PROCEDURE — 76642 ULTRASOUND BREAST LIMITED: CPT

## 2024-08-27 PROCEDURE — 77066 DX MAMMO INCL CAD BI: CPT

## 2024-08-27 PROCEDURE — G0279 TOMOSYNTHESIS, MAMMO: HCPCS

## 2024-08-28 ENCOUNTER — TELEPHONE (OUTPATIENT)
Dept: MAMMOGRAPHY | Facility: HOSPITAL | Age: 84
End: 2024-08-28
Payer: MEDICARE

## 2024-08-28 NOTE — TELEPHONE ENCOUNTER
Patient notified that medication clearance received from provider. Patient is aware to hold aspirin 81mg x 7 days prior to procedure, and is to resume immediately after procedure. Patient aware to contact prescribing provider with any medication questions. Patient verbalized understanding. BIS scheduling notified, pt states she already has biopsy scheduled for 9.17.24.

## 2024-09-04 ENCOUNTER — TELEPHONE (OUTPATIENT)
Dept: MAMMOGRAPHY | Facility: HOSPITAL | Age: 84
End: 2024-09-04
Payer: MEDICARE

## 2024-09-04 ENCOUNTER — PATIENT MESSAGE (OUTPATIENT)
Dept: INTERNAL MEDICINE | Facility: CLINIC | Age: 84
End: 2024-09-04
Payer: MEDICARE

## 2024-09-04 NOTE — TELEPHONE ENCOUNTER
Patient called in with questions regarding upcoming biopsy, questions answered, verbalized understanding.

## 2024-09-05 NOTE — TELEPHONE ENCOUNTER
From: Serenity Lagos  To: Bernadette Somers  Sent: 9/4/2024 6:18 PM EDT  Subject: ROLA    For some reason, my RX for Losartan was called in to [facility] instead of [facility] last time. Please call in a 90 days RX (refillable) to [facility] : Losartan Potassium 100MG. So much less expensive at [facility]. Thanks.

## 2024-09-06 ENCOUNTER — PATIENT MESSAGE (OUTPATIENT)
Dept: INTERNAL MEDICINE | Facility: CLINIC | Age: 84
End: 2024-09-06
Payer: MEDICARE

## 2024-09-06 RX ORDER — LOSARTAN POTASSIUM 100 MG/1
100 TABLET ORAL DAILY
Qty: 90 TABLET | Refills: 3 | Status: SHIPPED | OUTPATIENT
Start: 2024-09-06

## 2024-09-06 NOTE — TELEPHONE ENCOUNTER
From: Serenity Lagos  To: Bernadette Somers  Sent: 2024 1:06 PM EDT  Subject: ROLA    [facility] told me that the RX sent in for my Losartan had  and needed to be resent. If you can talk them today, that would be good since I am running low. Thanks.

## 2024-09-17 ENCOUNTER — HOSPITAL ENCOUNTER (OUTPATIENT)
Dept: MAMMOGRAPHY | Facility: HOSPITAL | Age: 84
Discharge: HOME | End: 2024-09-17
Payer: MEDICARE

## 2024-09-17 DIAGNOSIS — R92.8 ABNORMAL MAMMOGRAM: ICD-10-CM

## 2024-09-17 PROCEDURE — C1819 TISSUE LOCALIZATION-EXCISION: HCPCS

## 2024-09-17 PROCEDURE — 88360 TUMOR IMMUNOHISTOCHEM/MANUAL: CPT | Performed by: RADIOLOGY

## 2024-09-17 PROCEDURE — 25010000002 LIDOCAINE 1 % SOLUTION: Performed by: RADIOLOGY

## 2024-09-17 PROCEDURE — 88305 TISSUE EXAM BY PATHOLOGIST: CPT | Performed by: RADIOLOGY

## 2024-09-17 RX ORDER — LIDOCAINE HYDROCHLORIDE 10 MG/ML
15 INJECTION, SOLUTION INFILTRATION; PERINEURAL ONCE
Status: COMPLETED | OUTPATIENT
Start: 2024-09-17 | End: 2024-09-17

## 2024-09-17 RX ORDER — LIDOCAINE HYDROCHLORIDE AND EPINEPHRINE 10; 10 MG/ML; UG/ML
10 INJECTION, SOLUTION INFILTRATION; PERINEURAL ONCE
Status: COMPLETED | OUTPATIENT
Start: 2024-09-17 | End: 2024-09-17

## 2024-09-17 RX ADMIN — LIDOCAINE HYDROCHLORIDE,EPINEPHRINE BITARTRATE 10 ML: 10; .01 INJECTION, SOLUTION INFILTRATION; PERINEURAL at 11:22

## 2024-09-17 RX ADMIN — LIDOCAINE HYDROCHLORIDE 15 ML: 10 INJECTION, SOLUTION INFILTRATION; PERINEURAL at 11:22

## 2024-09-19 ENCOUNTER — TELEPHONE (OUTPATIENT)
Dept: MAMMOGRAPHY | Facility: HOSPITAL | Age: 84
End: 2024-09-19
Payer: MEDICARE

## 2024-09-27 ENCOUNTER — TELEPHONE (OUTPATIENT)
Dept: INTERNAL MEDICINE | Facility: CLINIC | Age: 84
End: 2024-09-27
Payer: MEDICARE

## 2024-09-27 RX ORDER — LACTULOSE 10 G/15ML
20 SOLUTION ORAL 2 TIMES DAILY PRN
Qty: 300 ML | Refills: 0 | Status: SHIPPED | OUTPATIENT
Start: 2024-09-27

## 2024-09-28 ENCOUNTER — HOSPITAL ENCOUNTER (INPATIENT)
Facility: HOSPITAL | Age: 84
LOS: 1 days | Discharge: HOME | End: 2024-09-30
Attending: EMERGENCY MEDICINE | Admitting: INTERNAL MEDICINE
Payer: MEDICARE

## 2024-09-28 ENCOUNTER — APPOINTMENT (OUTPATIENT)
Dept: CT IMAGING | Facility: HOSPITAL | Age: 84
End: 2024-09-28
Payer: MEDICARE

## 2024-09-28 DIAGNOSIS — E66.811 CLASS 1 OBESITY DUE TO EXCESS CALORIES WITH SERIOUS COMORBIDITY AND BODY MASS INDEX (BMI) OF 33.0 TO 33.9 IN ADULT: ICD-10-CM

## 2024-09-28 DIAGNOSIS — I10 BENIGN ESSENTIAL HYPERTENSION: ICD-10-CM

## 2024-09-28 DIAGNOSIS — M79.672 LEFT FOOT PAIN: Chronic | ICD-10-CM

## 2024-09-28 DIAGNOSIS — I25.84 CORONARY ARTERY CALCIFICATION OF NATIVE ARTERY: Chronic | ICD-10-CM

## 2024-09-28 DIAGNOSIS — D72.829 LEUKOCYTOSIS, UNSPECIFIED TYPE: ICD-10-CM

## 2024-09-28 DIAGNOSIS — Z78.0 OSTEOPENIA AFTER MENOPAUSE: ICD-10-CM

## 2024-09-28 DIAGNOSIS — E87.1 HYPONATREMIA: ICD-10-CM

## 2024-09-28 DIAGNOSIS — E78.5 HYPERLIPIDEMIA LDL GOAL <70: ICD-10-CM

## 2024-09-28 DIAGNOSIS — K61.1 PERIRECTAL ABSCESS: Primary | ICD-10-CM

## 2024-09-28 DIAGNOSIS — M79.89 LEFT LEG SWELLING: ICD-10-CM

## 2024-09-28 DIAGNOSIS — R00.2 PALPITATIONS: ICD-10-CM

## 2024-09-28 DIAGNOSIS — E66.09 CLASS 1 OBESITY DUE TO EXCESS CALORIES WITH SERIOUS COMORBIDITY AND BODY MASS INDEX (BMI) OF 33.0 TO 33.9 IN ADULT: ICD-10-CM

## 2024-09-28 DIAGNOSIS — I25.10 CORONARY ARTERY CALCIFICATION OF NATIVE ARTERY: Chronic | ICD-10-CM

## 2024-09-28 DIAGNOSIS — R60.0 PEDAL EDEMA: ICD-10-CM

## 2024-09-28 DIAGNOSIS — Z91.81 AT RISK FOR FALLS: Chronic | ICD-10-CM

## 2024-09-28 DIAGNOSIS — R56.9 SEIZURES: ICD-10-CM

## 2024-09-28 DIAGNOSIS — M85.80 OSTEOPENIA AFTER MENOPAUSE: ICD-10-CM

## 2024-09-28 LAB
ALBUMIN SERPL-MCNC: 3.6 G/DL (ref 3.5–5.2)
ALBUMIN/GLOB SERPL: 0.9 G/DL
ALP SERPL-CCNC: 67 U/L (ref 39–117)
ALT SERPL W P-5'-P-CCNC: 23 U/L (ref 1–33)
ANION GAP SERPL CALCULATED.3IONS-SCNC: 15 MMOL/L (ref 5–15)
AST SERPL-CCNC: 26 U/L (ref 1–32)
BASOPHILS # BLD AUTO: 0.05 10*3/MM3 (ref 0–0.2)
BASOPHILS NFR BLD AUTO: 0.3 % (ref 0–1.5)
BILIRUB SERPL-MCNC: 0.3 MG/DL (ref 0–1.2)
BUN SERPL-MCNC: 13 MG/DL (ref 8–23)
BUN/CREAT SERPL: 14.1 (ref 7–25)
CALCIUM SPEC-SCNC: 8.9 MG/DL (ref 8.6–10.5)
CHLORIDE SERPL-SCNC: 90 MMOL/L (ref 98–107)
CO2 SERPL-SCNC: 21 MMOL/L (ref 22–29)
CREAT SERPL-MCNC: 0.92 MG/DL (ref 0.57–1)
DEPRECATED RDW RBC AUTO: 38 FL (ref 37–54)
EGFRCR SERPLBLD CKD-EPI 2021: 61.9 ML/MIN/1.73
EOSINOPHIL # BLD AUTO: 0.08 10*3/MM3 (ref 0–0.4)
EOSINOPHIL NFR BLD AUTO: 0.4 % (ref 0.3–6.2)
ERYTHROCYTE [DISTWIDTH] IN BLOOD BY AUTOMATED COUNT: 11.8 % (ref 12.3–15.4)
GLOBULIN UR ELPH-MCNC: 3.8 GM/DL
GLUCOSE SERPL-MCNC: 100 MG/DL (ref 65–99)
HCT VFR BLD AUTO: 33.3 % (ref 34–46.6)
HGB BLD-MCNC: 11.2 G/DL (ref 12–15.9)
IMM GRANULOCYTES # BLD AUTO: 0.28 10*3/MM3 (ref 0–0.05)
IMM GRANULOCYTES NFR BLD AUTO: 1.4 % (ref 0–0.5)
LIPASE SERPL-CCNC: 23 U/L (ref 13–60)
LYMPHOCYTES # BLD AUTO: 2.21 10*3/MM3 (ref 0.7–3.1)
LYMPHOCYTES NFR BLD AUTO: 11.1 % (ref 19.6–45.3)
MCH RBC QN AUTO: 29.8 PG (ref 26.6–33)
MCHC RBC AUTO-ENTMCNC: 33.6 G/DL (ref 31.5–35.7)
MCV RBC AUTO: 88.6 FL (ref 79–97)
MONOCYTES # BLD AUTO: 1.83 10*3/MM3 (ref 0.1–0.9)
MONOCYTES NFR BLD AUTO: 9.2 % (ref 5–12)
NEUTROPHILS NFR BLD AUTO: 15.43 10*3/MM3 (ref 1.7–7)
NEUTROPHILS NFR BLD AUTO: 77.6 % (ref 42.7–76)
NRBC BLD AUTO-RTO: 0 /100 WBC (ref 0–0.2)
PLATELET # BLD AUTO: 504 10*3/MM3 (ref 140–450)
PMV BLD AUTO: 9.3 FL (ref 6–12)
POTASSIUM SERPL-SCNC: 3.5 MMOL/L (ref 3.5–5.2)
PROT SERPL-MCNC: 7.4 G/DL (ref 6–8.5)
RBC # BLD AUTO: 3.76 10*6/MM3 (ref 3.77–5.28)
SODIUM SERPL-SCNC: 126 MMOL/L (ref 136–145)
WBC NRBC COR # BLD AUTO: 19.88 10*3/MM3 (ref 3.4–10.8)

## 2024-09-28 PROCEDURE — 99285 EMERGENCY DEPT VISIT HI MDM: CPT

## 2024-09-28 PROCEDURE — 74177 CT ABD & PELVIS W/CONTRAST: CPT

## 2024-09-28 PROCEDURE — 25510000001 IOPAMIDOL 61 % SOLUTION: Performed by: EMERGENCY MEDICINE

## 2024-09-28 PROCEDURE — 80053 COMPREHEN METABOLIC PANEL: CPT | Performed by: EMERGENCY MEDICINE

## 2024-09-28 PROCEDURE — 25810000003 SODIUM CHLORIDE 0.9 % SOLUTION: Performed by: EMERGENCY MEDICINE

## 2024-09-28 PROCEDURE — 83690 ASSAY OF LIPASE: CPT | Performed by: EMERGENCY MEDICINE

## 2024-09-28 PROCEDURE — 85025 COMPLETE CBC W/AUTO DIFF WBC: CPT | Performed by: EMERGENCY MEDICINE

## 2024-09-28 PROCEDURE — 36415 COLL VENOUS BLD VENIPUNCTURE: CPT

## 2024-09-28 RX ORDER — IOPAMIDOL 612 MG/ML
80 INJECTION, SOLUTION INTRAVASCULAR
Status: COMPLETED | OUTPATIENT
Start: 2024-09-28 | End: 2024-09-28

## 2024-09-28 RX ORDER — VANCOMYCIN 1.75 GRAM/500 ML IN 0.9 % SODIUM CHLORIDE INTRAVENOUS
20 ONCE
Status: COMPLETED | OUTPATIENT
Start: 2024-09-28 | End: 2024-09-29

## 2024-09-28 RX ORDER — SODIUM CHLORIDE 0.9 % (FLUSH) 0.9 %
10 SYRINGE (ML) INJECTION AS NEEDED
Status: DISCONTINUED | OUTPATIENT
Start: 2024-09-28 | End: 2024-09-30 | Stop reason: HOSPADM

## 2024-09-28 RX ADMIN — IOPAMIDOL 80 ML: 612 INJECTION, SOLUTION INTRAVENOUS at 22:11

## 2024-09-28 RX ADMIN — SODIUM CHLORIDE 500 ML: 9 INJECTION, SOLUTION INTRAVENOUS at 22:46

## 2024-09-28 NOTE — Clinical Note
Level of Care: Telemetry [5]   Diagnosis: Perirectal abscess [[id number]]   Admitting Physician: KAT CASTILLO III [[id number]]   Attending Physician: KAT CASTILLO III [[id number]]   Bed Request Comments: tele obs not cdu

## 2024-09-29 ENCOUNTER — APPOINTMENT (OUTPATIENT)
Dept: GENERAL RADIOLOGY | Facility: HOSPITAL | Age: 84
End: 2024-09-29
Payer: MEDICARE

## 2024-09-29 PROBLEM — S76.311A STRAIN OF RIGHT HAMSTRING: Status: RESOLVED | Noted: 2022-12-19 | Resolved: 2024-09-29

## 2024-09-29 PROBLEM — R92.1 BREAST CALCIFICATION, LEFT: Status: ACTIVE | Noted: 2024-09-29

## 2024-09-29 PROBLEM — R20.0 NUMBNESS OF ARM: Status: RESOLVED | Noted: 2020-05-27 | Resolved: 2024-09-29

## 2024-09-29 PROBLEM — M54.2 CERVICALGIA: Chronic | Status: RESOLVED | Noted: 2020-05-27 | Resolved: 2024-09-29

## 2024-09-29 PROBLEM — G89.29 OTHER CHRONIC PAIN: Status: RESOLVED | Noted: 2019-05-03 | Resolved: 2024-09-29

## 2024-09-29 PROBLEM — N64.4 BREAST TENDERNESS IN FEMALE: Status: RESOLVED | Noted: 2019-11-27 | Resolved: 2024-09-29

## 2024-09-29 PROBLEM — I25.118 CORONARY ARTERY DISEASE OF NATIVE ARTERY OF NATIVE HEART WITH STABLE ANGINA PECTORIS: Status: RESOLVED | Noted: 2019-09-25 | Resolved: 2024-09-29

## 2024-09-29 PROBLEM — M79.601 BILATERAL ARM PAIN: Chronic | Status: RESOLVED | Noted: 2023-02-21 | Resolved: 2024-09-29

## 2024-09-29 PROBLEM — E53.8 B12 DEFICIENCY: Status: RESOLVED | Noted: 2020-06-19 | Resolved: 2024-09-29

## 2024-09-29 PROBLEM — E87.1 HYPONATREMIA: Status: ACTIVE | Noted: 2024-09-29

## 2024-09-29 PROBLEM — G56.00 CARPAL TUNNEL SYNDROME: Status: RESOLVED | Noted: 2020-06-19 | Resolved: 2024-09-29

## 2024-09-29 PROBLEM — H52.10 MYOPIA: Status: RESOLVED | Noted: 2018-05-29 | Resolved: 2024-09-29

## 2024-09-29 PROBLEM — R05.9 COUGH IN ADULT: Chronic | Status: RESOLVED | Noted: 2022-03-21 | Resolved: 2024-09-29

## 2024-09-29 PROBLEM — R07.81 RIB PAIN ON RIGHT SIDE: Status: RESOLVED | Noted: 2021-03-25 | Resolved: 2024-09-29

## 2024-09-29 PROBLEM — H52.4 PRESBYOPIA: Status: RESOLVED | Noted: 2018-05-29 | Resolved: 2024-09-29

## 2024-09-29 PROBLEM — R20.0 NUMBNESS AND TINGLING OF BOTH LEGS BELOW KNEES: Status: RESOLVED | Noted: 2019-01-28 | Resolved: 2024-09-29

## 2024-09-29 PROBLEM — M79.18 CERVICAL MYOFASCIAL PAIN SYNDROME: Status: RESOLVED | Noted: 2020-06-19 | Resolved: 2024-09-29

## 2024-09-29 PROBLEM — R56.9 SEIZURES: Status: ACTIVE | Noted: 2024-09-29

## 2024-09-29 PROBLEM — M25.612 DECREASED RANGE OF MOTION OF LEFT SHOULDER: Status: RESOLVED | Noted: 2022-12-19 | Resolved: 2024-09-29

## 2024-09-29 PROBLEM — M19.042 PRIMARY OSTEOARTHRITIS OF BOTH HANDS: Chronic | Status: RESOLVED | Noted: 2022-06-20 | Resolved: 2024-09-29

## 2024-09-29 PROBLEM — H52.229 REGULAR ASTIGMATISM: Status: RESOLVED | Noted: 2018-05-29 | Resolved: 2024-09-29

## 2024-09-29 PROBLEM — G89.29 CHRONIC BILATERAL LOW BACK PAIN WITHOUT SCIATICA: Chronic | Status: RESOLVED | Noted: 2019-01-28 | Resolved: 2024-09-29

## 2024-09-29 PROBLEM — K21.9 GERD WITHOUT ESOPHAGITIS: Status: ACTIVE | Noted: 2024-09-29

## 2024-09-29 PROBLEM — Z98.890 HISTORY OF BENIGN BREAST BIOPSY: Status: RESOLVED | Noted: 2019-01-28 | Resolved: 2024-09-29

## 2024-09-29 PROBLEM — R26.89 POOR BALANCE: Status: RESOLVED | Noted: 2019-01-28 | Resolved: 2024-09-29

## 2024-09-29 PROBLEM — M25.551 RIGHT HIP PAIN: Status: RESOLVED | Noted: 2019-01-28 | Resolved: 2024-09-29

## 2024-09-29 PROBLEM — M79.651 PAIN OF RIGHT THIGH: Chronic | Status: RESOLVED | Noted: 2022-10-25 | Resolved: 2024-09-29

## 2024-09-29 PROBLEM — Z78.9 ALCOHOL USE: Status: ACTIVE | Noted: 2024-09-29

## 2024-09-29 PROBLEM — M79.7 FIBROSITIS: Status: RESOLVED | Noted: 2020-06-19 | Resolved: 2024-09-29

## 2024-09-29 PROBLEM — M54.50 CHRONIC BILATERAL LOW BACK PAIN WITHOUT SCIATICA: Chronic | Status: RESOLVED | Noted: 2019-01-28 | Resolved: 2024-09-29

## 2024-09-29 PROBLEM — M50.30 DDD (DEGENERATIVE DISC DISEASE), CERVICAL: Status: RESOLVED | Noted: 2020-06-19 | Resolved: 2024-09-29

## 2024-09-29 PROBLEM — E55.9 VITAMIN D DEFICIENCY: Status: RESOLVED | Noted: 2019-01-28 | Resolved: 2024-09-29

## 2024-09-29 PROBLEM — R06.09 DOE (DYSPNEA ON EXERTION): Status: RESOLVED | Noted: 2020-02-20 | Resolved: 2024-09-29
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
## 2024-09-29 PROBLEM — M79.602 BILATERAL ARM PAIN: Chronic | Status: RESOLVED | Noted: 2023-02-21 | Resolved: 2024-09-29

## 2024-09-29 PROBLEM — L71.9 ACNE ROSACEA: Status: RESOLVED | Noted: 2019-01-28 | Resolved: 2024-09-29

## 2024-09-29 PROBLEM — M17.0 PRIMARY OSTEOARTHRITIS OF BOTH KNEES: Status: RESOLVED | Noted: 2019-01-28 | Resolved: 2024-09-29

## 2024-09-29 PROBLEM — R07.9 CHEST PAIN: Status: RESOLVED | Noted: 2019-11-27 | Resolved: 2024-09-29

## 2024-09-29 PROBLEM — M19.041 PRIMARY OSTEOARTHRITIS OF BOTH HANDS: Chronic | Status: RESOLVED | Noted: 2022-06-20 | Resolved: 2024-09-29

## 2024-09-29 PROBLEM — G89.29 CHRONIC LEFT SHOULDER PAIN: Chronic | Status: RESOLVED | Noted: 2022-06-20 | Resolved: 2024-09-29

## 2024-09-29 PROBLEM — H40.10X0 OPEN-ANGLE GLAUCOMA: Status: RESOLVED | Noted: 2017-04-14 | Resolved: 2024-09-29

## 2024-09-29 PROBLEM — F10.90 ALCOHOL USE: Status: ACTIVE | Noted: 2024-09-29

## 2024-09-29 PROBLEM — R20.2 NUMBNESS AND TINGLING OF BOTH LEGS BELOW KNEES: Status: RESOLVED | Noted: 2019-01-28 | Resolved: 2024-09-29

## 2024-09-29 PROBLEM — M25.512 CHRONIC LEFT SHOULDER PAIN: Chronic | Status: RESOLVED | Noted: 2022-06-20 | Resolved: 2024-09-29

## 2024-09-29 LAB
ABO GROUP BLD: NORMAL
ABO GROUP BLD: NORMAL
ANION GAP SERPL CALCULATED.3IONS-SCNC: 15 MMOL/L (ref 5–15)
BASOPHILS # BLD AUTO: 0.06 10*3/MM3 (ref 0–0.2)
BASOPHILS NFR BLD AUTO: 0.4 % (ref 0–1.5)
BILIRUB UR QL STRIP: NEGATIVE
BLD GP AB SCN SERPL QL: NEGATIVE
BUN SERPL-MCNC: 11 MG/DL (ref 8–23)
BUN/CREAT SERPL: 13.3 (ref 7–25)
CALCIUM SPEC-SCNC: 8.3 MG/DL (ref 8.6–10.5)
CHLORIDE SERPL-SCNC: 95 MMOL/L (ref 98–107)
CLARITY UR: CLEAR
CO2 SERPL-SCNC: 17 MMOL/L (ref 22–29)
COLOR UR: YELLOW
CREAT BLDA-MCNC: 0.8 MG/DL (ref 0.6–1.3)
CREAT BLDA-MCNC: 0.8 MG/DL (ref 0.6–1.3)
CREAT SERPL-MCNC: 0.83 MG/DL (ref 0.57–1)
D-LACTATE SERPL-SCNC: 1.3 MMOL/L (ref 0.5–2)
DEPRECATED RDW RBC AUTO: 37.3 FL (ref 37–54)
EGFRCR SERPLBLD CKD-EPI 2021: 70 ML/MIN/1.73
EOSINOPHIL # BLD AUTO: 0.12 10*3/MM3 (ref 0–0.4)
EOSINOPHIL NFR BLD AUTO: 0.8 % (ref 0.3–6.2)
ERYTHROCYTE [DISTWIDTH] IN BLOOD BY AUTOMATED COUNT: 11.7 % (ref 12.3–15.4)
GEN 5 2HR TROPONIN T REFLEX: 15 NG/L
GLUCOSE SERPL-MCNC: 82 MG/DL (ref 65–99)
GLUCOSE UR STRIP-MCNC: NEGATIVE MG/DL
HBA1C MFR BLD: 5.6 % (ref 4.8–5.6)
HCT VFR BLD AUTO: 29.9 % (ref 34–46.6)
HCT VFR BLD AUTO: 30 % (ref 34–46.6)
HGB BLD-MCNC: 10.2 G/DL (ref 12–15.9)
HGB BLD-MCNC: 10.4 G/DL (ref 12–15.9)
HGB UR QL STRIP.AUTO: NEGATIVE
IMM GRANULOCYTES # BLD AUTO: 0.23 10*3/MM3 (ref 0–0.05)
IMM GRANULOCYTES NFR BLD AUTO: 1.5 % (ref 0–0.5)
INR PPP: 1.04 (ref 0.89–1.12)
KETONES UR QL STRIP: NEGATIVE
LEUKOCYTE ESTERASE UR QL STRIP.AUTO: NEGATIVE
LYMPHOCYTES # BLD AUTO: 2 10*3/MM3 (ref 0.7–3.1)
LYMPHOCYTES NFR BLD AUTO: 13.1 % (ref 19.6–45.3)
MAGNESIUM SERPL-MCNC: 1.8 MG/DL (ref 1.6–2.4)
MCH RBC QN AUTO: 30.2 PG (ref 26.6–33)
MCHC RBC AUTO-ENTMCNC: 34.8 G/DL (ref 31.5–35.7)
MCV RBC AUTO: 86.9 FL (ref 79–97)
MONOCYTES # BLD AUTO: 1.54 10*3/MM3 (ref 0.1–0.9)
MONOCYTES NFR BLD AUTO: 10.1 % (ref 5–12)
MRSA DNA SPEC QL NAA+PROBE: NEGATIVE
NEUTROPHILS NFR BLD AUTO: 11.35 10*3/MM3 (ref 1.7–7)
NEUTROPHILS NFR BLD AUTO: 74.1 % (ref 42.7–76)
NITRITE UR QL STRIP: NEGATIVE
NRBC BLD AUTO-RTO: 0 /100 WBC (ref 0–0.2)
OSMOLALITY SERPL: 269 MOSM/KG (ref 275–295)
OSMOLALITY UR: 226 MOSM/KG (ref 300–1100)
PH UR STRIP.AUTO: 6 [PH] (ref 5–8)
PHOSPHATE SERPL-MCNC: 3.1 MG/DL (ref 2.5–4.5)
PLATELET # BLD AUTO: 450 10*3/MM3 (ref 140–450)
PMV BLD AUTO: 9.3 FL (ref 6–12)
POTASSIUM SERPL-SCNC: 3.7 MMOL/L (ref 3.5–5.2)
POTASSIUM SERPL-SCNC: 4.3 MMOL/L (ref 3.5–5.2)
PROCALCITONIN SERPL-MCNC: 0.11 NG/ML (ref 0–0.25)
PROT UR QL STRIP: NEGATIVE
PROTHROMBIN TIME: 13.7 SECONDS (ref 12.2–14.5)
RBC # BLD AUTO: 3.44 10*6/MM3 (ref 3.77–5.28)
RH BLD: NEGATIVE
RH BLD: NEGATIVE
SODIUM SERPL-SCNC: 127 MMOL/L (ref 136–145)
SODIUM SERPL-SCNC: 130 MMOL/L (ref 136–145)
SODIUM SERPL-SCNC: 131 MMOL/L (ref 136–145)
SODIUM SERPL-SCNC: 132 MMOL/L (ref 136–145)
SODIUM UR-SCNC: <20 MMOL/L
SP GR UR STRIP: 1.02 (ref 1–1.03)
T&S EXPIRATION DATE: NORMAL
TROPONIN T DELTA: 0 NG/L
TROPONIN T SERPL HS-MCNC: 15 NG/L
UROBILINOGEN UR QL STRIP: NORMAL
WBC NRBC COR # BLD AUTO: 15.3 10*3/MM3 (ref 3.4–10.8)

## 2024-09-29 PROCEDURE — 84145 PROCALCITONIN (PCT): CPT | Performed by: EMERGENCY MEDICINE

## 2024-09-29 PROCEDURE — 99223 1ST HOSP IP/OBS HIGH 75: CPT | Performed by: NURSE PRACTITIONER

## 2024-09-29 PROCEDURE — 83930 ASSAY OF BLOOD OSMOLALITY: CPT | Performed by: NURSE PRACTITIONER

## 2024-09-29 PROCEDURE — 87040 BLOOD CULTURE FOR BACTERIA: CPT | Performed by: NURSE PRACTITIONER

## 2024-09-29 PROCEDURE — 84295 ASSAY OF SERUM SODIUM: CPT | Performed by: INTERNAL MEDICINE

## 2024-09-29 PROCEDURE — 82565 ASSAY OF CREATININE: CPT

## 2024-09-29 PROCEDURE — 87070 CULTURE OTHR SPECIMN AEROBIC: CPT | Performed by: COLON & RECTAL SURGERY

## 2024-09-29 PROCEDURE — 81003 URINALYSIS AUTO W/O SCOPE: CPT | Performed by: EMERGENCY MEDICINE

## 2024-09-29 PROCEDURE — 86901 BLOOD TYPING SEROLOGIC RH(D): CPT

## 2024-09-29 PROCEDURE — 85025 COMPLETE CBC W/AUTO DIFF WBC: CPT | Performed by: NURSE PRACTITIONER

## 2024-09-29 PROCEDURE — 83735 ASSAY OF MAGNESIUM: CPT | Performed by: NURSE PRACTITIONER

## 2024-09-29 PROCEDURE — 83036 HEMOGLOBIN GLYCOSYLATED A1C: CPT | Performed by: NURSE PRACTITIONER

## 2024-09-29 PROCEDURE — 84300 ASSAY OF URINE SODIUM: CPT | Performed by: NURSE PRACTITIONER

## 2024-09-29 PROCEDURE — 85610 PROTHROMBIN TIME: CPT | Performed by: NURSE PRACTITIONER

## 2024-09-29 PROCEDURE — 83605 ASSAY OF LACTIC ACID: CPT | Performed by: EMERGENCY MEDICINE

## 2024-09-29 PROCEDURE — 25010000002 PIPERACILLIN SOD-TAZOBACTAM PER 1 G: Performed by: NURSE PRACTITIONER

## 2024-09-29 PROCEDURE — 0D9P0ZZ DRAINAGE OF RECTUM, OPEN APPROACH: ICD-10-PCS | Performed by: COLON & RECTAL SURGERY

## 2024-09-29 PROCEDURE — 25010000002 VANCOMYCIN HCL IN NACL 1.75-0.9 GM/500ML-% SOLUTION: Performed by: NURSE PRACTITIONER

## 2024-09-29 PROCEDURE — 84484 ASSAY OF TROPONIN QUANT: CPT | Performed by: NURSE PRACTITIONER

## 2024-09-29 PROCEDURE — 87205 SMEAR GRAM STAIN: CPT | Performed by: COLON & RECTAL SURGERY

## 2024-09-29 PROCEDURE — 86850 RBC ANTIBODY SCREEN: CPT | Performed by: INTERNAL MEDICINE

## 2024-09-29 PROCEDURE — 87641 MR-STAPH DNA AMP PROBE: CPT

## 2024-09-29 PROCEDURE — 25010000002 PIPERACILLIN SOD-TAZOBACTAM PER 1 G: Performed by: EMERGENCY MEDICINE

## 2024-09-29 PROCEDURE — 80048 BASIC METABOLIC PNL TOTAL CA: CPT | Performed by: NURSE PRACTITIONER

## 2024-09-29 PROCEDURE — 25010000002 THIAMINE HCL 200 MG/2ML SOLUTION: Performed by: NURSE PRACTITIONER

## 2024-09-29 PROCEDURE — 86900 BLOOD TYPING SEROLOGIC ABO: CPT

## 2024-09-29 PROCEDURE — 86900 BLOOD TYPING SEROLOGIC ABO: CPT | Performed by: INTERNAL MEDICINE

## 2024-09-29 PROCEDURE — 84295 ASSAY OF SERUM SODIUM: CPT | Performed by: NURSE PRACTITIONER

## 2024-09-29 PROCEDURE — 71045 X-RAY EXAM CHEST 1 VIEW: CPT

## 2024-09-29 PROCEDURE — 84132 ASSAY OF SERUM POTASSIUM: CPT | Performed by: INTERNAL MEDICINE

## 2024-09-29 PROCEDURE — 25810000003 LACTATED RINGERS PER 1000 ML: Performed by: NURSE PRACTITIONER

## 2024-09-29 PROCEDURE — 83935 ASSAY OF URINE OSMOLALITY: CPT | Performed by: NURSE PRACTITIONER

## 2024-09-29 PROCEDURE — 84100 ASSAY OF PHOSPHORUS: CPT | Performed by: NURSE PRACTITIONER

## 2024-09-29 PROCEDURE — 85014 HEMATOCRIT: CPT | Performed by: NURSE PRACTITIONER

## 2024-09-29 PROCEDURE — 85018 HEMOGLOBIN: CPT | Performed by: NURSE PRACTITIONER

## 2024-09-29 PROCEDURE — 86901 BLOOD TYPING SEROLOGIC RH(D): CPT | Performed by: INTERNAL MEDICINE

## 2024-09-29 PROCEDURE — 93005 ELECTROCARDIOGRAM TRACING: CPT | Performed by: NURSE PRACTITIONER

## 2024-09-29 RX ORDER — ONDANSETRON 2 MG/ML
4 INJECTION INTRAMUSCULAR; INTRAVENOUS EVERY 6 HOURS PRN
Status: DISCONTINUED | OUTPATIENT
Start: 2024-09-29 | End: 2024-09-30 | Stop reason: HOSPADM

## 2024-09-29 RX ORDER — HYDROMORPHONE HYDROCHLORIDE 1 MG/ML
0.5 INJECTION, SOLUTION INTRAMUSCULAR; INTRAVENOUS; SUBCUTANEOUS
Status: DISCONTINUED | OUTPATIENT
Start: 2024-09-29 | End: 2024-09-30 | Stop reason: HOSPADM

## 2024-09-29 RX ORDER — LORAZEPAM 0.5 MG/1
0.5 TABLET ORAL EVERY 8 HOURS PRN
Status: DISCONTINUED | OUTPATIENT
Start: 2024-09-29 | End: 2024-09-30 | Stop reason: HOSPADM

## 2024-09-29 RX ORDER — LIDOCAINE HYDROCHLORIDE AND EPINEPHRINE 10; 10 MG/ML; UG/ML
5 INJECTION, SOLUTION INFILTRATION; PERINEURAL ONCE
Status: COMPLETED | OUTPATIENT
Start: 2024-09-29 | End: 2024-09-29

## 2024-09-29 RX ORDER — ONDANSETRON 4 MG/1
4 TABLET, ORALLY DISINTEGRATING ORAL EVERY 6 HOURS PRN
Status: DISCONTINUED | OUTPATIENT
Start: 2024-09-29 | End: 2024-09-30 | Stop reason: HOSPADM

## 2024-09-29 RX ORDER — NITROGLYCERIN 0.4 MG/1
0.4 TABLET SUBLINGUAL
Status: DISCONTINUED | OUTPATIENT
Start: 2024-09-29 | End: 2024-09-30 | Stop reason: HOSPADM

## 2024-09-29 RX ORDER — SODIUM CHLORIDE, SODIUM LACTATE, POTASSIUM CHLORIDE, CALCIUM CHLORIDE 600; 310; 30; 20 MG/100ML; MG/100ML; MG/100ML; MG/100ML
100 INJECTION, SOLUTION INTRAVENOUS CONTINUOUS
Status: DISCONTINUED | OUTPATIENT
Start: 2024-09-29 | End: 2024-09-29

## 2024-09-29 RX ORDER — HYDRALAZINE HYDROCHLORIDE 20 MG/ML
10 INJECTION INTRAMUSCULAR; INTRAVENOUS EVERY 6 HOURS PRN
Status: DISCONTINUED | OUTPATIENT
Start: 2024-09-29 | End: 2024-09-30 | Stop reason: HOSPADM

## 2024-09-29 RX ORDER — NALOXONE HCL 0.4 MG/ML
0.4 VIAL (ML) INJECTION
Status: DISCONTINUED | OUTPATIENT
Start: 2024-09-29 | End: 2024-09-30 | Stop reason: HOSPADM

## 2024-09-29 RX ORDER — SODIUM CHLORIDE 0.9 % (FLUSH) 0.9 %
10 SYRINGE (ML) INJECTION AS NEEDED
Status: DISCONTINUED | OUTPATIENT
Start: 2024-09-29 | End: 2024-09-30 | Stop reason: HOSPADM

## 2024-09-29 RX ORDER — LOSARTAN POTASSIUM 50 MG/1
100 TABLET ORAL DAILY
Status: DISCONTINUED | OUTPATIENT
Start: 2024-09-29 | End: 2024-09-30 | Stop reason: HOSPADM

## 2024-09-29 RX ORDER — THIAMINE HYDROCHLORIDE 100 MG/ML
200 INJECTION, SOLUTION INTRAMUSCULAR; INTRAVENOUS EVERY 8 HOURS SCHEDULED
Status: DISCONTINUED | OUTPATIENT
Start: 2024-09-29 | End: 2024-09-30 | Stop reason: HOSPADM

## 2024-09-29 RX ORDER — LEVOTHYROXINE SODIUM 112 UG/1
112 TABLET ORAL DAILY
Status: DISCONTINUED | OUTPATIENT
Start: 2024-09-29 | End: 2024-09-30 | Stop reason: HOSPADM

## 2024-09-29 RX ORDER — SODIUM CHLORIDE 9 MG/ML
40 INJECTION, SOLUTION INTRAVENOUS AS NEEDED
Status: DISCONTINUED | OUTPATIENT
Start: 2024-09-29 | End: 2024-09-30 | Stop reason: HOSPADM

## 2024-09-29 RX ORDER — METOPROLOL SUCCINATE 50 MG/1
50 TABLET, EXTENDED RELEASE ORAL DAILY
Status: DISCONTINUED | OUTPATIENT
Start: 2024-09-29 | End: 2024-09-30 | Stop reason: HOSPADM

## 2024-09-29 RX ORDER — PANTOPRAZOLE SODIUM 40 MG/1
40 TABLET, DELAYED RELEASE ORAL
Status: DISCONTINUED | OUTPATIENT
Start: 2024-09-29 | End: 2024-09-30 | Stop reason: HOSPADM

## 2024-09-29 RX ORDER — SODIUM CHLORIDE 0.9 % (FLUSH) 0.9 %
10 SYRINGE (ML) INJECTION EVERY 12 HOURS SCHEDULED
Status: DISCONTINUED | OUTPATIENT
Start: 2024-09-29 | End: 2024-09-30 | Stop reason: HOSPADM

## 2024-09-29 RX ORDER — ACETAMINOPHEN 650 MG/1
650 SUPPOSITORY RECTAL EVERY 4 HOURS PRN
Status: DISCONTINUED | OUTPATIENT
Start: 2024-09-29 | End: 2024-09-30 | Stop reason: HOSPADM

## 2024-09-29 RX ORDER — HYDROCODONE BITARTRATE AND ACETAMINOPHEN 5; 325 MG/1; MG/1
1 TABLET ORAL EVERY 6 HOURS PRN
Status: DISCONTINUED | OUTPATIENT
Start: 2024-09-29 | End: 2024-09-30 | Stop reason: HOSPADM

## 2024-09-29 RX ORDER — ACETAMINOPHEN 325 MG/1
650 TABLET ORAL EVERY 4 HOURS PRN
Status: DISCONTINUED | OUTPATIENT
Start: 2024-09-29 | End: 2024-09-30 | Stop reason: HOSPADM

## 2024-09-29 RX ORDER — ACETAMINOPHEN 160 MG/5ML
650 SOLUTION ORAL EVERY 4 HOURS PRN
Status: DISCONTINUED | OUTPATIENT
Start: 2024-09-29 | End: 2024-09-30 | Stop reason: HOSPADM

## 2024-09-29 RX ORDER — LATANOPROST 50 UG/ML
1 SOLUTION/ DROPS OPHTHALMIC NIGHTLY
Status: DISCONTINUED | OUTPATIENT
Start: 2024-09-29 | End: 2024-09-30 | Stop reason: HOSPADM

## 2024-09-29 RX ORDER — POTASSIUM CHLORIDE 1500 MG/1
40 TABLET, EXTENDED RELEASE ORAL EVERY 4 HOURS
Status: COMPLETED | OUTPATIENT
Start: 2024-09-29 | End: 2024-09-29

## 2024-09-29 RX ADMIN — POTASSIUM CHLORIDE 40 MEQ: 1500 TABLET, EXTENDED RELEASE ORAL at 08:56

## 2024-09-29 RX ADMIN — HYDROCODONE BITARTRATE AND ACETAMINOPHEN 1 TABLET: 5; 325 TABLET ORAL at 22:05

## 2024-09-29 RX ADMIN — PIPERACILLIN AND TAZOBACTAM 3.38 G: 3; .375 INJECTION, POWDER, LYOPHILIZED, FOR SOLUTION INTRAVENOUS at 01:00

## 2024-09-29 RX ADMIN — THIAMINE HYDROCHLORIDE 200 MG: 100 INJECTION, SOLUTION INTRAMUSCULAR; INTRAVENOUS at 22:01

## 2024-09-29 RX ADMIN — THIAMINE HYDROCHLORIDE 200 MG: 100 INJECTION, SOLUTION INTRAMUSCULAR; INTRAVENOUS at 14:25

## 2024-09-29 RX ADMIN — SODIUM CHLORIDE, POTASSIUM CHLORIDE, SODIUM LACTATE AND CALCIUM CHLORIDE 100 ML/HR: 600; 310; 30; 20 INJECTION, SOLUTION INTRAVENOUS at 03:01

## 2024-09-29 RX ADMIN — PIPERACILLIN AND TAZOBACTAM 3.38 G: 3; .375 INJECTION, POWDER, LYOPHILIZED, FOR SOLUTION INTRAVENOUS at 16:50

## 2024-09-29 RX ADMIN — LIDOCAINE HYDROCHLORIDE,EPINEPHRINE BITARTRATE 5 ML: 10; .01 INJECTION, SOLUTION INFILTRATION; PERINEURAL at 09:40

## 2024-09-29 RX ADMIN — THIAMINE HYDROCHLORIDE 200 MG: 100 INJECTION, SOLUTION INTRAMUSCULAR; INTRAVENOUS at 05:49

## 2024-09-29 RX ADMIN — FOLIC ACID 1 MG: 5 INJECTION, SOLUTION INTRAMUSCULAR; INTRAVENOUS; SUBCUTANEOUS at 10:50

## 2024-09-29 RX ADMIN — LATANOPROST 1 DROP: 50 SOLUTION OPHTHALMIC at 22:01

## 2024-09-29 RX ADMIN — HYDROCODONE BITARTRATE AND ACETAMINOPHEN 1 TABLET: 5; 325 TABLET ORAL at 10:50

## 2024-09-29 RX ADMIN — PANTOPRAZOLE SODIUM 40 MG: 40 TABLET, DELAYED RELEASE ORAL at 05:48

## 2024-09-29 RX ADMIN — POTASSIUM CHLORIDE 40 MEQ: 1500 TABLET, EXTENDED RELEASE ORAL at 03:02

## 2024-09-29 RX ADMIN — LOSARTAN POTASSIUM 100 MG: 50 TABLET, FILM COATED ORAL at 08:56

## 2024-09-29 RX ADMIN — METOPROLOL SUCCINATE 50 MG: 50 TABLET, EXTENDED RELEASE ORAL at 08:56

## 2024-09-29 RX ADMIN — Medication 10 ML: at 22:05

## 2024-09-29 RX ADMIN — PIPERACILLIN AND TAZOBACTAM 3.38 G: 3; .375 INJECTION, POWDER, LYOPHILIZED, FOR SOLUTION INTRAVENOUS at 08:57

## 2024-09-29 RX ADMIN — Medication 1750 MG: at 03:02

## 2024-09-29 RX ADMIN — PIPERACILLIN AND TAZOBACTAM 3.38 G: 3; .375 INJECTION, POWDER, LYOPHILIZED, FOR SOLUTION INTRAVENOUS at 22:01

## 2024-09-29 RX ADMIN — LEVOTHYROXINE SODIUM 112 MCG: 0.11 TABLET ORAL at 05:48

## 2024-09-29 NOTE — PROGRESS NOTES
[facility]iatric [facility] Services  ADMISSION FOLLOW-UP NOTE          Patient admitted after midnight, H&P by my partner performed earlier on today's date reviewed.  Interim findings, labs, and charting also reviewed.        The [facility] Problem List has been managed and updated to include any new diagnoses:  Active Hospital Problems    Diagnosis  POA    **Perirectal abscess [K61.1]  Yes    Breast calcification, left [R92.1]  Yes    GERD without esophagitis [K21.9]  Unknown    Hyponatremia [E87.1]  Unknown    Alcohol use [Z78.9]  Unknown    Seizures [R56.9]  Yes    Slow transit constipation [K59.01]  Yes    Palpitations [R00.2]  Yes    Hypothyroidism (acquired) [E03.9]  Yes    Benign essential hypertension [I10]  Yes      Resolved Hospital Problems   No resolved problems to display.     In summary, this is a 85 yo female who presented w rectal urgency  Found to have what appears traumatic hematoma, possibly super-infected. Drained today    ADDITIONAL PLAN:  - f/u culture on drainage, zosyn for now  - repeat CBC in AM, hyponatremia mild can repeat in AM as well    Patient not interested in placement/assisted living etc. Lives at home w 89 yo . Interested in OP PT/OT (deneis helpfulness of home PT/OT in past)    Expected Discharge poss 9/30 (Discharge date is tentative pending patient's medical condition and is subject to change)  Expected Discharge Date: 9/30/2024; Expected Discharge Time:      Lisa Singleton MD  09/29/24
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
## 2024-09-29 NOTE — PLAN OF CARE
Goal Outcome Evaluation:  Plan of Care Reviewed With: patient        Progress: no change  Outcome Evaluation: Patient arrived from ED around 0230. Patient is A&Ox4. VSS. NSR on tele. On room air. Remains NPO. IVF currently infusing. No complaints of pain overnight. Continue plan of care.

## 2024-09-29 NOTE — CONSULTS
Serenity Lagos     LOS: 0 days   Patient Care Team:  Bernadette Somers MD as PCP - General (Internal Medicine)  Syed Hooker MD as Consulting Physician (Cardiology)  Celio Iniguez MD as Consulting Physician (Ophthalmology)  Ender Jolly MD as Consulting Physician (Dermatology)  Jil Arreola APRN as Nurse Practitioner (Nurse Practitioner)  Josh Noyola Jr., MD as Consulting Physician (Hand Surgery)    Chief Complaint: Rectal bleeding    Subjective   History of Present Illness:   Ms. Raya is an 84-year-old white female with a lifelong history of constipation.  Her constipation worsened over the last week or 2.  She is only able to move her bowels if she took a laxative.  Even then she only passed small pieces of stool with liquid.  Over the last several days she has noted blood with the stool.  She saw Dr. Sahni who recommended she go to the emergency room.  Evaluation at that time revealed a white blood cell count of 19 and a CT scan that showed a 6 x 3 cm perirectal abscess.  I have reviewed the CT scan.  Surprisingly the patient does not have a lot of pain.  She denies fever.  Patient denies diabetes.  She denies bleeding tendency.                    Past Medical History:   Diagnosis Date    Cervicalgia     Chronic bronchitis     Coronary artery disease     Dysphagia     Glaucoma, open angle     Hearing loss     Hypertension     Hyperthyroidism     thyroid gland killed off with MD treatment    Hypothyroidism     regulated by Levothyroxine    Low back pain     Medicare annual wellness visit, subsequent 12/15/2020    Mixed hyperlipidemia     Osteoarthritis     cervical spine degenerative    Osteopenia     Perforation of tympanic membrane     Pruritus     chronic general pruritis    Salivary gland cancer     Scoliosis 2005    discovered in x-rays by Orthopedist    Shingles     postherpetic neuralgia-thorax    Spondylolisthesis      lumbar spine    Wrist tendonitis      Past  Surgical History:   Procedure Laterality Date    APPENDECTOMY      BREAST BIOPSY      BREAST BIOPSY Right 07/29/2019    BREAST CYST EXCISION      ENDOSCOPY  2017    normal (per pt) EGD    MOUTH SURGERY  2000    TONSILLECTOMY       No current facility-administered medications on file prior to encounter.     Current Outpatient Medications on File Prior to Encounter   Medication Sig Dispense Refill    Docusate Sodium 100 MG capsule take 1 tablet by oral route twice daily      lactulose (CHRONULAC) 10 GM/15ML solution Take 30 mL by mouth 2 (Two) Times a Day As Needed (constipation). 300 mL 0    latanoprost (XALATAN) 0.005 % ophthalmic solution Administer 1 drop to both eyes Every Night.      levothyroxine (SYNTHROID, LEVOTHROID) 112 MCG tablet Take 1 tablet by mouth Daily. 90 tablet 3    losartan (COZAAR) 100 MG tablet Take 1 tablet by mouth Daily. 90 tablet 3    metoprolol succinate XL (TOPROL-XL) 50 MG 24 hr tablet TAKE 1 TABLET DAILY 90 tablet 3    omeprazole (priLOSEC) 40 MG capsule TAKE 1 CAPSULE DAILY 90 capsule 3    raloxifene (EVISTA) 60 MG tablet Take 1 tablet by mouth Daily. 90 tablet 3    Glucosamine-Chondroitin (OSTEO BI-FLEX REGULAR STRENGTH PO) 1 daily      [DISCONTINUED] aspirin 81 MG EC tablet Take 1 tablet by mouth Daily. 90 tablet 3    [DISCONTINUED] Biotin 5000 MCG sublingual tablet take 1 by oral route  every day      [DISCONTINUED] CALCIUM PO Take 1 tablet by mouth Daily. Calcium 600 + vitamin d      [DISCONTINUED] Cholecalciferol (Vitamin D3) 50 MCG (2000 UT) capsule Take 1 capsule by mouth Daily.      [DISCONTINUED] desonide (DESOWEN) 0.05 % ointment APPLY TO AREAS TWICE A DAY AS NEEDED      [DISCONTINUED] furosemide (LASIX) 20 MG tablet TAKE 1 TABLET BY MOUTH DAILY AS NEEDED (EDEMA). 90 tablet 1    [DISCONTINUED] gentamicin (GARAMYCIN) 0.3 % ophthalmic solution 2 drops in affected ear twice a day 5 mL 0    [DISCONTINUED] meloxicam (MOBIC) 7.5 MG tablet Take 1 tablet by mouth Daily As Needed  (severe pain). 30 tablet 0    [DISCONTINUED] Probiotic Product (PROBIOTIC DAILY PO) One capsule daily      [DISCONTINUED] vitamin B-12 (CYANOCOBALAMIN) 1000 MCG tablet Take 1 tablet by mouth Every Other Day. 90 tablet 2    [DISCONTINUED] Vitamin E 400 units tablet Daily.       No Known Allergies  Family History   Problem Relation Age of Onset    Hypertension Mother     Heart failure Mother         CHF    Coronary artery disease Mother         CABG    Arthritis Mother         osteo arthritis    Lung cancer Father 59        Heavy smoker and Black Lung    Alcohol abuse Father     Rheum arthritis Sister     Other Sister 76        sepsis due to infected knee replacement    COPD Sister         heavy smoker     Alcohol abuse Sister     Arthritis Sister         severe rheumatoid arthritis    Alzheimer's disease Maternal Grandmother     Coronary artery disease Maternal Grandfather     Cancer Paternal Grandmother         stomach    Heart disease Paternal Grandfather     Hypertension Other     Obesity Other     Stroke Maternal Aunt     Heart disease Maternal Aunt     Stroke Maternal Aunt     Stroke Maternal Aunt     Coronary artery disease Maternal Uncle     Stroke Maternal Uncle     Heart disease Maternal Uncle     Breast cancer Neg Hx     Ovarian cancer Neg Hx      Social History     Socioeconomic History    Marital status:    Tobacco Use    Smoking status: Former     Current packs/day: 0.00     Average packs/day: 1 pack/day for 20.0 years (20.0 ttl pk-yrs)     Types: Cigarettes     Start date: 1960     Quit date: 1980     Years since quittin.7    Smokeless tobacco: Never   Vaping Use    Vaping status: Never Used   Substance and Sexual Activity    Alcohol use: Yes     Alcohol/week: 7.0 standard drinks of alcohol     Types: 7 Glasses of wine per week     Comment: 2 glasses wine  with dinner every day.  grand total 8-9oz    Drug use: No    Sexual activity: Not Currently     Partners: Male       Review of  "Systems: See history and physical     Objective     Vital Signs  Blood pressure 156/78, pulse 87, temperature 96.3 °F (35.7 °C), temperature source Oral, resp. rate 18, height 162.6 cm (64\"), weight 92.3 kg (203 lb 6.4 oz), SpO2 97%.    Physical Exam:  Constitutional: No acute distress, awake, alert  Eyes: Sclerae anicteric, no conjunctival injection  HENT: NCAT, mucous membranes moist  Neck: Supple, no thyromegaly, no lymphadenopathy, trachea midline  Respiratory: Clear to auscultation bilaterally, nonlabored respirations   Cardiovascular: RRR, no murmurs  Gastrointestinal: Positive bowel sounds, soft, nontender, nondistended  Musculoskeletal: No bilateral ankle edema, no clubbing or cyanosis to extremities  Psychiatric: Appropriate affect, cooperative  Neurologic: Oriented x 3, strength symmetric in all extremities, Cranial Nerves grossly intact to confrontation, speech clear  Skin: No rashes  Rectal: The patient has a mild to moderately tender fluctuant mass in the right posterior lateral perianal region.  Digital rectal exam does not reveal any impaction.  Again the mass is able to be palpated by manually.  It is mild to moderately tender.  It extends along the right side of the anal canal.    Results Review:       WBC WBC   Date Value Ref Range Status   09/29/2024 15.30 (H) 3.40 - 10.80 10*3/mm3 Final   09/28/2024 19.88 (H) 3.40 - 10.80 10*3/mm3 Final      HGB Hemoglobin   Date Value Ref Range Status   09/29/2024 10.4 (L) 12.0 - 15.9 g/dL Final   09/28/2024 11.2 (L) 12.0 - 15.9 g/dL Final      HCT Hematocrit   Date Value Ref Range Status   09/29/2024 29.9 (L) 34.0 - 46.6 % Final   09/28/2024 33.3 (L) 34.0 - 46.6 % Final      PLT           Results from last 7 days   Lab Units 09/29/24  0351   PLATELETS 10*3/mm3 450            Sodium Sodium   Date Value Ref Range Status   09/29/2024 131 (L) 136 - 145 mmol/L Final   09/29/2024 127 (L) 136 - 145 mmol/L Final   09/29/2024 130 (L) 136 - 145 mmol/L Final   09/28/2024 " "126 (L) 136 - 145 mmol/L Final      Potassium Potassium   Date Value Ref Range Status   09/29/2024 3.7 3.5 - 5.2 mmol/L Final   09/28/2024 3.5 3.5 - 5.2 mmol/L Final      Chloride Chloride   Date Value Ref Range Status   09/29/2024 95 (L) 98 - 107 mmol/L Final   09/28/2024 90 (L) 98 - 107 mmol/L Final      Bicarbonate No results found for: \"PLASMABICARB\"   BUN BUN   Date Value Ref Range Status   09/29/2024 11 8 - 23 mg/dL Final   09/28/2024 13 8 - 23 mg/dL Final      Creatinine Creatinine   Date Value Ref Range Status   09/29/2024 0.83 0.57 - 1.00 mg/dL Final   09/29/2024 0.80 0.60 - 1.30 mg/dL Final   09/29/2024 0.80 0.60 - 1.30 mg/dL Final     Comment:     Serial Number: [id number]:  [id number]   09/28/2024 0.92 0.57 - 1.00 mg/dL Final                                      Imaging Results (Last 24 Hours)       Procedure Component Value Units Date/Time    XR Chest 1 View [[id number]] Collected: 09/29/24 0104     Updated: 09/29/24 0107    Narrative:      Examination: XR CHEST 1 VW-     Date of Exam: 9/29/2024 12:23 AM     Indication: pre op.     Comparison: 3/25/2021.     Technique: Single radiographic view of the chest was obtained.     Findings:  There is no pneumothorax, pleural effusion or focal airspace  consolidation. Cardiomediastinal silhouette is unremarkable. Pulmonary  vasculature appears within normal limits. Regional bones appear grossly  intact.        Impression:      No acute cardiopulmonary abnormality.     This report was finalized on 9/29/2024 1:04 AM by Juvenal Hurtado MD.       CT Abdomen Pelvis With Contrast [[id number]] Collected: 09/28/24 2323     Updated: 09/28/24 2330    Narrative:      CT ABDOMEN PELVIS W CONTRAST    Date of Exam: 9/28/2024 10:01 PM EDT    Indication: abd pain and constipation.    Comparison: None available.    Technique: Axial CT images were obtained of the abdomen and pelvis following the uneventful intravenous administration of 80 mL Isovue-300. Reconstructed coronal and " sagittal images were also obtained. Automated exposure control and iterative   construction methods were used.      Findings:  Heart size is normal. There is a small hiatal hernia. Lung bases are clear. No acute findings in the superficial soft tissues. There is mild S-shaped scoliosis of the lumbar spine. There is focal kyphosis at the thoracolumbar junction. There is severe   lumbar spondylosis and moderate lower thoracic spondylosis.    The liver is homogeneous. The gallbladder extends along the lateral dorsal surface of the anterior liver. There is a small area of wall thickening at the gallbladder fundus that may reflect adenomyomatosis. The bile ducts, pancreas, remainder of the   stomach, duodenum and spleen appear within normal limits. There is cortical scarring at the superior pole of the left kidney. The right kidney is normal. There is no hydronephrosis. Urinary bladder, uterus and ovaries appear normal. The appendix is not   seen. The colon is unremarkable. There is a complex lobulated rim-enhancing fluid and gas collection extending greater than 180 degrees around the distal rectum and extending posteriorly into the subcutaneous tissues left of midline at the midline   gluteal cleft. The collection measures up to 6.4 x 3.8 cm and is concerning for perirectal/perianal abscess. There is mild aortoiliac atherosclerosis. No aneurysm. No ascites, pneumoperitoneum or lymphadenopathy.      Impression:      Impression:  1.6.4 x 3.8 cm rim-enhancing fluid and gas collection extending greater than 180 degrees around the distal rectum and extending posteriorly into the subcutaneous tissues left of midline at the midline gluteal cleft. This is concerning for   perirectal/perianal abscess.  2.Small hiatal hernia.  3.Thoracolumbar degenerative changes.        Electronically Signed: Juvenal Hurtado MD    9/28/2024 11:27 PM EDT    Workstation ID: [id number]              Assessment & Plan   Patient with findings consistent  with perirectal abscess on physical exam and CT scan.  She does have leukocytosis but surprisingly does not have fever or significant tenderness.  Because of the leukocytosis and the fact that the CT findings suggest gas and fluid would like to proceed with incision and drainage with culture.  I have discussed the procedure with the patient including risks such as bleeding or infection.  Have also discussed the chance of fistula formation requiring further surgery.  The patient understands and desires to proceed.            Patrick Walters MD  09/29/24  09:10 EDT

## 2024-09-29 NOTE — OP NOTE
Procedure Report    Patient Name:  Serenity Lagos  YOB: 1940    Date of Surgery: 9/29/2024       Pre-op Diagnosis:   Perirectal abscess  Post-op Diagnosis:     Perirectal abscess       Procedure: Incision and drainage and culture of perirectal abscess    Staff:  Surgeon Dr. Patrick Walters               Anesthesia: 5 mL 1% lidocaine with epinephrine    Estimated Blood Loss: minimal    Specimen: Culture sent of abscess contents.  Findings:   The patient had bloody fluid under pressure.  A culture was taken.        Complications: None          Description of Procedure:   After the patient was properly identified and timeout was performed, the buttocks were prepped with Betadine.  5 mL of 1% lidocaine with epinephrine was injected in the posterior anal canal overlying the fluctuant mass.  An appropriate amount of time was allowed for the local anesthetic to take effect.  The abscess was then incised.  This drained bloody fluid under pressure.  A culture was taken.  The patient was observed for 10 minutes and no significant bleeding was seen.  A dressing of 4 x 4's was placed.      Patrick Walters MD     Date: 9/29/2024  Time: 09:33 EDT

## 2024-09-29 NOTE — PROGRESS NOTES
"Pharmacy Consult-Vancomycin Dosing  Serenity Lagos is a  84 y.o. female receiving vancomycin therapy.     Indication: SSTI  Consulting Provider: hospitalist  ID Consult: No    Goal AUC: 400 - 600 mg/L*hr    Current Antimicrobial Therapy  Anti-Infectives (From admission, onward)      Ordered     Dose/Rate Route Frequency Start Stop    09/29/24 0229  piperacillin-tazobactam (ZOSYN) 3.375 g IVPB in 100 mL NS MBP (CD)        Ordering Provider: Rebecca Barber APRN    3.375 g  over 4 Hours Intravenous Every 8 Hours 09/29/24 0800 10/04/24 0759    09/29/24 0229  Pharmacy to dose vancomycin        Ordering Provider: Rebecca Barber APRN     Does not apply Continuous PRN 09/29/24 0229 10/04/24 0228 09/28/24 2334  piperacillin-tazobactam (ZOSYN) 3.375 g IVPB in 100 mL NS MBP (CD)        Ordering Provider: Celio Landon MD    3.375 g Intravenous Once 09/28/24 2350 09/29/24 0205    09/28/24 2334  vancomycin IVPB 1750 mg in 0.9% Sodium Chloride (premix) 500 mL        Ordering Provider: Rebecca Barber APRN    20 mg/kg × 83.9 kg  285.7 mL/hr over 105 Minutes Intravenous Once 09/28/24 2350              Allergies  Allergies as of 09/28/2024    (No Known Allergies)       Labs  Results from last 7 days   Lab Units 09/29/24  0052 09/29/24  0044 09/28/24 2052   BUN mg/dL  --   --  13   CREATININE mg/dL 0.80 0.80 0.92     Results from last 7 days   Lab Units 09/28/24 2052   WBC 10*3/mm3 19.88*       Evaluation of Dosing  Last Dose Received in the ED/Outside Facility:               N/A  Is Patient on Dialysis or Renal Replacement:               No    Ht - 162.6 cm (64\")  Wt - 83.9 kg (185 lb)    Estimated Creatinine Clearance: 55.9 mL/min (by C-G formula based on SCr of 0.8 mg/dL).  Intake & Output (last 3 days)       None            Microbiology and Radiology  Microbiology Results (last 10 days)       ** No results found for the last 240 hours. **            Reported Vancomycin Levels                   [facility] AUC " Calculation:  Current AUC:   -- mg/L*hr    Predicted Steady State AUC on Current Dose: -- mg/L*hr  _________________________________  Predicted Steady State AUC on New Dose:   447 mg/L*hr    Assessment/Plan:  1. Will give vancomycin 1750 mg IV once                               followed by      Vancomycin 1250 mg IV q 24 hours    2. Vancomycin trough is scheduled 10/1 at 0600 prior to the 3rd dose. Predicted Steady State AUC at current dose: 447 mg/L*hr.      Norm Anthony, AugustinaD, BCPS  9/29/2024  02:38 EDT

## 2024-09-29 NOTE — ED PROVIDER NOTES
"Subjective   History of Present Illness  84-year-old female presents for evaluation of \"constipation.\"  The patient tells me that for the past week or so she has been struggling with constipation.  She tells me that she has been taking over-the-counter stool softeners and laxatives but continues to experience a sensation of constipation along with rectal pain and \"feeling bloated.\"  She denies any accompanying abdominal pain but does note rectal pain.  She is unsure as to whether or not she has hemorrhoids.  She currently rates her pain at 7 out of 10 in severity.  She notes blood in her stools.  She denies any fevers.  No known dietary triggers for her symptoms.      Review of Systems   Gastrointestinal:  Positive for constipation and rectal pain.   All other systems reviewed and are negative.      Past Medical History:   Diagnosis Date   • Cervicalgia    • Chronic bronchitis    • Coronary artery disease    • Dysphagia    • Glaucoma, open angle    • Hearing loss    • Hypertension    • Hyperthyroidism     thyroid gland killed off with MD treatment   • Hypothyroidism     regulated by Levothyroxine   • Low back pain    • Medicare annual wellness visit, subsequent 12/15/2020   • Mixed hyperlipidemia    • Osteoarthritis     cervical spine degenerative   • Osteopenia    • Perforation of tympanic membrane    • Pruritus     chronic general pruritis   • Salivary gland cancer    • Scoliosis 2005    discovered in x-rays by Orthopedist   • Shingles     postherpetic neuralgia-thorax   • Spondylolisthesis      lumbar spine   • Wrist tendonitis        No Known Allergies    Past Surgical History:   Procedure Laterality Date   • APPENDECTOMY     • BREAST BIOPSY     • BREAST BIOPSY Right 07/29/2019   • BREAST CYST EXCISION     • ENDOSCOPY  2017    normal (per pt) EGD   • MOUTH SURGERY  2000   • TONSILLECTOMY         Family History   Problem Relation Age of Onset   • Hypertension Mother    • Heart failure Mother         CHF   • " Coronary artery disease Mother         CABG   • Arthritis Mother         osteo arthritis   • Lung cancer Father 59        Heavy smoker and Black Lung   • Alcohol abuse Father    • Rheum arthritis Sister    • Other Sister 76        sepsis due to infected knee replacement   • COPD Sister         heavy smoker    • Alcohol abuse Sister    • Arthritis Sister         severe rheumatoid arthritis   • Alzheimer's disease Maternal Grandmother    • Coronary artery disease Maternal Grandfather    • Cancer Paternal Grandmother         stomach   • Heart disease Paternal Grandfather    • Hypertension Other    • Obesity Other    • Stroke Maternal Aunt    • Heart disease Maternal Aunt    • Stroke Maternal Aunt    • Stroke Maternal Aunt    • Coronary artery disease Maternal Uncle    • Stroke Maternal Uncle    • Heart disease Maternal Uncle    • Breast cancer Neg Hx    • Ovarian cancer Neg Hx        Social History     Socioeconomic History   • Marital status:    Tobacco Use   • Smoking status: Former     Current packs/day: 0.00     Average packs/day: 1 pack/day for 20.0 years (20.0 ttl pk-yrs)     Types: Cigarettes     Start date: 1960     Quit date: 1980     Years since quittin.7   • Smokeless tobacco: Never   Vaping Use   • Vaping status: Never Used   Substance and Sexual Activity   • Alcohol use: Yes     Alcohol/week: 7.0 standard drinks of alcohol     Types: 7 Glasses of wine per week     Comment: 2 glasses wine  with dinner every day.  grand total 8-9oz   • Drug use: No   • Sexual activity: Not Currently     Partners: Male           Objective   Physical Exam  Vitals and nursing note reviewed.   Constitutional:       General: She is not in acute distress.     Appearance: She is well-developed. She is not diaphoretic.      Comments: Nontoxic-appearing elderly female   HENT:      Head: Normocephalic and atraumatic.   Eyes:      Pupils: Pupils are equal, round, and reactive to light.   Cardiovascular:      Rate  "and Rhythm: Normal rate and regular rhythm.      Heart sounds: Normal heart sounds. No murmur heard.     No friction rub. No gallop.   Pulmonary:      Effort: Pulmonary effort is normal. No respiratory distress.      Breath sounds: Normal breath sounds. No wheezing or rales.   Abdominal:      General: Bowel sounds are normal. There is no distension.      Palpations: Abdomen is soft. There is no mass.      Tenderness: There is no abdominal tenderness. There is no guarding or rebound.      Comments: No focal abdominal tenderness, no peritoneal signs, no pain out of proportion to exam   Genitourinary:     Comments: No visible or palpable external hemorrhoids present, mild rectal tenderness noted, no fecal impaction or hard stool noted within rectal vault  Musculoskeletal:         General: Normal range of motion.      Cervical back: Neck supple.   Skin:     General: Skin is warm and dry.      Findings: No erythema or rash.   Neurological:      Mental Status: She is alert and oriented to person, place, and time.   Psychiatric:         Mood and Affect: Mood normal.         Thought Content: Thought content normal.         Judgment: Judgment normal.         Procedures           ED Course  ED Course as of 09/29/24 0047   Sat Sep 28, 2024   2226 84-year-old female presents for evaluation of \"constipation.\"  The patient tells me that for the past week or so she has been struggling with constipation.  She tells me that she has been taking over-the-counter stool softeners and laxatives but continues to experience a sensation of constipation along with rectal pain and \"feeling bloated.\"  As a result, she came here to the ED to be evaluated this evening.  On arrival, the patient is nontoxic-appearing.  Nonsurgical abdomen.  Differential diagnosis is quite broad.  We will obtain labs and imaging, and we will reassess following initial interventions. [DD]   2227 Labs remarkable for white blood cell count of 19,000 and sodium of 126. " [DD]   2242 On digital rectal exam, the patient has no fecal impaction or hard stool noted within her rectal vault.  No visible or palpable external hemorrhoid noted.  Mild rectal tenderness present. [DD]   2333 I personally and independently reviewed the patient's CT images and findings, and I am in agreement with the radiologist regarding CT interpretation--particularly regarding perirectal abscess. [DD]   2333 Vancomycin and Zosyn initiated.  I discussed the patient's case with our hospitalist, Dr. Grewal, and the patient will be admitted under his care for further evaluation and treatment and for surgical consult in the morning.  The patient is hemodynamically stable at this time and is aware/agreeable with the plan. [DD]      ED Course User Index  [DD] Celio Landon MD                                   Recent Results (from the past 24 hour(s))   Comprehensive Metabolic Panel    Collection Time: 09/28/24  8:52 PM    Specimen: Blood   Result Value Ref Range    Glucose 100 (H) 65 - 99 mg/dL    BUN 13 8 - 23 mg/dL    Creatinine 0.92 0.57 - 1.00 mg/dL    Sodium 126 (L) 136 - 145 mmol/L    Potassium 3.5 3.5 - 5.2 mmol/L    Chloride 90 (L) 98 - 107 mmol/L    CO2 21.0 (L) 22.0 - 29.0 mmol/L    Calcium 8.9 8.6 - 10.5 mg/dL    Total Protein 7.4 6.0 - 8.5 g/dL    Albumin 3.6 3.5 - 5.2 g/dL    ALT (SGPT) 23 1 - 33 U/L    AST (SGOT) 26 1 - 32 U/L    Alkaline Phosphatase 67 39 - 117 U/L    Total Bilirubin 0.3 0.0 - 1.2 mg/dL    Globulin 3.8 gm/dL    A/G Ratio 0.9 g/dL    BUN/Creatinine Ratio 14.1 7.0 - 25.0    Anion Gap 15.0 5.0 - 15.0 mmol/L    eGFR 61.9 >60.0 mL/min/1.73   Lipase    Collection Time: 09/28/24  8:52 PM    Specimen: Blood   Result Value Ref Range    Lipase 23 13 - 60 U/L   CBC Auto Differential    Collection Time: 09/28/24  8:52 PM    Specimen: Blood   Result Value Ref Range    WBC 19.88 (H) 3.40 - 10.80 10*3/mm3    RBC 3.76 (L) 3.77 - 5.28 10*6/mm3    Hemoglobin 11.2 (L) 12.0 - 15.9 g/dL     Hematocrit 33.3 (L) 34.0 - 46.6 %    MCV 88.6 79.0 - 97.0 fL    MCH 29.8 26.6 - 33.0 pg    MCHC 33.6 31.5 - 35.7 g/dL    RDW 11.8 (L) 12.3 - 15.4 %    RDW-SD 38.0 37.0 - 54.0 fl    MPV 9.3 6.0 - 12.0 fL    Platelets 504 (H) 140 - 450 10*3/mm3    Neutrophil % 77.6 (H) 42.7 - 76.0 %    Lymphocyte % 11.1 (L) 19.6 - 45.3 %    Monocyte % 9.2 5.0 - 12.0 %    Eosinophil % 0.4 0.3 - 6.2 %    Basophil % 0.3 0.0 - 1.5 %    Immature Grans % 1.4 (H) 0.0 - 0.5 %    Neutrophils, Absolute 15.43 (H) 1.70 - 7.00 10*3/mm3    Lymphocytes, Absolute 2.21 0.70 - 3.10 10*3/mm3    Monocytes, Absolute 1.83 (H) 0.10 - 0.90 10*3/mm3    Eosinophils, Absolute 0.08 0.00 - 0.40 10*3/mm3    Basophils, Absolute 0.05 0.00 - 0.20 10*3/mm3    Immature Grans, Absolute 0.28 (H) 0.00 - 0.05 10*3/mm3    nRBC 0.0 0.0 - 0.2 /100 WBC   Urinalysis With Culture If Indicated - Urine, Clean Catch    Collection Time: 09/29/24 12:09 AM    Specimen: Urine, Clean Catch   Result Value Ref Range    Color, UA Yellow Yellow, Straw    Appearance, UA Clear Clear    pH, UA 6.0 5.0 - 8.0    Specific Gravity, UA 1.024 1.001 - 1.030    Glucose, UA Negative Negative    Ketones, UA Negative Negative    Bilirubin, UA Negative Negative    Blood, UA Negative Negative    Protein, UA Negative Negative    Leuk Esterase, UA Negative Negative    Nitrite, UA Negative Negative    Urobilinogen, UA 0.2 E.U./dL 0.2 - 1.0 E.U./dL   Lactic Acid, Plasma    Collection Time: 09/29/24 12:09 AM    Specimen: Blood   Result Value Ref Range    Lactate 1.3 0.5 - 2.0 mmol/L     Note: In addition to lab results from this visit, the labs listed above may include labs taken at another facility or during a different encounter within the last 24 hours. Please correlate lab times with ED admission and discharge times for further clarification of the services performed during this visit.    CT Abdomen Pelvis With Contrast   Final Result   Impression:   1.6.4 x 3.8 cm rim-enhancing fluid and gas collection  extending greater than 180 degrees around the distal rectum and extending posteriorly into the subcutaneous tissues left of midline at the midline gluteal cleft. This is concerning for    perirectal/perianal abscess.   2.Small hiatal hernia.   3.Thoracolumbar degenerative changes.            Electronically Signed: Juvenal Hurtado MD     9/28/2024 11:27 PM EDT     Workstation ID: [id number]      XR Chest 1 View    (Results Pending)     Vitals:    09/28/24 2336 09/28/24 2337 09/28/24 2338 09/28/24 2339   BP:       BP Location:       Patient Position:       Pulse: 68 73 76 72   Resp:       Temp:       TempSrc:       SpO2: 98% 100% 100% 100%   Weight:       Height:         Medications   sodium chloride 0.9 % flush 10 mL (has no administration in time range)   piperacillin-tazobactam (ZOSYN) 3.375 g IVPB in 100 mL NS MBP (CD) (has no administration in time range)   vancomycin IVPB 1750 mg in 0.9% Sodium Chloride (premix) 500 mL (has no administration in time range)   lactated ringers infusion (has no administration in time range)   sodium chloride 0.9 % bolus 500 mL (0 mL Intravenous Stopped 9/29/24 0013)   iopamidol (ISOVUE-300) 61 % injection 80 mL (80 mL Intravenous Given 9/28/24 2211)     ECG/EMG Results (last 24 hours)       ** No results found for the last 24 hours. **          ECG 12 Lead Pre-Op / Pre-Procedure    (Results Pending)                 Medical Decision Making  Amount and/or Complexity of Data Reviewed  Labs: ordered.  Radiology: ordered.    Risk  Prescription drug management.  Decision regarding hospitalization.        Final diagnoses:   Perirectal abscess   Leukocytosis, unspecified type   Hyponatremia       ED Disposition  ED Disposition       ED Disposition   Decision to Admit    Condition   --    Comment   Level of Care: Telemetry [5]   Diagnosis: Perirectal abscess [[id number]]   Admitting Physician: KAT CASTILLO III [[id number]]   Attending Physician: KAT CASTILLO III [[id number]]   Is  patient appropriate for Inpatient Observation Unit?: No [0]   Bed Request Comments: tele obs not cdu                 No follow-up provider specified.       Medication List      No changes were made to your prescriptions during this visit.            Celio Landon MD  09/29/24 0047

## 2024-09-29 NOTE — ED NOTES
" Serenity Lagos    Nursing Report ED to Floor:  Mental status: a&ox4  Ambulatory status: assist x1  Oxygen Therapy:  ra  Cardiac Rhythm: nsr  Admitted from: home  Safety Concerns:  fall risk   Social Issues: na  ED Room #:  08    ED Nurse Phone Extension - [phone] or may call [phone].      HPI:   Chief Complaint   Patient presents with    Constipation       Past Medical History:  Past Medical History:   Diagnosis Date    Cervicalgia     Chronic bronchitis     Coronary artery disease     Dysphagia     Glaucoma, open angle     Hearing loss     Hypertension     Hyperthyroidism     thyroid gland killed off with MD treatment    Hypothyroidism     regulated by Levothyroxine    Low back pain     Medicare annual wellness visit, subsequent 12/15/2020    Mixed hyperlipidemia     Osteoarthritis     cervical spine degenerative    Osteopenia     Perforation of tympanic membrane     Pruritus     chronic general pruritis    Salivary gland cancer     Scoliosis 2005    discovered in x-rays by Orthopedist    Shingles     postherpetic neuralgia-thorax    Spondylolisthesis      lumbar spine    Wrist tendonitis         Past Surgical History:  Past Surgical History:   Procedure Laterality Date    APPENDECTOMY      BREAST BIOPSY      BREAST BIOPSY Right 07/29/2019    BREAST CYST EXCISION      ENDOSCOPY  2017    normal (per pt) EGD    MOUTH SURGERY  2000    TONSILLECTOMY          Admitting Doctor:   Jeff Grewal III, DO    Consulting Provider(s):  Consults       No orders found from 8/30/2024 to 9/29/2024.             Admitting Diagnosis:   There were no encounter diagnoses.    Most Recent Vitals:   Vitals:    09/28/24 2023 09/28/24 2330   BP: (!) 182/86 178/75   BP Location: Right arm    Patient Position: Sitting    Pulse: 78 75   Resp: 16    Temp: 98.4 °F (36.9 °C)    TempSrc: Oral    SpO2: 98% 97%   Weight: 83.9 kg (185 lb)    Height: 162.6 cm (64\")        Active LDAs/IV Access:   Lines, Drains & Airways       Active LDAs       " Name Placement date Placement time Site Days    Peripheral IV 09/28/24 2053 Left Antecubital 09/28/24 2053  Antecubital  less than 1                    Labs (abnormal labs have a star):   Labs Reviewed   COMPREHENSIVE METABOLIC PANEL - Abnormal; Notable for the following components:       Result Value    Glucose 100 (*)     Sodium 126 (*)     Chloride 90 (*)     CO2 21.0 (*)     All other components within normal limits    Narrative:     GFR Normal >60  Chronic Kidney Disease <60  Kidney Failure <15    The GFR formula is only valid for adults with stable renal function between ages 18 and 70.   CBC WITH AUTO DIFFERENTIAL - Abnormal; Notable for the following components:    WBC 19.88 (*)     RBC 3.76 (*)     Hemoglobin 11.2 (*)     Hematocrit 33.3 (*)     RDW 11.8 (*)     Platelets 504 (*)     Neutrophil % 77.6 (*)     Lymphocyte % 11.1 (*)     Immature Grans % 1.4 (*)     Neutrophils, Absolute 15.43 (*)     Monocytes, Absolute 1.83 (*)     Immature Grans, Absolute 0.28 (*)     All other components within normal limits   LIPASE - Normal   BLOOD CULTURE   BLOOD CULTURE   URINALYSIS W/ CULTURE IF INDICATED   LACTIC ACID, PLASMA   PROCALCITONIN   POCT CREATININE   CBC AND DIFFERENTIAL    Narrative:     The following orders were created for panel order CBC & Differential.  Procedure                               Abnormality         Status                     ---------                               -----------         ------                     CBC Auto Differential[[id number]]        Abnormal            Final result                 Please view results for these tests on the individual orders.       Meds Given in ED:   Medications   sodium chloride 0.9 % flush 10 mL (has no administration in time range)   piperacillin-tazobactam (ZOSYN) 3.375 g IVPB in 100 mL NS MBP (CD) (has no administration in time range)   vancomycin IVPB 1750 mg in 0.9% Sodium Chloride (premix) 500 mL (has no administration in time range)   sodium  chloride 0.9 % bolus 500 mL (500 mL Intravenous New Bag 9/28/24 2246)   iopamidol (ISOVUE-300) 61 % injection 80 mL (80 mL Intravenous Given 9/28/24 2211)           Last NIH score:                                                          Dysphagia screening results:  Patient Factors Component (Dysphagia:Stroke or Rule-out)  Best Eye Response: 4-->(E4) spontaneous (09/28/24 2252)  Best Motor Response: 6-->(M6) obeys commands (09/28/24 2252)  Best Verbal Response: 5-->(V5) oriented (09/28/24 2252)  [place] Coma Scale Score: 15 (09/28/24 2252)     [place] Coma Scale:  No data recorded     CIWA:        Restraint Type:            Isolation Status:  No active isolations

## 2024-09-30 ENCOUNTER — READMISSION MANAGEMENT (OUTPATIENT)
Dept: CALL CENTER | Facility: HOSPITAL | Age: 84
End: 2024-09-30
Payer: MEDICARE

## 2024-09-30 VITALS
HEART RATE: 72 BPM | TEMPERATURE: 97.1 F | WEIGHT: 203.4 LBS | OXYGEN SATURATION: 94 % | SYSTOLIC BLOOD PRESSURE: 152 MMHG | BODY MASS INDEX: 34.72 KG/M2 | RESPIRATION RATE: 18 BRPM | HEIGHT: 64 IN | DIASTOLIC BLOOD PRESSURE: 68 MMHG

## 2024-09-30 LAB
ANION GAP SERPL CALCULATED.3IONS-SCNC: 9 MMOL/L (ref 5–15)
BUN SERPL-MCNC: 10 MG/DL (ref 8–23)
BUN/CREAT SERPL: 12.3 (ref 7–25)
CALCIUM SPEC-SCNC: 8.4 MG/DL (ref 8.6–10.5)
CHLORIDE SERPL-SCNC: 102 MMOL/L (ref 98–107)
CO2 SERPL-SCNC: 20 MMOL/L (ref 22–29)
CREAT SERPL-MCNC: 0.81 MG/DL (ref 0.57–1)
DEPRECATED RDW RBC AUTO: 38.4 FL (ref 37–54)
EGFRCR SERPLBLD CKD-EPI 2021: 72.1 ML/MIN/1.73
ERYTHROCYTE [DISTWIDTH] IN BLOOD BY AUTOMATED COUNT: 11.9 % (ref 12.3–15.4)
GLUCOSE SERPL-MCNC: 92 MG/DL (ref 65–99)
HCT VFR BLD AUTO: 28.8 % (ref 34–46.6)
HGB BLD-MCNC: 9.6 G/DL (ref 12–15.9)
MCH RBC QN AUTO: 29.7 PG (ref 26.6–33)
MCHC RBC AUTO-ENTMCNC: 33.3 G/DL (ref 31.5–35.7)
MCV RBC AUTO: 89.2 FL (ref 79–97)
PLATELET # BLD AUTO: 446 10*3/MM3 (ref 140–450)
PMV BLD AUTO: 9.1 FL (ref 6–12)
POTASSIUM SERPL-SCNC: 4.5 MMOL/L (ref 3.5–5.2)
RBC # BLD AUTO: 3.23 10*6/MM3 (ref 3.77–5.28)
SODIUM SERPL-SCNC: 131 MMOL/L (ref 136–145)
WBC NRBC COR # BLD AUTO: 9.62 10*3/MM3 (ref 3.4–10.8)

## 2024-09-30 PROCEDURE — 97116 GAIT TRAINING THERAPY: CPT

## 2024-09-30 PROCEDURE — 99238 HOSP IP/OBS DSCHRG MGMT 30/<: CPT | Performed by: INTERNAL MEDICINE

## 2024-09-30 PROCEDURE — 97165 OT EVAL LOW COMPLEX 30 MIN: CPT

## 2024-09-30 PROCEDURE — 97161 PT EVAL LOW COMPLEX 20 MIN: CPT

## 2024-09-30 PROCEDURE — 25010000002 THIAMINE PER 100 MG: Performed by: NURSE PRACTITIONER

## 2024-09-30 PROCEDURE — 25010000002 PIPERACILLIN SOD-TAZOBACTAM PER 1 G: Performed by: NURSE PRACTITIONER

## 2024-09-30 PROCEDURE — 80048 BASIC METABOLIC PNL TOTAL CA: CPT | Performed by: INTERNAL MEDICINE

## 2024-09-30 PROCEDURE — 85027 COMPLETE CBC AUTOMATED: CPT | Performed by: INTERNAL MEDICINE

## 2024-09-30 RX ADMIN — Medication 10 ML: at 09:02

## 2024-09-30 RX ADMIN — THIAMINE HYDROCHLORIDE 200 MG: 100 INJECTION, SOLUTION INTRAMUSCULAR; INTRAVENOUS at 06:19

## 2024-09-30 RX ADMIN — THIAMINE HYDROCHLORIDE 200 MG: 100 INJECTION, SOLUTION INTRAMUSCULAR; INTRAVENOUS at 14:00

## 2024-09-30 RX ADMIN — FOLIC ACID 1 MG: 5 INJECTION, SOLUTION INTRAMUSCULAR; INTRAVENOUS; SUBCUTANEOUS at 09:02

## 2024-09-30 RX ADMIN — LOSARTAN POTASSIUM 100 MG: 50 TABLET, FILM COATED ORAL at 09:02

## 2024-09-30 RX ADMIN — METOPROLOL SUCCINATE 50 MG: 50 TABLET, EXTENDED RELEASE ORAL at 09:02

## 2024-09-30 RX ADMIN — PANTOPRAZOLE SODIUM 40 MG: 40 TABLET, DELAYED RELEASE ORAL at 06:19

## 2024-09-30 RX ADMIN — LEVOTHYROXINE SODIUM 112 MCG: 0.11 TABLET ORAL at 06:19

## 2024-09-30 RX ADMIN — PIPERACILLIN AND TAZOBACTAM 3.38 G: 3; .375 INJECTION, POWDER, LYOPHILIZED, FOR SOLUTION INTRAVENOUS at 09:01

## 2024-09-30 NOTE — CASE MANAGEMENT/SOCIAL WORK
Discharge Planning Assessment  [facility]     Patient Name: Serenity Lagos  MRN: [id number]  Today's Date: 9/30/2024    Admit Date: 9/28/2024    Plan: home   Discharge Needs Assessment       Row Name 09/30/24 1352       Living Environment    People in Home spouse    Current Living Arrangements home    Potentially Unsafe Housing Conditions none    In the past 12 months has the electric, gas, oil, or water company threatened to shut off services in your home? No    Primary Care Provided by self    Provides Primary Care For spouse    Family Caregiver if Needed none    Quality of Family Relationships supportive    Able to Return to Prior Arrangements yes       Resource/Environmental Concerns    Resource/Environmental Concerns none    Transportation Concerns none       Transportation Needs    In the past 12 months, has lack of transportation kept you from medical appointments or from getting medications? no    In the past 12 months, has lack of transportation kept you from meetings, work, or from getting things needed for daily living? No       Food Insecurity    Within the past 12 months, you worried that your food would run out before you got the money to buy more. Never true    Within the past 12 months, the food you bought just didn't last and you didn't have money to get more. Never true       Transition Planning    Patient/Family Anticipates Transition to home with family    Patient/Family Anticipated Services at Transition none    Transportation Anticipated family or friend will provide       Discharge Needs Assessment    Readmission Within the Last 30 Days no previous admission in last 30 days    Equipment Currently Used at Home walker, rolling;grab bar;commode    Concerns to be Addressed discharge planning    Anticipated Changes Related to Illness none    Equipment Needed After Discharge none    Outpatient/Agency/Support Group Needs outpatient therapy                   Discharge Plan       Row Name  09/30/24 1354       Plan    Plan home    Patient/Family in Agreement with Plan yes    Plan Comments Met with Mrs. Lagos at the bedside to initiate discharge planning. She shares a home in [facility] with her . She is independent with activities of daily living and assists with the care of her spouse. She has a rolling walker, grab bars, and a raised toilet. She stated she only uses the walker occasionally. She denies the receipt of home health or outpatient services at this time. She requested an order for outpatient PT.  will print the order and send with patient. Her primary care is Dr. Bernadette Somers. She denies obstacles to receiving medical care or obtaining medications. No other discharge needs were identified today.    Final Discharge Disposition Code 01 - home or self-care                  Continued Care and Services - Admitted Since 9/28/2024    No active coordination exists for this encounter.       Expected Discharge Date and Time       Expected Discharge Date Expected Discharge Time    Sep 30, 2024            Demographic Summary       Row Name 09/30/24 1351       General Information    Admission Type inpatient    Arrived From emergency department    Referral Source admission list    Reason for Consult discharge planning    Preferred Language English       Contact Information    Permission Granted to Share Info With permission denied;family/designee    Contact Information Obtained for     Contact Information Comments Horacio Lagos Spouse [phone]                   Functional Status       Row Name 09/30/24 1352       Functional Status    Usual Activity Tolerance good    Current Activity Tolerance good       Physical Activity    On average, how many days per week do you engage in moderate to strenuous exercise (like a brisk walk)? 0 days    On average, how many minutes do you engage in exercise at this level? 0 min    Number of minutes of exercise  per week 0       Assessment of Health Literacy    How often do you have someone help you read hospital materials? Never    How often do you have problems learning about your medical condition because of difficulty understanding written information? Never    How often do you have a problem understanding what is told to you about your medical condition? Never    How confident are you filling out medical forms by yourself? Extremely    Health Literacy Good       Functional Status, IADL    Medications independent    Meal Preparation independent    Housekeeping independent    Laundry independent    Shopping independent       Mental Status    General Appearance WDL WDL       Mental Status Summary    Recent Changes in Mental Status/Cognitive Functioning no changes                   Psychosocial    No documentation.                  Abuse/Neglect    No documentation.                  Legal    No documentation.                  Substance Abuse    No documentation.                  Patient Forms    No documentation.                     Grazyna López RN

## 2024-09-30 NOTE — PLAN OF CARE
Goal Outcome Evaluation:  Plan of Care Reviewed With: patient, family        Progress: improving  Outcome Evaluation: patient discharged home with family

## 2024-09-30 NOTE — THERAPY EVALUATION
Patient Name: Serenity Lagos  : 1940    MRN: [id number]                              Today's Date: 2024       Admit Date: 2024    Visit Dx:     ICD-10-CM ICD-9-CM   1. Perirectal abscess  K61.1 566   2. Leukocytosis, unspecified type  D72.829 288.60   3. Hyponatremia  E87.1 276.1     Patient Active Problem List   Diagnosis    Osteopenia after menopause    Chronic pruritus    Hypothyroidism (acquired)    Benign essential hypertension    Pedal edema    Hyperlipidemia LDL goal <70    Palpitations    Class 1 obesity due to excess calories with body mass index (BMI) of 33.0 to 33.9 in adult    Coronary artery calcification of native artery    Bilateral ocular hypertension    Annual physical exam    Medicare annual wellness visit, subsequent    At risk for falls    Perennial allergic rhinitis with seasonal variation    Slow transit constipation    Left foot pain    Fall    Left leg swelling    Perirectal abscess    Breast calcification, left    GERD without esophagitis    Hyponatremia    Alcohol use    Seizures     Past Medical History:   Diagnosis Date    Cervicalgia     Chronic bronchitis     Coronary artery disease     Dysphagia     Glaucoma, open angle     Hearing loss     Hypertension     Hyperthyroidism     thyroid gland killed off with MD treatment    Hypothyroidism     regulated by Levothyroxine    Low back pain     Medicare annual wellness visit, subsequent 12/15/2020    Mixed hyperlipidemia     Osteoarthritis     cervical spine degenerative    Osteopenia     Perforation of tympanic membrane     Pruritus     chronic general pruritis    Salivary gland cancer     Scoliosis     discovered in x-rays by Orthopedist    Shingles     postherpetic neuralgia-thorax    Spondylolisthesis      lumbar spine    Wrist tendonitis      Past Surgical History:   Procedure Laterality Date    APPENDECTOMY      BREAST BIOPSY      BREAST BIOPSY Right 2019    BREAST CYST EXCISION      ENDOSCOPY  2017     normal (per pt) EGD    MOUTH SURGERY  2000    TONSILLECTOMY        General Information       Row Name 09/30/24 0740          OT Time and Intention    Document Type evaluation  -AC     Mode of Treatment occupational therapy  -AC       Row Name 09/30/24 0740          General Information    Patient Profile Reviewed yes  -AC     Prior Level of Function independent:;all household mobility;community mobility;ADL's  most recently has been using RW to ambulate  -AC     Existing Precautions/Restrictions fall  -AC     Barriers to Rehab medically complex;previous functional deficit  -AC       Row Name 09/30/24 0740          Occupational Profile    Environmental Supports and Barriers (Occupational Profile) tub shower  -AC       Row Name 09/30/24 0740          Living Environment    People in Home spouse  -AC       Row Name 09/30/24 0740          Home Main Entrance    Number of Stairs, Main Entrance one  -AC     Stair Railings, Main Entrance railing on left side (ascending)  -AC       Row Name 09/30/24 0740          Stairs Within Home, Primary    Number of Stairs, Within Home, Primary none  -AC       Row Name 09/30/24 0740          Cognition    Orientation Status (Cognition) oriented x 4  -AC       Row Name 09/30/24 0740          Safety Issues, Functional Mobility    Safety Issues Affecting Function (Mobility) positioning of assistive device  -     Impairments Affecting Function (Mobility) endurance/activity tolerance;pain;strength  -AC               User Key  (r) = Recorded By, (t) = Taken By, (c) = Cosigned By      Initials Name Provider Type    AC Rebecca Nagel, OT Occupational Therapist                     Mobility/ADL's       Row Name 09/30/24 1035          Bed Mobility    Comment, (Bed Mobility) [facility] pre/post  -AC       Row Name 09/30/24 1035          Transfers    Transfers sit-stand transfer  -AC       Row Name 09/30/24 1035          Sit-Stand Transfer    Sit-Stand [place] (Transfers) verbal cues;standby assist  -AC      Assistive Device (Sit-Stand Transfers) walker, front-wheeled  -[facility] Name 09/30/24 1035          Functional Mobility    Functional Mobility- Ind. Level verbal cues required;contact guard assist  -     Functional Mobility- Device walker, front-wheeled  -     Functional Mobility-Distance (Feet) --  household distance  -     Functional Mobility- Safety Issues step length decreased  -AC       Row Name 09/30/24 1035          Activities of Daily Living    BADL Assessment/Intervention lower body dressing  -AC       Row Name 09/30/24 1035          Lower Body Dressing Assessment/Training    [place] Level (Lower Body Dressing) standby assist  -     Position (Lower Body Dressing) unsupported sitting  -               User Key  (r) = Recorded By, (t) = Taken By, (c) = Cosigned By      Initials Name Provider Type    Rebecca Sanchez, OT Occupational Therapist                   Obj/Interventions       [facility] Name 09/30/24 1041          Sensory Assessment (Somatosensory)    Sensory Assessment (Somatosensory) UE sensation intact  -AC       Row Name 09/30/24 1041          Vision Assessment/Intervention    Visual Impairment/Limitations corrective lenses full-time  -AC       Row Name 09/30/24 1041          Range of Motion Comprehensive    General Range of Motion bilateral upper extremity ROM WNL  -AC       Row Name 09/30/24 1041          Strength Comprehensive (MMT)    Comment, General Manual Muscle Testing (MMT) Assessment BUE grossly 4/5  -[facility] Name 09/30/24 1041          Balance    Balance Assessment sitting static balance;sitting dynamic balance;standing static balance;standing dynamic balance  -     Static Sitting Balance independent  -     Dynamic Sitting Balance independent  -     Position, Sitting Balance unsupported;sitting in chair  -     Static Standing Balance standby assist  -     Dynamic Standing Balance contact guard  -     Position/Device Used, Standing Balance  supported;walker, front-wheeled  -AC               User Key  (r) = Recorded By, (t) = Taken By, (c) = Cosigned By      Initials Name Provider Type    Rebecca Sanchez, OT Occupational Therapist                   Goals/Plan       Row Name 09/30/24 1044          Transfer Goal 1 (OT)    Activity/Assistive Device (Transfer Goal 1, OT) bed-to-chair/chair-to-bed;toilet;walker, rolling  -AC     [place] Level/Cues Needed (Transfer Goal 1, OT) supervision required  -AC     Time Frame (Transfer Goal 1, OT) long term goal (LTG);10 days  -AC     Progress/Outcome (Transfer Goal 1, OT) new goal;goal ongoing  -AC       Row Name 09/30/24 1044          Toileting Goal 1 (OT)    Activity/Device (Toileting Goal 1, OT) adjust/manage clothing;perform perineal hygiene  -AC     [place] Level/Cues Needed (Toileting Goal 1, OT) supervision required  -AC     Time Frame (Toileting Goal 1, OT) short term goal (STG);5 days  -AC     Progress/Outcome (Toileting Goal 1, OT) new goal;goal ongoing  -AC       Row Name 09/30/24 1044          Grooming Goal 1 (OT)    Activity/Device (Grooming Goal 1, OT) hair care;oral care;wash face, hands  -AC     [place] (Grooming Goal 1, OT) supervision required  -AC     Time Frame (Grooming Goal 1, OT) short term goal (STG);5 days  -AC     Strategies/Barriers (Grooming Goal 1, OT) standing sink side  -AC     Progress/Outcome (Grooming Goal 1, OT) new goal;goal ongoing  -AC       Row Name 09/30/24 1044          Therapy Assessment/Plan (OT)    Planned Therapy Interventions (OT) activity tolerance training;BADL retraining;functional balance retraining;occupation/activity based interventions;patient/caregiver education/training;strengthening exercise;transfer/mobility retraining  -AC               User Key  (r) = Recorded By, (t) = Taken By, (c) = Cosigned By      Initials Name Provider Type    Rebecca Sanchez OT Occupational Therapist                   Clinical Impression       Row Name 09/30/24 1041           Pain Scale: FACES Pre/Post-Treatment    Pain: FACES Scale, Pretreatment 0-->no hurt  -AC     Posttreatment Pain Rating 0-->no hurt  -AC     Pain Location - rectal  -AC       Row Name 09/30/24 1041          Plan of Care Review    Plan of Care Reviewed With patient  -AC     Outcome Evaluation Pt presents below baseline with self care/mobility d/t weakness and decr activity tolerance.  Pt SBA LBD, CGA to ambualte with RW.  OT will follow to advance pt toward PLOF. Recommend home with assist upon d/c.  -AC       Row Name 09/30/24 1041          Therapy Assessment/Plan (OT)    Patient/Family Therapy Goal Statement (OT) return home  -AC     Rehab Potential (OT) good, to achieve stated therapy goals  -AC     Criteria for Skilled Therapeutic Interventions Met (OT) yes;skilled treatment is necessary  -AC     Therapy Frequency (OT) daily  -AC     Predicted Duration of Therapy Intervention (OT) 10 days  -AC       Row Name 09/30/24 1041          Therapy Plan Review/Discharge Plan (OT)    Anticipated Discharge Disposition (OT) home with assist  -AC       Row Name 09/30/24 1041          Vital Signs    Pre Systolic BP Rehab 160  -AC     Pre Treatment Diastolic BP 76  -AC     Posttreatment Heart Rate (beats/min) 67  -AC     Pre Patient Position Sitting  -AC     Post Patient Position Sitting  -AC       Row Name 09/30/24 1041          Positioning and Restraints    Pre-Treatment Position sitting in chair/recliner  -AC     Post Treatment Position chair  -AC     In Chair notified nsg;reclined;call light within reach;encouraged to call for assist;exit alarm on;waffle cushion  -AC               User Key  (r) = Recorded By, (t) = Taken By, (c) = Cosigned By      Initials Name Provider Type    Rebecca Sanchez, OT Occupational Therapist                   Outcome Measures       Row Name 09/30/24 1045          How much help from another is currently needed...    Putting on and taking off regular lower body clothing? 3  -AC      Bathing (including washing, rinsing, and drying) 3  -AC     Toileting (which includes using toilet bed pan or urinal) 3  -AC     Putting on and taking off regular upper body clothing 4  -AC     Taking care of personal grooming (such as brushing teeth) 3  -AC     Eating meals 4  -AC     AM-PAC 6 Clicks Score (OT) 20  -AC       Row Name 09/30/24 0902 09/30/24 0840       How much help from another person do you currently need...    Turning from your back to your side while in flat bed without using bedrails? 4  -DF 4  -LR    Moving from lying on back to sitting on the side of a flat bed without bedrails? 3  -DF 3  -LR    Moving to and from a bed to a chair (including a wheelchair)? 3  -DF 3  -LR    Standing up from a chair using your arms (e.g., wheelchair, bedside chair)? 3  -DF 3  -LR    Climbing 3-5 steps with a railing? 3  -DF 3  -LR    To walk in hospital room? 3  -DF 3  -LR    AM-PAC 6 Clicks Score (PT) 19  -DF 19  -LR    Highest Level of Mobility Goal 6 --> Walk 10 steps or more  -DF 6 --> Walk 10 steps or more  -LR      Row Name 09/30/24 1045 09/30/24 0840       Functional Assessment    Outcome Measure Options AM-PAC 6 Clicks Daily Activity (OT)  -AC AM-PAC 6 Clicks Basic Mobility (PT)  -LR              User Key  (r) = Recorded By, (t) = Taken By, (c) = Cosigned By      Initials Name Provider Type    Rebecca Sanchez, OT Occupational Therapist    LR Leti Nunez, PT Physical Therapist    DF Stefany Neri, RN Registered Nurse                    Occupational Therapy Education       Title: PT OT SLP Therapies (In Progress)       Topic: Occupational Therapy (In Progress)       Point: ADL training (In Progress)       Description:   Instruct learner(s) on proper safety adaptation and remediation techniques during self care or transfers.   Instruct in proper use of assistive devices.                  Learning Progress Summary             Patient Acceptance, E, NR by KATHRYN at 9/30/2024 1047                          Point: Home exercise program (Not Started)       Description:   Instruct learner(s) on appropriate technique for monitoring, assisting and/or progressing therapeutic exercises/activities.                  Learner Progress:  Not documented in this visit.              Point: Precautions (Not Started)       Description:   Instruct learner(s) on prescribed precautions during self-care and functional transfers.                  Learner Progress:  Not documented in this visit.              Point: Body mechanics (Not Started)       Description:   Instruct learner(s) on proper positioning and spine alignment during self-care, functional mobility activities and/or exercises.                  Learner Progress:  Not documented in this visit.                              User Key       Initials Effective Dates Name Provider Type Discipline     02/03/23 -  Rebecca Nagel, OT Occupational Therapist OT                  OT Recommendation and Plan  Planned Therapy Interventions (OT): activity tolerance training, BADL retraining, functional balance retraining, occupation/activity based interventions, patient/caregiver education/training, strengthening exercise, transfer/mobility retraining  Therapy Frequency (OT): daily  Plan of Care Review  Plan of Care Reviewed With: patient  Outcome Evaluation: Pt presents below baseline with self care/mobility d/t weakness and decr activity tolerance.  Pt SBA LBD, CGA to ambualte with RW.  OT will follow to advance pt toward PLOF. Recommend home with assist upon d/c.     Time Calculation:   Evaluation Complexity (OT)  Review Occupational Profile/Medical/Therapy History Complexity: brief/low complexity  Assessment, Occupational Performance/Identification of Deficit Complexity: 1-3 performance deficits  Clinical Decision Making Complexity (OT): problem focused assessment/low complexity  Overall Complexity of Evaluation (OT): low complexity     Time Calculation- OT       Row Name 09/30/24  0840 09/30/24 0740          Time Calculation- OT    OT Start Time -- 0740  -AC     OT Received On -- 09/30/24  -AC     OT Goal Re-Cert Due Date -- 10/10/24  -AC        Timed Charges    73974 - Gait Training Minutes  8  -LR --        Untimed Charges    OT Eval/Re-eval Minutes -- 50  -AC        Total Minutes    Timed Charges Total Minutes 8  -LR --     Untimed Charges Total Minutes -- 50  -AC      Total Minutes 8  -LR 50  -AC               User Key  (r) = Recorded By, (t) = Taken By, (c) = Cosigned By      Initials Name Provider Type    AC Rebecca Nagel, OT Occupational Therapist    LR Leti Nunez, PT Physical Therapist                  Therapy Charges for Today       Code Description Service Date Service Provider Modifiers Qty    [id number] HC OT EVAL LOW COMPLEXITY 4 9/30/2024 Rebecca Nagel OT GO 1                 Rebecca Nagel OT  9/30/2024

## 2024-09-30 NOTE — OUTREACH NOTE
Prep Survey      Flowsheet Row Responses   [facility] patient discharged from? [facility]   Is LACE score < 7 ? Yes   Eligibility [facility]   Date of Admission 09/28/24   Date of Discharge 09/30/24   Discharge Disposition Home or Self Care   Discharge diagnosis Perirectal abscess, Incision and drainage and culture of perirectal abscess   Does the patient have one of the following disease processes/diagnoses(primary or secondary)? General Surgery   Does the patient have Home health ordered? No   Is there a DME ordered? No   Prep survey completed? Yes            Paulette HARDWICK - Registered Nurse

## 2024-09-30 NOTE — PLAN OF CARE
Goal Outcome Evaluation:  Plan of Care Reviewed With: patient           Outcome Evaluation: Pt presents below baseline with self care/mobility d/t weakness and decr activity tolerance.  Pt SBA LBD, CGA to ambulate with RW.  OT will follow to advance pt toward PLOF. Recommend home with assist upon d/c.      Anticipated Discharge Disposition (OT): home with assist

## 2024-09-30 NOTE — THERAPY EVALUATION
Patient Name: Serenity Lagos  : 1940    MRN: [id number]                              Today's Date: 2024       Admit Date: 2024    Visit Dx:     ICD-10-CM ICD-9-CM   1. Perirectal abscess  K61.1 566   2. Leukocytosis, unspecified type  D72.829 288.60   3. Hyponatremia  E87.1 276.1     Patient Active Problem List   Diagnosis    Osteopenia after menopause    Chronic pruritus    Hypothyroidism (acquired)    Benign essential hypertension    Pedal edema    Hyperlipidemia LDL goal <70    Palpitations    Class 1 obesity due to excess calories with body mass index (BMI) of 33.0 to 33.9 in adult    Coronary artery calcification of native artery    Bilateral ocular hypertension    Annual physical exam    Medicare annual wellness visit, subsequent    At risk for falls    Perennial allergic rhinitis with seasonal variation    Slow transit constipation    Left foot pain    Fall    Left leg swelling    Perirectal abscess    Breast calcification, left    GERD without esophagitis    Hyponatremia    Alcohol use    Seizures     Past Medical History:   Diagnosis Date    Cervicalgia     Chronic bronchitis     Coronary artery disease     Dysphagia     Glaucoma, open angle     Hearing loss     Hypertension     Hyperthyroidism     thyroid gland killed off with MD treatment    Hypothyroidism     regulated by Levothyroxine    Low back pain     Medicare annual wellness visit, subsequent 12/15/2020    Mixed hyperlipidemia     Osteoarthritis     cervical spine degenerative    Osteopenia     Perforation of tympanic membrane     Pruritus     chronic general pruritis    Salivary gland cancer     Scoliosis     discovered in x-rays by Orthopedist    Shingles     postherpetic neuralgia-thorax    Spondylolisthesis      lumbar spine    Wrist tendonitis      Past Surgical History:   Procedure Laterality Date    APPENDECTOMY      BREAST BIOPSY      BREAST BIOPSY Right 2019    BREAST CYST EXCISION      ENDOSCOPY  2017     normal (per pt) EGD    MOUTH SURGERY  2000    TONSILLECTOMY        General Information       Row Name 09/30/24 0840          Physical Therapy Time and Intention    Document Type evaluation  -LR     Mode of Treatment physical therapy  -LR       Row Name 09/30/24 0840          General Information    Patient Profile Reviewed yes  -LR     Prior Level of Function independent:;all household mobility;community mobility;gait;transfer;bed mobility  recent use of RW for mobility, reports chronic balance issues  -LR     Existing Precautions/Restrictions fall  -LR     Barriers to Rehab medically complex;previous functional deficit  -LR       Row Name 09/30/24 0840          Living Environment    People in Home spouse  -LR       Row Name 09/30/24 0840          Home Main Entrance    Number of Stairs, Main Entrance one  -LR     Stair Railings, Main Entrance railing on left side (ascending)  -LR       Row Name 09/30/24 0840          Stairs Within Home, Primary    Number of Stairs, Within Home, Primary none  -LR       Row Name 09/30/24 0840          Cognition    Orientation Status (Cognition) oriented x 4  -LR       Row Name 09/30/24 0840          Safety Issues, Functional Mobility    Safety Issues Affecting Function (Mobility) positioning of assistive device  -LR     Impairments Affecting Function (Mobility) strength;pain;endurance/activity tolerance  -LR               User Key  (r) = Recorded By, (t) = Taken By, (c) = Cosigned By      Initials Name Provider Type    LR Leti Nunez, PT Physical Therapist                   Mobility       Row Name 09/30/24 0840          Bed Mobility    Comment, (Bed Mobility) [facility] on arrival and at end of eval.  -LR       Row Name 09/30/24 0840          Transfers    Comment, (Transfers) Verbal cues for correct hand placement with t/f and to keep RW with her during t/f. Denied dizziness upon sitting forward and upon standing.  -LR       Row Name 09/30/24 0840          Bed-Chair Transfer     Bed-Chair [place] (Transfers) not tested  -LR       Row Name 09/30/24 0840          Sit-Stand Transfer    Sit-Stand [place] (Transfers) verbal cues;standby assist  -LR     Assistive Device (Sit-Stand Transfers) walker, front-wheeled  -LR       Row Name 09/30/24 0840          Gait/Stairs (Locomotion)    [place] Level (Gait) verbal cues;contact guard  -LR     Assistive Device (Gait) walker, front-wheeled  -LR     Patient was able to Ambulate yes  -LR     Distance in Feet (Gait) 250  -LR     Deviations/Abnormal Patterns (Gait) bilateral deviations;nikita decreased;gait speed decreased;stride length decreased  -LR     Bilateral Gait Deviations forward flexed posture;heel strike decreased  -LR     [place] Level (Stairs) not tested  -LR     Comment, (Gait/Stairs) Patient ambulated with step through gait pattern at slow pace with forward flexed posture and RW too far out in front. Improved with cues to keep RW closer, to stay within RW, increased LE weight bearing, decreased UE weight bearing, and increased step length. Improved with cues for correction. Gait limited by fatigue. No LOB or unsteadiness noted.  -LR               User Key  (r) = Recorded By, (t) = Taken By, (c) = Cosigned By      Initials Name Provider Type    LR Leti Nunez, PT Physical Therapist                   Obj/Interventions       Row Name 09/30/24 0840          Range of Motion Comprehensive    General Range of Motion bilateral lower extremity ROM WFL  -LR       Row Name 09/30/24 0840          Strength Comprehensive (MMT)    General Manual Muscle Testing (MMT) Assessment lower extremity strength deficits identified  -LR       Row Name 09/30/24 0840          Balance    Balance Assessment sitting static balance;sitting dynamic balance;standing static balance;standing dynamic balance  -LR     Static Sitting Balance independent  -LR     Dynamic Sitting Balance independent  -LR     Position, Sitting Balance  unsupported;sitting in chair  -LR     Static Standing Balance standby assist  -LR     Dynamic Standing Balance contact guard  -LR     Position/Device Used, Standing Balance supported;walker, rolling  -LR       Row Name 09/30/24 0840          Sensory Assessment (Somatosensory)    Sensory Assessment (Somatosensory) LE sensation intact;other (see comments)  denies numbness/tingling; reports light touch equal and intact upon assessment  -LR       Row Name 09/30/24 0840          Lower Extremity (Manual Muscle Testing)    Comment, MMT: Lower Extremity B hip flexors: 4+/5, B knee extensors: 5/5, B knee flexors; 4+/5, B ankle DFs: 5/5  -LR               User Key  (r) = Recorded By, (t) = Taken By, (c) = Cosigned By      Initials Name Provider Type    LR Leti Nunez, PT Physical Therapist                   Goals/Plan       Row Name 09/30/24 0840          Bed Mobility Goal 1 (PT)    Activity/Assistive Device (Bed Mobility Goal 1, PT) sit to supine/supine to sit  -LR     Time Frame (Bed Mobility Goal 1, PT) short term goal (STG);3 days  -LR     Progress/Outcomes (Bed Mobility Goal 1, PT) goal ongoing  -LR       Row Name 09/30/24 0840          Transfer Goal 1 (PT)    Activity/Assistive Device (Transfer Goal 1, PT) sit-to-stand/stand-to-sit;walker, rolling  -LR     [place] Level/Cues Needed (Transfer Goal 1, PT) modified independence  -LR     Time Frame (Transfer Goal 1, PT) long term goal (LTG);5 days  -LR     Progress/Outcome (Transfer Goal 1, PT) goal ongoing  -LR       Row Name 09/30/24 0840          Gait Training Goal 1 (PT)    Activity/Assistive Device (Gait Training Goal 1, PT) gait (walking locomotion);walker, rolling  -LR     Distance (Gait Training Goal 1, PT) 500 feet  -LR     Time Frame (Gait Training Goal 1, PT) long term goal (LTG);5 days  -LR     Progress/Outcome (Gait Training Goal 1, PT) goal ongoing  -LR       Row Name 09/30/24 0840          Stairs Goal 1 (PT)    Activity/Assistive Device  (Stairs Goal 1, PT) ascending stairs;descending stairs;using handrail, left;step-to-step  -LR     [place] Level/Cues Needed (Stairs Goal 1, PT) standby assist  -LR     Number of Stairs (Stairs Goal 1, PT) 1  -LR     Time Frame (Stairs Goal 1, PT) long term goal (LTG);5 days  -LR     Progress/Outcome (Stairs Goal 1, PT) goal ongoing  -LR       Row Name 09/30/24 0840          Therapy Assessment/Plan (PT)    Planned Therapy Interventions (PT) balance training;bed mobility training;gait training;home exercise program;strengthening;stair training;transfer training;patient/family education  -LR               User Key  (r) = Recorded By, (t) = Taken By, (c) = Cosigned By      Initials Name Provider Type    LR Leti Nunez, PT Physical Therapist                   Clinical Impression       Row Name 09/30/24 0840          Pain    Pain Intervention(s) Ambulation/increased activity;Repositioned  -LR     Additional Documentation Pain Scale: FACES Pre/Post-Treatment (Group)  -LR       Row Name 09/30/24 0840          Pain Scale: FACES Pre/Post-Treatment    Pain: FACES Scale, Pretreatment 2-->hurts little bit  -LR     Posttreatment Pain Rating 2-->hurts little bit  -LR     Pain Location - rectal  -LR       Row Name 09/30/24 0840          Plan of Care Review    Plan of Care Reviewed With patient  -LR     Progress improving  -LR     Outcome Evaluation Patient ambulated 250 feet with RW, CGA, step through gait pattern, limited by fatigue. Mild strength deficits noted. Patient currently slightly below baseline functioning, demonstrating decreased functional mobility status, decreased endurance, and decreased strength. Will address these deficits to promote return to PLOF. Recommend d/c home with assist.  -LR       Row Name 09/30/24 0840          Therapy Assessment/Plan (PT)    Patient/Family Therapy Goals Statement (PT) go home  -LR     Rehab Potential (PT) good, to achieve stated therapy goals  -LR     Criteria for  Skilled Interventions Met (PT) skilled treatment is necessary;meets criteria  -LR     Therapy Frequency (PT) daily  -LR     Predicted Duration of Therapy Intervention (PT) 5 days  -LR       Row Name 09/30/24 0840          Vital Signs    Pre Systolic BP Rehab 152  -LR     Pre Treatment Diastolic BP 68  -LR     Pretreatment Heart Rate (beats/min) 69  -LR     Pre SpO2 (%) 98  -LR     O2 Delivery Pre Treatment room air  -LR     Post SpO2 (%) 97  -LR     O2 Delivery Post Treatment room air  -LR     Pre Patient Position Sitting  -LR     Post Patient Position Sitting  -LR       Row Name 09/30/24 0840          Positioning and Restraints    Pre-Treatment Position sitting in chair/recliner  -LR     Post Treatment Position chair  -LR     In Chair notified nsg;reclined;sitting;call light within reach;encouraged to call for assist;exit alarm on;legs elevated;waffle cushion  -LR               User Key  (r) = Recorded By, (t) = Taken By, (c) = Cosigned By      Initials Name Provider Type    Leti Sin, PT Physical Therapist                   Outcome Measures       Row Name 09/30/24 0840          How much help from another person do you currently need...    Turning from your back to your side while in flat bed without using bedrails? 4  -LR     Moving from lying on back to sitting on the side of a flat bed without bedrails? 3  -LR     Moving to and from a bed to a chair (including a wheelchair)? 3  -LR     Standing up from a chair using your arms (e.g., wheelchair, bedside chair)? 3  -LR     Climbing 3-5 steps with a railing? 3  -LR     To walk in hospital room? 3  -LR     AM-PAC 6 Clicks Score (PT) 19  -LR     Highest Level of Mobility Goal 6 --> Walk 10 steps or more  -LR       Row Name 09/30/24 0840          Functional Assessment    Outcome Measure Options AM-PAC 6 Clicks Basic Mobility (PT)  -LR               User Key  (r) = Recorded By, (t) = Taken By, (c) = Cosigned By      Initials Name Provider Type    LR  Leti Nunez, PT Physical Therapist                                 Physical Therapy Education       Title: PT OT SLP Therapies (In Progress)       Topic: Physical Therapy (In Progress)       Point: Mobility training (Done)       Learning Progress Summary             Patient Acceptance, E,D, VU,DU by LR at 9/30/2024 0840    Comment: Educated on safety with mobility, benefits of mobility, correct sit<->stand t/f technique, correct gait mechanics, and progression of POC.                         Point: Home exercise program (Not Started)       Learner Progress:  Not documented in this visit.              Point: Body mechanics (Done)       Learning Progress Summary             Patient Acceptance, E,D, VU,DU by LR at 9/30/2024 0840    Comment: Educated on safety with mobility, benefits of mobility, correct sit<->stand t/f technique, correct gait mechanics, and progression of POC.                         Point: Precautions (Done)       Learning Progress Summary             Patient Acceptance, E,D, VU,DU by LR at 9/30/2024 0840    Comment: Educated on safety with mobility, benefits of mobility, correct sit<->stand t/f technique, correct gait mechanics, and progression of POC.                                         User Key       Initials Effective Dates Name Provider Type Discipline     02/03/23 -  Leti Nunez, PT Physical Therapist PT                  PT Recommendation and Plan  Planned Therapy Interventions (PT): balance training, bed mobility training, gait training, home exercise program, strengthening, stair training, transfer training, patient/family education  Plan of Care Reviewed With: patient  Progress: improving  Outcome Evaluation: Patient ambulated 250 feet with RW, CGA, step through gait pattern, limited by fatigue. Mild strength deficits noted. Patient currently slightly below baseline functioning, demonstrating decreased functional mobility status, decreased endurance, and decreased  strength. Will address these deficits to promote return to PLOF. Recommend d/c home with assist.     Time Calculation:   PT Evaluation Complexity  History, PT Evaluation Complexity: 1-2 personal factors and/or comorbidities  Examination of Body Systems (PT Eval Complexity): 1-2 elements  Clinical Presentation (PT Evaluation Complexity): stable  Clinical Decision Making (PT Evaluation Complexity): low complexity  Overall Complexity (PT Evaluation Complexity): low complexity     PT Charges       Row Name 09/30/24 0840             Time Calculation    Start Time 0840  -LR      PT Received On 09/30/24  -LR      PT Goal Re-Cert Due Date 10/10/24  -LR         Timed Charges    91935 - Gait Training Minutes  8  -LR         Untimed Charges    PT Eval/Re-eval Minutes 42  -LR         Total Minutes    Timed Charges Total Minutes 8  -LR      Untimed Charges Total Minutes 42  -LR       Total Minutes 50  -LR                User Key  (r) = Recorded By, (t) = Taken By, (c) = Cosigned By      Initials Name Provider Type    LR Leti Nunez, PT Physical Therapist                  Therapy Charges for Today       Code Description Service Date Service Provider Modifiers Qty    13780919005 HC GAIT TRAINING EA 15 MIN 9/30/2024 Leti Nunez, PT GP 1    32355171070 HC PT EVAL LOW COMPLEXITY 3 9/30/2024 Leti Nunez, PT GP 1            PT G-Codes  Outcome Measure Options: AM-PAC 6 Clicks Basic Mobility (PT)  AM-PAC 6 Clicks Score (PT): 19  PT Discharge Summary  Anticipated Discharge Disposition (PT): home with assist    Leti Nunez, PT  9/30/2024

## 2024-09-30 NOTE — PROGRESS NOTES
Serenity Lagos     LOS: 1 day     Subjective     Patient rested well last night.  No fever.  Small amount of bloody discharge.  White blood cell count has normalized to 9.6.  Hemoglobin 9.6.  Wound culture pending.  Gram stain does not show bacteria or white blood cells.    Objective     Vital Signs  Vitals:    09/30/24 0413   BP: 160/76   Pulse:    Resp: 18   Temp:    SpO2:        I/O  Intake & Output (last day)         09/29 0701  09/30 0700    P.O. 480    IV Piggyback 250    Total Intake(mL/kg) 730 (7.9)    Net +730         Urine Unmeasured Occurrence 4 x    Stool Unmeasured Occurrence 2 x            Physical Exam:    Abdomen soft and nontender.  I&D site without erythema or swelling.  Patient has some bloody thin discharge.     Results Review:       WBC WBC   Date Value Ref Range Status   09/30/2024 9.62 3.40 - 10.80 10*3/mm3 Final   09/29/2024 15.30 (H) 3.40 - 10.80 10*3/mm3 Final   09/28/2024 19.88 (H) 3.40 - 10.80 10*3/mm3 Final      HGB Hemoglobin   Date Value Ref Range Status   09/30/2024 9.6 (L) 12.0 - 15.9 g/dL Final   09/29/2024 10.2 (L) 12.0 - 15.9 g/dL Final   09/29/2024 10.4 (L) 12.0 - 15.9 g/dL Final   09/28/2024 11.2 (L) 12.0 - 15.9 g/dL Final      HCT Hematocrit   Date Value Ref Range Status   09/30/2024 28.8 (L) 34.0 - 46.6 % Final   09/29/2024 30.0 (L) 34.0 - 46.6 % Final   09/29/2024 29.9 (L) 34.0 - 46.6 % Final   09/28/2024 33.3 (L) 34.0 - 46.6 % Final      PLT        Results from last 7 days   Lab Units 09/30/24  0500   PLATELETS 10*3/mm3 446            Sodium Sodium   Date Value Ref Range Status   09/30/2024 131 (L) 136 - 145 mmol/L Final   09/29/2024 132 (L) 136 - 145 mmol/L Final   09/29/2024 131 (L) 136 - 145 mmol/L Final   09/29/2024 127 (L) 136 - 145 mmol/L Final   09/29/2024 130 (L) 136 - 145 mmol/L Final   09/28/2024 126 (L) 136 - 145 mmol/L Final      Potassium Potassium   Date Value Ref Range Status   09/30/2024 4.5 3.5 - 5.2 mmol/L Final   09/29/2024 4.3 3.5 - 5.2 mmol/L Final  "  09/29/2024 3.7 3.5 - 5.2 mmol/L Final   09/28/2024 3.5 3.5 - 5.2 mmol/L Final      Chloride Chloride   Date Value Ref Range Status   09/30/2024 102 98 - 107 mmol/L Final   09/29/2024 95 (L) 98 - 107 mmol/L Final   09/28/2024 90 (L) 98 - 107 mmol/L Final      Bicarbonate No results found for: \"PLASMABICARB\"   BUN BUN   Date Value Ref Range Status   09/30/2024 10 8 - 23 mg/dL Final   09/29/2024 11 8 - 23 mg/dL Final   09/28/2024 13 8 - 23 mg/dL Final      Creatinine Creatinine   Date Value Ref Range Status   09/30/2024 0.81 0.57 - 1.00 mg/dL Final   09/29/2024 0.83 0.57 - 1.00 mg/dL Final   09/29/2024 0.80 0.60 - 1.30 mg/dL Final   09/29/2024 0.80 0.60 - 1.30 mg/dL Final     Comment:     Serial Number: [id number]:  [id number]   09/28/2024 0.92 0.57 - 1.00 mg/dL Final      Calcium Calcium   Date Value Ref Range Status   09/30/2024 8.4 (L) 8.6 - 10.5 mg/dL Final   09/29/2024 8.3 (L) 8.6 - 10.5 mg/dL Final   09/28/2024 8.9 8.6 - 10.5 mg/dL Final        Assessment & Plan       Perirectal abscess    Hypothyroidism (acquired)    Benign essential hypertension    Palpitations    Slow transit constipation    Breast calcification, left    GERD without esophagitis    Hyponatremia    Alcohol use    Seizures      Patient doing well status post I&D of perirectal fluid collection.  This may represent a posttraumatic hematoma.  Patient can go home today from my standpoint.  Would recommend continuing oral antibiotics for 5 days.  Follow-up in my office in 6 weeks.  Patient to call for recurrent swelling, pain, or excessive bleeding.    Patrick Walters MD  09/30/24  06:21 EDT        "

## 2024-09-30 NOTE — PLAN OF CARE
Goal Outcome Evaluation:  Plan of Care Reviewed With: patient        Progress: improving  Outcome Evaluation: Patient ambulated 250 feet with RW, CGA, step through gait pattern, limited by fatigue. Mild strength deficits noted. Patient currently slightly below baseline functioning, demonstrating decreased functional mobility status, decreased endurance, and decreased strength. Will address these deficits to promote return to PLOF. Recommend d/c home with assist.      Anticipated Discharge Disposition (PT): home with assist

## 2024-10-01 ENCOUNTER — TRANSITIONAL CARE MANAGEMENT TELEPHONE ENCOUNTER (OUTPATIENT)
Dept: CALL CENTER | Facility: HOSPITAL | Age: 84
End: 2024-10-01
Payer: MEDICARE

## 2024-10-01 LAB
BACTERIA SPEC AEROBE CULT: ABNORMAL
GRAM STN SPEC: ABNORMAL
QT INTERVAL: 390 MS
QTC INTERVAL: 408 MS

## 2024-10-01 NOTE — OUTREACH NOTE
Call Center TCM Note      Flowsheet Row Responses   [facility] patient discharged from? [place]   Does the patient have one of the following disease processes/diagnoses(primary or secondary)? General Surgery   TCM attempt successful? No   Unsuccessful attempts Attempt 2  [Attempted to reach patient / spouse. No answer.]            Brandee Grant RN    10/1/2024, 13:48 EDT

## 2024-10-01 NOTE — OUTREACH NOTE
Call Center TCM Note      Flowsheet Row Responses   [facility] patient discharged from? [place]   Does the patient have one of the following disease processes/diagnoses(primary or secondary)? General Surgery   TCM attempt successful? No   Unsuccessful attempts Attempt 1  [Attempted to reach patient / spouse. No answer.]   Call Status Left message            Brandee Grant RN    10/1/2024, 10:56 EDT

## 2024-10-02 ENCOUNTER — TRANSITIONAL CARE MANAGEMENT TELEPHONE ENCOUNTER (OUTPATIENT)
Dept: CALL CENTER | Facility: HOSPITAL | Age: 84
End: 2024-10-02
Payer: MEDICARE

## 2024-10-02 NOTE — OUTREACH NOTE
Call Center TCM Note      Flowsheet Row Responses   [facility] patient discharged from? [place]   Does the patient have one of the following disease processes/diagnoses(primary or secondary)? General Surgery   TCM attempt successful? Yes   Call start time 0853   Call end time 0857   Discharge diagnosis Perirectal abscess, Incision and drainage and culture of perirectal abscess   Meds reviewed with patient/caregiver? Yes   Is the patient having any side effects they believe may be caused by any medication additions or changes? No   Does the patient have all medications related to this admission filled (includes all antibiotics, pain medications, etc.) Yes   Is the patient taking all medications as directed (includes completed medication regime)? Yes   Comments Hosp dc fu apt on 10/7/24 with PCP   Does the patient have an appointment with their PCP within 7-14 days of discharge? Yes   Has home health visited the patient within 72 hours of discharge? N/A   Psychosocial issues? No   Did the patient receive a copy of their discharge instructions? Yes   Nursing interventions Reviewed instructions with patient   What is the patient's perception of their health status since discharge? Improving   Nursing interventions Nurse provided patient education   Is the patient /caregiver able to teach back basic post-op care? --  [Pt had specific questions re: bathing instructions-nurse instructed pt to contact Dr. Walters's office]   Is the patient/caregiver able to teach back signs and symptoms of incisional infection? Increased redness, swelling or pain at the incisonal site, Incisional warmth, Increased drainage or bleeding, Pus or odor from incision, Fever   Is the patient/caregiver able to teach back steps to recovery at home? Set small, achievable goals for return to baseline health, Rest and rebuild strength, gradually increase activity, Practice good oral hygiene, Eat a well-balance diet, Make a list of questions for  surgeon's appointment   If the patient is a current smoker, are they able to teach back resources for cessation? Not a smoker   Is the patient/caregiver able to teach back the hierarchy of who to call/visit for symptoms/problems? PCP, Specialist, Home health nurse, Urgent Care, ED, 911 Yes   TCM call completed? Yes   Call end time 0836            Patricia Riggs RN    10/2/2024, 09:01 EDT

## 2024-10-04 LAB
BACTERIA SPEC AEROBE CULT: NORMAL
BACTERIA SPEC AEROBE CULT: NORMAL

## 2024-10-06 NOTE — DISCHARGE SUMMARY
[facility] Medicine Services  DISCHARGE SUMMARY    Patient Name: Serenity Lagos  : 1940  MRN: [id number]    Date of Admission: 2024 10:16 PM  Date of Discharge:    Primary Care Physician: Bernadette Somers MD    Consults       Date and Time Order Name Status Description    2024  2:29 AM Inpatient Colorectal Surgery Consult Completed             Hospital Course     Presenting Problem: per-rectal hematoma w concern for infection    Active Hospital Problems    Diagnosis  POA    **Perirectal abscess [K61.1]  Yes    Breast calcification, left [R92.1]  Yes    GERD without esophagitis [K21.9]  Yes    Hyponatremia [E87.1]  Yes    Alcohol use [Z78.9]  Yes    Seizures [R56.9]  Yes    Slow transit constipation [K59.01]  Yes    Palpitations [R00.2]  Yes    Hypothyroidism (acquired) [E03.9]  Yes    Benign essential hypertension [I10]  Yes      Resolved Hospital Problems   No resolved problems to display.          Hospital Course:  Serenity Lagos is a 84 y.o. female who presented w rectal urgency. On CT imaging initial concern for marleen-rectal abscess but on bedside drainage by Dr Walters on  more c/w traumatic hematoma, possibly super-infected given imaging findings.   Discharged on augmentin x 5 days after abx here, f/u 6 weeks in Dr Walters's office and to call if any recurrent swelling pain or excessive bleeding      Discharge Follow Up Recommendations for outpatient labs/diagnostics:   F/u PCP 1 week   F/u Dr Walters 6 weeks    Day of Discharge     HPI:   Doing well, very anxious to get home today      Vital Signs:      Vitals:    24 0859   BP: 152/68   Pulse: 72   Resp: 18   Temp: 97.1 °F (36.2 °C)   SpO2:          Physical Exam:  Constitutional: No acute distress, awake, alert  HENT: NCAT, mucous membranes moist  Respiratory: Clear to auscultation bilaterally, respiratory effort normal   Cardiovascular: RRR, no murmurs, rubs, or gallops  Gastrointestinal: Soft,  nontender, nondistended  Musculoskeletal: Muscle tone within normal limits, no joint effusions appreciated  Psychiatric: Appropriate affect, cooperative  Neurologic: Alert and oriented, facial movements symmetric and spontaneous movement of all 4 extremities grossly equal bilaterally, speech clear  Skin: No rashes    Pertinent  and/or Most Recent Results     LAB RESULTS:      Lab 09/30/24  0500 09/29/24  1636   WBC 9.62  --    HEMOGLOBIN 9.6* 10.2*   HEMATOCRIT 28.8* 30.0*   PLATELETS 446  --    MCV 89.2  --          Lab 09/30/24  0500 09/29/24  1637   SODIUM 131* 132*   POTASSIUM 4.5  --    CHLORIDE 102  --    CO2 20.0*  --    ANION GAP 9.0  --    BUN 10  --    CREATININE 0.81  --    EGFR 72.1  --    GLUCOSE 92  --    CALCIUM 8.4*  --                      Lab 09/29/24  1636   ABO TYPING O   RH TYPING Negative   ANTIBODY SCREEN Negative         Brief Urine Lab Results  (Last result in the past 365 days)        Color   Clarity   Blood   Leuk Est   Nitrite   Protein   CREAT   Urine HCG        09/29/24 0009 Yellow   Clear   Negative   Negative   Negative   Negative                 Microbiology Results (last 10 days)       Procedure Component Value - Date/Time    Wound Culture - Wound, Rectal Abscess [[id number]]  (Abnormal) Collected: 09/29/24 0956    Lab Status: Final result Specimen: Wound from Rectal Abscess Updated: 10/01/24 0712     Wound Culture Light growth (2+) Mixed Gram Negative Denise     Gram Stain No WBCs or organisms seen    MRSA Screen, PCR (Inpatient) - Swab, Nares [[id number]]  (Normal) Collected: 09/29/24 0614    Lab Status: Final result Specimen: Swab from Nares Updated: 09/29/24 0752     MRSA PCR Negative    Narrative:      The negative predictive value of this diagnostic test is high and should only be used to consider de-escalating anti-MRSA therapy. A positive result may indicate colonization with MRSA and must be correlated clinically.  MRSA Negative    Blood Culture - Blood, Hand, Right  [[id number]]  (Normal) Collected: 09/29/24 0033    Lab Status: Final result Specimen: Blood from Hand, Right Updated: 10/04/24 0730     Blood Culture No growth at 5 days    Narrative:      Less than seven (7) mL's of blood was collected.  Insufficient quantity may yield false negative results.    Blood Culture - Blood, Arm, Right [[id number]]  (Normal) Collected: 09/29/24 0009    Lab Status: Final result Specimen: Blood from Arm, Right Updated: 10/04/24 0730     Blood Culture No growth at 5 days    Narrative:      Less than seven (7) mL's of blood was collected.  Insufficient quantity may yield false negative results.            XR Chest 1 View    Result Date: 9/29/2024  Examination: XR CHEST 1 VW-  Date of Exam: 9/29/2024 12:23 AM  Indication: pre op.  Comparison: 3/25/2021.  Technique: Single radiographic view of the chest was obtained.  Findings: There is no pneumothorax, pleural effusion or focal airspace consolidation. Cardiomediastinal silhouette is unremarkable. Pulmonary vasculature appears within normal limits. Regional bones appear grossly intact.      No acute cardiopulmonary abnormality.  This report was finalized on 9/29/2024 1:04 AM by Juvenal Hurtado MD.      CT Abdomen Pelvis With Contrast    Result Date: 9/28/2024  CT ABDOMEN PELVIS W CONTRAST Date of Exam: 9/28/2024 10:01 PM EDT Indication: abd pain and constipation. Comparison: None available. Technique: Axial CT images were obtained of the abdomen and pelvis following the uneventful intravenous administration of 80 mL Isovue-300. Reconstructed coronal and sagittal images were also obtained. Automated exposure control and iterative construction methods were used. Findings: Heart size is normal. There is a small hiatal hernia. Lung bases are clear. No acute findings in the superficial soft tissues. There is mild S-shaped scoliosis of the lumbar spine. There is focal kyphosis at the thoracolumbar junction. There is severe lumbar spondylosis and  moderate lower thoracic spondylosis. The liver is homogeneous. The gallbladder extends along the lateral dorsal surface of the anterior liver. There is a small area of wall thickening at the gallbladder fundus that may reflect adenomyomatosis. The bile ducts, pancreas, remainder of the stomach, duodenum and spleen appear within normal limits. There is cortical scarring at the superior pole of the left kidney. The right kidney is normal. There is no hydronephrosis. Urinary bladder, uterus and ovaries appear normal. The appendix is not seen. The colon is unremarkable. There is a complex lobulated rim-enhancing fluid and gas collection extending greater than 180 degrees around the distal rectum and extending posteriorly into the subcutaneous tissues left of midline at the midline gluteal cleft. The collection measures up to 6.4 x 3.8 cm and is concerning for perirectal/perianal abscess. There is mild aortoiliac atherosclerosis. No aneurysm. No ascites, pneumoperitoneum or lymphadenopathy.     Impression: 1.6.4 x 3.8 cm rim-enhancing fluid and gas collection extending greater than 180 degrees around the distal rectum and extending posteriorly into the subcutaneous tissues left of midline at the midline gluteal cleft. This is concerning for perirectal/perianal abscess. 2.Small hiatal hernia. 3.Thoracolumbar degenerative changes. Electronically Signed: Juvenal Hurtado MD  9/28/2024 11:27 PM EDT  Workstation ID: [id number]     Results for orders placed during the hospital encounter of 06/06/24    Duplex Venous Lower Extremity - Left CAR    Interpretation Summary    Normal left lower extremity venous duplex scan.      Results for orders placed during the hospital encounter of 06/06/24    Duplex Venous Lower Extremity - Left CAR    Interpretation Summary    Normal left lower extremity venous duplex scan.      Results for orders placed during the hospital encounter of 12/29/22    Adult Transthoracic Echo Complete w/ Color,  Spectral and Contrast if necessary per protocol    Interpretation Summary    Left ventricular systolic function is normal. Calculated left ventricular EF = 58.5% Left ventricular ejection fraction appears to be 61 - 65%.    Left ventricular diastolic function was normal.    Estimated right ventricular systolic pressure from tricuspid regurgitation is mildly elevated (35-45 mmHg).      Plan for Follow-up of Pending Labs/Results:     Discharge Details        Discharge Medications        Continue These Medications        Instructions Start Date   Docusate Sodium 100 MG capsule   take 1 tablet by oral route twice daily      lactulose 10 GM/15ML solution  Commonly known as: CHRONULAC   20 g, Oral, 2 Times Daily PRN      latanoprost 0.005 % ophthalmic solution  Commonly known as: XALATAN   1 drop, Both Eyes, Nightly      levothyroxine 112 MCG tablet  Commonly known as: SYNTHROID, LEVOTHROID   112 mcg, Oral, Daily      losartan 100 MG tablet  Commonly known as: COZAAR   100 mg, Oral, Daily      metoprolol succinate XL 50 MG 24 hr tablet  Commonly known as: TOPROL-XL   50 mg, Oral, Daily      omeprazole 40 MG capsule  Commonly known as: priLOSEC   TAKE 1 CAPSULE DAILY      OSTEO BI-FLEX REGULAR STRENGTH PO   1 daily      raloxifene 60 MG tablet  Commonly known as: EVISTA   60 mg, Oral, Daily             ASK your doctor about these medications        Instructions Start Date   amoxicillin-clavulanate 875-125 MG per tablet  Commonly known as: AUGMENTIN  Ask about: Should I take this medication?   1 tablet, Oral, 2 Times Daily               No Known Allergies      Discharge Disposition:  Home or Self Care    Diet:  Hospital:No active diet order           Activity:      Restrictions or Other Recommendations:         CODE STATUS:    Code Status and Medical Interventions: CPR (Attempt to Resuscitate); Full Support   Ordered at: 09/29/24 0014     Code Status (Patient has no pulse and is not breathing):    CPR (Attempt to  Resuscitate)     Medical Interventions (Patient has pulse or is breathing):    Full Support       Future Appointments   Date Time Provider Department Center   10/7/2024  2:15 PM Bernadette Somers MD [facility]   11/25/2024  1:45 PM Bernadette Somers MD [facility]   2/27/2025  1:20 PM Yaneth Joiner PA-C [facility]       Additional Instructions for the Follow-ups that You Need to Schedule       Ambulatory Referral to Physical Therapy for Evaluation & Treatment   As directed      Specialty needed: Evaluate and treat   Exercises: Stretching ROM Strengthening   Gait Training: Right Left   Weight Bearing Status: Full weight bearing   Follow-up needed: Yes        Discharge Follow-up with PCP   As directed       Currently Documented PCP:    Bernadette Somers MD    PCP Phone Number:    [phone]     Follow Up Details: 1 week f/u from hospital - BMP check to f/u hyponatremia        Discharge Follow-up with Specialty: Dr. Patrick Walters; 6 Weeks   As directed      Specialty: Dr. Patrick Walters   Follow Up: 6 Weeks                      Lisa Singleton MD  10/06/24      Time Spent on Discharge:  I spent  25  minutes on this discharge activity which included: face-to-face encounter with the patient, reviewing the data in the system, coordination of the care with the nursing staff as well as consultants, documentation, and entering orders.

## 2024-10-07 ENCOUNTER — OFFICE VISIT (OUTPATIENT)
Dept: INTERNAL MEDICINE | Facility: CLINIC | Age: 84
End: 2024-10-07
Payer: MEDICARE

## 2024-10-07 VITALS
SYSTOLIC BLOOD PRESSURE: 120 MMHG | DIASTOLIC BLOOD PRESSURE: 82 MMHG | OXYGEN SATURATION: 98 % | TEMPERATURE: 98 F | BODY MASS INDEX: 31.76 KG/M2 | HEIGHT: 64 IN | WEIGHT: 186 LBS | HEART RATE: 58 BPM

## 2024-10-07 DIAGNOSIS — R79.9 ABNORMAL FINDING OF BLOOD CHEMISTRY, UNSPECIFIED: ICD-10-CM

## 2024-10-07 DIAGNOSIS — K61.1 PERIRECTAL ABSCESS: Primary | ICD-10-CM

## 2024-10-07 DIAGNOSIS — E03.9 HYPOTHYROIDISM (ACQUIRED): ICD-10-CM

## 2024-10-07 DIAGNOSIS — I10 BENIGN ESSENTIAL HYPERTENSION: ICD-10-CM

## 2024-10-07 DIAGNOSIS — R63.0 POOR APPETITE: ICD-10-CM

## 2024-10-07 DIAGNOSIS — K59.01 SLOW TRANSIT CONSTIPATION: Chronic | ICD-10-CM

## 2024-10-07 DIAGNOSIS — E87.1 HYPONATREMIA: ICD-10-CM

## 2024-10-07 DIAGNOSIS — R00.2 PALPITATIONS: ICD-10-CM

## 2024-10-07 DIAGNOSIS — E78.5 HYPERLIPIDEMIA LDL GOAL <70: ICD-10-CM

## 2024-10-07 DIAGNOSIS — D05.12 DUCTAL CARCINOMA IN SITU (DCIS) OF LEFT BREAST: ICD-10-CM

## 2024-10-07 PROCEDURE — 1111F DSCHRG MED/CURRENT MED MERGE: CPT | Performed by: INTERNAL MEDICINE

## 2024-10-07 PROCEDURE — 1160F RVW MEDS BY RX/DR IN RCRD: CPT | Performed by: INTERNAL MEDICINE

## 2024-10-07 PROCEDURE — 1125F AMNT PAIN NOTED PAIN PRSNT: CPT | Performed by: INTERNAL MEDICINE

## 2024-10-07 PROCEDURE — 3074F SYST BP LT 130 MM HG: CPT | Performed by: INTERNAL MEDICINE

## 2024-10-07 PROCEDURE — 3079F DIAST BP 80-89 MM HG: CPT | Performed by: INTERNAL MEDICINE

## 2024-10-07 PROCEDURE — 1159F MED LIST DOCD IN RCRD: CPT | Performed by: INTERNAL MEDICINE

## 2024-10-07 PROCEDURE — 99495 TRANSJ CARE MGMT MOD F2F 14D: CPT | Performed by: INTERNAL MEDICINE

## 2024-10-07 RX ORDER — METOPROLOL SUCCINATE 50 MG/1
50 TABLET, EXTENDED RELEASE ORAL DAILY
Qty: 90 TABLET | Refills: 3 | Status: SHIPPED | OUTPATIENT
Start: 2024-10-07

## 2024-10-07 NOTE — PATIENT INSTRUCTIONS
Patient Instructions  Problem List Items Addressed This Visit          Cardiac and Vasculature    Benign essential hypertension    Overview     Taking losartan 100mg and metoprolol  XL 50mg  daily.               Relevant Medications    losartan (COZAAR) 100 MG tablet    metoprolol succinate XL (TOPROL-XL) 50 MG 24 hr tablet    Palpitations    Overview     14-day heart monitor 9/4/2019: Wear time 12 days.  Average heart rate 82 bpm.  PACs less than 1% brief SVT longest 8 beats.         Relevant Medications    metoprolol succinate XL (TOPROL-XL) 50 MG 24 hr tablet       Gastrointestinal Abdominal     Slow transit constipation (Chronic)    Overview     Taking probiotic and stool softeners daily.         Perirectal abscess - Primary       Genitourinary and Reproductive     Hyponatremia       Hematology and Neoplasia    Ductal carcinoma in situ (DCIS) of left breast       Symptoms and Signs    Poor appetite       BMI for Adults  Body mass index (BMI) is a number found using a person's weight and height. BMI can help tell how much of a person's weight is made up of fat. BMI does not measure body fat directly. It is used instead of tests that directly measure body fat, which can be difficult and expensive.  What are BMI measurements used for?  BMI is useful to:  Find out if your weight puts you at higher risk for medical problems.  Help recommend changes, such as in diet and exercise. This can help you reach a healthy weight. BMI screening can be done again to see if these changes are working.  How is BMI calculated?  Your height and weight are measured. The BMI is found from those numbers. This can be done with [place] or metric measurements. Note that charts and online BMI calculators are available to help you find your BMI quickly and easily without doing these calculations.  To calculate your BMI in [place] measurements:  Measure your weight in pounds (lb).  Multiply the number of pounds by 703.  So, for an adult who weighs  "150 lb, multiply that number by 703: 150 x 703, which equals 105,450.  Measure your height in inches. Then multiply that number by itself to get a measurement called \"inches squared.\"  So, for an adult who is 70 inches tall, the \"inches squared\" measurement is 70 inches x 70 inches, which equals 4,900 inches squared.  Divide the total from step 2 (number of lb x 703) by the total from step 3 (inches squared): 105,450 ÷ 4,900 = 21.5. This is your BMI.  To calculate your BMI in metric measurements:    Measure your weight in kilograms (kg).  For this example, the weight is 70 kg.  Measure your height in meters (m). Then multiply that number by itself to get a measurement called \"meters squared.\"  So, for an adult who is 1.75 m tall, the \"meters squared\" measurement is 1.75 m x 1.75 m, which equals 3.1 meters squared.  Divide the number of kilograms (your weight) by the meters squared number. In this example: 70 ÷ 3.1 = 22.6. This is your BMI.  What do the results mean?  BMI charts are used to see if you are underweight, normal weight, overweight, or obese. The following guidelines will be used:  Underweight: BMI less than 18.5.  Normal weight: BMI between 18.5 and 24.9.  Overweight: BMI between 25 and 29.9.  Obese: BMI of 30 or above.  BMI is a tool and cannot diagnose a condition. Talk with your health care provider about what your BMI means for you. Keep these notes in mind:  Weight includes fat and muscle. Someone with a muscular build, such as an athlete, may have a BMI that is higher than 24.9. In cases like these, BMI is not a correct measure of body fat.  If you have a BMI of 25 or higher, your provider may need to do more testing to find out if excess body fat is the cause.  BMI is measured the same way for males and females. Females usually have more body fat than males of the same height and weight.  Where to find more information  For more information about BMI, including tools to quickly find your BMI, go " to:  Centers for Disease Control and Prevention: cdc.gov  American Heart Association: heart.org  National Heart, Lung, and Blood [place]: nhlbi.nih.gov  This information is not intended to replace advice given to you by your health care provider. Make sure you discuss any questions you have with your health care provider.  Document Revised: 09/07/2023 Document Reviewed: 08/31/2023  [employer] Patient Education © 2024 [employer]  Heart-Healthy Eating Plan  Many factors influence your heart (coronary) health, including eating and exercise habits. Coronary risk increases with abnormal blood fat (lipid) levels. Heart-healthy meal planning includes limiting unhealthy fats, increasing healthy fats, and making other diet and lifestyle changes.  What is my plan?  Your health care provider may recommend that you:  Limit your fat intake to _________% or less of your total calories each day.  Limit your saturated fat intake to _________% or less of your total calories each day.  Limit the amount of cholesterol in your diet to less than _________ mg per day.  What are tips for following this plan?  Cooking  Cook foods using methods other than frying. Baking, boiling, grilling, and broiling are all good options. Other ways to reduce fat include:  Removing the skin from poultry.  Removing all visible fats from meats.  Steaming vegetables in water or broth.  Meal planning  A plate with examples of foods in a healthy diet.      At meals, imagine dividing your plate into fourths:  Fill one-half of your plate with vegetables and green salads.  Fill one-fourth of your plate with whole grains.  Fill one-fourth of your plate with lean protein foods.  Eat 4-5 servings of vegetables per day. One serving equals 1 cup raw or cooked vegetable, or 2 cups raw leafy greens.  Eat 4-5 servings of fruit per day. One serving equals 1 medium whole fruit, ¼ cup dried fruit, ½ cup fresh, frozen, or canned fruit, or ½ cup 100% fruit juice.  Eat more  foods that contain soluble fiber. Examples include apples, broccoli, carrots, beans, peas, and barley. Aim to get 25-30 g of fiber per day.  Increase your consumption of legumes, nuts, and seeds to 4-5 servings per week. One serving of dried beans or legumes equals ½ cup cooked, 1 serving of nuts is ¼ cup, and 1 serving of seeds equals 1 tablespoon.  Fats  Choose healthy fats more often. Choose monounsaturated and polyunsaturated fats, such as olive and canola oils, flaxseeds, walnuts, almonds, and seeds.  Eat more omega-3 fats. Choose salmon, mackerel, sardines, tuna, flaxseed oil, and ground flaxseeds. Aim to eat fish at least 2 times each week.  Check food labels carefully to identify foods with trans fats or high amounts of saturated fat.  Limit saturated fats. These are found in animal products, such as meats, butter, and cream. Plant sources of saturated fats include palm oil, palm kernel oil, and coconut oil.  Avoid foods with partially hydrogenated oils in them. These contain trans fats. Examples are stick margarine, some tub margarines, cookies, crackers, and other baked goods.  Avoid fried foods.  General information  Eat more home-cooked food and less restaurant, buffet, and fast food.  Limit or avoid alcohol.  Limit foods that are high in starch and sugar.  Lose weight if you are overweight. Losing just 5-10% of your body weight can help your overall health and prevent diseases such as diabetes and heart disease.  Monitor your salt (sodium) intake, especially if you have high blood pressure. Talk with your health care provider about your sodium intake.  Try to incorporate more vegetarian meals weekly.  What foods can I eat?  Fruits  All fresh, canned (in natural juice), or frozen fruits.  Vegetables  Fresh or frozen vegetables (raw, steamed, roasted, or grilled). Green salads.  Grains  Most grains. Choose whole wheat and whole grains most of the time. Rice and pasta, including brown rice and pastas made  with whole wheat.  Meats and other proteins  Lean, well-trimmed beef, veal, pork, and lamb. Chicken and turkey without skin. All fish and shellfish. Wild duck, rabbit, pheasant, and venison. Egg whites or low-cholesterol egg substitutes. Dried beans, peas, lentils, and tofu. Seeds and most nuts.  Dairy  Low-fat or nonfat cheeses, including ricotta and mozzarella. Skim or 1% milk (liquid, powdered, or evaporated). Buttermilk made with low-fat milk. Nonfat or low-fat yogurt.  Fats and oils  Non-hydrogenated (trans-free) margarines. Vegetable oils, including soybean, sesame, sunflower, olive, peanut, safflower, corn, canola, and cottonseed. Salad dressings or mayonnaise made with a vegetable oil.  Beverages  Water (mineral or sparkling). Coffee and tea. Diet carbonated beverages.  Sweets and desserts  Sherbet, gelatin, and fruit ice. Small amounts of dark chocolate.  Limit all sweets and desserts.  Seasonings and condiments  All seasonings and condiments.  The items listed above may not be a complete list of foods and beverages you can eat. Contact a dietitian for more options.  What foods are not recommended?  Fruits  Canned fruit in heavy syrup. Fruit in cream or butter sauce. Fried fruit. Limit coconut.  Vegetables  Vegetables cooked in cheese, cream, or butter sauce. Fried vegetables.  Grains  Breads made with saturated or trans fats, oils, or whole milk. Croissants. Sweet rolls. Donuts. High-fat crackers, such as cheese crackers.  Meats and other proteins  Fatty meats, such as hot dogs, ribs, sausage, finney, rib-eye roast or steak. High-fat deli meats, such as salami and bologna. Caviar. Domestic duck and goose. Organ meats, such as liver.  Dairy  Cream, sour cream, cream cheese, and creamed cottage cheese. Whole-milk cheeses. Whole or 2% milk (liquid, evaporated, or condensed). Whole buttermilk. Cream sauce or high-fat cheese sauce. Whole-milk yogurt.  Fats and oils  Meat fat, or shortening. Cocoa butter,  hydrogenated oils, palm oil, coconut oil, palm kernel oil. Solid fats and shortenings, including finney fat, salt pork, lard, and butter. Nondairy cream substitutes. Salad dressings with cheese or sour cream.  Beverages  Regular sodas and any drinks with added sugar.  Sweets and desserts  Frosting. Pudding. Cookies. Cakes. Pies. Milk chocolate or white chocolate. Buttered syrups. Full-fat ice cream or ice cream drinks.  The items listed above may not be a complete list of foods and beverages to avoid. Contact a dietitian for more information.  Summary  Heart-healthy meal planning includes limiting unhealthy fats, increasing healthy fats, and making other diet and lifestyle changes.  Lose weight if you are overweight. Losing just 5-10% of your body weight can help your overall health and prevent diseases such as diabetes and heart disease.  Focus on eating a balance of foods, including fruits and vegetables, low-fat or nonfat dairy, lean protein, nuts and legumes, whole grains, and heart-healthy oils and fats.  This information is not intended to replace advice given to you by your health care provider. Make sure you discuss any questions you have with your health care provider.  Document Revised: 04/28/2022 Document Reviewed: 04/28/2022  [employer] Patient Education © 2022 [employer]

## 2024-10-07 NOTE — PROGRESS NOTES
Transitional Care Follow Up Visit  Subjective     Serenity Lagos is a 84 y.o. female who presents for a transitional care management visit.    Within 48 business hours after discharge our office contacted her via telephone to coordinate her care and needs.      I reviewed and discussed the details of that call along with the discharge summary, hospital problems, inpatient lab results, inpatient diagnostic studies, and consultation reports with Serenity.     Current outpatient and discharge medications have been reconciled for the patient.  Reviewed by: Bernadette Somers MD          9/30/2024     4:50 PM   Date of TCM Phone Call   [facility]   Date of Admission 9/28/2024   Date of Discharge 9/30/2024   Discharge Disposition Home or Self Care     Risk for Readmission (LACE) Score: 5 (9/30/2024  6:00 AM)      History of Present Illness   Course During Hospital Stay: Patient was admitted on 9/28 with perirectal abscess.  It was drained by Dr. Walters and she was discharged home on 9/30/2024.  She was noted to have anemia on lab draws.  Serum sodium was mildly low at 127 but improved to 131 on discharge.    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    She reports a persistent issue with her rectum, causing discomfort. A recent CAT scan revealed an abscess on the left side of her rectum. She describes her stools as soft but infrequent and has had bowel movements only three times in a week since returning home. She has not been taking any medication for constipation due to previous unsuccessful attempts, including a two-day trial of lactulose. She does not have MiraLAX at  "home. Her appetite is low, and she has been consuming oatmeal, which she believes has helped slightly. She is concerned about her ability to maintain cleanliness due to constipation, which she believes is causing blood in her stool. She has been maintaining hygiene by washing the area.    She completed a five-day course of amoxicillin after her hospital discharge and suspects it may be causing her to feel gassy. She reports no heartburn or indigestion. She also experiences mild pain and irritation in the rectal area and has difficulty sleeping through the night due to frequent bathroom visits.    She mentions that she was not informed about the need to keep a pad on her back or whether she could shower after her hospital discharge. Despite attempts to contact the hospital for further instructions, she has not received any response. She has a scheduled follow-up appointment with Dr. Walters on 11/13/2024.    She has an upcoming appointment with Dr. Palomares, a breast surgeon, in two days and has been advised to consult an oncologist as well.    She reports no shortness of breath, cough, or dizziness.      The following portions of the patient's history were reviewed and updated as appropriate: allergies, current medications, past family history, past medical history, past social history, past surgical history, and problem list.    Vitals:    10/07/24 1413   BP: 120/82   BP Location: Left arm   Patient Position: Sitting   Cuff Size: Adult   Pulse: 58   Temp: 98 °F (36.7 °C)   TempSrc: Infrared   SpO2: 98%   Weight: 84.4 kg (186 lb)   Height: 162.6 cm (64.02\")   PainSc:   5     Body mass index is 31.91 kg/m².       Review of Systems    Objective   Physical Exam  Vitals and nursing note reviewed.   Constitutional:       Appearance: She is well-developed. She is obese.   HENT:      Head: Normocephalic.   Eyes:      Conjunctiva/sclera: Conjunctivae normal.      Pupils: Pupils are equal, round, and reactive to light.   Neck:      " Thyroid: No thyromegaly.   Cardiovascular:      Rate and Rhythm: Normal rate and regular rhythm.      Heart sounds: Normal heart sounds.   Pulmonary:      Effort: Pulmonary effort is normal.      Breath sounds: Normal breath sounds. No wheezing.   Genitourinary:         Comments: Wound is 6mm linear.  No erythema.  No drainage.  It is healing well  Musculoskeletal:         General: Normal range of motion.      Cervical back: Normal range of motion and neck supple.   Lymphadenopathy:      Cervical: No cervical adenopathy.   Skin:     General: Skin is warm and dry.   Neurological:      Mental Status: She is alert and oriented to person, place, and time.   Psychiatric:         Attention and Perception: Attention normal.         Mood and Affect: Mood normal.         Thought Content: Thought content normal.         Assessment & Plan     Patient Instructions   Problem List Items Addressed This Visit          Cardiac and Vasculature    Benign essential hypertension    Overview     Taking losartan 100mg and metoprolol  XL 50mg  daily.               Relevant Medications    losartan (COZAAR) 100 MG tablet    metoprolol succinate XL (TOPROL-XL) 50 MG 24 hr tablet    Palpitations    Overview     14-day heart monitor 9/4/2019: Wear time 12 days.  Average heart rate 82 bpm.  PACs less than 1% brief SVT longest 8 beats.         Relevant Medications    metoprolol succinate XL (TOPROL-XL) 50 MG 24 hr tablet       Gastrointestinal Abdominal     Slow transit constipation (Chronic)    Overview     Taking probiotic and stool softeners daily.         Perirectal abscess - Primary       Genitourinary and Reproductive     Hyponatremia       Hematology and Neoplasia    Ductal carcinoma in situ (DCIS) of left breast       Symptoms and Signs    Poor appetite          Diagnosis Plan   1. Perirectal abscess        2. Slow transit constipation        3. Poor appetite        4. Hyponatremia        5. Ductal carcinoma in situ (DCIS) of left breast         6. Benign essential hypertension  metoprolol succinate XL (TOPROL-XL) 50 MG 24 hr tablet      7. Palpitations  metoprolol succinate XL (TOPROL-XL) 50 MG 24 hr tablet        Assessment & Plan  1. Perirectal abscess.  The abscess is showing signs of improvement. The dressing, which had inadvertently moved higher than the wound, was removed. No erythema or drainage was observed today. She was advised to gently cleanse the area during showers and maintain its cleanliness. To prevent constipation, she was encouraged to use the lactulose prescribed by Dr. Sahni, initially three times a day until bowel movements normalize, then reducing to twice daily. Hydration was emphasized.    2. Constipation.  She was advised to use lactulose three times a day until bowel movements normalize, then reduce to twice daily. Hydration was emphasized.    3. Poor appetite.  She was encouraged to consume three balanced meals daily, each including a source of protein. A handout on dietary protein was provided and attached to her portal. Adequate fluid intake was also recommended.    4. Hyponatremia.  Her serum sodium levels will be rechecked prior to her next appointment in November 2024.    5. Ductal carcinoma in situ of left breast.  An appointment with Dr. Palomares has been scheduled for her in 2 days.    6. Benign essential hypertension.  She will maintain her current regimen of losartan and metoprolol.    Follow-up  She will return for her annual wellness visit on November 25, 2024.        Patient or patient representative verbalized consent for the use of Ambient Listening during the visit with  Bernadette Somers MD for chart documentation. 10/7/2024  19:05 EDT

## 2024-10-08 ENCOUNTER — TRANSCRIBE ORDERS (OUTPATIENT)
Dept: PHYSICAL THERAPY | Facility: HOSPITAL | Age: 84
End: 2024-10-08
Payer: MEDICARE

## 2024-10-08 DIAGNOSIS — C50.912 MALIGNANT NEOPLASM OF LEFT FEMALE BREAST, UNSPECIFIED ESTROGEN RECEPTOR STATUS, UNSPECIFIED SITE OF BREAST: Primary | ICD-10-CM

## 2024-10-09 ENCOUNTER — HOSPITAL ENCOUNTER (OUTPATIENT)
Dept: PHYSICAL THERAPY | Facility: HOSPITAL | Age: 84
Discharge: HOME | End: 2024-10-09

## 2024-10-09 ENCOUNTER — PATIENT OUTREACH (OUTPATIENT)
Dept: ONCOLOGY | Facility: CLINIC | Age: 84
End: 2024-10-09
Payer: MEDICARE

## 2024-10-09 DIAGNOSIS — C50.912 MALIGNANT NEOPLASM OF LEFT FEMALE BREAST, UNSPECIFIED ESTROGEN RECEPTOR STATUS, UNSPECIFIED SITE OF BREAST: Primary | ICD-10-CM

## 2024-10-10 DIAGNOSIS — Z17.0 MALIGNANT NEOPLASM OF LEFT BREAST IN FEMALE, ESTROGEN RECEPTOR POSITIVE, UNSPECIFIED SITE OF BREAST: Primary | ICD-10-CM

## 2024-10-10 DIAGNOSIS — C50.912 MALIGNANT NEOPLASM OF LEFT BREAST IN FEMALE, ESTROGEN RECEPTOR POSITIVE, UNSPECIFIED SITE OF BREAST: Primary | ICD-10-CM

## 2024-10-10 NOTE — THERAPY DISCHARGE NOTE
Outpatient Physical Therapy Lymphedema Initial Evaluation & Discharge  [facility]     Patient Name: Serenity Lagos  : 1940  MRN: [id number]  Today's Date: 10/10/2024      Visit Date: 10/09/2024    Visit Dx:    ICD-10-CM ICD-9-CM   1. Malignant neoplasm of left female breast, unspecified estrogen receptor status, unspecified site of breast  C50.912 174.9       Patient Active Problem List   Diagnosis    Osteopenia after menopause    Chronic pruritus    Hypothyroidism (acquired)    Benign essential hypertension    Pedal edema    Hyperlipidemia LDL goal <70    Palpitations    Class 1 obesity due to excess calories with body mass index (BMI) of 33.0 to 33.9 in adult    Coronary artery calcification of native artery    Bilateral ocular hypertension    Annual physical exam    Medicare annual wellness visit, subsequent    At risk for falls    Perennial allergic rhinitis with seasonal variation    Slow transit constipation    Left foot pain    Fall    Left leg swelling    Perirectal abscess    Breast calcification, left    GERD without esophagitis    Hyponatremia    Alcohol use    Seizures    Ductal carcinoma in situ (DCIS) of left breast    Poor appetite        Past Medical History:   Diagnosis Date    Cervicalgia     Chronic bronchitis     Coronary artery disease     Dysphagia     Glaucoma, open angle     Hearing loss     Hypertension     Hyperthyroidism     thyroid gland killed off with MD treatment    Hypothyroidism     regulated by Levothyroxine    Low back pain     Medicare annual wellness visit, subsequent 12/15/2020    Mixed hyperlipidemia     Osteoarthritis     cervical spine degenerative    Osteopenia     Perforation of tympanic membrane     Pruritus     chronic general pruritis    Salivary gland cancer     Scoliosis     discovered in x-rays by Orthopedist    Shingles     postherpetic neuralgia-thorax    Spondylolisthesis      lumbar spine    Wrist tendonitis         Past Surgical History:    Procedure Laterality Date    APPENDECTOMY      BREAST BIOPSY      BREAST BIOPSY Right 07/29/2019    BREAST CYST EXCISION      ENDOSCOPY  2017    normal (per pt) EGD    MOUTH SURGERY  2000    TONSILLECTOMY              Lymphedema       Row Name 10/09/24 1710             Subjective Pain    Able to rate subjective pain? yes  -CA      Pre-Treatment Pain Level 0  -CA      Post-Treatment Pain Level 0  -CA         Subjective    Subjective Comments Pt has been dx with L sided breast CA. Her currently plan is to undergo a L lumpectomy without SLNB however she has to undergo additional MRI before her surgery will be scheduled. She presents with her family  -CA         Lymphedema Assessment    Lymphedema Classification LUE:;at risk/stage 0  -CA      Lymphedema Surgery Comments surgery is TBD  -CA         Posture/Observations    Alignment Options Forward head;Thoracic kyphosis;Rounded shoulders;Lumbar lordosis  -CA      Forward Head Moderate  -CA      Thoracic Kyphosis Moderate;Increased  -CA      Rounded Shoulders Moderate  -CA      Lumbar lordosis Moderate;Decreased  -CA         General ROM    RT Upper Ext Rt Shoulder ABduction;Rt Shoulder Flexion;Rt Shoulder External Rotation;Rt Shoulder Internal Rotation  -CA      LT Upper Ext Lt Shoulder Flexion;Lt Shoulder External Rotation;Lt Shoulder Internal Rotation;Lt Shoulder ABduction  -CA      GENERAL ROM COMMENTS WFL wrist/hand  -CA         Right Upper Ext    Rt Shoulder Abduction AROM 120  -CA      Rt Shoulder Flexion AROM 150  -CA      Rt Shoulder External Rotation AROM WFL  -CA      Rt Shoulder Internal Rotation AROM WFL  -CA         Left Upper Ext    Lt Shoulder Abduction AROM 100  -CA      Lt Shoulder Flexion AROM 140  -CA      Lt Shoulder External Rotation AROM WFL  -CA      Lt Shoulder Internal Rotation AROM WFL  -CA         MMT (Manual Muscle Testing)    Rt Upper Ext Rt Shoulder Flexion;Rt Shoulder ABduction;Rt Shoulder Internal Rotation;Rt Shoulder External Rotation   -CA      Lt Upper Ext Lt Shoulder Flexion;Lt Shoulder ABduction;Lt Shoulder Internal Rotation;Lt Shoulder External Rotation  -CA         MMT Right Upper Ext    Rt Shoulder Flexion MMT, Gross Movement (4-/5) good minus  -CA      Rt Shoulder ABduction MMT, Gross Movement (4-/5) good minus  -CA      Rt Shoulder Internal Rotation MMT, Gross Movement (4-/5) good minus  -CA      Rt Shoulder External Rotation MMT, Gross Movement (4-/5) good minus  -CA         MMT Left Upper Ext    Lt Shoulder Flexion MMT, Gross Movement (4-/5) good minus  -CA      Lt Shoulder ABduction MMT, Gross Movement (4-/5) good minus  -CA      Lt Shoulder Internal Rotation MMT, Gross Movement (4-/5) good minus  -CA      Lt Shoulder External Rotation MMT, Gross Movement (4-/5) good minus  -CA         Hand  Strength     Strength Affected Side Bilateral  -CA          Strength Right    Right  Test 1 22  -CA       Strength Average Right 22  -CA          Strength Left    Left  Test 1 22  -CA       Strength Average Left 22  -CA         Lymphedema Edema Assessment    Edema Assessment Comment No edema present at this time.  -CA         Skin Changes/Observations    Location/Assessment Upper Extremity  -CA      Upper Extremity Conditions bilateral:;normal;intact  -CA      Upper Extremity Color/Pigment bilateral:;normal  -CA         Lymphedema Sensation    Lymphedema Sensation Reports RUE:;LUE:  -CA      Lymphedema Sensation Tests light touch  -CA      Lymphedema Light Touch RUE:;LUE:;WNL  -CA         L-Dex Bioimpedence Screening    L-Dex Measurement Extremity LUE  -CA      L-Dex Patient Position Standing  -CA      L-Dex UE Dominate Side Right  -CA      L-Dex UE At Risk Side Left  -CA      L-Dex UE Pre Surgical Value Yes  -CA      L-Dex UE Score 6  -CA      L-Dex UE Comment The patient had SOZO measurement which I reviewed today. The score is in normal limits, see scanned to EMR. Bioimpedance spectroscopy helps identify the onset  of lymphedema in an arm or leg before patients experience noticeable swelling. Research has shown that the early detection of lymphedema using L-Dex combined with treatment can reduce progression to chronic lymphedema by 95% in breast cancer patients. Whenever possible, patients are tested for baseline L-Dex score before cancer treatment begins and then are reassessed during regular follow-up visits using the SOZO device. Otherwise, this can be started postoperatively and continued during regular follow-up visits. If the patient’s L-Dex score increases above normal levels, that is a sign that lymphedema is developing and a referral is made to occupational or physical therapy for further evaluation and early compression treatment. Lymphedema assessment with the SOZO L-Dex score is recommended to be done every 3 months for the first 3 years and then every 6 months for years 4 and 5 followed by annually afterwards. Baseline bioimpedence taken as pt's surgery plan may change as might her radiation plan. If no additional lymphnodes removed, bioimpedence is not indicated in the future unless pt presents with symptoms.  -CA                User Key  (r) = Recorded By, (t) = Taken By, (c) = Cosigned By      Initials Name Provider Type    CA Cristy Garg, PT Physical Therapist                      Therapy Education  Education Details: Pt educated on prehab evaluation assessments including bioimpedance. Pt was provided an HEP detailing information including lymphedema, risk reduction, and post op exercise within her available ROM  Given: HEP, Symptoms/condition management, Posture/body mechanics  Program: New  How Provided: Verbal, Written, Demonstration  Provided to: Patient (and family)  Level of Understanding: Verbalized     OP Exercises       Row Name 10/09/24 1567             Subjective    Subjective Comments Pt has been dx with L sided breast CA. Her currently plan is to undergo a L lumpectomy without SLNB however  she has to undergo additional MRI before her surgery will be scheduled. She presents with her family  -CA         Subjective Pain    Able to rate subjective pain? yes  -CA      Pre-Treatment Pain Level 0  -CA      Post-Treatment Pain Level 0  -CA         Exercise 1    Exercise Name 1 Elbow flexion/extension  -CA      Additional Comments 3-4x/day, HEP level 1- post op day 1 to day 7  -CA         Exercise 2    Exercise Name 2 Fist/wrist circles  -CA      Reps 2 x10  -CA      Additional Comments 3-4x/day, HEP level 1- post op day 1 to day 7  -CA         Exercise 3    Exercise Name 3 Neck Flexion/extension/rotation/lateral flexion  -CA      Reps 3 x10  -CA      Additional Comments 3-4x/day, HEP level 1- post op day 1 to day 7  -CA         Exercise 4    Exercise Name 4 horizontal abduction with hands behind neck  -CA      Reps 4 x10  -CA      Time 4 5 seconds  -CA      Additional Comments 3-4x/day, HEP level 2- post op day 7 to day 14  -CA         Exercise 5    Exercise Name 5 lumbar trunk rotration  -CA      Reps 5 x10  -CA      Time 5 5 seconds  -CA      Additional Comments 3-4x/day, HEP level 2- post op day 7 to day 14  -CA         Exercise 6    Exercise Name 6 shoulder rolls back  -CA      Reps 6 x10  -CA      Time 6 5 seconds  -CA      Additional Comments 3-4x/day, HEP level 2- post op day 7 to day 14  -CA         Exercise 7    Exercise Name 7 horizontal abduction/adduction with elbows extended  -CA      Reps 7 x10  -CA      Time 7 5  -CA      Additional Comments 3-4x/day, HEP level 2- post op day 7 to day 14  -CA         Exercise 8    Exercise Name 8 wall walks for shoulder flexion  -CA      Reps 8 x10  -CA      Additional Comments 3-4x/day, HEP level 3- post op day 14 until motion is returned  -CA         Exercise 9    Exercise Name 9 IR with hands behind back  -CA      Reps 9 x10  -CA      Additional Comments 3-4x/day, HEP level 3- post op day 14 until motion is returned  -CA         Exercise 10    Exercise Name  10 Horizontal abduction with hands behind head  -CA      Reps 10 x10  -CA      Additional Comments 3-4x/day, HEP level 3- post op day 14 until motion is returned  -CA         Exercise 11    Exercise Name 11 PNF D1 shoulder flexion  -CA      Reps 11 x10  -CA      Additional Comments 3-4x/day, HEP level 3- post op day 14 until motion is returned  -CA         Exercise 12    Exercise Name 12 Trunk stretch with shoulder abduction  -CA      Reps 12 x10  -CA      Time 12 5 seconds  -CA      Additional Comments 3-4x/day, HEP level 3- post op day 14 until motion is returned  -CA                User Key  (r) = Recorded By, (t) = Taken By, (c) = Cosigned By      Initials Name Provider Type    Cristy Garza, PT Physical Therapist                       PT OP Goals       Row Name 10/09/24 1710          Long Term Goals    LTG Date to Achieve 10/10/24  -CA     LTG 1 Pt demonstrates awareness of post-operative movement restrictions and HEP to facilitate lymphatic regeneration and reduce the risk of seroma formation, axillary web syndrome, and lymphedema while ensuring shoulder joint mobility.  -CA     LTG 1 Progress Met  -CA     LTG 2 Pt demonstrates understanding of post-operative basic lymphedema precautions  -CA     LTG 2 Progress Met  -CA        Time Calculation    PT Goal Re-Cert Due Date 10/10/24  -CA               User Key  (r) = Recorded By, (t) = Taken By, (c) = Cosigned By      Initials Name Provider Type    Cristy Garza, PT Physical Therapist                     PT Assessment/Plan       Row Name 10/09/24 1710          PT Assessment    Assessment Comments This patient presents to PT pre-operatively for planned BrCA surgery scheduled in a couple weeks. Baseline ROM, postural, and bioimpedance measurements were taken today to be compared to measurements retaken 3-4 weeks post surgery. At that time, any reduced movement, decline in function, or postural issues will be addressed with skilled care and new goals  will be established.  Personal risk factors for lymphedema post-operatively for the L upper extremity and trunk quadrant were also assessed today and basic lymphedema precautions were discussed. A more detailed discussion regarding personal lymphedema risk factors can take place post-operatively once the number of lymph nodes removed and the plan for further medical care is known.  -CA     Please refer to paper survey for additional self-reported information Yes  -CA     Rehab Potential Good  -CA     Patient/caregiver participated in establishment of treatment plan and goals Yes  -CA     Patient would benefit from skilled therapy intervention Yes  -CA        PT Plan    PT Frequency One time visit  -CA     Planned CPT's? PT EVAL MOD COMPLELITY: 17637;PT BIS XTRACELL FLUID ANALYSIS: 87129  -CA     PT Plan Comments This patient may return to PT 3-4 weeks post operatively for re-evaluation measurements to be compared to measurements taken today, at her pre-operative evaluation. In addition, she can be examined for possible post-BrCA surgery sequelae such as axillary web syndrome, scar adhesions, edema, worsened posture, scapular winging, pain, and reduced ROM and function. At that time, a future plan and goals will be established and skilled care continued if indicated. Currently, she has been provided with information before BrCA surgery in order to facilitate recovery and reduce the risk of post-operative sequelae.  -CA               User Key  (r) = Recorded By, (t) = Taken By, (c) = Cosigned By      Initials Name Provider Type    Cristy Garza, PT Physical Therapist                     Outcome Measure Options: Quick DASH, Timed Up and Go (TUG)  Quick DASH  Open a tight or new jar.: Mild Difficulty  Do heavy household chores (e.g., wash walls, wash floors): No Difficulty  Carry a shopping bag or briefcase: No Difficulty  Wash your back: No Difficulty  Use a knife to cut food: No Difficulty  During the past  week, to what extent has your arm, shoulder, or hand problem interfered with your normal social activites with family, friends, neighbors or groups?: Not at all  During the past week, were you limited in your work or other regular daily activities as a result of your arm, shoulder or hand problem?: Not limited at all  Arm, Shoulder, or hand pain: None  Tingling (pins and needles) in your arm, shoulder, or hand: None  During the past week, how much difficulty have you had sleeping because of the pain in your arm, shoulder or hand?: No difficulty  Number of Questions Answered: 11  Quick DASH Score: 0  Timed Up and Go (TUG)  Timed Up and Go Comments: Declined. Uses walker for community access. Slow pace, kyphotic posture, reduced foot clearance [place].      Time Calculation:   Start Time: 1710  Stop Time: 1745  Time Calculation (min): 35 min  Untimed Charges  PT Eval/Re-eval Minutes: 35  Total Minutes  Untimed Charges Total Minutes: 35   Total Minutes: 35   Time calculation does not account for 10 minutes documentation time    Therapy Charges for Today       Code Description Service Date Service Provider Modifiers Qty    72439322516 HC PT EVAL MOD COMPLEXITY 3 10/10/2024 Cristy Garg, PT GP 1    81866153910 HC PT BIS XTRACELL FLUID ANALYSIS 10/10/2024 Cristy Garg, PT  1            PT G-Codes  Outcome Measure Options: Quick DASH, Timed Up and Go (TUG)  Quick DASH Score: 0            Cristy Garg PT  10/10/2024

## 2024-10-10 NOTE — PROGRESS NOTES
I saw patient with Dr ROMERO and patient's niece - Dr ROMERO reviewed the pathology from biopsy and surgical/treatment options- the patient with have breast MRI and if clear she will proceed with BCT - I took notes - reviewed them and gave them to patient- supportive materials and educational materials were reviewed and given.

## 2024-10-11 ENCOUNTER — TELEPHONE (OUTPATIENT)
Dept: CARDIOLOGY | Facility: CLINIC | Age: 84
End: 2024-10-11
Payer: MEDICARE

## 2024-10-11 NOTE — TELEPHONE ENCOUNTER
[facility] is requesting cardiac clearance for patient. Dr. Sasha Gonzalez is performing a mastectomy/lumpectomy. Please advise.

## 2024-10-14 RX ORDER — GENTAMICIN SULFATE 3 MG/ML
SOLUTION/ DROPS OPHTHALMIC
Qty: 5 ML | Refills: 0 | Status: SHIPPED | OUTPATIENT
Start: 2024-10-14

## 2024-10-14 NOTE — TELEPHONE ENCOUNTER
Called patient. Pt without any new cardiac symptoms or SOA. I let patient know she was good to go for surgery and I would fax a letter over to [facility]. Pt agreeable.

## 2024-10-15 ENCOUNTER — HOSPITAL ENCOUNTER (OUTPATIENT)
Dept: MRI IMAGING | Facility: HOSPITAL | Age: 84
Discharge: HOME | End: 2024-10-15
Admitting: SURGERY
Payer: MEDICARE

## 2024-10-15 DIAGNOSIS — D05.12 INTRADUCTAL CARCINOMA IN SITU OF LEFT BREAST: ICD-10-CM

## 2024-10-15 PROCEDURE — A9577 INJ MULTIHANCE: HCPCS | Performed by: SURGERY

## 2024-10-15 PROCEDURE — C8908 MRI W/O FOL W/CONT, BREAST,: HCPCS

## 2024-10-15 PROCEDURE — 0 GADOBENATE DIMEGLUMINE 529 MG/ML SOLUTION: Performed by: SURGERY

## 2024-10-15 PROCEDURE — C8937 CAD BREAST MRI: HCPCS

## 2024-10-15 RX ADMIN — GADOBENATE DIMEGLUMINE 17 ML: 529 INJECTION, SOLUTION INTRAVENOUS at 15:49

## 2024-10-16 ENCOUNTER — TELEPHONE (OUTPATIENT)
Dept: MRI IMAGING | Facility: HOSPITAL | Age: 84
End: 2024-10-16
Payer: MEDICARE

## 2024-10-16 NOTE — TELEPHONE ENCOUNTER
Patient was recommended from her MRI Breast for a Left 2nd look ultrasound. Scheduled for 10/17/2024 at 8:00 with 7:45 arrival at [address]. No BT. Encouraged to call with any further questions.

## 2024-10-17 ENCOUNTER — HOSPITAL ENCOUNTER (OUTPATIENT)
Dept: ULTRASOUND IMAGING | Facility: HOSPITAL | Age: 84
Discharge: HOME | End: 2024-10-17
Admitting: RADIOLOGY
Payer: MEDICARE

## 2024-10-17 DIAGNOSIS — R92.8 ABNORMAL MRI, BREAST: ICD-10-CM

## 2024-10-17 DIAGNOSIS — D05.12 INTRADUCTAL CARCINOMA IN SITU OF LEFT BREAST: ICD-10-CM

## 2024-10-17 PROCEDURE — 76642 ULTRASOUND BREAST LIMITED: CPT

## 2024-10-29 ENCOUNTER — TRANSCRIBE ORDERS (OUTPATIENT)
Dept: ADMINISTRATIVE | Facility: HOSPITAL | Age: 84
End: 2024-10-29
Payer: MEDICARE

## 2024-10-29 DIAGNOSIS — D05.12 INTRADUCTAL CARCINOMA IN SITU OF LEFT BREAST: Primary | ICD-10-CM

## 2024-10-30 ENCOUNTER — PRE-ADMISSION TESTING (OUTPATIENT)
Dept: PREADMISSION TESTING | Facility: HOSPITAL | Age: 84
End: 2024-10-30
Payer: MEDICARE

## 2024-10-30 LAB
ALBUMIN SERPL-MCNC: 3.6 G/DL (ref 3.5–5.2)
ALBUMIN/GLOB SERPL: 1.1 G/DL
ALP SERPL-CCNC: 58 U/L (ref 39–117)
ALT SERPL W P-5'-P-CCNC: 12 U/L (ref 1–33)
ANION GAP SERPL CALCULATED.3IONS-SCNC: 10 MMOL/L (ref 5–15)
AST SERPL-CCNC: 26 U/L (ref 1–32)
BILIRUB SERPL-MCNC: 0.3 MG/DL (ref 0–1.2)
BUN SERPL-MCNC: 15 MG/DL (ref 8–23)
BUN/CREAT SERPL: 17.4 (ref 7–25)
CALCIUM SPEC-SCNC: 9 MG/DL (ref 8.6–10.5)
CHLORIDE SERPL-SCNC: 101 MMOL/L (ref 98–107)
CO2 SERPL-SCNC: 23 MMOL/L (ref 22–29)
CREAT SERPL-MCNC: 0.86 MG/DL (ref 0.57–1)
DEPRECATED RDW RBC AUTO: 45.5 FL (ref 37–54)
EGFRCR SERPLBLD CKD-EPI 2021: 67.1 ML/MIN/1.73
ERYTHROCYTE [DISTWIDTH] IN BLOOD BY AUTOMATED COUNT: 13.5 % (ref 12.3–15.4)
GLOBULIN UR ELPH-MCNC: 3.3 GM/DL
GLUCOSE SERPL-MCNC: 102 MG/DL (ref 65–99)
HCT VFR BLD AUTO: 33.9 % (ref 34–46.6)
HGB BLD-MCNC: 11.3 G/DL (ref 12–15.9)
MCH RBC QN AUTO: 30.6 PG (ref 26.6–33)
MCHC RBC AUTO-ENTMCNC: 33.3 G/DL (ref 31.5–35.7)
MCV RBC AUTO: 91.9 FL (ref 79–97)
PLATELET # BLD AUTO: 337 10*3/MM3 (ref 140–450)
PMV BLD AUTO: 9.9 FL (ref 6–12)
POTASSIUM SERPL-SCNC: 4.1 MMOL/L (ref 3.5–5.2)
PROT SERPL-MCNC: 6.9 G/DL (ref 6–8.5)
RBC # BLD AUTO: 3.69 10*6/MM3 (ref 3.77–5.28)
SODIUM SERPL-SCNC: 134 MMOL/L (ref 136–145)
WBC NRBC COR # BLD AUTO: 10.65 10*3/MM3 (ref 3.4–10.8)

## 2024-10-30 PROCEDURE — 36415 COLL VENOUS BLD VENIPUNCTURE: CPT

## 2024-10-30 PROCEDURE — 85027 COMPLETE CBC AUTOMATED: CPT

## 2024-10-30 PROCEDURE — 80053 COMPREHEN METABOLIC PANEL: CPT

## 2024-11-04 ENCOUNTER — LAB REQUISITION (OUTPATIENT)
Dept: LAB | Facility: HOSPITAL | Age: 84
End: 2024-11-04
Payer: MEDICARE

## 2024-11-04 ENCOUNTER — HOSPITAL ENCOUNTER (OUTPATIENT)
Dept: MAMMOGRAPHY | Facility: HOSPITAL | Age: 84
Discharge: HOME | End: 2024-11-04
Payer: MEDICARE

## 2024-11-04 DIAGNOSIS — D05.12 INTRADUCTAL CARCINOMA IN SITU OF LEFT BREAST: ICD-10-CM

## 2024-11-04 PROCEDURE — 88307 TISSUE EXAM BY PATHOLOGIST: CPT | Performed by: SURGERY

## 2024-11-04 PROCEDURE — C1819 TISSUE LOCALIZATION-EXCISION: HCPCS

## 2024-11-04 PROCEDURE — 76098 X-RAY EXAM SURGICAL SPECIMEN: CPT

## 2024-11-04 PROCEDURE — 25010000002 LIDOCAINE 1 % SOLUTION: Performed by: RADIOLOGY

## 2024-11-04 RX ORDER — LIDOCAINE HYDROCHLORIDE 10 MG/ML
5 INJECTION, SOLUTION INFILTRATION; PERINEURAL ONCE
Status: COMPLETED | OUTPATIENT
Start: 2024-11-04 | End: 2024-11-04

## 2024-11-04 RX ADMIN — Medication 5 ML: at 08:58

## 2024-11-14 ENCOUNTER — TELEPHONE (OUTPATIENT)
Dept: CARDIOLOGY | Facility: CLINIC | Age: 84
End: 2024-11-14
Payer: MEDICARE

## 2024-11-14 NOTE — PROGRESS NOTES
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  Pathology showed intermediate grade DCIS with comedonecrosis, measuring 8 mm.  CAD  Glaucoma  Hypertension  Hypothyroidism  Osteopenia      CHIEF COMPLAINT: DCIS      HISTORY OF PRESENT ILLNESS:  The patient is a 84 y.o. female, referred for evaluation of recently diagnosed DCIS.    She is feeling well today.  She reports Dr. Dudley is sending a test on her tumor to determine the risk.    REVIEW OF SYSTEMS:  A 14 point review of systems was performed and is negative except as noted above.    Past Medical History:   Diagnosis Date    Breast cancer 2024    left breast    Cervicalgia     Chronic bronchitis     Coronary artery disease     Dysphagia     Glaucoma, open angle     Hearing loss     Hypertension     Hyperthyroidism     thyroid gland killed off with MD treatment    Hypothyroidism     regulated by Levothyroxine    Low back pain     Medicare annual wellness visit, subsequent 12/15/2020    Mixed hyperlipidemia     Osteoarthritis     cervical spine degenerative    Osteopenia     Perforation of tympanic membrane     Pruritus     chronic general pruritis    Salivary gland cancer     Scoliosis 2005    discovered in x-rays by Orthopedist    Shingles     postherpetic neuralgia-thorax    Spondylolisthesis      lumbar spine    Wrist tendonitis                Objective     BP (!) 219/86   Pulse 66   Temp 98.3 °F (36.8 °C)   Resp 18   Ht 157.5 cm (62\")   Wt 84.8 kg (187 lb)   SpO2 98%   BMI 34.20 kg/m²   Performance Status:  ECOG score: 2           General: well appearing female in no acute distress  Skin: no rashes, lesions, bruising, or petechiae  Psych: mood and affect appropriate    Lab Results   Component Value Date    WBC 8.88 11/18/2024    HGB 11.6 (L) 11/18/2024    HCT 35.0 11/18/2024    MCV 89.3 11/18/2024     11/18/2024     Lab Results   Component Value Date    GLUCOSE 85 " "11/18/2024    BUN 15 11/18/2024    CREATININE 1.03 (H) 11/18/2024    EGFRIFNONA 65 12/13/2021    BCR 14.6 11/18/2024    K 4.3 11/18/2024    CO2 23.0 11/18/2024    CALCIUM 9.5 11/18/2024    ALBUMIN 3.7 11/18/2024    AST 14 11/18/2024    ALT 9 11/18/2024       Mammo Stereotactic Breast Device Placement Initial Without Bx, Mammo Post Device Placement Left  Narrative: LEFT BREAST TOMOSYNTHESIS/STEREOTACTIC GUIDED NEEDLE LOCALIZATION:  AFFIRM     CLINICAL HISTORY:  Localization of microcalcifications, post biopsy clip  is displaced laterally     TECHNIQUE:  After obtaining informed consent and performing a \"time-out\"  procedure, the left breast was breast was positioned in the alphanumeric  grid compression paddle from a lateral approach.  A 2D/3D left CC view  and stereotactic pair were obtained.  The lesion undergoing localization  was identified.  The breast was prepped in the usual sterile fashion and  anesthestized with 1% Lidocaine without epinephrine.  Next, a 9 cm Bard  needle was placed into the breast from a lateral approach to the  appropriate depth and stereotactic images were obtained.  After  confirming accurate positioning of the needle, a wire was placed through  the needle and the needle was removed with the wire remaining in place.     Routine left 2D/3D CC and ML digital mammographic images were obtained  with a BB placed on the lateral skin edge. The calcifications were  located at the juncture of the middle and proximal of the thick part of  the wire, 9 cm from the lateral skin edge. Mammographic images were sent  along with the patient to the operating room. No complications occurred  during this procedure.     Impression: Successful tomosynthesis guided needle localization of  microcalcifications in the lateral left breast.       PATHOLOGY: Pathology demonstrated intermediate nuclear grade ductal  carcinoma in situ with associated comedonecrosis. No invasive  malignancy. Extended margins " demonstrate fibrocystic changes including  usual type ductal epithelial hyperplasia and stromal fibrosis. Negative  margins.     RECOMMENDATION: Left diagnostic mammogram with tomosynthesis in 6  months.  ________________________  PHYSICIAN ORDER:   Diagnostic 6 month mammographic and/or US followup.  DIAGNOSIS:  Probably benign findings     This report was finalized on 11/11/2024 1:57 PM by Kristal Calvert MD.               ASSESSMENT AND PLAN:     Serenity Lagos is a 84 y.o. female with a recent diagnosis of DCIS.    We discussed the finding of DCIS in the breast.  I told her that DCIS is not truly a cancer as it has not invaded into the tissues.  It does not have the potential to spread to lymph nodes or other parts of the body.  However, if untreated DCIS does have the potential to turn into an invasive cancer.  For this reason we treat it in much the same way that we treat invasive cancers, with surgery and radiation.  We also discussed the option of adjuvant hormonal therapy.  Hormone therapy in this setting can reduce the risk of recurrent DCIS or invasive cancer in both breasts.  It does not change the risk of dying from breast cancer.    We discussed the option of low dose tamoxifen.  This drug is in the same category as raloxifene that she has been taking for many years.  We discussed potential side effects of tamoxifen treatment including an increased risk of blood clots, and increased risk of uterine cancer, hot flashes, and sleep or mood disturbance.    She wants to wait and see what her test results show.  I will plan to follow up with her in a few weeks.    F/u PRN.               A total greater than 60 mins minutes was spent in face to face patient time, examination, counseling, charting, reviewing test results, and reviewing outside records.    Rebecca Adames MD    11/20/2024

## 2024-11-14 NOTE — TELEPHONE ENCOUNTER
Received voicemail from Estefany that pt is having a minor surgery and needs cardiac clearance.    I returned the call to [facility]ef[facility]. She states that pt is scheduled to have a colonoscopy and fistulectomy on 11/21/24. They want to know if pt can get cardiac clearance for the procedure, or if Dr. Hooker needs to see pt before giving clearance.    Last appt for pt with Dr. Hooker was 2/22/24.    I spoke to pt. She states she is not having, and has not had, any chest pain or shortness of breath. She also states she did fine when she had her recent breast surgery.    Please advise about cardiac clearance.    If clearance given, will be faxed to [facility]ef[facility] at [phone].

## 2024-11-18 ENCOUNTER — LAB (OUTPATIENT)
Dept: LAB | Facility: HOSPITAL | Age: 84
End: 2024-11-18
Payer: MEDICARE

## 2024-11-18 DIAGNOSIS — E03.9 HYPOTHYROIDISM (ACQUIRED): ICD-10-CM

## 2024-11-18 DIAGNOSIS — I10 BENIGN ESSENTIAL HYPERTENSION: ICD-10-CM

## 2024-11-18 DIAGNOSIS — E78.5 HYPERLIPIDEMIA LDL GOAL <70: ICD-10-CM

## 2024-11-18 DIAGNOSIS — R79.9 ABNORMAL FINDING OF BLOOD CHEMISTRY, UNSPECIFIED: ICD-10-CM

## 2024-11-18 LAB
ALBUMIN SERPL-MCNC: 3.7 G/DL (ref 3.5–5.2)
ALBUMIN UR-MCNC: <1.2 MG/DL
ALBUMIN/GLOB SERPL: 1 G/DL
ALP SERPL-CCNC: 61 U/L (ref 39–117)
ALT SERPL W P-5'-P-CCNC: 9 U/L (ref 1–33)
ANION GAP SERPL CALCULATED.3IONS-SCNC: 10 MMOL/L (ref 5–15)
AST SERPL-CCNC: 14 U/L (ref 1–32)
BACTERIA UR QL AUTO: ABNORMAL /HPF
BASOPHILS # BLD AUTO: 0.05 10*3/MM3 (ref 0–0.2)
BASOPHILS NFR BLD AUTO: 0.6 % (ref 0–1.5)
BILIRUB SERPL-MCNC: 0.3 MG/DL (ref 0–1.2)
BILIRUB UR QL STRIP: NEGATIVE
BUN SERPL-MCNC: 15 MG/DL (ref 8–23)
BUN/CREAT SERPL: 14.6 (ref 7–25)
CALCIUM SPEC-SCNC: 9.5 MG/DL (ref 8.6–10.5)
CHLORIDE SERPL-SCNC: 100 MMOL/L (ref 98–107)
CHOLEST SERPL-MCNC: 176 MG/DL (ref 0–200)
CLARITY UR: CLEAR
CO2 SERPL-SCNC: 23 MMOL/L (ref 22–29)
COLOR UR: YELLOW
CREAT SERPL-MCNC: 1.03 MG/DL (ref 0.57–1)
CREAT UR-MCNC: 177.7 MG/DL
DEPRECATED RDW RBC AUTO: 40.9 FL (ref 37–54)
EGFRCR SERPLBLD CKD-EPI 2021: 54.1 ML/MIN/1.73
EOSINOPHIL # BLD AUTO: 0.24 10*3/MM3 (ref 0–0.4)
EOSINOPHIL NFR BLD AUTO: 2.7 % (ref 0.3–6.2)
ERYTHROCYTE [DISTWIDTH] IN BLOOD BY AUTOMATED COUNT: 12.7 % (ref 12.3–15.4)
GLOBULIN UR ELPH-MCNC: 3.6 GM/DL
GLUCOSE SERPL-MCNC: 85 MG/DL (ref 65–99)
GLUCOSE UR STRIP-MCNC: NEGATIVE MG/DL
HBA1C MFR BLD: 5.8 % (ref 4.8–5.6)
HCT VFR BLD AUTO: 35 % (ref 34–46.6)
HDLC SERPL-MCNC: 59 MG/DL (ref 40–60)
HGB BLD-MCNC: 11.6 G/DL (ref 12–15.9)
HGB UR QL STRIP.AUTO: NEGATIVE
HYALINE CASTS UR QL AUTO: ABNORMAL /LPF
IMM GRANULOCYTES # BLD AUTO: 0.03 10*3/MM3 (ref 0–0.05)
IMM GRANULOCYTES NFR BLD AUTO: 0.3 % (ref 0–0.5)
KETONES UR QL STRIP: NEGATIVE
LDLC SERPL CALC-MCNC: 98 MG/DL (ref 0–100)
LDLC/HDLC SERPL: 1.63 {RATIO}
LEUKOCYTE ESTERASE UR QL STRIP.AUTO: ABNORMAL
LYMPHOCYTES # BLD AUTO: 3.27 10*3/MM3 (ref 0.7–3.1)
LYMPHOCYTES NFR BLD AUTO: 36.8 % (ref 19.6–45.3)
MCH RBC QN AUTO: 29.6 PG (ref 26.6–33)
MCHC RBC AUTO-ENTMCNC: 33.1 G/DL (ref 31.5–35.7)
MCV RBC AUTO: 89.3 FL (ref 79–97)
MICROALBUMIN/CREAT UR: NORMAL MG/G{CREAT}
MONOCYTES # BLD AUTO: 0.9 10*3/MM3 (ref 0.1–0.9)
MONOCYTES NFR BLD AUTO: 10.1 % (ref 5–12)
NEUTROPHILS NFR BLD AUTO: 4.39 10*3/MM3 (ref 1.7–7)
NEUTROPHILS NFR BLD AUTO: 49.5 % (ref 42.7–76)
NITRITE UR QL STRIP: NEGATIVE
NRBC BLD AUTO-RTO: 0 /100 WBC (ref 0–0.2)
PH UR STRIP.AUTO: 6 [PH] (ref 5–8)
PLATELET # BLD AUTO: 407 10*3/MM3 (ref 140–450)
PMV BLD AUTO: 10.9 FL (ref 6–12)
POTASSIUM SERPL-SCNC: 4.3 MMOL/L (ref 3.5–5.2)
PROT SERPL-MCNC: 7.3 G/DL (ref 6–8.5)
PROT UR QL STRIP: ABNORMAL
RBC # BLD AUTO: 3.92 10*6/MM3 (ref 3.77–5.28)
RBC # UR STRIP: ABNORMAL /HPF
REF LAB TEST METHOD: ABNORMAL
SODIUM SERPL-SCNC: 133 MMOL/L (ref 136–145)
SP GR UR STRIP: 1.02 (ref 1–1.03)
SQUAMOUS #/AREA URNS HPF: ABNORMAL /HPF
TRIGL SERPL-MCNC: 104 MG/DL (ref 0–150)
TSH SERPL DL<=0.05 MIU/L-ACNC: 0.42 UIU/ML (ref 0.27–4.2)
UROBILINOGEN UR QL STRIP: ABNORMAL
VIT B12 BLD-MCNC: 468 PG/ML (ref 211–946)
VLDLC SERPL-MCNC: 19 MG/DL (ref 5–40)
WBC # UR STRIP: ABNORMAL /HPF
WBC NRBC COR # BLD AUTO: 8.88 10*3/MM3 (ref 3.4–10.8)

## 2024-11-18 PROCEDURE — 85025 COMPLETE CBC W/AUTO DIFF WBC: CPT

## 2024-11-18 PROCEDURE — 82043 UR ALBUMIN QUANTITATIVE: CPT

## 2024-11-18 PROCEDURE — 82607 VITAMIN B-12: CPT

## 2024-11-18 PROCEDURE — 84443 ASSAY THYROID STIM HORMONE: CPT

## 2024-11-18 PROCEDURE — 83036 HEMOGLOBIN GLYCOSYLATED A1C: CPT

## 2024-11-18 PROCEDURE — 80053 COMPREHEN METABOLIC PANEL: CPT

## 2024-11-18 PROCEDURE — 82570 ASSAY OF URINE CREATININE: CPT

## 2024-11-18 PROCEDURE — 80061 LIPID PANEL: CPT

## 2024-11-18 PROCEDURE — 81001 URINALYSIS AUTO W/SCOPE: CPT

## 2024-11-19 ENCOUNTER — PRE-ADMISSION TESTING (OUTPATIENT)
Dept: PREADMISSION TESTING | Facility: HOSPITAL | Age: 84
End: 2024-11-19
Payer: MEDICARE

## 2024-11-19 ENCOUNTER — HOSPITAL ENCOUNTER (OUTPATIENT)
Dept: GENERAL RADIOLOGY | Facility: HOSPITAL | Age: 84
Discharge: HOME | End: 2024-11-19
Payer: MEDICARE

## 2024-11-19 VITALS — WEIGHT: 186.51 LBS | HEIGHT: 62 IN | BODY MASS INDEX: 34.32 KG/M2

## 2024-11-19 DIAGNOSIS — R00.2 PALPITATIONS: ICD-10-CM

## 2024-11-19 DIAGNOSIS — I10 BENIGN ESSENTIAL HYPERTENSION: ICD-10-CM

## 2024-11-19 PROCEDURE — 71046 X-RAY EXAM CHEST 2 VIEWS: CPT

## 2024-11-19 RX ORDER — ACETAMINOPHEN 500 MG
500 TABLET ORAL EVERY 6 HOURS PRN
COMMUNITY

## 2024-11-19 RX ORDER — METOPROLOL SUCCINATE 50 MG/1
50 TABLET, EXTENDED RELEASE ORAL DAILY
Qty: 90 TABLET | Refills: 1 | Status: SHIPPED | OUTPATIENT
Start: 2024-11-19

## 2024-11-19 NOTE — PAT
An arrival time for procedure was not provided during PAT visit. If patient had any questions or concerns about their arrival time, they were instructed to contact their surgeon/physician.  Additionally, if the patient referred to an arrival time that was acquired from their my chart account, patient was encouraged to verify that time with their surgeon/physician. Arrival times are NOT provided in Pre Admission Testing Department.    Per Anesthesia Request, patient instructed not to take their ACE/ARB medications on the AM of surgery.    Patient denies any current skin issues.     Patient directed to Radiology Department for CXR after Pre Admission Testing Appointment.     LABS on chart from 11/18/24.    EKG on chart from 9/29/24 patient denies chest pain and shortness of breath.     Cardiac clearance on chart from 11/14/2024 on chart.

## 2024-11-20 ENCOUNTER — ANESTHESIA EVENT (OUTPATIENT)
Dept: PERIOP | Facility: HOSPITAL | Age: 84
End: 2024-11-20
Payer: MEDICARE

## 2024-11-20 ENCOUNTER — CONSULT (OUTPATIENT)
Dept: ONCOLOGY | Facility: CLINIC | Age: 84
End: 2024-11-20
Payer: MEDICARE

## 2024-11-20 ENCOUNTER — HOSPITAL ENCOUNTER (OUTPATIENT)
Dept: RADIATION ONCOLOGY | Facility: HOSPITAL | Age: 84
Setting detail: RADIATION/ONCOLOGY SERIES
Discharge: HOME | End: 2024-11-20
Payer: MEDICARE

## 2024-11-20 ENCOUNTER — OFFICE VISIT (OUTPATIENT)
Dept: RADIATION ONCOLOGY | Facility: HOSPITAL | Age: 84
End: 2024-11-20
Payer: MEDICARE

## 2024-11-20 VITALS
RESPIRATION RATE: 20 BRPM | SYSTOLIC BLOOD PRESSURE: 180 MMHG | WEIGHT: 184.6 LBS | HEART RATE: 68 BPM | BODY MASS INDEX: 33.76 KG/M2 | TEMPERATURE: 97.8 F | DIASTOLIC BLOOD PRESSURE: 90 MMHG | OXYGEN SATURATION: 99 %

## 2024-11-20 VITALS
DIASTOLIC BLOOD PRESSURE: 86 MMHG | SYSTOLIC BLOOD PRESSURE: 219 MMHG | HEART RATE: 66 BPM | OXYGEN SATURATION: 98 % | TEMPERATURE: 98.3 F | WEIGHT: 187 LBS | BODY MASS INDEX: 34.41 KG/M2 | RESPIRATION RATE: 18 BRPM | HEIGHT: 62 IN

## 2024-11-20 DIAGNOSIS — C50.912 MALIGNANT NEOPLASM OF LEFT BREAST IN FEMALE, ESTROGEN RECEPTOR POSITIVE, UNSPECIFIED SITE OF BREAST: Primary | ICD-10-CM

## 2024-11-20 DIAGNOSIS — Z17.0 MALIGNANT NEOPLASM OF LEFT BREAST IN FEMALE, ESTROGEN RECEPTOR POSITIVE, UNSPECIFIED SITE OF BREAST: Primary | ICD-10-CM

## 2024-11-20 PROCEDURE — G0463 HOSPITAL OUTPT CLINIC VISIT: HCPCS | Performed by: RADIOLOGY

## 2024-11-20 PROCEDURE — G0463 HOSPITAL OUTPT CLINIC VISIT: HCPCS

## 2024-11-20 RX ORDER — FAMOTIDINE 10 MG/ML
20 INJECTION, SOLUTION INTRAVENOUS ONCE
Status: CANCELLED | OUTPATIENT
Start: 2024-11-20 | End: 2024-11-20

## 2024-11-20 RX ORDER — SODIUM CHLORIDE 0.9 % (FLUSH) 0.9 %
10 SYRINGE (ML) INJECTION EVERY 12 HOURS SCHEDULED
Status: CANCELLED | OUTPATIENT
Start: 2024-11-20

## 2024-11-20 NOTE — PROGRESS NOTES
CONSULTATION NOTE      :                                                          1940  DATE OF CONSULTATION:                       2024   REQUESTING PHYSICIAN:                   Sasha Gonzalez MD  REASON FOR CONSULTATION:           No matching staging information was found for the patient.         BRIEF HISTORY:  The patient is a very pleasant 84 y.o. female  with ***     Allergy:   Allergies   Allergen Reactions    Wound Dressing Adhesive Rash       Social History:   Social History     Socioeconomic History    Marital status:    Tobacco Use    Smoking status: Former     Current packs/day: 0.00     Average packs/day: 1 pack/day for 20.0 years (20.0 ttl pk-yrs)     Types: Cigarettes     Start date: 1960     Quit date: 1980     Years since quittin.9    Smokeless tobacco: Never   Vaping Use    Vaping status: Never Used   Substance and Sexual Activity    Alcohol use: Yes     Alcohol/week: 7.0 standard drinks of alcohol     Types: 7 Glasses of wine per week     Comment: 2 glasses wine  with dinner every day.  grand total 8-9oz. patient hasnt had any in 2 month (stated 2024)    Drug use: No    Sexual activity: Defer     Partners: Male       Past Medical History:   Past Medical History:   Diagnosis Date    Breast cancer     left breast    Cervicalgia     Chronic bronchitis     Coronary artery disease     Dysphagia     Glaucoma, open angle     Hearing loss     Hypertension     Hyperthyroidism     thyroid gland killed off with MD treatment    Hypothyroidism     regulated by Levothyroxine    Low back pain     Medicare annual wellness visit, subsequent 12/15/2020    Mixed hyperlipidemia     Osteoarthritis     cervical spine degenerative    Osteopenia     Perforation of tympanic membrane     Pruritus     chronic general pruritis    Salivary gland cancer     Scoliosis     discovered in x-rays by Orthopedist    Shingles     postherpetic neuralgia-thorax    Spondylolisthesis   "    lumbar spine    Wrist tendonitis        Family History: family history includes Alcohol abuse in her father and sister; Alzheimer's disease in her maternal grandmother; Arthritis in her mother and sister; COPD in her sister; Cancer in her paternal grandmother; Coronary artery disease in her maternal grandfather, maternal uncle, and mother; Heart disease in her maternal aunt, maternal uncle, and paternal grandfather; Heart failure in her mother; Hypertension in her mother and another family member; Lung cancer (age of onset: 59) in her father; Obesity in an other family member; Other (age of onset: 76) in her sister; Rheum arthritis in her sister; Stroke in her maternal aunt, maternal aunt, maternal aunt, and maternal uncle.     Surgical History:   Past Surgical History:   Procedure Laterality Date    APPENDECTOMY      BREAST BIOPSY      BREAST BIOPSY Right 07/29/2019    BREAST CYST EXCISION      BREAST LUMPECTOMY Left     for breast cancer    ENDOSCOPY  2017    normal (per pt) EGD    MOUTH SURGERY  2000    TONSILLECTOMY          Review of Systems:   Review of Systems   Gastrointestinal:  Positive for rectal pain.        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   Neurological:  Positive for gait problem.           Objective   VITAL SIGNS: There were no vitals filed for this visit.     Karnofsky score: 80   {KSP (Optional):93770}    Physical Exam:   Physical Exam         The following portions of the patient's history were reviewed and updated as appropriate: {history reviewed:20406::\"allergies\",\"current medications\",\"past family history\",\"past medical history\",\"past social history\",\"past surgical history\",\"problem list\"}.    Assessment:   Assessment      ***    RECOMMENDATIONS:  ***    I spent a total of *** minutes on todays visit, with more than *** minutes in direct face to face communication, and the remainder of " the time spent in reviewing the relevant history, records, available imaging, and for documentation.    Follow Up:   No follow-ups on file.  There are no diagnoses linked to this encounter.     Iman Vides RN

## 2024-11-20 NOTE — PROGRESS NOTES
New Patient Office Visit      Encounter Date: 11/20/2024   Patient Name: Serenity Lagos  YOB: 1940   Medical Record Number: [id number]   Primary Diagnosis: Malignant neoplasm of left breast in female, estrogen receptor positive, unspecified site of breast [C50.912, Z17.0]   Cancer Staging: Cancer Staging   No matching staging information was found for the patient.      Chief Complaint:    Chief Complaint   Patient presents with    Breast Cancer       History of Present Illness: Serenity Lagos is a 84 y.o. female who is here for consultation regarding her DCIS of the left breast, diagnosed following abnormal screening mammography. Diagnostic mammography/US was reviewed. Core biopsy identified an int grade DCIS with comedo necrosis and microcalcifications (ER+/ME+).   Breast MRI identified the biopsy proven DCIS with surrounding nonmass enhancement and a 1.1 cm enhancing mass favored to be a LN for which US was recommended. US eval returned as BIRADS 1, favoring a benign IM LN.   11/4/24 - left lumpectomy - int grade DCIS with associated comedonecrosis. DCIS 8 mm, margins negative by greater than 10 mm. pTis, stage 0    She has a rectal fistula with colonoscopy tomorrow and repair upcoming.  She is ambulatory with assistance of a walker. She is recovering well from her lumpectomy    Age of first menses: 15  Age of menopause: 51  Hormone replacement: no  Personal hx of breast cancer: no  Family hx of breast cancer: no  Age of first live birth: no children or pregnancies    Subjective        Review of Systems: Review of Systems   Constitutional:  Positive for fatigue.   Gastrointestinal:  Positive for rectal pain.        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  She is having colonoscopy and surgery tomorrow with Dr. Walters.   Musculoskeletal:  Positive for back pain and gait problem.        Pt walks with walker because of multiple falls   Skin:         Pt reports left breast  wound is healing well. Pt reports 5 previous breast biopsies.   Neurological:  Positive for weakness.        Pt reports multiple falls recently. Walks with the assist of a walker       Age of first menses: 15  Age of menopause: 51  Hormone replacement: no  Personal hx of breast cancer: no  Family hx of breast cancer: no  Age of first live birth: no children or pregnancies      Past Medical History:   Past Medical History:   Diagnosis Date    Breast cancer 2024    left breast    Cervicalgia     Chronic bronchitis     Coronary artery disease     Dysphagia     Glaucoma, open angle     Hearing loss     Hypertension     Hyperthyroidism     thyroid gland killed off with MD treatment    Hypothyroidism     regulated by Levothyroxine    Low back pain     Medicare annual wellness visit, subsequent 12/15/2020    Mixed hyperlipidemia     Osteoarthritis     cervical spine degenerative    Osteopenia     Perforation of tympanic membrane     Pruritus     chronic general pruritis    Salivary gland cancer     Scoliosis 2005    discovered in x-rays by Orthopedist    Shingles     postherpetic neuralgia-thorax    Spondylolisthesis      lumbar spine    Wrist tendonitis        Past Surgical History:   Past Surgical History:   Procedure Laterality Date    APPENDECTOMY      BREAST BIOPSY      BREAST BIOPSY Right 07/29/2019    BREAST CYST EXCISION      BREAST LUMPECTOMY Left     for breast cancer    ENDOSCOPY  2017    normal (per pt) EGD    MOUTH SURGERY  2000    TONSILLECTOMY         Family History:   Family History   Problem Relation Age of Onset    Hypertension Mother     Heart failure Mother         CHF    Coronary artery disease Mother         CABG    Arthritis Mother         osteo arthritis    Lung cancer Father 59        Heavy smoker and Black Lung    Alcohol abuse Father     Rheum arthritis Sister     COPD Sister         heavy smoker     Alcohol abuse Sister     Arthritis Sister         severe rheumatoid arthritis    Stroke Maternal  Aunt     Heart disease Maternal Aunt     Stroke Maternal Aunt     Stroke Maternal Aunt     Coronary artery disease Maternal Uncle     Stroke Maternal Uncle     Heart disease Maternal Uncle     Alzheimer's disease Maternal Grandmother     Coronary artery disease Maternal Grandfather     Cancer Paternal Grandmother         stomach    Heart disease Paternal Grandfather     Hypertension Other     Obesity Other     Breast cancer Neg Hx     Ovarian cancer Neg Hx        Social History:   Social History     Socioeconomic History    Marital status:    Tobacco Use    Smoking status: Former     Current packs/day: 0.00     Average packs/day: 1 pack/day for 20.0 years (20.0 ttl pk-yrs)     Types: Cigarettes     Start date: 1960     Quit date: 1980     Years since quittin.9    Smokeless tobacco: Never   Vaping Use    Vaping status: Never Used   Substance and Sexual Activity    Alcohol use: Yes     Alcohol/week: 7.0 standard drinks of alcohol     Types: 7 Glasses of wine per week     Comment: 2 glasses wine  with dinner every day.  grand total 8-9oz. patient hasnt had any in 2 month (stated 2024)    Drug use: No    Sexual activity: Defer     Partners: Male       Medications:     Current Outpatient Medications:     acetaminophen (TYLENOL) 500 MG tablet, Take 1 tablet by mouth Every 6 (Six) Hours As Needed for Mild Pain., Disp: , Rfl:     Docusate Sodium 100 MG capsule, Take 1 tablet by mouth 2 (Two) Times a Day., Disp: , Rfl:     gentamicin (GARAMYCIN) 0.3 % ophthalmic solution, Instill 2 drops in both ears 3 times a day, Disp: 5 mL, Rfl: 0    Glucosamine-Chondroitin (OSTEO BI-FLEX REGULAR STRENGTH PO), Take 1 tablet by mouth Daily. 1 daily, Disp: , Rfl:     latanoprost (XALATAN) 0.005 % ophthalmic solution, Administer 1 drop to both eyes Every Night., Disp: , Rfl:     levothyroxine (SYNTHROID, LEVOTHROID) 112 MCG tablet, Take 1 tablet by mouth Daily., Disp: 90 tablet, Rfl: 3    losartan (COZAAR) 100 MG  tablet, Take 1 tablet by mouth Daily., Disp: 90 tablet, Rfl: 3    metoprolol succinate XL (TOPROL-XL) 50 MG 24 hr tablet, TAKE 1 TABLET DAILY, Disp: 90 tablet, Rfl: 1    omeprazole (priLOSEC) 40 MG capsule, TAKE 1 CAPSULE DAILY (Patient taking differently: Take 1 capsule by mouth Daily.), Disp: 90 capsule, Rfl: 3    raloxifene (EVISTA) 60 MG tablet, Take 1 tablet by mouth Daily., Disp: 90 tablet, Rfl: 3    Allergies:   Allergies   Allergen Reactions    Wound Dressing Adhesive Rash       Measures:   Advanced Care Plan: N Advanced Care Planning was discussed. The patient does not have a living will documented in the medical record and declined to perform one today.  KPS/Quality of Life: 80 - Restricted Physical Activity  ECOG: (1) Restricted in physically strenuous activity, ambulatory and able to do work of light nature      Objective     Physical Exam:   Constitutional: The patient is a well-developed, well-nourished female  in no acute distress.  Alert and oriented ×3.  General: NAD, sitting comfortably  Eye: EOMI, anicteric sclerae  HENT: NC/AT, MMM  Neck: No JVD or cervical lymphadenopathy  Respiratory: Symmetric expansion, nonlabored respiration  Cardiovascular: Regular rate and rhythm.  No murmurs, rubs, or gallops are appreciated.  Abdomen: nontender, nondistended.   Musculoskeletal: No obvious joint deformities, range of motion intact in all 4 extremities  Neuro: Alert oriented x3, cranial nerves III through XII are grossly intact, with no focal neurological deficits noted on exam.  Psych: Mood and affect appropriate      Assessment / Plan      Assessment/Plan:   Serenity Lagos is a pleasant 84 y.o. female with stage 0 DCIS of the left breast.  She understands that the role of radiation therapy after a lumpectomy is to decrease the risk of an ipsilateral breast recurrence, specifically to prevent invasive recurrence. She has an upcoming appointment in medical oncology to discuss recomendations regarding  endocrine/hormonal therapy.     Thankfully, due to her favorable pathologic characteristics, she has many options.  We discussed options including whole breast treatment and partial breast treatment (accelerated or moderately hypofractionated). Given her age and favorable tumor characteristics, we also discussed literature surrounding omission of radiation treatment, which would be very reasonable given her .     She is interested in our office sending out a DCISion RT assay to have specific information about her personal risk of recurrence with and without RT as she considers her decision. At this time, she is leaning most towards 5 fraction APBI. We will send this back and see her in early Dec to discuss results.        Diagnoses and all orders for this visit:    1. Malignant neoplasm of left breast in female, estrogen receptor positive, unspecified site of breast (Primary)  -     Ambulatory Referral to ONC Social Work         Follow Up:  No follow-ups on file.      Time:   I spent 65 minutes on this encounter today, 11/20/24. Activities that took place during this time include: preparing to see the patient, obtaining history, reviewing separately obtained history, performing a medically appropriate examination and evaluation, counseling and educating the patient, ordering medications/tests/procedures, communicating with other healthcare providers, documenting clinical information in the health record, and coordinating care for this patient.     Sincerely,        Samina Dudley MD  Radiation Oncology   This document has been signed by Samina Dudley MD on November 20, 2024 16:26 EST     NOTICE TO PATIENTS:   At [facility], we believe that sharing information builds trust and better relationships. You are receiving this note because you recently visited [facility]. It is possible you will see health information before a provider has talked with you about it. This kind of information can be easy to  misunderstand. To help you fully understand what it means for your health, we urge you to discuss this note with your provider.

## 2024-11-20 NOTE — LETTER
November 20, 2024     Bernadette Somers MD  [address] [facility] [address]  [facility] [address]    Patient: Serenity Lagos   YOB: 1940   Date of Visit: 11/20/2024       Dear Bernadette Somers MD    Serenity Lagos was in my office today. Below is a copy of my note.    If you have questions, please do not hesitate to call me. I look forward to following Serenity along with you.         Sincerely,        Rebecca Adames MD        CC: Sasha Gonzalez MD      Subjective    PROBLEM LIST:  [id number] ER+ (95%, 2+) [place] + (5%, 1+) DCIS of the left breast  Left breast lumpectomy on 11/4/24.  Pathology showed intermediate grade DCIS with comedonecrosis, measuring 8 mm.  CAD  Glaucoma  Hypertension  Hypothyroidism  Osteopenia      CHIEF COMPLAINT: DCIS      HISTORY OF PRESENT ILLNESS:  The patient is a 84 y.o. female, referred for evaluation of recently diagnosed DCIS.    She is feeling well today.  She reports Dr. Dudley is sending a test on her tumor to determine the risk.    REVIEW OF SYSTEMS:  A 14 point review of systems was performed and is negative except as noted above.    Past Medical History:   Diagnosis Date   • Breast cancer 2024    left breast   • Cervicalgia    • Chronic bronchitis    • Coronary artery disease    • Dysphagia    • Glaucoma, open angle    • Hearing loss    • Hypertension    • Hyperthyroidism     thyroid gland killed off with MD treatment   • Hypothyroidism     regulated by Levothyroxine   • Low back pain    • Medicare annual wellness visit, subsequent 12/15/2020   • Mixed hyperlipidemia    • Osteoarthritis     cervical spine degenerative   • Osteopenia    • Perforation of tympanic membrane    • Pruritus     chronic general pruritis   • Salivary gland cancer    • Scoliosis 2005    discovered in x-rays by Orthopedist   • Shingles     postherpetic neuralgia-thorax   • Spondylolisthesis      lumbar spine   • Wrist tendonitis                Objective    BP (!) 219/86   Pulse 66    "Temp 98.3 °F (36.8 °C)   Resp 18   Ht 157.5 cm (62\")   Wt 84.8 kg (187 lb)   SpO2 98%   BMI 34.20 kg/m²   Performance Status:  ECOG score: 2           General: well appearing female in no acute distress  Skin: no rashes, lesions, bruising, or petechiae  Psych: mood and affect appropriate    Lab Results   Component Value Date    WBC 8.88 11/18/2024    HGB 11.6 (L) 11/18/2024    HCT 35.0 11/18/2024    MCV 89.3 11/18/2024     11/18/2024     Lab Results   Component Value Date    GLUCOSE 85 11/18/2024    BUN 15 11/18/2024    CREATININE 1.03 (H) 11/18/2024    EGFRIFNONA 65 12/13/2021    BCR 14.6 11/18/2024    K 4.3 11/18/2024    CO2 23.0 11/18/2024    CALCIUM 9.5 11/18/2024    ALBUMIN 3.7 11/18/2024    AST 14 11/18/2024    ALT 9 11/18/2024       Mammo Stereotactic Breast Device Placement Initial Without Bx, Mammo Post Device Placement Left  Narrative: LEFT BREAST TOMOSYNTHESIS/STEREOTACTIC GUIDED NEEDLE LOCALIZATION:  AFFIRM     CLINICAL HISTORY:  Localization of microcalcifications, post biopsy clip  is displaced laterally     TECHNIQUE:  After obtaining informed consent and performing a \"time-out\"  procedure, the left breast was breast was positioned in the alphanumeric  grid compression paddle from a lateral approach.  A 2D/3D left CC view  and stereotactic pair were obtained.  The lesion undergoing localization  was identified.  The breast was prepped in the usual sterile fashion and  anesthestized with 1% Lidocaine without epinephrine.  Next, a 9 cm Bard  needle was placed into the breast from a lateral approach to the  appropriate depth and stereotactic images were obtained.  After  confirming accurate positioning of the needle, a wire was placed through  the needle and the needle was removed with the wire remaining in place.     Routine left 2D/3D CC and ML digital mammographic images were obtained  with a BB placed on the lateral skin edge. The calcifications were  located at the juncture of the middle " and proximal of the thick part of  the wire, 9 cm from the lateral skin edge. Mammographic images were sent  along with the patient to the operating room. No complications occurred  during this procedure.     Impression: Successful tomosynthesis guided needle localization of  microcalcifications in the lateral left breast.       PATHOLOGY: Pathology demonstrated intermediate nuclear grade ductal  carcinoma in situ with associated comedonecrosis. No invasive  malignancy. Extended margins demonstrate fibrocystic changes including  usual type ductal epithelial hyperplasia and stromal fibrosis. Negative  margins.     RECOMMENDATION: Left diagnostic mammogram with tomosynthesis in 6  months.  ________________________  PHYSICIAN ORDER:   Diagnostic 6 month mammographic and/or US followup.  DIAGNOSIS:  Probably benign findings     This report was finalized on 11/11/2024 1:57 PM by Kristal Calvert MD.               ASSESSMENT AND PLAN:     Serenity Lagos is a 84 y.o. female with a recent diagnosis of DCIS.    We discussed the finding of DCIS in the breast.  I told her that DCIS is not truly a cancer as it has not invaded into the tissues.  It does not have the potential to spread to lymph nodes or other parts of the body.  However, if untreated DCIS does have the potential to turn into an invasive cancer.  For this reason we treat it in much the same way that we treat invasive cancers, with surgery and radiation.  We also discussed the option of adjuvant hormonal therapy.  Hormone therapy in this setting can reduce the risk of recurrent DCIS or invasive cancer in both breasts.  It does not change the risk of dying from breast cancer.    We discussed the option of low dose tamoxifen.  This drug is in the same category as raloxifene that she has been taking for many years.  We discussed potential side effects of tamoxifen treatment including an increased risk of blood clots, and increased risk of uterine cancer, hot flashes,  and sleep or mood disturbance.    She wants to wait and see what her test results show.  I will plan to follow up with her in a few weeks.    F/u PRN.               A total greater than 60 mins minutes was spent in face to face patient time, examination, counseling, charting, reviewing test results, and reviewing outside records.    Rebecca Adames MD    11/20/2024

## 2024-11-21 ENCOUNTER — ANESTHESIA (OUTPATIENT)
Dept: PERIOP | Facility: HOSPITAL | Age: 84
End: 2024-11-21
Payer: MEDICARE

## 2024-11-21 ENCOUNTER — HOSPITAL ENCOUNTER (OUTPATIENT)
Facility: HOSPITAL | Age: 84
Setting detail: HOSPITAL OUTPATIENT SURGERY
Discharge: HOME | End: 2024-11-21
Attending: COLON & RECTAL SURGERY | Admitting: COLON & RECTAL SURGERY
Payer: MEDICARE

## 2024-11-21 ENCOUNTER — ANESTHESIA EVENT CONVERTED (OUTPATIENT)
Dept: ANESTHESIOLOGY | Facility: HOSPITAL | Age: 84
End: 2024-11-21
Payer: MEDICARE

## 2024-11-21 VITALS
WEIGHT: 186.95 LBS | BODY MASS INDEX: 34.4 KG/M2 | HEIGHT: 62 IN | SYSTOLIC BLOOD PRESSURE: 173 MMHG | OXYGEN SATURATION: 95 % | RESPIRATION RATE: 12 BRPM | HEART RATE: 73 BPM | DIASTOLIC BLOOD PRESSURE: 80 MMHG | TEMPERATURE: 97.7 F

## 2024-11-21 DIAGNOSIS — K60.30 ANAL FISTULA: ICD-10-CM

## 2024-11-21 DIAGNOSIS — K62.89 RECTAL PAIN: ICD-10-CM

## 2024-11-21 PROCEDURE — 25010000002 FENTANYL CITRATE (PF) 100 MCG/2ML SOLUTION: Performed by: NURSE ANESTHETIST, CERTIFIED REGISTERED

## 2024-11-21 PROCEDURE — 25810000003 LACTATED RINGERS PER 1000 ML: Performed by: ANESTHESIOLOGY

## 2024-11-21 PROCEDURE — 88304 TISSUE EXAM BY PATHOLOGIST: CPT | Performed by: COLON & RECTAL SURGERY

## 2024-11-21 PROCEDURE — 25010000002 PROPOFOL 10 MG/ML EMULSION: Performed by: NURSE ANESTHETIST, CERTIFIED REGISTERED

## 2024-11-21 PROCEDURE — 25010000002 SUGAMMADEX 200 MG/2ML SOLUTION: Performed by: NURSE ANESTHETIST, CERTIFIED REGISTERED

## 2024-11-21 PROCEDURE — 25010000002 ONDANSETRON PER 1 MG: Performed by: NURSE ANESTHETIST, CERTIFIED REGISTERED

## 2024-11-21 PROCEDURE — 25010000002 DEXAMETHASONE PER 1 MG: Performed by: NURSE ANESTHETIST, CERTIFIED REGISTERED

## 2024-11-21 PROCEDURE — 25010000002 LIDOCAINE PF 1% 1 % SOLUTION: Performed by: ANESTHESIOLOGY

## 2024-11-21 PROCEDURE — 25010000002 LIDOCAINE PF 1% 1 % SOLUTION: Performed by: NURSE ANESTHETIST, CERTIFIED REGISTERED

## 2024-11-21 RX ORDER — SODIUM CHLORIDE 0.9 % (FLUSH) 0.9 %
10 SYRINGE (ML) INJECTION AS NEEDED
Status: DISCONTINUED | OUTPATIENT
Start: 2024-11-21 | End: 2024-11-21 | Stop reason: HOSPADM

## 2024-11-21 RX ORDER — PROMETHAZINE HYDROCHLORIDE 25 MG/1
25 TABLET ORAL ONCE AS NEEDED
Status: DISCONTINUED | OUTPATIENT
Start: 2024-11-21 | End: 2024-11-25 | Stop reason: HOSPADM

## 2024-11-21 RX ORDER — ROCURONIUM BROMIDE 10 MG/ML
INJECTION, SOLUTION INTRAVENOUS AS NEEDED
Status: DISCONTINUED | OUTPATIENT
Start: 2024-11-21 | End: 2024-11-21 | Stop reason: SURG

## 2024-11-21 RX ORDER — IPRATROPIUM BROMIDE AND ALBUTEROL SULFATE 2.5; .5 MG/3ML; MG/3ML
3 SOLUTION RESPIRATORY (INHALATION) ONCE AS NEEDED
Status: DISCONTINUED | OUTPATIENT
Start: 2024-11-21 | End: 2024-11-25 | Stop reason: HOSPADM

## 2024-11-21 RX ORDER — FENTANYL CITRATE 50 UG/ML
50 INJECTION, SOLUTION INTRAMUSCULAR; INTRAVENOUS
Status: DISCONTINUED | OUTPATIENT
Start: 2024-11-21 | End: 2024-11-25 | Stop reason: HOSPADM

## 2024-11-21 RX ORDER — SODIUM CHLORIDE 9 MG/ML
9 INJECTION, SOLUTION INTRAVENOUS AS NEEDED
Status: DISCONTINUED | OUTPATIENT
Start: 2024-11-21 | End: 2024-11-22 | Stop reason: HOSPADM

## 2024-11-21 RX ORDER — LIDOCAINE HYDROCHLORIDE 10 MG/ML
0.5 INJECTION, SOLUTION EPIDURAL; INFILTRATION; INTRACAUDAL; PERINEURAL ONCE AS NEEDED
Status: COMPLETED | OUTPATIENT
Start: 2024-11-21 | End: 2024-11-21

## 2024-11-21 RX ORDER — BUPIVACAINE HCL/0.9 % NACL/PF 0.125 %
PLASTIC BAG, INJECTION (ML) EPIDURAL AS NEEDED
Status: DISCONTINUED | OUTPATIENT
Start: 2024-11-21 | End: 2024-11-21 | Stop reason: SURG

## 2024-11-21 RX ORDER — HYDROMORPHONE HYDROCHLORIDE 1 MG/ML
0.5 INJECTION, SOLUTION INTRAMUSCULAR; INTRAVENOUS; SUBCUTANEOUS
Status: DISCONTINUED | OUTPATIENT
Start: 2024-11-21 | End: 2024-11-25 | Stop reason: HOSPADM

## 2024-11-21 RX ORDER — ONDANSETRON 2 MG/ML
INJECTION INTRAMUSCULAR; INTRAVENOUS AS NEEDED
Status: DISCONTINUED | OUTPATIENT
Start: 2024-11-21 | End: 2024-11-21 | Stop reason: SDUPTHER

## 2024-11-21 RX ORDER — MIDAZOLAM HYDROCHLORIDE 1 MG/ML
0.5 INJECTION, SOLUTION INTRAMUSCULAR; INTRAVENOUS
Status: DISCONTINUED | OUTPATIENT
Start: 2024-11-21 | End: 2024-11-21 | Stop reason: HOSPADM

## 2024-11-21 RX ORDER — SODIUM CHLORIDE, SODIUM LACTATE, POTASSIUM CHLORIDE, CALCIUM CHLORIDE 600; 310; 30; 20 MG/100ML; MG/100ML; MG/100ML; MG/100ML
9 INJECTION, SOLUTION INTRAVENOUS CONTINUOUS
Status: DISCONTINUED | OUTPATIENT
Start: 2024-11-21 | End: 2024-11-22 | Stop reason: HOSPADM

## 2024-11-21 RX ORDER — HYDROCODONE BITARTRATE AND ACETAMINOPHEN 5; 325 MG/1; MG/1
1 TABLET ORAL ONCE AS NEEDED
Status: DISCONTINUED | OUTPATIENT
Start: 2024-11-21 | End: 2024-11-25 | Stop reason: HOSPADM

## 2024-11-21 RX ORDER — FENTANYL CITRATE 50 UG/ML
INJECTION, SOLUTION INTRAMUSCULAR; INTRAVENOUS AS NEEDED
Status: DISCONTINUED | OUTPATIENT
Start: 2024-11-21 | End: 2024-11-21 | Stop reason: SDUPTHER

## 2024-11-21 RX ORDER — SODIUM CHLORIDE 0.9 % (FLUSH) 0.9 %
3 SYRINGE (ML) INJECTION EVERY 12 HOURS SCHEDULED
Status: DISCONTINUED | OUTPATIENT
Start: 2024-11-21 | End: 2024-11-25 | Stop reason: HOSPADM

## 2024-11-21 RX ORDER — LABETALOL HYDROCHLORIDE 5 MG/ML
5 INJECTION, SOLUTION INTRAVENOUS
Status: DISCONTINUED | OUTPATIENT
Start: 2024-11-21 | End: 2024-11-25 | Stop reason: HOSPADM

## 2024-11-21 RX ORDER — PROPOFOL 10 MG/ML
VIAL (ML) INTRAVENOUS AS NEEDED
Status: DISCONTINUED | OUTPATIENT
Start: 2024-11-21 | End: 2024-11-21 | Stop reason: SURG

## 2024-11-21 RX ORDER — SODIUM CHLORIDE 0.9 % (FLUSH) 0.9 %
3-10 SYRINGE (ML) INJECTION AS NEEDED
Status: DISCONTINUED | OUTPATIENT
Start: 2024-11-21 | End: 2024-11-25 | Stop reason: HOSPADM

## 2024-11-21 RX ORDER — HYDRALAZINE HYDROCHLORIDE 20 MG/ML
5 INJECTION INTRAMUSCULAR; INTRAVENOUS
Status: DISCONTINUED | OUTPATIENT
Start: 2024-11-21 | End: 2024-11-25 | Stop reason: HOSPADM

## 2024-11-21 RX ORDER — HYDROCODONE BITARTRATE AND ACETAMINOPHEN 5; 325 MG/1; MG/1
1 TABLET ORAL EVERY 6 HOURS PRN
Qty: 15 TABLET | Refills: 0 | Status: SHIPPED | OUTPATIENT
Start: 2024-11-21 | End: 2024-11-25

## 2024-11-21 RX ORDER — NALOXONE HCL 0.4 MG/ML
0.4 VIAL (ML) INJECTION AS NEEDED
Status: DISCONTINUED | OUTPATIENT
Start: 2024-11-21 | End: 2024-11-25 | Stop reason: HOSPADM

## 2024-11-21 RX ORDER — BUPIVACAINE HYDROCHLORIDE AND EPINEPHRINE 2.5; 5 MG/ML; UG/ML
INJECTION, SOLUTION INFILTRATION; PERINEURAL AS NEEDED
Status: DISCONTINUED | OUTPATIENT
Start: 2024-11-21 | End: 2024-11-21 | Stop reason: HOSPADM

## 2024-11-21 RX ORDER — PROMETHAZINE HYDROCHLORIDE 25 MG/1
25 SUPPOSITORY RECTAL ONCE AS NEEDED
Status: DISCONTINUED | OUTPATIENT
Start: 2024-11-21 | End: 2024-11-25 | Stop reason: HOSPADM

## 2024-11-21 RX ORDER — DROPERIDOL 2.5 MG/ML
0.62 INJECTION, SOLUTION INTRAMUSCULAR; INTRAVENOUS ONCE AS NEEDED
Status: DISCONTINUED | OUTPATIENT
Start: 2024-11-21 | End: 2024-11-25 | Stop reason: HOSPADM

## 2024-11-21 RX ORDER — FAMOTIDINE 20 MG/1
20 TABLET, FILM COATED ORAL ONCE
Status: COMPLETED | OUTPATIENT
Start: 2024-11-21 | End: 2024-11-21

## 2024-11-21 RX ORDER — DEXAMETHASONE SODIUM PHOSPHATE 4 MG/ML
INJECTION, SOLUTION INTRA-ARTICULAR; INTRALESIONAL; INTRAMUSCULAR; INTRAVENOUS; SOFT TISSUE AS NEEDED
Status: DISCONTINUED | OUTPATIENT
Start: 2024-11-21 | End: 2024-11-21 | Stop reason: SURG

## 2024-11-21 RX ORDER — LIDOCAINE HYDROCHLORIDE 10 MG/ML
INJECTION, SOLUTION EPIDURAL; INFILTRATION; INTRACAUDAL; PERINEURAL AS NEEDED
Status: DISCONTINUED | OUTPATIENT
Start: 2024-11-21 | End: 2024-11-21 | Stop reason: SURG

## 2024-11-21 RX ORDER — SODIUM CHLORIDE, SODIUM LACTATE, POTASSIUM CHLORIDE, CALCIUM CHLORIDE 600; 310; 30; 20 MG/100ML; MG/100ML; MG/100ML; MG/100ML
9 INJECTION, SOLUTION INTRAVENOUS CONTINUOUS
Status: DISCONTINUED | OUTPATIENT
Start: 2024-11-22 | End: 2024-11-22 | Stop reason: HOSPADM

## 2024-11-21 RX ORDER — DROPERIDOL 2.5 MG/ML
0.62 INJECTION, SOLUTION INTRAMUSCULAR; INTRAVENOUS
Status: DISCONTINUED | OUTPATIENT
Start: 2024-11-21 | End: 2024-11-25 | Stop reason: HOSPADM

## 2024-11-21 RX ORDER — ONDANSETRON 2 MG/ML
4 INJECTION INTRAMUSCULAR; INTRAVENOUS ONCE AS NEEDED
Status: DISCONTINUED | OUTPATIENT
Start: 2024-11-21 | End: 2024-11-25 | Stop reason: HOSPADM

## 2024-11-21 RX ADMIN — SODIUM CHLORIDE, SODIUM LACTATE, POTASSIUM CHLORIDE, CALCIUM CHLORIDE 9 ML/HR: 20; 30; 600; 310 INJECTION, SOLUTION INTRAVENOUS at 06:51

## 2024-11-21 RX ADMIN — LIDOCAINE HYDROCHLORIDE 0.5 ML: 10 INJECTION, SOLUTION EPIDURAL; INFILTRATION; INTRACAUDAL; PERINEURAL at 06:52

## 2024-11-21 RX ADMIN — ONDANSETRON 4 MG: 2 INJECTION INTRAMUSCULAR; INTRAVENOUS at 07:51

## 2024-11-21 RX ADMIN — LIDOCAINE HYDROCHLORIDE 100 MG: 10 INJECTION, SOLUTION EPIDURAL; INFILTRATION; INTRACAUDAL; PERINEURAL at 07:42

## 2024-11-21 RX ADMIN — PROPOFOL 150 MG: 10 INJECTION, EMULSION INTRAVENOUS at 07:42

## 2024-11-21 RX ADMIN — SUGAMMADEX 200 MG: 100 INJECTION, SOLUTION INTRAVENOUS at 08:35

## 2024-11-21 RX ADMIN — DEXAMETHASONE SODIUM PHOSPHATE 8 MG: 4 INJECTION INTRA-ARTICULAR; INTRALESIONAL; INTRAMUSCULAR; INTRAVENOUS; SOFT TISSUE at 07:50

## 2024-11-21 RX ADMIN — Medication 100 MCG: at 08:15

## 2024-11-21 RX ADMIN — FAMOTIDINE 20 MG: 20 TABLET, FILM COATED ORAL at 06:52

## 2024-11-21 RX ADMIN — Medication 100 MCG: at 08:00

## 2024-11-21 RX ADMIN — FENTANYL CITRATE 100 MCG: 50 INJECTION, SOLUTION INTRAMUSCULAR; INTRAVENOUS at 07:42

## 2024-11-21 RX ADMIN — ROCURONIUM BROMIDE 50 MG: 10 INJECTION INTRAVENOUS at 07:42

## 2024-11-21 NOTE — OP NOTE
COLONOSCOPY, RECTAL FISTULECTOMY  Procedure Report    Patient Name:  Serenity Lagos  YOB: 1940    Date of Surgery:  11/21/2024       Pre-op Diagnosis:   Fistula in ano  Screening colonoscopy  Post-op Diagnosis:     Normal colon  Posterior anal fistula  Hypertrophied anal papilla  Procedure:   Colonoscopy to terminal ileum  Anal fistulectomy  Excision of anal papilla        Staff:  Surgeon(s):  Patrick Walters MD               Anesthesia: General with 20 mL colon percent Marcaine with epinephrine in the perianal skin and submucosal planes ASA Score: III     Estimated Blood Loss: minimal    Specimen:    Specimens       ID Source Type Tests Collected By Collected At Frozen?    A Large Intestine, Rectum Tissue TISSUE PATHOLOGY EXAM   Patrick Walters MD 11/21/24 5528     Description: rectal papilla and fistula track for permanent              Findings: The patient had healthy appearing colonic and terminal ileal mucosa without tumor mass or polyp.  The patient had a 1-1/2 cm long fistula with an external opening in the left posterolateral location.  This probed underneath the skin and subcutaneous tissue and a small amount of the internal sphincter mechanism to a internal opening in the posterior anal canal at the dentate line.  There is a very small fissure located at this location as well.  Proximal to this was a hypertrophied papilla.          Complications: None          Description of Procedure: After the patient was properly identified she was brought to the operating room and placed in comfortable supine position.  Once anesthesia been obtained timeout was performed.  The patient was placed in lithotomy position in candycane stirrups.  The anal Was examined the findings noted above.  The lubricated Olympus video colonoscope inserted into the anus and advanced without difficulty to the cecum.  The ileocecal valve and appendiceal orifice were identified and the terminal ileum was intubated for  approximately 5 cm.  The colonoscope was slowly withdrawn examining coastal surfaces.  There were no tumors masses polyps or diverticula seen.  The colonoscope was fully withdrawn.    Following this the perianal skin was prepped and sterilely draped.  The anal canal was examined with the findings noted above.  The fistula was probed and the overlying skin, subcutaneous tissue and a very small amount of the internal sphincter mechanism was divided.  The hypertrophied papilla was excised.  Hemostasis was obtained using electrocautery.  A small biopsy of the fistula tract was taken.  Good hemostasis was noted.  The sponge needle count was correct.  A sterile dressing was applied.  She was taken the recovery room in good condition.      Patrick Walters MD     Date: 11/21/2024  Time: 08:44 EST

## 2024-11-21 NOTE — ANESTHESIA PROCEDURE NOTES
Airway  Urgency: elective    Date/Time: 11/21/2024 7:44 AM  Airway not difficult    General Information and Staff    Patient location during procedure: OR  CRNA/CAA: Manfred Mackey CRNA    Indications and Patient Condition  Indications for airway management: airway protection    Preoxygenated: yes  MILS not maintained throughout  Mask difficulty assessment: 2 - vent by mask + OA or adjuvant +/- NMBA    Final Airway Details  Final airway type: endotracheal airway      Successful airway: ETT  Cuffed: yes   Successful intubation technique: direct laryngoscopy  Endotracheal tube insertion site: oral  Blade: Alex  Blade size: 3  ETT size (mm): 7.0  Cormack-Lehane Classification: grade IIb - view of arytenoids or posterior of glottis only  Placement verified by: chest auscultation and capnometry   Measured from: lips  ETT/EBT  to lips (cm): 20  Number of attempts at approach: 1  Assessment: lips, teeth, and gum same as pre-op and atraumatic intubation    Additional Comments  Negative epigastric sounds, Breath sound equal bilaterally with symmetric chest rise and fall

## 2024-11-21 NOTE — ANESTHESIA PROCEDURE NOTES
"Peripheral Block      Patient reassessed immediately prior to procedure    Patient location during procedure: OR  Reason for block: at surgeon's request and post-op pain management  Preanesthetic Checklist  Completed: patient identified, IV checked, site marked, risks and benefits discussed, surgical consent, monitors and equipment checked, pre-op evaluation and timeout performed  Prep:  Pt Position: supine  Sterile barriers:cap, gloves, mask and washed/disinfected hands  Prep: ChloraPrep  Patient monitoring: blood pressure monitoring, continuous pulse oximetry and EKG  Procedure    Sedation: yes  Performed under: general  Guidance:ultrasound guided  Images:still images obtained, printed/placed on chart    Laterality:Bilateral  Block Type:TAP  Injection Technique:single-shot  Needle Type:short-bevel and echogenic  Needle Gauge:20 G  Resistance on Injection: none          Medications  Comment:Block Injection:  LA dose divided between Right and Left block        Post Assessment  Injection Assessment: negative aspiration for heme, incremental injection and no paresthesia on injection  Patient Tolerance:comfortable throughout block  Complications:no  Additional Notes    Mid-Axillary/Lateral TAPs    A high-frequency linear transducer, with sterile cover, was placed in the midaxillary line between the ASIS and costal margin. The External Oblique Muscle (EOM), Internal Oblique Muscle (IOM), Transverse Abdominus Muscle (FAM), and Peritoneum were identified. The insertion site was prepped in sterile fashion and then localized with 2-5 ml of 1% Lidocaine. Using ultrasound-guidance, a 20-gauge B-Jackson 4\" Ultraplex 360 non-stimulating echogenic needle was advanced in plane, from medial to lateral, until the tip of the needle was in the fascial plane between the IOM and FAM. 1-3ml of preservative free normal saline was used to hydro-dissect the fascial planes. After the fascial plane was verified, the local anesthetic (LA) was " injected. The procedure was repeated on the opposite side for bilateral coverage. Aspiration every 5 ml to prevent intravascular injection. Injection was completed with negative aspiration of blood and negative intravascular injection. Injection pressures were normal with minimal resistance. Midaxillary TAPs should reach intercostal nerves T10- T11 and the subcostal nerve T12.    Performed by: Manfred Mackey, CRNA

## 2024-11-21 NOTE — ANESTHESIA POSTPROCEDURE EVALUATION
Patient: Serenity Lagos    Procedure Summary       Date: 11/21/24 Room / Location:  [facility] /  [facility]    Anesthesia Start: 0735 Anesthesia Stop: 0848    Procedures:       COLONOSCOPY      RECTAL FISTULECTOMY (Rectum) Diagnosis:     Surgeons: Patrick Wlaters MD Provider: Devante Iniguez MD    Anesthesia Type: general ASA Status: 3            Anesthesia Type: general    Vitals  Vitals Value Taken Time   BP     Temp     Pulse 80 11/21/24 0847   Resp     SpO2 100 % 11/21/24 0847   Vitals shown include unfiled device data.        Post Anesthesia Care and Evaluation    Patient location during evaluation: PACU  Patient participation: complete - patient participated  Level of consciousness: awake and alert  Pain management: adequate    Airway patency: patent  Anesthetic complications: No anesthetic complications  PONV Status: none  Cardiovascular status: hemodynamically stable and acceptable  Respiratory status: nonlabored ventilation, acceptable and nasal cannula  Hydration status: acceptable

## 2024-11-21 NOTE — ANESTHESIA PREPROCEDURE EVALUATION
" Anesthesia Evaluation     Patient summary reviewed and Nursing notes reviewed   NPO Solid Status: > 8 hours  NPO Liquid Status: > 2 hours           Airway   Mallampati: III  TM distance: >3 FB  Neck ROM: full  Possible difficult intubation  Dental          Pulmonary    (+) a smoker (remote),  (-) asthma, shortness of breath, no home oxygen  Cardiovascular     ECG reviewed    (+) hypertension, hyperlipidemia  (-) CAD, angina, cardiac stents    ROS comment: ECG SR  1st degree AV block  LVH- normal variant  NS ST abnormality    ECHO good EF ow normal   MPS 2022 wnl good EF       Neuro/Psych  (-) CVASeizures: denies.  GI/Hepatic/Renal/Endo    (+) obesity, GERD, thyroid problem hypothyroidism  (-) morbid obesity, no renal disease, diabetes    Musculoskeletal     (+) neck pain  Abdominal    Substance History      OB/GYN          Other   arthritis, blood dyscrasia anemia,   history of cancer (breast)    ROS/Med Hx Other: HCT 35 ow normal labs       Phys Exam Other: R incisor single -partial non removeable               Anesthesia Plan    ASA 3     general     ([place] (teeth care) R Upper  crown     Discuss Block status as scheduled for \" Block \")  intravenous induction     Anesthetic plan, risks, benefits, and alternatives have been provided, discussed and informed consent has been obtained with: patient.    Plan discussed with CRNA.      CODE STATUS:         "

## 2024-11-22 LAB
CYTO UR: NORMAL
LAB AP CASE REPORT: NORMAL
LAB AP CLINICAL INFORMATION: NORMAL
PATH REPORT.FINAL DX SPEC: NORMAL
PATH REPORT.GROSS SPEC: NORMAL

## 2024-11-24 ENCOUNTER — TELEPHONE (OUTPATIENT)
Dept: RADIATION ONCOLOGY | Facility: HOSPITAL | Age: 84
End: 2024-11-24
Payer: MEDICARE

## 2024-11-24 NOTE — TELEPHONE ENCOUNTER
Called patient to schedule Re-Evaluation with Dr. Dudley for Wednesday, 12/11/24 at 12:30 pm.  Patient requested this specific day as she wants all appointments at [facility].  Patient understands that Dr. Dudley has Breast Conference from 12:30 pm to 1:00 pm and will not see Dr. Dudley until she returns from the conference and is agreeable to wait.  Patient verbalized an understanding of date, time, and location of appointment.  Contact number provided and patient will call with questions or concerns.

## 2024-11-25 ENCOUNTER — OFFICE VISIT (OUTPATIENT)
Dept: INTERNAL MEDICINE | Facility: CLINIC | Age: 84
End: 2024-11-25
Payer: MEDICARE

## 2024-11-25 ENCOUNTER — DOCUMENTATION (OUTPATIENT)
Dept: OTHER | Facility: HOSPITAL | Age: 84
End: 2024-11-25
Payer: MEDICARE

## 2024-11-25 VITALS
DIASTOLIC BLOOD PRESSURE: 80 MMHG | SYSTOLIC BLOOD PRESSURE: 162 MMHG | BODY MASS INDEX: 33.75 KG/M2 | WEIGHT: 183.4 LBS | OXYGEN SATURATION: 98 % | HEIGHT: 62 IN | HEART RATE: 66 BPM | TEMPERATURE: 98.4 F

## 2024-11-25 DIAGNOSIS — K60.30 ANAL FISTULA: ICD-10-CM

## 2024-11-25 DIAGNOSIS — I25.10 CORONARY ARTERY CALCIFICATION OF NATIVE ARTERY: Chronic | ICD-10-CM

## 2024-11-25 DIAGNOSIS — E03.9 HYPOTHYROIDISM (ACQUIRED): ICD-10-CM

## 2024-11-25 DIAGNOSIS — D05.12 DUCTAL CARCINOMA IN SITU (DCIS) OF LEFT BREAST: ICD-10-CM

## 2024-11-25 DIAGNOSIS — K59.01 SLOW TRANSIT CONSTIPATION: Chronic | ICD-10-CM

## 2024-11-25 DIAGNOSIS — Z00.00 ANNUAL PHYSICAL EXAM: ICD-10-CM

## 2024-11-25 DIAGNOSIS — Z00.00 MEDICARE ANNUAL WELLNESS VISIT, SUBSEQUENT: Primary | ICD-10-CM

## 2024-11-25 DIAGNOSIS — E78.5 HYPERLIPIDEMIA LDL GOAL <70: ICD-10-CM

## 2024-11-25 DIAGNOSIS — E66.811 CLASS 1 OBESITY DUE TO EXCESS CALORIES WITH SERIOUS COMORBIDITY AND BODY MASS INDEX (BMI) OF 33.0 TO 33.9 IN ADULT: ICD-10-CM

## 2024-11-25 DIAGNOSIS — I25.84 CORONARY ARTERY CALCIFICATION OF NATIVE ARTERY: Chronic | ICD-10-CM

## 2024-11-25 DIAGNOSIS — E66.09 CLASS 1 OBESITY DUE TO EXCESS CALORIES WITH SERIOUS COMORBIDITY AND BODY MASS INDEX (BMI) OF 33.0 TO 33.9 IN ADULT: ICD-10-CM

## 2024-11-25 DIAGNOSIS — I10 BENIGN ESSENTIAL HYPERTENSION: ICD-10-CM

## 2024-11-25 DIAGNOSIS — Z91.81 AT RISK FOR FALLS: Chronic | ICD-10-CM

## 2024-11-25 DIAGNOSIS — K21.9 GERD WITHOUT ESOPHAGITIS: ICD-10-CM

## 2024-11-25 PROCEDURE — 3079F DIAST BP 80-89 MM HG: CPT | Performed by: INTERNAL MEDICINE

## 2024-11-25 PROCEDURE — 99397 PER PM REEVAL EST PAT 65+ YR: CPT | Performed by: INTERNAL MEDICINE

## 2024-11-25 PROCEDURE — 1126F AMNT PAIN NOTED NONE PRSNT: CPT | Performed by: INTERNAL MEDICINE

## 2024-11-25 PROCEDURE — 1159F MED LIST DOCD IN RCRD: CPT | Performed by: INTERNAL MEDICINE

## 2024-11-25 PROCEDURE — 3077F SYST BP >= 140 MM HG: CPT | Performed by: INTERNAL MEDICINE

## 2024-11-25 PROCEDURE — G0439 PPPS, SUBSEQ VISIT: HCPCS | Performed by: INTERNAL MEDICINE

## 2024-11-25 PROCEDURE — 1160F RVW MEDS BY RX/DR IN RCRD: CPT | Performed by: INTERNAL MEDICINE

## 2024-11-25 RX ORDER — LACTOBACILLUS RHAMNOSUS GG 10B CELL
1 CAPSULE ORAL DAILY
Start: 2024-11-25

## 2024-11-25 RX ORDER — OMEPRAZOLE 40 MG/1
40 CAPSULE, DELAYED RELEASE ORAL DAILY
Qty: 90 CAPSULE | Refills: 3 | Status: SHIPPED | OUTPATIENT
Start: 2024-11-25

## 2024-11-25 RX ORDER — TELMISARTAN 80 MG/1
80 TABLET ORAL DAILY
Qty: 30 TABLET | Refills: 5 | Status: SHIPPED | OUTPATIENT
Start: 2024-11-25

## 2024-11-25 RX ORDER — CYANOCOBALAMIN (VITAMIN B-12) 500 MCG
500 TABLET ORAL DAILY
Start: 2024-11-25

## 2024-11-25 RX ORDER — ASPIRIN 81 MG
200 TABLET, DELAYED RELEASE (ENTERIC COATED) ORAL 2 TIMES DAILY
Qty: 360 TABLET | Refills: 3 | Status: SHIPPED | OUTPATIENT
Start: 2024-11-25

## 2024-11-25 RX ORDER — POLYETHYLENE GLYCOL 3350 17 G/17G
17 POWDER, FOR SOLUTION ORAL DAILY
Start: 2024-11-25

## 2024-11-25 RX ORDER — LEVOTHYROXINE SODIUM 112 UG/1
112 TABLET ORAL DAILY
Qty: 90 TABLET | Refills: 3 | Status: SHIPPED | OUTPATIENT
Start: 2024-11-25

## 2024-11-25 NOTE — PROGRESS NOTES
Distress Screening Follow-up    Name: Serenity Lagos    : 1940    Diagnosis: Ductal carcinoma in situ (DCIS) of left breast     Location of Distress Screening: Radiation Oncology    Distress Level: 6 (2024  1:46 PM)       Interventions:        Comments:   SW called to follow up with pt regarding recent distress screen results. SW left a VM with pt introducing self, and offering additional support. SW provided call back number, ([phone]) and encouraged pt to reach out at her convenience.     SW will remain available to offer support.     Leti Brambila, BISMARK  Oncology Social Worker

## 2024-11-25 NOTE — PROGRESS NOTES
Subjective   The ABCs of the Annual Wellness Visit  Medicare Wellness Visit      Serenity Lagos is a 84 y.o. patient who presents for a Medicare Wellness Visit.    The following portions of the patient's history were reviewed and   updated as appropriate: allergies, current medications, past family history, past medical history, past social history, past surgical history, and problem list.    Compared to one year ago, the patient's physical   health is the same.  Compared to one year ago, the patient's mental   health is the same.    Recent Hospitalizations:  This patient has had a [facility] admission record on file within the last 365 days.  Current Medical Providers:  Patient Care Team:  Bernadette Somers MD as PCP - General (Internal Medicine)  Syed Hooker MD as Consulting Physician (Cardiology)  Celio Iniguez MD as Consulting Physician (Ophthalmology)  Ender Jolly MD as Consulting Physician (Dermatology)  Jil Arreola APRN as Nurse Practitioner (Nurse Practitioner)  Josh Noyola Jr., MD as Consulting Physician (Hand Surgery)  Samina Dudley MD as Consulting Physician (Radiation Oncology)  Sasha Gonzalez MD as Referring Physician (General Surgery)  Patrick Walters MD as Consulting Physician (Colon and Rectal Surgery)    Facility-Administered Medications Prior to Visit   Medication Dose Route Frequency Provider Last Rate Last Admin    droperidol (INAPSINE) injection 0.625 mg  0.625 mg Intravenous Q15 Min PRN Manfred Mackey CRNA        Or    droperidol (INAPSINE) injection 0.625 mg  0.625 mg Intramuscular Once PRN Manfred Mackey CRNA        fentaNYL citrate (PF) (SUBLIMAZE) injection 50 mcg  50 mcg Intravenous Q5 Min PRN Manfred Mackey CRNA        hydrALAZINE (APRESOLINE) injection 5 mg  5 mg Intravenous Q10 Min PRN Manfred Mackey CRNA        HYDROcodone-acetaminophen (NORCO) 5-325 MG per tablet 1 tablet  1 tablet Oral Once PRN Key  Manfred GALVAN CRNA        HYDROmorphone (DILAUDID) injection 0.5 mg  0.5 mg Intravenous Q10 Min PRN Manfred Mackey CRNA        ipratropium-albuterol (DUO-NEB) nebulizer solution 3 mL  3 mL Nebulization Once PRN Manfred Mackey CRNA        labetalol (NORMODYNE,TRANDATE) injection 5 mg  5 mg Intravenous Q5 Min PRN Manfred Mackey CRNA        lactated ringers bolus 250 mL  250 mL Intravenous Once PRN Manfred Mackey CRNA        naloxone (NARCAN) injection 0.4 mg  0.4 mg Intravenous PRN Manfred Mackey CRNA        ondansetron (ZOFRAN) injection 4 mg  4 mg Intravenous Once PRN Manfred Mackey CRNA        promethazine (PHENERGAN) suppository 25 mg  25 mg Rectal Once PRN Manfred Mackey CRNA        Or    promethazine (PHENERGAN) tablet 25 mg  25 mg Oral Once PRN Manfred Mackey CRNA        sodium chloride 0.9 % flush 3 mL  3 mL Intravenous Q12H Manfred Mackey CRNA        sodium chloride 0.9 % flush 3-10 mL  3-10 mL Intravenous PRN Manfred Mackey CRNA         Outpatient Medications Prior to Visit   Medication Sig Dispense Refill    acetaminophen (TYLENOL) 500 MG tablet Take 1 tablet by mouth Every 6 (Six) Hours As Needed for Mild Pain.      Glucosamine-Chondroitin (OSTEO BI-FLEX REGULAR STRENGTH PO) Take 1 tablet by mouth Daily. 1 daily      latanoprost (XALATAN) 0.005 % ophthalmic solution Administer 1 drop to both eyes Every Night.      metoprolol succinate XL (TOPROL-XL) 50 MG 24 hr tablet TAKE 1 TABLET DAILY 90 tablet 1    raloxifene (EVISTA) 60 MG tablet Take 1 tablet by mouth Daily. 90 tablet 3    Docusate Sodium 100 MG capsule Take 1 tablet by mouth 2 (Two) Times a Day.      HYDROcodone-acetaminophen (NORCO) 5-325 MG per tablet Take 1 tablet by mouth Every 6 (Six) Hours As Needed for Moderate Pain (Pain) for up to 4 days. Do not drive if taking medication.  Take only for postoperative pain. 15 tablet 0    levothyroxine (SYNTHROID, LEVOTHROID) 112 MCG tablet  Take 1 tablet by mouth Daily. 90 tablet 3    losartan (COZAAR) 100 MG tablet Take 1 tablet by mouth Daily. 90 tablet 3    omeprazole (priLOSEC) 40 MG capsule TAKE 1 CAPSULE DAILY (Patient taking differently: Take 1 capsule by mouth Daily.) 90 capsule 3    gentamicin (GARAMYCIN) 0.3 % ophthalmic solution Instill 2 drops in both ears 3 times a day 5 mL 0     Opioid medication/s are on active medication list.  and I have evaluated her active treatment plan and pain score trends (see table).  Vitals:    11/25/24 1402   PainSc: 0-No pain     I have reviewed the chart for potential of high risk medication and harmful drug interactions in the elderly.        Aspirin is not on active medication list.  Aspirin use is not indicated based on review of current medical condition/s. Risk of harm outweighs potential benefits.  .    Patient Active Problem List   Diagnosis    Osteopenia after menopause    Chronic pruritus    Hypothyroidism (acquired)    Benign essential hypertension    Pedal edema    Hyperlipidemia LDL goal <70    Palpitations    Class 1 obesity due to excess calories with body mass index (BMI) of 33.0 to 33.9 in adult    Coronary artery calcification of native artery    Bilateral ocular hypertension    Annual physical exam    Medicare annual wellness visit, subsequent    At risk for falls    Perennial allergic rhinitis with seasonal variation    Slow transit constipation    Left foot pain    Fall    Left leg swelling    Perirectal abscess    Breast calcification, left    GERD without esophagitis    Hyponatremia    Alcohol use    Seizures    Ductal carcinoma in situ (DCIS) of left breast    Poor appetite    Anal fistula    Hypertrophied anal papilla     Advance Care Planning Advance Directive is not on file.  ACP discussion was held with the patient during this visit. Patient does not have an advance directive, information provided.            Objective   Vitals:    11/25/24 1402   BP: 162/80   BP Location: Left arm  "  Patient Position: Sitting   Cuff Size: Adult   Pulse: 66   Temp: 98.4 °F (36.9 °C)   TempSrc: Infrared   SpO2: 98%   Weight: 83.2 kg (183 lb 6.4 oz)   Height: 157.5 cm (62.01\")   PainSc: 0-No pain       Estimated body mass index is 33.54 kg/m² as calculated from the following:    Height as of this encounter: 157.5 cm (62.01\").    Weight as of this encounter: 83.2 kg (183 lb 6.4 oz).            Does the patient have evidence of cognitive impairment? No  Lab Results   Component Value Date    TRIG 104 2024    HDL 59 2024    LDL 98 2024    VLDL 19 2024    HGBA1C 5.80 (H) 2024                                                                                                Health  Risk Assessment    Smoking Status:  Social History     Tobacco Use   Smoking Status Former    Current packs/day: 0.00    Average packs/day: 1 pack/day for 20.0 years (20.0 ttl pk-yrs)    Types: Cigarettes    Start date: 1960    Quit date: 1980    Years since quittin.9   Smokeless Tobacco Never     Alcohol Consumption:  Social History     Substance and Sexual Activity   Alcohol Use Yes    Alcohol/week: 7.0 standard drinks of alcohol    Types: 7 Glasses of wine per week    Comment: 2 glasses wine  with dinner every day.  grand total 8-9oz       Fall Risk Screen  STEADI Fall Risk Assessment was completed, and patient is at LOW risk for falls.Assessment completed on:2024    Depression Screening   Little interest or pleasure in doing things? Not at all   Feeling down, depressed, or hopeless? Not at all   PHQ-2 Total Score 0      Health Habits and Functional and Cognitive Screenin/18/2024     4:20 PM   Functional & Cognitive Status   Do you have difficulty preparing food and eating? No    Do you have difficulty bathing yourself, getting dressed or grooming yourself? No    Do you have difficulty using the toilet? No    Do you have difficulty moving around from place to place? No    Do you have " trouble with steps or getting out of a bed or a chair? Yes    Current Diet Well Balanced Diet    Dental Exam Up to date    Eye Exam Up to date    Exercise (times per week) 0 times per week    Current Exercises Include No Regular Exercise    Do you need help using the phone?  No    Are you deaf or do you have serious difficulty hearing?  No    Do you need help to go to places out of walking distance? Yes    Do you need help shopping? No    Do you need help preparing meals?  No    Do you need help with housework?  Yes    Do you need help with laundry? Yes    Do you need help taking your medications? No    Do you need help managing money? No    Do you ever drive or ride in a car without wearing a seat belt? No    Have you felt unusual stress, anger or loneliness in the last month? No    Who do you live with? Spouse    If you need help, do you have trouble finding someone available to you? No    Have you been bothered in the last four weeks by sexual problems? No    Do you have difficulty concentrating, remembering or making decisions? No        Patient-reported           Age-appropriate Screening Schedule:  Refer to the list below for future screening recommendations based on patient's age, sex and/or medical conditions. Orders for these recommended tests are listed in the plan section. The patient has been provided with a written plan.    Health Maintenance List  Health Maintenance   Topic Date Due    RSV Vaccine - Adults (1 - 1-dose 75+ series) Never done    DXA SCAN  06/15/2025    BMI FOLLOWUP  10/08/2025    LIPID PANEL  11/18/2025    ANNUAL WELLNESS VISIT  11/25/2025    COLORECTAL CANCER SCREENING  11/21/2029    TDAP/TD VACCINES (2 - Td or Tdap) 10/10/2031    COVID-19 Vaccine  Completed    INFLUENZA VACCINE  Completed    Pneumococcal Vaccine 65+  Completed    MAMMOGRAM  Discontinued    ZOSTER VACCINE  Discontinued                                                                                                                                                 CMS Preventative Services Quick Reference  Risk Factors Identified During Encounter  Fall Risk-High or Moderate: Discussed Fall Prevention in the home and Information on Fall Prevention Shared in After Visit Summary    The above risks/problems have been discussed with the patient.  Pertinent information has been shared with the patient in the After Visit Summary.  An After Visit Summary and PPPS were made available to the patient.    Follow Up:   Next Medicare Wellness visit to be scheduled in 1 year.         Additional E&M Note during same encounter follows:  Patient has additional, significant, and separately identifiable condition(s)/problem(s) that require work above and beyond the Medicare Wellness Visit     Chief Complaint  Medicare Wellness-subsequent and Hypertension    Subjective    HPI  Lilliana is also being seen today for an annual adult preventative physical exam.        The patient presents for Medicare annual wellness visit and annual physical exam.    She is current with her immunizations, excluding the RSV. Her mammogram, colonoscopy, and DEXA are all up to date.    Recent hospitalization for perirectal abscess and anal fistula.  Status post repair by Dr. Walters.  She is still having some pain and slow healing.  Stools are hard and difficult to pass.  For constipation she is taking a probiotic and stool softener.  She tries to drink a lot of fluids.      She is taking medication for blood pressure including losartan and metoprolol.  She does avoid salt in the diet and avoids sodas.  Blood pressures are not very well-controlled.    Hyperlipidemia, lifestyle control.  She does try to eat less fats and carbohydrates.  She is not very physically active however.    Taking levothyroxine daily.  Energy is pretty good.          Objective   Vital Signs:  /80 (BP Location: Left arm, Patient Position: Sitting, Cuff Size: Adult)   Pulse 66   Temp 98.4 °F (36.9 °C)  "(Infrared)   Ht 157.5 cm (62.01\")   Wt 83.2 kg (183 lb 6.4 oz)   SpO2 98%   BMI 33.54 kg/m²   Physical Exam  Vitals and nursing note reviewed.   Constitutional:       Appearance: She is well-developed.   HENT:      Head: Normocephalic.   Eyes:      Conjunctiva/sclera: Conjunctivae normal.      Pupils: Pupils are equal, round, and reactive to light.   Neck:      Thyroid: No thyromegaly.   Cardiovascular:      Rate and Rhythm: Normal rate and regular rhythm.      Heart sounds: Normal heart sounds.   Pulmonary:      Effort: Pulmonary effort is normal.      Breath sounds: Normal breath sounds. No wheezing.   Abdominal:      General: Bowel sounds are normal.      Palpations: Abdomen is soft.      Tenderness: There is no abdominal tenderness.   Musculoskeletal:         General: No tenderness. Normal range of motion.      Cervical back: Normal range of motion and neck supple.      Right lower leg: No edema.      Left lower leg: No edema.   Lymphadenopathy:      Cervical: No cervical adenopathy.   Skin:     General: Skin is warm and dry.      Findings: No rash.   Neurological:      Mental Status: She is alert and oriented to person, place, and time.      Cranial Nerves: No cranial nerve deficit.      Sensory: No sensory deficit.      Coordination: Coordination normal.      Gait: Gait abnormal.      Comments: ANTALGIC GAIT   Psychiatric:         Attention and Perception: Attention normal.         Mood and Affect: Mood normal.         Speech: Speech normal.         Behavior: Behavior normal.         Thought Content: Thought content normal.         Cognition and Memory: Cognition normal.         Judgment: Judgment normal.         Vital Signs  BMI is 32 or 33.    The following data was reviewed by: Bernadette Somers MD on 11/25/2024:    Results                Assessment and Plan Additional age appropriate preventative wellness advice topics were discussed during today's preventative wellness exam(some topics already " addressed during AWV portion of the note above):    Physical Activity: Advised cardiovascular activity 150 minutes per week as tolerated. (example brisk walk for 30 minutes, 5 days a week).     Nutrition: Discussed nutrition plan with patient. Information shared in after visit summary. Goal is for a well balanced diet to enhance overall health.     Healthy Weight: Discussed current and goal BMI with patient. Steps to attain this goal discussed. Information shared in after visit summary.    Problem List Items Addressed This Visit          Advance Directives and General Issues    At risk for falls (Chronic)       Cardiac and Vasculature    Coronary artery calcification of native artery (Chronic)    Overview     Stress test in September 2019 showed some calcification of the coronary arteries on the CT portion of the test.  The patient saw Dr. Hooker and was not felt to have significant coronary artery disease.    Not taking 81 mg enteric-coated aspirin daily.  Improve blood pressure control.  Improve low-fat diet.  Increase exercise.         Relevant Medications    metoprolol succinate XL (TOPROL-XL) 50 MG 24 hr tablet    labetalol (NORMODYNE,TRANDATE) injection 5 mg    Hyperlipidemia LDL goal <70    Overview     Lifestyle control.         Benign essential hypertension    Overview     Taking losartan 100mg and metoprolol  XL 50mg  daily.               Relevant Medications    metoprolol succinate XL (TOPROL-XL) 50 MG 24 hr tablet    labetalol (NORMODYNE,TRANDATE) injection 5 mg    hydrALAZINE (APRESOLINE) injection 5 mg    telmisartan (MICARDIS) 80 MG tablet       Endocrine and Metabolic    Class 1 obesity due to excess calories with body mass index (BMI) of 33.0 to 33.9 in adult    Hypothyroidism (acquired)    Overview     Levothyroxine 125mcg.         Relevant Medications    metoprolol succinate XL (TOPROL-XL) 50 MG 24 hr tablet    labetalol (NORMODYNE,TRANDATE) injection 5 mg    levothyroxine (SYNTHROID, LEVOTHROID)  112 MCG tablet       Gastrointestinal Abdominal     Slow transit constipation (Chronic)    Overview     Taking probiotic and 1 stool softener twice daily.         GERD without esophagitis    Relevant Medications    omeprazole (priLOSEC) 40 MG capsule    Anal fistula    Overview     S/p fistula repair by Dr. Walters 11/2024.            Health Encounters    Annual physical exam    Medicare annual wellness visit, subsequent - Primary       Hematology and Neoplasia    Ductal carcinoma in situ (DCIS) of left breast          1. Medicare annual wellness visit and annual physical exam.  She is up to date on immunizations except for RSV. It is recommended that she get it at her pharmacy soon. She is up to date on mammogram, colonoscopy, and DEXA.    2. Benign essential hypertension.  Blood pressure control needs improvement.  She will continue metoprolol every evening.  She will stop losartan and start taking telmisartan 80 mg tablet daily which is a similar medication but more potent. The prescription was sent to the pharmacy. She should continue to decrease salt in the diet and check blood pressures at home. Follow up with APRN in about 6 weeks.    3. Hyperlipidemia.  She will continue to improve her low-fat healthy diet and try to be more active around the house.    4. Coronary artery calcification of native artery.  She is to continue risk reduction with improved blood pressure control and improved cholesterol. She should try to eat a low-fat, healthy diet and be more physically active.    5. Slow transit constipation.  She is not having much stool and the stools are hard since she had anal fistula repair by Dr. Walters recently. She should increase fluid intake, drink more water, increase docusate sodium to two of the 100 mg capsules twice a day, continue taking probiotic daily, and add MiraLAX in hot coffee, tea, or water twice a day. If stools are not starting to move better by 3 days from now, she will give us a call.  Moving around the house more and trying to be a little more physically active will also help.    6. Class 1 obesity.  She has lost 4 pounds over the last few weeks. She will continue trying to improve her low-fat healthy diet, eat more fruits and vegetables, and get more fiber in the diet. She should try to be more physically active around the house.    7. Ductal carcinoma in situ.  She will be starting radiation treatment with Dr. Luis Enrique garvin.    8. Fall risk.  She should increase fluid intake and try to get some protein in the diet three times a day.    9. Hypothyroidism.  Continue current dose of levothyroxine daily.    10. GERD.  Continue omeprazole daily.    Follow-up  Return in 6 weeks for follow-up with APRN. She will see the doctor for a 6-month fasting follow-up in May 2025.            Follow Up   Return in about 6 weeks (around 1/6/2025) for Recheck BP with APC.  Patient was given instructions and counseling regarding her condition or for health maintenance advice. Please see specific information pulled into the AVS if appropriate.  Patient or patient representative verbalized consent for the use of Ambient Listening during the visit with  Bernadette Somers MD for chart documentation. 11/25/2024  15:02 EST

## 2024-11-27 RX ORDER — LOSARTAN POTASSIUM 100 MG/1
100 TABLET ORAL DAILY
Qty: 90 TABLET | Refills: 3 | OUTPATIENT
Start: 2024-11-27

## 2024-11-30 NOTE — PATIENT INSTRUCTIONS
Problem List Items Addressed This Visit          Advance Directives and General Issues    At risk for falls (Chronic)       Cardiac and Vasculature    Coronary artery calcification of native artery (Chronic)    Overview     Stress test in September 2019 showed some calcification of the coronary arteries on the CT portion of the test.  The patient saw Dr. Hooker and was not felt to have significant coronary artery disease.    Not taking 81 mg enteric-coated aspirin daily.  Improve blood pressure control.  Improve low-fat diet.  Increase exercise.         Relevant Medications    metoprolol succinate XL (TOPROL-XL) 50 MG 24 hr tablet    Hyperlipidemia LDL goal <70    Overview     Lifestyle control.         Benign essential hypertension    Overview     Taking losartan 100mg and metoprolol  XL 50mg  daily.               Relevant Medications    metoprolol succinate XL (TOPROL-XL) 50 MG 24 hr tablet    telmisartan (MICARDIS) 80 MG tablet       Endocrine and Metabolic    Class 1 obesity due to excess calories with body mass index (BMI) of 33.0 to 33.9 in adult    Hypothyroidism (acquired)    Overview     Levothyroxine 125mcg.         Relevant Medications    metoprolol succinate XL (TOPROL-XL) 50 MG 24 hr tablet    levothyroxine (SYNTHROID, LEVOTHROID) 112 MCG tablet       Gastrointestinal Abdominal     Slow transit constipation (Chronic)    Overview     Taking probiotic and 1 stool softener twice daily.         GERD without esophagitis    Relevant Medications    omeprazole (priLOSEC) 40 MG capsule    Anal fistula    Overview     S/p fistula repair by Dr. Walters 11/2024.            Health Encounters    Annual physical exam    Medicare annual wellness visit, subsequent - Primary       Hematology and Neoplasia    Ductal carcinoma in situ (DCIS) of left breast     Exercising to Stay Healthy  To become healthy and stay healthy, it is recommended that you do moderate-intensity and vigorous-intensity exercise. You can tell that  you are exercising at a moderate intensity if your heart starts beating faster and you start breathing faster but can still hold a conversation. You can tell that you are exercising at a vigorous intensity if you are breathing much harder and faster and cannot hold a conversation while exercising.  How can exercise benefit me?  Exercising regularly is important. It has many health benefits, such as:  Improving overall fitness, flexibility, and endurance.  Increasing bone density.  Helping with weight control.  Decreasing body fat.  Increasing muscle strength and endurance.  Reducing stress and tension, anxiety, depression, or anger.  Improving overall health.  What guidelines should I follow while exercising?  Before you start a new exercise program, talk with your health care provider.  Do not exercise so much that you hurt yourself, feel dizzy, or get very short of breath.  Wear comfortable clothes and wear shoes with good support.  Drink plenty of water while you exercise to prevent dehydration or heat stroke.  Work out until your breathing and your heartbeat get faster (moderate intensity).  How often should I exercise?  Choose an activity that you enjoy, and set realistic goals. Your health care provider can help you make an activity plan that is individually designed and works best for you.  Exercise regularly as told by your health care provider. This may include:  Doing strength training two times a week, such as:  Lifting weights.  Using resistance bands.  Push-ups.  Sit-ups.  Yoga.  Doing a certain intensity of exercise for a given amount of time. Choose from these options:  A total of 150 minutes of moderate-intensity exercise every week.  A total of 75 minutes of vigorous-intensity exercise every week.  A mix of moderate-intensity and vigorous-intensity exercise every week.  Children, pregnant women, people who have not exercised regularly, people who are overweight, and older adults may need to talk  with a health care provider about what activities are safe to perform. If you have a medical condition, be sure to talk with your health care provider before you start a new exercise program.  What are some exercise ideas?  Moderate-intensity exercise ideas include:  Walking 1 mile (1.6 km) in about 15 minutes.  Biking.  Hiking.  Golfing.  Dancing.  Water aerobics.  Vigorous-intensity exercise ideas include:  Walking 4.5 miles (7.2 km) or more in about 1 hour.  Jogging or running 5 miles (8 km) in about 1 hour.  Biking 10 miles (16.1 km) or more in about 1 hour.  Lap swimming.  Roller-skating or in-line skating.  Cross-country skiing.  Vigorous competitive sports, such as football, basketball, and soccer.  Jumping rope.  Aerobic dancing.  What are some everyday activities that can help me get exercise?  Yard work, such as:  Pushing a .  Raking and bagging leaves.  Washing your car.  Pushing a stroller.  Shoveling snow.  Gardening.  Washing windows or floors.  How can I be more active in my day-to-day activities?  Use stairs instead of an elevator.  Take a walk during your lunch break.  If you drive, park your car farther away from your work or school.  If you take public transportation, get off one stop early and walk the rest of the way.  Stand up or walk around during all of your indoor phone calls.  Get up, stretch, and walk around every 30 minutes throughout the day.  Enjoy exercise with a friend. Support to continue exercising will help you keep a regular routine of activity.  Where to find more information  You can find more information about exercising to stay healthy from:  [place] Department of Health and Human Services: www.hhs.gov  Centers for Disease Control and Prevention (CDC): www.cdc.gov  Summary  Exercising regularly is important. It will improve your overall fitness, flexibility, and endurance.  Regular exercise will also improve your overall health. It can help you control your weight,  "reduce stress, and improve your bone density.  Do not exercise so much that you hurt yourself, feel dizzy, or get very short of breath.  Before you start a new exercise program, talk with your health care provider.  This information is not intended to replace advice given to you by your health care provider. Make sure you discuss any questions you have with your health care provider.  Document Revised: 04/15/2022 Document Reviewed: 04/15/2022  Elsevier Patient Education © 2023 [employer] BMI for Adults  Body mass index (BMI) is a number found using a person's weight and height. BMI can help tell how much of a person's weight is made up of fat. BMI does not measure body fat directly. It is used instead of tests that directly measure body fat, which can be difficult and expensive.  What are BMI measurements used for?  BMI is useful to:  Find out if your weight puts you at higher risk for medical problems.  Help recommend changes, such as in diet and exercise. This can help you reach a healthy weight. BMI screening can be done again to see if these changes are working.  How is BMI calculated?  Your height and weight are measured. The BMI is found from those numbers. This can be done with U[place] or metric measurements. Note that charts and online BMI calculators are available to help you find your BMI quickly and easily without doing these calculations.  To calculate your BMI in [place] measurements:  Measure your weight in pounds (lb).  Multiply the number of pounds by 703.  So, for an adult who weighs 150 lb, multiply that number by 703: 150 x 703, which equals 105,450.  Measure your height in inches. Then multiply that number by itself to get a measurement called \"inches squared.\"  So, for an adult who is 70 inches tall, the \"inches squared\" measurement is 70 inches x 70 inches, which equals 4,900 inches squared.  Divide the total from step 2 (number of lb x 703) by the total from step 3 (inches squared): 105,450 ÷ " "4,900 = 21.5. This is your BMI.  To calculate your BMI in metric measurements:    Measure your weight in kilograms (kg).  For this example, the weight is 70 kg.  Measure your height in meters (m). Then multiply that number by itself to get a measurement called \"meters squared.\"  So, for an adult who is 1.75 m tall, the \"meters squared\" measurement is 1.75 m x 1.75 m, which equals 3.1 meters squared.  Divide the number of kilograms (your weight) by the meters squared number. In this example: 70 ÷ 3.1 = 22.6. This is your BMI.  What do the results mean?  BMI charts are used to see if you are underweight, normal weight, overweight, or obese. The following guidelines will be used:  Underweight: BMI less than 18.5.  Normal weight: BMI between 18.5 and 24.9.  Overweight: BMI between 25 and 29.9.  Obese: BMI of 30 or above.  BMI is a tool and cannot diagnose a condition. Talk with your health care provider about what your BMI means for you. Keep these notes in mind:  Weight includes fat and muscle. Someone with a muscular build, such as an athlete, may have a BMI that is higher than 24.9. In cases like these, BMI is not a correct measure of body fat.  If you have a BMI of 25 or higher, your provider may need to do more testing to find out if excess body fat is the cause.  BMI is measured the same way for males and females. Females usually have more body fat than males of the same height and weight.  Where to find more information  For more information about BMI, including tools to quickly find your BMI, go to:  Centers for Disease Control and Prevention: cdc.gov  American Heart Association: heart.org  National Heart, Lung, and Blood Molt: nhlbi.nih.gov  This information is not intended to replace advice given to you by your health care provider. Make sure you discuss any questions you have with your health care provider.  Document Revised: 09/07/2023 Document Reviewed: 08/31/2023  [employer] Patient Education © 2024 " [employer]  Heart-Healthy Eating Plan  Many factors influence your heart (coronary) health, including eating and exercise habits. Coronary risk increases with abnormal blood fat (lipid) levels. Heart-healthy meal planning includes limiting unhealthy fats, increasing healthy fats, and making other diet and lifestyle changes.  What is my plan?  Your health care provider may recommend that you:  Limit your fat intake to _________% or less of your total calories each day.  Limit your saturated fat intake to _________% or less of your total calories each day.  Limit the amount of cholesterol in your diet to less than _________ mg per day.  What are tips for following this plan?  Cooking  Cook foods using methods other than frying. Baking, boiling, grilling, and broiling are all good options. Other ways to reduce fat include:  Removing the skin from poultry.  Removing all visible fats from meats.  Steaming vegetables in water or broth.  Meal planning  A plate with examples of foods in a healthy diet.      At meals, imagine dividing your plate into fourths:  Fill one-half of your plate with vegetables and green salads.  Fill one-fourth of your plate with whole grains.  Fill one-fourth of your plate with lean protein foods.  Eat 4-5 servings of vegetables per day. One serving equals 1 cup raw or cooked vegetable, or 2 cups raw leafy greens.  Eat 4-5 servings of fruit per day. One serving equals 1 medium whole fruit, ¼ cup dried fruit, ½ cup fresh, frozen, or canned fruit, or ½ cup 100% fruit juice.  Eat more foods that contain soluble fiber. Examples include apples, broccoli, carrots, beans, peas, and barley. Aim to get 25-30 g of fiber per day.  Increase your consumption of legumes, nuts, and seeds to 4-5 servings per week. One serving of dried beans or legumes equals ½ cup cooked, 1 serving of nuts is ¼ cup, and 1 serving of seeds equals 1 tablespoon.  Fats  Choose healthy fats more often. Choose monounsaturated and  polyunsaturated fats, such as olive and canola oils, flaxseeds, walnuts, almonds, and seeds.  Eat more omega-3 fats. Choose salmon, mackerel, sardines, tuna, flaxseed oil, and ground flaxseeds. Aim to eat fish at least 2 times each week.  Check food labels carefully to identify foods with trans fats or high amounts of saturated fat.  Limit saturated fats. These are found in animal products, such as meats, butter, and cream. Plant sources of saturated fats include palm oil, palm kernel oil, and coconut oil.  Avoid foods with partially hydrogenated oils in them. These contain trans fats. Examples are stick margarine, some tub margarines, cookies, crackers, and other baked goods.  Avoid fried foods.  General information  Eat more home-cooked food and less restaurant, buffet, and fast food.  Limit or avoid alcohol.  Limit foods that are high in starch and sugar.  Lose weight if you are overweight. Losing just 5-10% of your body weight can help your overall health and prevent diseases such as diabetes and heart disease.  Monitor your salt (sodium) intake, especially if you have high blood pressure. Talk with your health care provider about your sodium intake.  Try to incorporate more vegetarian meals weekly.  What foods can I eat?  Fruits  All fresh, canned (in natural juice), or frozen fruits.  Vegetables  Fresh or frozen vegetables (raw, steamed, roasted, or grilled). Green salads.  Grains  Most grains. Choose whole wheat and whole grains most of the time. Rice and pasta, including brown rice and pastas made with whole wheat.  Meats and other proteins  Lean, well-trimmed beef, veal, pork, and lamb. Chicken and turkey without skin. All fish and shellfish. Wild duck, rabbit, pheasant, and venison. Egg whites or low-cholesterol egg substitutes. Dried beans, peas, lentils, and tofu. Seeds and most nuts.  Dairy  Low-fat or nonfat cheeses, including ricotta and mozzarella. Skim or 1% milk (liquid, powdered, or evaporated).  Buttermilk made with low-fat milk. Nonfat or low-fat yogurt.  Fats and oils  Non-hydrogenated (trans-free) margarines. Vegetable oils, including soybean, sesame, sunflower, olive, peanut, safflower, corn, canola, and cottonseed. Salad dressings or mayonnaise made with a vegetable oil.  Beverages  Water (mineral or sparkling). Coffee and tea. Diet carbonated beverages.  Sweets and desserts  Sherbet, gelatin, and fruit ice. Small amounts of dark chocolate.  Limit all sweets and desserts.  Seasonings and condiments  All seasonings and condiments.  The items listed above may not be a complete list of foods and beverages you can eat. Contact a dietitian for more options.  What foods are not recommended?  Fruits  Canned fruit in heavy syrup. Fruit in cream or butter sauce. Fried fruit. Limit coconut.  Vegetables  Vegetables cooked in cheese, cream, or butter sauce. Fried vegetables.  Grains  Breads made with saturated or trans fats, oils, or whole milk. Croissants. Sweet rolls. Donuts. High-fat crackers, such as cheese crackers.  Meats and other proteins  Fatty meats, such as hot dogs, ribs, sausage, finney, rib-eye roast or steak. High-fat deli meats, such as salami and bologna. Caviar. Domestic duck and goose. Organ meats, such as liver.  Dairy  Cream, sour cream, cream cheese, and creamed cottage cheese. Whole-milk cheeses. Whole or 2% milk (liquid, evaporated, or condensed). Whole buttermilk. Cream sauce or high-fat cheese sauce. Whole-milk yogurt.  Fats and oils  Meat fat, or shortening. Cocoa butter, hydrogenated oils, palm oil, coconut oil, palm kernel oil. Solid fats and shortenings, including finney fat, salt pork, lard, and butter. Nondairy cream substitutes. Salad dressings with cheese or sour cream.  Beverages  Regular sodas and any drinks with added sugar.  Sweets and desserts  Frosting. Pudding. Cookies. Cakes. Pies. Milk chocolate or white chocolate. Buttered syrups. Full-fat ice cream or ice cream  drinks.  The items listed above may not be a complete list of foods and beverages to avoid. Contact a dietitian for more information.  Summary  Heart-healthy meal planning includes limiting unhealthy fats, increasing healthy fats, and making other diet and lifestyle changes.  Lose weight if you are overweight. Losing just 5-10% of your body weight can help your overall health and prevent diseases such as diabetes and heart disease.  Focus on eating a balance of foods, including fruits and vegetables, low-fat or nonfat dairy, lean protein, nuts and legumes, whole grains, and heart-healthy oils and fats.  This information is not intended to replace advice given to you by your health care provider. Make sure you discuss any questions you have with your health care provider.  Document Revised: 04/28/2022 Document Reviewed: 04/28/2022  Elsevier Patient Education © 2022 [employer] Inc.

## 2024-12-04 ENCOUNTER — TELEPHONE (OUTPATIENT)
Dept: RADIATION ONCOLOGY | Facility: HOSPITAL | Age: 84
End: 2024-12-04
Payer: MEDICARE

## 2024-12-04 NOTE — TELEPHONE ENCOUNTER
Called patient to go over results of DCISion RT score, which returned low risk. Her results predict 10 year risk of any in breast recurrence after surgery, without RT, to be 5% and 10 year risk of invasive recurrence after surgery, without RT, to be 3%.     We discussed that omission of RT would be reasonable as these risk are quite low.   She would like to follow up in office next week to discuss. At this time, she is leaning towards doing partial breast RT.     Samina Dudley MD

## 2024-12-10 ENCOUNTER — TELEPHONE (OUTPATIENT)
Dept: ONCOLOGY | Facility: CLINIC | Age: 84
End: 2024-12-10
Payer: MEDICARE

## 2024-12-10 NOTE — TELEPHONE ENCOUNTER
Called patient to follow-up.  She does not want to do any adjuvant endocrine therapy which I think is reasonable.  No oncology follow-up needed.

## 2024-12-11 ENCOUNTER — OFFICE VISIT (OUTPATIENT)
Dept: RADIATION ONCOLOGY | Facility: HOSPITAL | Age: 84
End: 2024-12-11
Payer: MEDICARE

## 2024-12-11 ENCOUNTER — HOSPITAL ENCOUNTER (OUTPATIENT)
Dept: RADIATION ONCOLOGY | Facility: HOSPITAL | Age: 84
Setting detail: RADIATION/ONCOLOGY SERIES
Discharge: HOME | End: 2024-12-11
Payer: MEDICARE

## 2024-12-11 VITALS
HEIGHT: 62 IN | RESPIRATION RATE: 20 BRPM | WEIGHT: 182.9 LBS | BODY MASS INDEX: 33.66 KG/M2 | OXYGEN SATURATION: 99 % | HEART RATE: 54 BPM | TEMPERATURE: 98.3 F | DIASTOLIC BLOOD PRESSURE: 69 MMHG | SYSTOLIC BLOOD PRESSURE: 147 MMHG

## 2024-12-11 DIAGNOSIS — Z17.0 MALIGNANT NEOPLASM OF LEFT BREAST IN FEMALE, ESTROGEN RECEPTOR POSITIVE, UNSPECIFIED SITE OF BREAST: Primary | ICD-10-CM

## 2024-12-11 DIAGNOSIS — C50.912 MALIGNANT NEOPLASM OF LEFT BREAST IN FEMALE, ESTROGEN RECEPTOR POSITIVE, UNSPECIFIED SITE OF BREAST: Primary | ICD-10-CM

## 2024-12-11 PROCEDURE — G0463 HOSPITAL OUTPT CLINIC VISIT: HCPCS

## 2024-12-11 NOTE — PROGRESS NOTES
Follow Up Office Visit      Encounter Date: 12/11/2024   Patient Name: Serenity Lagos  YOB: 1940   Medical Record Number: [id number]   Primary Diagnosis: No primary diagnosis found.   Cancer Staging: Cancer Staging   No matching staging information was found for the patient.                Cancer Staging   No matching staging information was found for the patient.          Chief Complaint:    Chief Complaint   Patient presents with    Breast Cancer       Oncologic History: Serenity Lagos is a 84 y.o. female  regarding her DCIS of the left breast, diagnosed following abnormal screening mammography. Diagnostic mammography/US was reviewed. Core biopsy identified an int grade DCIS with comedo necrosis and microcalcifications (ER+/GA+).   Breast MRI identified the biopsy proven DCIS with surrounding nonmass enhancement and a 1.1 cm enhancing mass favored to be a LN for which US was recommended. US eval returned as BIRADS 1, favoring a benign IM LN.   11/4/24 - left lumpectomy - int grade DCIS with associated comedonecrosis. DCIS 8 mm, margins negative by greater than 10 mm. pTis, stage 0    Interval History: Since last visit has decided against use of an AI. She has also had repair of a rectal fistula.           Subjective      Review of Systems: Review of Systems   Constitutional:  Positive for fatigue.   Gastrointestinal:         Pt reports that she has continued to have some mild rectal drainage after recent Colonoscopy/Fistulectomy   Genitourinary:  Positive for frequency and urgency.        Pt reports mild urinary incontinence w/ urgency & frequency; pt wears a pad to manage.   Musculoskeletal:         Pt uses walker/rolator to ambulate as a safety measure against falls.   Skin:         Pt reports skin around rectum is red & irritated; pt has F/U w/ Dr. Walters next week & will address.   Neurological:  Positive for weakness.       The following portions of  "the patient's history were reviewed and updated as appropriate: allergies, current medications, past family history, past medical history, past social history, past surgical history and problem list.    Measures:   KPS/Quality of Life: 80 - Restricted Physical Activity      Objective     Physical Exam:   Vital Signs:   Vitals:    12/11/24 1246   BP: 147/69   Pulse: 54   Resp: 20   Temp: 98.3 °F (36.8 °C)   TempSrc: Temporal   SpO2: 99%   Weight: 83 kg (182 lb 14.4 oz)   Height: 157.5 cm (62\")   PainSc: 0-No pain     Body mass index is 33.45 kg/m².     Constitutional: No acute distress, sitting comfortably  Eye: EOMI, anicteric sclerae  HENT: NC/AT, MMM   Respiratory: Symmetric expansion, nonlabored respiration  MSK: ROM intact in all four extremities, no obvious deformities  Neuro: Alert, oriented x3, CN3-12 grossly intact.   Psych: Appropriate mood and affect.            Assessment / Plan        Assessment/Plan:     There are no diagnoses linked to this encounter.    Serenity Lagos is a pleasant 84 y.o. female with stage 0 DCIS of the left breast.  She understands that the role of radiation therapy after a lumpectomy is to decrease the risk of an ipsilateral breast recurrence, specifically to prevent invasive recurrence. She decided against taking anastrazole. She is here to discuss DCISion RT results, which returned low risk. Her results predict 10 year risk of any in breast recurrence after surgery, without RT, to be 5% and 10 year risk of invasive recurrence after surgery, without RT, to be 3%.   We discussed that omission of RT would be reasonable as these risk are quite low.   She would like to have accelerated partial breast treatment. I anticipate treating her to a dose of 30 Gy/5 fractions. Consent was obtained and she will be scheduled for CT simulation this week.     Follow Up:   No follow-ups on file.        Time:   I spent 35 minutes on this encounter today, 12/11/24. Activities that took place " during this time include:   - preparing to see the patient  - obtaining and reviewing separately obtained history  - performing a medically appropriate examination and evaluation  - counseling and educating the patient  - ordering medications/tests/procedures  - communicating with other healthcare providers  - documenting clinical information in the health record  - coordinating care for this patient.     Sincerely,        Samina Dudley MD  Radiation Oncology  This document has been signed by Samina Dudley MD on December 11, 2024 13:53 EST           NOTICE TO PATIENTS  At [facility], we believe that sharing information builds trust and better relationships. You are receiving this note because you recently visited [facility]. It is possible you will see health information before a provider has talked with you about it. This kind of information can be easy to misunderstand. To help you fully understand what it means for your health, we urge you to discuss this note with your provider.

## 2024-12-13 ENCOUNTER — HOSPITAL ENCOUNTER (OUTPATIENT)
Dept: RADIATION ONCOLOGY | Facility: HOSPITAL | Age: 84
Discharge: HOME | End: 2024-12-13

## 2024-12-13 PROCEDURE — 77332 RADIATION TREATMENT AID(S): CPT | Performed by: RADIOLOGY

## 2024-12-18 PROCEDURE — 77301 RADIOTHERAPY DOSE PLAN IMRT: CPT | Performed by: RADIOLOGY

## 2024-12-18 PROCEDURE — 77300 RADIATION THERAPY DOSE PLAN: CPT | Performed by: RADIOLOGY

## 2024-12-18 PROCEDURE — 77338 DESIGN MLC DEVICE FOR IMRT: CPT | Performed by: RADIOLOGY

## 2024-12-20 ENCOUNTER — HOSPITAL ENCOUNTER (OUTPATIENT)
Dept: RADIATION ONCOLOGY | Facility: HOSPITAL | Age: 84
Discharge: HOME | End: 2024-12-20

## 2024-12-20 PROCEDURE — 77385: CPT | Performed by: RADIOLOGY

## 2024-12-23 ENCOUNTER — HOSPITAL ENCOUNTER (OUTPATIENT)
Dept: RADIATION ONCOLOGY | Facility: HOSPITAL | Age: 84
Discharge: HOME | End: 2024-12-23
Payer: MEDICARE

## 2024-12-23 VITALS — BODY MASS INDEX: 33.87 KG/M2 | WEIGHT: 185.2 LBS

## 2024-12-23 PROCEDURE — 77385: CPT | Performed by: RADIOLOGY

## 2024-12-24 ENCOUNTER — HOSPITAL ENCOUNTER (OUTPATIENT)
Dept: RADIATION ONCOLOGY | Facility: HOSPITAL | Age: 84
Discharge: HOME | End: 2024-12-24

## 2024-12-24 PROCEDURE — 77385: CPT | Performed by: RADIOLOGY

## 2024-12-24 PROCEDURE — 77336 RADIATION PHYSICS CONSULT: CPT | Performed by: RADIOLOGY

## 2024-12-27 ENCOUNTER — HOSPITAL ENCOUNTER (OUTPATIENT)
Dept: RADIATION ONCOLOGY | Facility: HOSPITAL | Age: 84
Discharge: HOME | End: 2024-12-27

## 2024-12-27 PROCEDURE — 77385: CPT | Performed by: RADIOLOGY

## 2024-12-30 ENCOUNTER — HOSPITAL ENCOUNTER (OUTPATIENT)
Dept: RADIATION ONCOLOGY | Facility: HOSPITAL | Age: 84
Discharge: HOME | End: 2024-12-30
Payer: MEDICARE

## 2024-12-30 PROCEDURE — 77385: CPT | Performed by: RADIOLOGY

## 2025-01-14 ENCOUNTER — OFFICE VISIT (OUTPATIENT)
Dept: INTERNAL MEDICINE | Facility: CLINIC | Age: 85
End: 2025-01-14
Payer: MEDICARE

## 2025-01-14 VITALS
DIASTOLIC BLOOD PRESSURE: 72 MMHG | BODY MASS INDEX: 33.86 KG/M2 | TEMPERATURE: 98.4 F | WEIGHT: 184 LBS | HEIGHT: 62 IN | SYSTOLIC BLOOD PRESSURE: 120 MMHG | HEART RATE: 64 BPM

## 2025-01-14 DIAGNOSIS — I10 BENIGN ESSENTIAL HYPERTENSION: Primary | ICD-10-CM

## 2025-01-14 PROCEDURE — 1160F RVW MEDS BY RX/DR IN RCRD: CPT

## 2025-01-14 PROCEDURE — 1126F AMNT PAIN NOTED NONE PRSNT: CPT

## 2025-01-14 PROCEDURE — 99213 OFFICE O/P EST LOW 20 MIN: CPT

## 2025-01-14 PROCEDURE — 1159F MED LIST DOCD IN RCRD: CPT

## 2025-01-14 PROCEDURE — 3074F SYST BP LT 130 MM HG: CPT

## 2025-01-14 PROCEDURE — G2211 COMPLEX E/M VISIT ADD ON: HCPCS

## 2025-01-14 PROCEDURE — 3078F DIAST BP <80 MM HG: CPT

## 2025-01-14 RX ORDER — KETOCONAZOLE 20 MG/G
1 CREAM TOPICAL DAILY
COMMUNITY
Start: 2024-12-13

## 2025-01-14 RX ORDER — KETOCONAZOLE 20 MG/ML
SHAMPOO, SUSPENSION TOPICAL
COMMUNITY
Start: 2024-12-13

## 2025-01-16 NOTE — PROGRESS NOTES
Office Note     Name: Serenity Lagos    : 1940     MRN: [id number]   Care Team: Patient Care Team:  Bernadette Somers MD as PCP - General (Internal Medicine)  Syed Hooker MD as Consulting Physician (Cardiology)  Celio Iniguez MD as Consulting Physician (Ophthalmology)  Ender Jolly MD as Consulting Physician (Dermatology)  Jil Arreola APRN as Nurse Practitioner (Nurse Practitioner)  Josh Noyola Jr., MD as Consulting Physician (Hand Surgery)  Samina Dudley MD as Consulting Physician (Radiation Oncology)  Sasha Gonzalez MD as Referring Physician (General Surgery)  Patrick Walters MD as Consulting Physician (Colon and Rectal Surgery)    Chief Complaint  Hypertension    Subjective     History of Present Illness:    Lilliana is a pleasant 84-year-old female who comes to the office today for a follow-up on hypertension.    She states that 6 weeks ago, she was continuing to have elevated blood pressures.  Her PCP changed her blood pressure medicine to telmisartan 80 mg daily and she continued on metoprolol succinate XL 50 mg daily.  She has not been monitoring her BP over the last 6 weeks however, she denies headaches, chest pain, shortness of breath, or visual changes.  She denies ankle swelling.  Her BP is within normal limits in the office today.    Past Medical History:   Diagnosis Date    Breast cancer     left breast    Cervicalgia     Chronic bronchitis     Coronary artery disease     Dysphagia     Glaucoma, open angle     Hearing loss     Hypertension     Hyperthyroidism     thyroid gland killed off with MD treatment    Hypothyroidism     regulated by Levothyroxine    Low back pain     Medicare annual wellness visit, subsequent 12/15/2020    Mixed hyperlipidemia     Osteoarthritis     cervical spine degenerative    Osteopenia     Perforation of tympanic membrane     Pruritus     chronic general pruritis    Salivary gland cancer     Scoliosis 2005     discovered in x-rays by Orthopedist    Shingles     postherpetic neuralgia-thorax    Spondylolisthesis      lumbar spine    Wrist tendonitis        Past Surgical History:   Procedure Laterality Date    APPENDECTOMY      BREAST BIOPSY      BREAST BIOPSY Right 2019    BREAST CYST EXCISION      BREAST LUMPECTOMY Left     for breast cancer    COLONOSCOPY N/A 2024    Procedure: COLONOSCOPY;  Surgeon: Patrick Walters MD;  Location:  [facility];  Service: General;  Laterality: N/A;  scope ID: [id number]; scope in: 0949; cecum: 0803; scope out: 0812    ENDOSCOPY      normal (per pt) EGD    MOUTH SURGERY      RECTAL FISTULECTOMY N/A 2024    Procedure: RECTAL FISTULECTOMY;  Surgeon: Patrick Walters MD;  Location:  [facility];  Service: General;  Laterality: N/A;    TONSILLECTOMY         Social History     Socioeconomic History    Marital status:    Tobacco Use    Smoking status: Former     Current packs/day: 0.00     Average packs/day: 1 pack/day for 20.0 years (20.0 ttl pk-yrs)     Types: Cigarettes     Start date: 1960     Quit date: 1980     Years since quittin.0    Smokeless tobacco: Never   Vaping Use    Vaping status: Never Used   Substance and Sexual Activity    Alcohol use: Yes     Alcohol/week: 7.0 standard drinks of alcohol     Types: 7 Glasses of wine per week     Comment: 2 glasses wine  with dinner every day.  grand total 8-9oz    Drug use: No    Sexual activity: Not Currently     Partners: Male         Current Outpatient Medications:     acetaminophen (TYLENOL) 500 MG tablet, Take 1 tablet by mouth Every 6 (Six) Hours As Needed for Mild Pain., Disp: , Rfl:     cyanocobalamin (VITAMIN B-12) 500 MCG tablet, Take 1 tablet by mouth Daily., Disp: , Rfl:     Docusate Sodium 100 MG capsule, Take 2 tablets by mouth 2 (Two) Times a Day., Disp: 360 tablet, Rfl: 3    Glucosamine-Chondroitin (OSTEO BI-FLEX REGULAR STRENGTH PO), Take 1 tablet by mouth Daily. 1 daily, Disp: , Rfl:      "ketoconazole (NIZORAL) 2 % cream, Apply 1 Application topically to the appropriate area as directed Daily. Please see attached for detailed directions, Disp: , Rfl:     ketoconazole (NIZORAL) 2 % shampoo, WASH INTO SCALP, LET SIT FOR 5 MINS, AND THEN RINSE. USE TWICE WEEKLY, Disp: , Rfl:     Lactobacillus-Inulin (Scopixe [employer]) capsule, Take 1 capsule by mouth Daily., Disp: , Rfl:     latanoprost (XALATAN) 0.005 % ophthalmic solution, Administer 1 drop to both eyes Every Night., Disp: , Rfl:     levothyroxine (SYNTHROID, LEVOTHROID) 112 MCG tablet, Take 1 tablet by mouth Daily., Disp: 90 tablet, Rfl: 3    metoprolol succinate XL (TOPROL-XL) 50 MG 24 hr tablet, TAKE 1 TABLET DAILY, Disp: 90 tablet, Rfl: 1    omeprazole (priLOSEC) 40 MG capsule, Take 1 capsule by mouth Daily., Disp: 90 capsule, Rfl: 3    polyethylene glycol (GlycoLax) 17 GM/SCOOP powder, Take 17 g by mouth Daily., Disp: , Rfl:     raloxifene (EVISTA) 60 MG tablet, Take 1 tablet by mouth Daily., Disp: 90 tablet, Rfl: 3    telmisartan (MICARDIS) 80 MG tablet, Take 1 tablet by mouth Daily., Disp: 30 tablet, Rfl: 5    Procedures    PHQ-9 Total Score:      Objective     Vital Signs  /72 (BP Location: Left arm, Patient Position: Sitting, Cuff Size: Adult)   Pulse 64   Temp 98.4 °F (36.9 °C) (Temporal)   Ht 157.5 cm (62.01\")   Wt 83.5 kg (184 lb)   BMI 33.65 kg/m²   Estimated body mass index is 33.65 kg/m² as calculated from the following:    Height as of this encounter: 157.5 cm (62.01\").    Weight as of this encounter: 83.5 kg (184 lb).    Physical Exam  Vitals and nursing note reviewed.   HENT:      Head: Normocephalic.   Cardiovascular:      Rate and Rhythm: Normal rate.   Pulmonary:      Effort: Pulmonary effort is normal.      Breath sounds: Normal breath sounds.   Musculoskeletal:      Right ankle: Normal.      Left ankle: Normal.   Skin:     General: Skin is warm and dry.   Neurological:      General: No focal deficit " present.      Mental Status: She is alert and oriented to person, place, and time.   Psychiatric:         Mood and Affect: Mood normal.         Behavior: Behavior normal.         Thought Content: Thought content normal.         Judgment: Judgment normal.          Assessment and Plan     Assessment/Plan:  Diagnoses and all orders for this visit:    1. Benign essential hypertension (Primary)  -Continue to monitor BP and report any abnormal values.  -Continue to attempt an overall healthy, well-balanced diet.  Avoid added salt in diet.  -Continue telmisartan 80 mg daily, metoprolol succinate XL 50 mg daily.  -Push oral fluids to promote hydration.     Patient Instructions   Continue medications as prescribed.  Check BP at home and keep a log for your next visit.    In general, adults should engage in aerobic physical activity 3-4 times a week with each session lasting an average of 40 minutes.  Goal for 150 minutes per week total.  Moderate (brisk walking or jogging) to vigorous (running or biking) physical activity is recommended.  There are many helpful strategies for heart-healthy eating, including the DASH diet and the [employer] Choose My Plate.   Patients with high blood pressure should consume no more than 2,400 mg of sodium a day, ideally reducing sodium intake to 1,500 mg a day. However, even reducing sodium intake in one's current diet by 1,000 mg each day can help lower blood pressure.    Limit alcohol consumption.    Information obtained from the American College of Cardiology and American Heart Association.   Plan of care reviewed with patient at the conclusion of today's visit. Education was provided regarding diagnosis, management and any prescribed or recommended OTC medications.  Patient verbalizes understanding of and agreement with management plan.    Follow Up  Return in about 4 months (around 5/14/2025) for Recheck.  And continued management of chronic conditions.    Dana Aly, SHARMILA

## 2025-01-16 NOTE — PATIENT INSTRUCTIONS
Continue medications as prescribed.  Check BP at home and keep a log for your next visit.    In general, adults should engage in aerobic physical activity 3-4 times a week with each session lasting an average of 40 minutes.  Goal for 150 minutes per week total.  Moderate (brisk walking or jogging) to vigorous (running or biking) physical activity is recommended.  There are many helpful strategies for heart-healthy eating, including the DASH diet and the [employer] Choose My Plate.   Patients with high blood pressure should consume no more than 2,400 mg of sodium a day, ideally reducing sodium intake to 1,500 mg a day. However, even reducing sodium intake in one's current diet by 1,000 mg each day can help lower blood pressure.    Limit alcohol consumption.    Information obtained from the American College of Cardiology and American Heart Association.

## 2025-01-20 ENCOUNTER — TRANSCRIBE ORDERS (OUTPATIENT)
Dept: ADMINISTRATIVE | Facility: HOSPITAL | Age: 85
End: 2025-01-20
Payer: MEDICARE

## 2025-01-20 DIAGNOSIS — D05.12 INTRADUCTAL CARCINOMA IN SITU OF LEFT BREAST: Primary | ICD-10-CM

## 2025-01-28 ENCOUNTER — OFFICE VISIT (OUTPATIENT)
Dept: RADIATION ONCOLOGY | Facility: HOSPITAL | Age: 85
End: 2025-01-28
Payer: MEDICARE

## 2025-01-28 ENCOUNTER — HOSPITAL ENCOUNTER (OUTPATIENT)
Dept: RADIATION ONCOLOGY | Facility: HOSPITAL | Age: 85
Setting detail: RADIATION/ONCOLOGY SERIES
Discharge: HOME | End: 2025-01-28
Payer: MEDICARE

## 2025-01-28 VITALS
WEIGHT: 184.6 LBS | BODY MASS INDEX: 33.97 KG/M2 | DIASTOLIC BLOOD PRESSURE: 72 MMHG | RESPIRATION RATE: 18 BRPM | SYSTOLIC BLOOD PRESSURE: 166 MMHG | OXYGEN SATURATION: 98 % | HEART RATE: 89 BPM | TEMPERATURE: 98.6 F | HEIGHT: 62 IN

## 2025-01-28 DIAGNOSIS — D05.12 DUCTAL CARCINOMA IN SITU (DCIS) OF LEFT BREAST: Primary | ICD-10-CM

## 2025-01-28 PROCEDURE — G0463 HOSPITAL OUTPT CLINIC VISIT: HCPCS

## 2025-01-28 NOTE — PROGRESS NOTES
Follow Up Office Visit      Encounter Date: 01/28/2025   Patient Name: Serenity Lagos  YOB: 1940   Medical Record Number: [id number]   Primary Diagnosis: Ductal carcinoma in situ (DCIS) of left breast [D05.12]   Cancer Staging: Cancer Staging   No matching staging information was found for the patient.                Cancer Staging   No matching staging information was found for the patient.          Chief Complaint:    Chief Complaint   Patient presents with    Breast Cancer     Malignant neoplasm of left breast in female, estrogen receptor positive, unspecified site of breast       Oncologic History: Serenity Lagos is a 84 y.o. female with a pTis, stage 0 +/+ DCIS of the left breast, managed with a left lumpectomy by Dr. Robert Lee.  Final pathology revealed intermediate grade DCIS with associated comedonecrosis. DCIS 8 mm, margins negative by greater than 10 mm.  Patient healed well from surgery.    DCISion RT results returned as low risk. Her results predict 10 year risk of any in breast recurrence after surgery, without RT, to be 5% and 10 year risk of invasive recurrence after surgery, without RT, to be 3%.  She elected to undergo accelerated partial breast treatment.    Interval History: She completed left partial breast adjuvant radiation under the care of Dr. Samina Dudley, receiving 30 GY/5 fractions.  She completed on 12/30/2024 and is here for follow-up.    Patient reports she tolerated radiation well.  No side effects during radiation.  She reports following radiation she has had mild itching of her left breast due to dry skin.  No other noted side effects.  Patient reports she has been well.    Patient consulted with Dr. Adames who discussed option of low-dose tamoxifen.  Patient has been on raloxifene for years and is in the same category as tamoxifen.  Patient elected to forego endocrine therapy with low-dose tamoxifen.    Patient reports  "left arm range of motion is at baseline.  Overall pleased with cosmetic outcome.    Prior Radiation: No          Subjective      Review of Systems: Review of Systems   Skin:         Mild itching of left breast skin   All other systems reviewed and are negative.      The following portions of the patient's history were reviewed and updated as appropriate: allergies, current medications, past family history, past medical history, past social history, past surgical history and problem list.    Measures:   KPS/Quality of Life: 90 - Limited Activity      Objective     Physical Exam:   Vital Signs:   Vitals:    01/28/25 1344   BP: 166/72  Comment: pt reports BP medicine was recently changed by PCP   Pulse: 89   Resp: 18   Temp: 98.6 °F (37 °C)   TempSrc: Skin   SpO2: 98%   Weight: 83.7 kg (184 lb 9.6 oz)   Height: 157.5 cm (62\")   PainSc: 0-No pain     Body mass index is 33.76 kg/m².     Constitutional: No acute distress, sitting comfortably  Eye: EOMI, anicteric sclerae  HENT: NC/AT, MMM   Respiratory: Symmetric expansion, nonlabored respiration  MSK: ROM intact in all four extremities, no obvious deformities  Neuro: Alert, oriented x3, CN3-12 grossly intact.   Psych: Appropriate mood and affect.  Left breast: Skin is intact, no hyperpigmentation, peeling, or other skin lesions.  Well-healed periareolar surgical scar.  No left breast masses or seromas.  No left axillary lymphadenopathy      Assessment / Plan        Assessment/Plan:     Diagnoses and all orders for this visit:    1. Ductal carcinoma in situ (DCIS) of left breast (Primary)        Serenity Lagos is a pleasant 84 y.o. female with  pTis, stage 0 +/+ DCIS of the left breast.    Following left breast lumpectomy, she underwent adjuvant radiotherapy on the left as part of her breast conserving treatment, completing 4 weeks ago.  She tolerated treatment well.  She developed the anticipated grade 1 pruritus which has appropriately improved.  Clinical exam of " the left breast today is benign.  We discussed the role of local breast/scar massage, as well as stretching and range of motion exercises of the left upper extremity to minimize the risk of treatment related fibrosis or lymphedema.  Recommend the use of topical Vitamin E.  Recommend maintaining a healthy lifestyle with a well balanced diet, calcium and vitamin D for osteoporosis prevention, and exercise as well as continue with recommended health and cancer screening guidelines.  The patient decided to forego low-dose tamoxifen.  She is scheduled for repeat diagnostic bilateral mammogram  on 7/21/2025.  We discussed follow-up intervals, including biannual diagnostic mammograms to alternate between the right and bilateral breasts, biannual clinical breast exams, and monthly self breast exams.      Follow Up:   Return in about 1 year (around 1/28/2026) for Office Visit, Appt with SHARMILA.        Time:   I spent 30 minutes on this encounter today, 01/28/25. Activities that took place during this time include:   - preparing to see the patient  - obtaining and reviewing separately obtained history  - performing a medically appropriate examination and evaluation  - counseling and educating the patient  - ordering medications/tests/procedures  - communicating with other healthcare providers  - documenting clinical information in the health record  - coordinating care for this patient.     Sincerely,        SHARMILA Kimball, DNP  Radiation Oncology  This document has been signed by SHARMILA Reese on January 28, 2025 14:25 EST           NOTICE TO PATIENTS  At [facility], we believe that sharing information builds trust and better relationships. You are receiving this note because you recently visited [facility]. It is possible you will see health information before a provider has talked with you about it. This kind of information can be easy to misunderstand. To help you fully understand what it means  for your health, we urge you to discuss this note with your provider.

## 2025-02-17 DIAGNOSIS — Z17.0 MALIGNANT NEOPLASM OF LEFT BREAST IN FEMALE, ESTROGEN RECEPTOR POSITIVE, UNSPECIFIED SITE OF BREAST: Primary | ICD-10-CM

## 2025-02-17 DIAGNOSIS — C50.912 MALIGNANT NEOPLASM OF LEFT BREAST IN FEMALE, ESTROGEN RECEPTOR POSITIVE, UNSPECIFIED SITE OF BREAST: Primary | ICD-10-CM

## 2025-02-17 RX ORDER — TELMISARTAN 80 MG/1
80 TABLET ORAL DAILY
Qty: 90 TABLET | Refills: 3 | Status: SHIPPED | OUTPATIENT
Start: 2025-02-17

## 2025-03-05 RX ORDER — RALOXIFENE HYDROCHLORIDE 60 MG/1
60 TABLET, FILM COATED ORAL DAILY
Qty: 90 TABLET | Refills: 3 | Status: SHIPPED | OUTPATIENT
Start: 2025-03-05

## 2025-04-08 ENCOUNTER — HOSPITAL ENCOUNTER (OUTPATIENT)
Dept: GENERAL RADIOLOGY | Facility: HOSPITAL | Age: 85
Discharge: HOME | End: 2025-04-08
Payer: MEDICARE

## 2025-04-08 ENCOUNTER — LAB (OUTPATIENT)
Dept: LAB | Facility: HOSPITAL | Age: 85
End: 2025-04-08
Payer: MEDICARE

## 2025-04-08 ENCOUNTER — OFFICE VISIT (OUTPATIENT)
Dept: INTERNAL MEDICINE | Facility: CLINIC | Age: 85
End: 2025-04-08
Payer: MEDICARE

## 2025-04-08 VITALS
HEIGHT: 62 IN | WEIGHT: 186.4 LBS | TEMPERATURE: 98.2 F | OXYGEN SATURATION: 98 % | BODY MASS INDEX: 34.3 KG/M2 | DIASTOLIC BLOOD PRESSURE: 86 MMHG | HEART RATE: 86 BPM | SYSTOLIC BLOOD PRESSURE: 128 MMHG

## 2025-04-08 DIAGNOSIS — G89.29 CHRONIC LEFT SHOULDER PAIN: ICD-10-CM

## 2025-04-08 DIAGNOSIS — M25.512 CHRONIC LEFT SHOULDER PAIN: ICD-10-CM

## 2025-04-08 DIAGNOSIS — N28.9 RENAL INSUFFICIENCY: ICD-10-CM

## 2025-04-08 DIAGNOSIS — M25.552 PAIN OF LEFT HIP: ICD-10-CM

## 2025-04-08 DIAGNOSIS — M54.2 CERVICALGIA: Chronic | ICD-10-CM

## 2025-04-08 DIAGNOSIS — Z91.81 AT RISK FOR FALLS: Chronic | ICD-10-CM

## 2025-04-08 DIAGNOSIS — M25.512 CHRONIC LEFT SHOULDER PAIN: Primary | Chronic | ICD-10-CM

## 2025-04-08 DIAGNOSIS — G89.29 CHRONIC LEFT SHOULDER PAIN: Chronic | ICD-10-CM

## 2025-04-08 DIAGNOSIS — M79.672 LEFT FOOT PAIN: Chronic | ICD-10-CM

## 2025-04-08 DIAGNOSIS — M79.641 BILATERAL HAND PAIN: ICD-10-CM

## 2025-04-08 DIAGNOSIS — M79.642 BILATERAL HAND PAIN: ICD-10-CM

## 2025-04-08 DIAGNOSIS — G89.29 CHRONIC LEFT SHOULDER PAIN: Primary | Chronic | ICD-10-CM

## 2025-04-08 DIAGNOSIS — M25.512 CHRONIC LEFT SHOULDER PAIN: Chronic | ICD-10-CM

## 2025-04-08 DIAGNOSIS — R26.89 POOR BALANCE: ICD-10-CM

## 2025-04-08 LAB
BASOPHILS # BLD AUTO: 0.05 10*3/MM3 (ref 0–0.2)
BASOPHILS NFR BLD AUTO: 0.6 % (ref 0–1.5)
DEPRECATED RDW RBC AUTO: 39.3 FL (ref 37–54)
EOSINOPHIL # BLD AUTO: 0.18 10*3/MM3 (ref 0–0.4)
EOSINOPHIL NFR BLD AUTO: 2 % (ref 0.3–6.2)
ERYTHROCYTE [DISTWIDTH] IN BLOOD BY AUTOMATED COUNT: 12.2 % (ref 12.3–15.4)
HCT VFR BLD AUTO: 36.3 % (ref 34–46.6)
HGB BLD-MCNC: 12.4 G/DL (ref 12–15.9)
IMM GRANULOCYTES # BLD AUTO: 0.03 10*3/MM3 (ref 0–0.05)
IMM GRANULOCYTES NFR BLD AUTO: 0.3 % (ref 0–0.5)
LYMPHOCYTES # BLD AUTO: 2.39 10*3/MM3 (ref 0.7–3.1)
LYMPHOCYTES NFR BLD AUTO: 26.8 % (ref 19.6–45.3)
MCH RBC QN AUTO: 30.1 PG (ref 26.6–33)
MCHC RBC AUTO-ENTMCNC: 34.2 G/DL (ref 31.5–35.7)
MCV RBC AUTO: 88.1 FL (ref 79–97)
MONOCYTES # BLD AUTO: 1.12 10*3/MM3 (ref 0.1–0.9)
MONOCYTES NFR BLD AUTO: 12.6 % (ref 5–12)
NEUTROPHILS NFR BLD AUTO: 5.14 10*3/MM3 (ref 1.7–7)
NEUTROPHILS NFR BLD AUTO: 57.7 % (ref 42.7–76)
NRBC BLD AUTO-RTO: 0 /100 WBC (ref 0–0.2)
PLATELET # BLD AUTO: 341 10*3/MM3 (ref 140–450)
PMV BLD AUTO: 11 FL (ref 6–12)
RBC # BLD AUTO: 4.12 10*6/MM3 (ref 3.77–5.28)
WBC NRBC COR # BLD AUTO: 8.91 10*3/MM3 (ref 3.4–10.8)

## 2025-04-08 PROCEDURE — 80053 COMPREHEN METABOLIC PANEL: CPT

## 2025-04-08 PROCEDURE — 73502 X-RAY EXAM HIP UNI 2-3 VIEWS: CPT

## 2025-04-08 PROCEDURE — 85025 COMPLETE CBC W/AUTO DIFF WBC: CPT

## 2025-04-08 PROCEDURE — 72040 X-RAY EXAM NECK SPINE 2-3 VW: CPT

## 2025-04-08 PROCEDURE — 73030 X-RAY EXAM OF SHOULDER: CPT

## 2025-04-08 NOTE — PATIENT INSTRUCTIONS
Patient Instructions  Problem List Items Addressed This Visit          Advance Directives and General Issues    At risk for falls (Chronic)       Genitourinary and Reproductive     Renal insufficiency       Musculoskeletal and Injuries    Cervicalgia (Chronic)    Overview              Relevant Orders    XR Spine Cervical 2 or 3 View    Chronic left shoulder pain - Primary (Chronic)    Relevant Orders    XR Shoulder 2+ View Left    Ambulatory Referral to Orthopedic Surgery    CBC & Differential    Comprehensive Metabolic Panel    Left foot pain (Chronic)    Overview   She saw Dr. Amaya and DX with neuroma. Pain improving with steroid injection and meloxicam         Bilateral hand pain    Overview   Patient took cipro in August, but arm pain did not occur until January.         Pain of left hip    Relevant Orders    XR Hip With or Without Pelvis 2 - 3 View Left       Symptoms and Signs    Poor balance    Overview   Resume exercises from [facility].            Exercising to Stay Healthy  To become healthy and stay healthy, it is recommended that you do moderate-intensity and vigorous-intensity exercise. You can tell that you are exercising at a moderate intensity if your heart starts beating faster and you start breathing faster but can still hold a conversation. You can tell that you are exercising at a vigorous intensity if you are breathing much harder and faster and cannot hold a conversation while exercising.  How can exercise benefit me?  Exercising regularly is important. It has many health benefits, such as:  Improving overall fitness, flexibility, and endurance.  Increasing bone density.  Helping with weight control.  Decreasing body fat.  Increasing muscle strength and endurance.  Reducing stress and tension, anxiety, depression, or anger.  Improving overall health.  What guidelines should I follow while exercising?  Before you start a new exercise program, talk with your health care provider.  Do not exercise  so much that you hurt yourself, feel dizzy, or get very short of breath.  Wear comfortable clothes and wear shoes with good support.  Drink plenty of water while you exercise to prevent dehydration or heat stroke.  Work out until your breathing and your heartbeat get faster (moderate intensity).  How often should I exercise?  Choose an activity that you enjoy, and set realistic goals. Your health care provider can help you make an activity plan that is individually designed and works best for you.  Exercise regularly as told by your health care provider. This may include:  Doing strength training two times a week, such as:  Lifting weights.  Using resistance bands.  Push-ups.  Sit-ups.  Yoga.  Doing a certain intensity of exercise for a given amount of time. Choose from these options:  A total of 150 minutes of moderate-intensity exercise every week.  A total of 75 minutes of vigorous-intensity exercise every week.  A mix of moderate-intensity and vigorous-intensity exercise every week.  Children, pregnant women, people who have not exercised regularly, people who are overweight, and older adults may need to talk with a health care provider about what activities are safe to perform. If you have a medical condition, be sure to talk with your health care provider before you start a new exercise program.  What are some exercise ideas?  Moderate-intensity exercise ideas include:  Walking 1 mile (1.6 km) in about 15 minutes.  Biking.  Hiking.  Golfing.  Dancing.  Water aerobics.  Vigorous-intensity exercise ideas include:  Walking 4.5 miles (7.2 km) or more in about 1 hour.  Jogging or running 5 miles (8 km) in about 1 hour.  Biking 10 miles (16.1 km) or more in about 1 hour.  Lap swimming.  Roller-skating or in-line skating.  Cross-country skiing.  Vigorous competitive sports, such as football, basketball, and soccer.  Jumping rope.  Aerobic dancing.  What are some everyday activities that can help me get  exercise?  Yard work, such as:  Pushing a .  Raking and bagging leaves.  Washing your car.  Pushing a stroller.  Shoveling snow.  Gardening.  Washing windows or floors.  How can I be more active in my day-to-day activities?  Use stairs instead of an elevator.  Take a walk during your lunch break.  If you drive, park your car farther away from your work or school.  If you take public transportation, get off one stop early and walk the rest of the way.  Stand up or walk around during all of your indoor phone calls.  Get up, stretch, and walk around every 30 minutes throughout the day.  Enjoy exercise with a friend. Support to continue exercising will help you keep a regular routine of activity.  Where to find more information  You can find more information about exercising to stay healthy from:  [place] Department of Health and Human Services: www.hhs.gov  Centers for Disease Control and Prevention (CDC): www.cdc.gov  Summary  Exercising regularly is important. It will improve your overall fitness, flexibility, and endurance.  Regular exercise will also improve your overall health. It can help you control your weight, reduce stress, and improve your bone density.  Do not exercise so much that you hurt yourself, feel dizzy, or get very short of breath.  Before you start a new exercise program, talk with your health care provider.  This information is not intended to replace advice given to you by your health care provider. Make sure you discuss any questions you have with your health care provider.  Document Revised: 04/15/2022 Document Reviewed: 04/15/2022  Elsevier Patient Education © 2023 [employer] BMI for Adults  Body mass index (BMI) is a number found using a person's weight and height. BMI can help tell how much of a person's weight is made up of fat. BMI does not measure body fat directly. It is used instead of tests that directly measure body fat, which can be difficult and expensive.  What are BMI  "measurements used for?  BMI is useful to:  Find out if your weight puts you at higher risk for medical problems.  Help recommend changes, such as in diet and exercise. This can help you reach a healthy weight. BMI screening can be done again to see if these changes are working.  How is BMI calculated?  Your height and weight are measured. The BMI is found from those numbers. This can be done with [place] or metric measurements. Note that charts and online BMI calculators are available to help you find your BMI quickly and easily without doing these calculations.  To calculate your BMI in [place] measurements:  Measure your weight in pounds (lb).  Multiply the number of pounds by 703.  So, for an adult who weighs 150 lb, multiply that number by 703: 150 x 703, which equals 105,450.  Measure your height in inches. Then multiply that number by itself to get a measurement called \"inches squared.\"  So, for an adult who is 70 inches tall, the \"inches squared\" measurement is 70 inches x 70 inches, which equals 4,900 inches squared.  Divide the total from step 2 (number of lb x 703) by the total from step 3 (inches squared): 105,450 ÷ 4,900 = 21.5. This is your BMI.  To calculate your BMI in metric measurements:    Measure your weight in kilograms (kg).  For this example, the weight is 70 kg.  Measure your height in meters (m). Then multiply that number by itself to get a measurement called \"meters squared.\"  So, for an adult who is 1.75 m tall, the \"meters squared\" measurement is 1.75 m x 1.75 m, which equals 3.1 meters squared.  Divide the number of kilograms (your weight) by the meters squared number. In this example: 70 ÷ 3.1 = 22.6. This is your BMI.  What do the results mean?  BMI charts are used to see if you are underweight, normal weight, overweight, or obese. The following guidelines will be used:  Underweight: BMI less than 18.5.  Normal weight: BMI between 18.5 and 24.9.  Overweight: BMI between 25 and 29.9.  Obese: " BMI of 30 or above.  BMI is a tool and cannot diagnose a condition. Talk with your health care provider about what your BMI means for you. Keep these notes in mind:  Weight includes fat and muscle. Someone with a muscular build, such as an athlete, may have a BMI that is higher than 24.9. In cases like these, BMI is not a correct measure of body fat.  If you have a BMI of 25 or higher, your provider may need to do more testing to find out if excess body fat is the cause.  BMI is measured the same way for males and females. Females usually have more body fat than males of the same height and weight.  Where to find more information  For more information about BMI, including tools to quickly find your BMI, go to:  Centers for Disease Control and Prevention: cdc.gov  American Heart Association: heart.org  National Heart, Lung, and Blood [place]: nhlbi.nih.gov  This information is not intended to replace advice given to you by your health care provider. Make sure you discuss any questions you have with your health care provider.  Document Revised: 09/07/2023 Document Reviewed: 08/31/2023  [employer] Patient Education © 2024 [employer]Fall Prevention in the Home, Adult  Falls can cause injuries and affect people of all ages. There are many simple things that you can do to make your home safe and to help prevent falls.  If you need it, ask for help making these changes.  What actions can I take to prevent falls?  General information  Use good lighting in all rooms. Make sure to:  Replace any light bulbs that burn out.  Turn on lights if it is dark and use night-lights.  Keep items that you use often in easy-to-reach places. Lower the shelves around your home if needed.  Move furniture so that there are clear paths around it.  Do not keep throw rugs or other things on the floor that can make you trip.  If any of your floors are uneven, fix them.  Add color or contrast paint or tape to clearly matilda and help you see:  Grab  bars or handrails.  First and last steps of staircases.  Where the edge of each step is.  If you use a ladder or stepladder:  Make sure that it is fully opened. Do not climb a closed ladder.  Make sure the sides of the ladder are locked in place.  Have someone hold the ladder while you use it.  Know where your pets are as you move through your home.  What can I do in the bathroom?         Keep the floor dry. Clean up any water that is on the floor right away.  Remove soap buildup in the bathtub or shower. Buildup makes bathtubs and showers slippery.  Use non-skid mats or decals on the floor of the bathtub or shower.  Attach bath mats securely with double-sided, non-slip rug tape.  If you need to sit down while you are in the shower, use a non-slip stool.  Install grab bars by the toilet and in the bathtub and shower. Do not use towel bars as grab bars.  What can I do in the bedroom?  Make sure that you have a light by your bed that is easy to reach.  Do not use any sheets or blankets on your bed that hang to the floor.  Have a firm bench or chair with side arms that you can use for support when you get dressed.  What can I do in the kitchen?  Clean up any spills right away.  If you need to reach something above you, use a sturdy step stool that has a grab bar.  Keep electrical cables out of the way.  Do not use floor polish or wax that makes floors slippery.  What can I do with my stairs?  Do not leave anything on the stairs.  Make sure that you have a light switch at the top and the bottom of the stairs. Have them installed if you do not have them.  Make sure that there are handrails on both sides of the stairs. Fix handrails that are broken or loose. Make sure that handrails are as long as the staircases.  Install non-slip stair treads on all stairs in your home if they do not have carpet.  Avoid having throw rugs at the top or bottom of stairs, or secure the rugs with carpet tape to prevent them from  moving.  Choose a carpet design that does not hide the edge of steps on the stairs. Make sure that carpet is firmly attached to the stairs. Fix any carpet that is loose or worn.  What can I do on the outside of my home?  Use bright outdoor lighting.  Repair the edges of walkways and driveways and fix any cracks. Clear paths of anything that can make you trip, such as tools or rocks.  Add color or contrast paint or tape to clearly matilda and help you see high doorway thresholds.  Trim any bushes or trees on the main path into your home.  Check that handrails are securely fastened and in good repair. Both sides of all steps should have handrails.  Install guardrails along the edges of any raised decks or porches.  Have leaves, snow, and ice cleared regularly. Use sand, salt, or ice melt on walkways during winter months if you live where there is ice and snow.  In the garage, clean up any spills right away, including grease or oil spills.  What other actions can I take?  Review your medicines with your health care provider. Some medicines can make you confused or feel dizzy. This can increase your chance of falling.  Wear closed-toe shoes that fit well and support your feet. Wear shoes that have rubber soles and low heels.  Use a cane, walker, scooter, or crutches that help you move around if needed.  Talk with your provider about other ways that you can decrease your risk of falls. This may include seeing a physical therapist to learn to do exercises to improve movement and strength.  Where to find more information  Centers for Disease Control and Prevention[place]: cdc.gov  National [place] on Aging: jos[place].nih.gov  National [place] on Aging: [place]os[place].nih.gov  Contact a health care provider if:  You are afraid of falling at home.  You feel weak, drowsy, or dizzy at home.  You fall at home.  Get help right away if you:  Lose consciousness or have trouble moving after a fall.  Have a fall that causes a head injury.  These  symptoms may be an emergency. Get help right away. Call 911.  Do not wait to see if the symptoms will go away.  Do not drive yourself to the hospital.  This information is not intended to replace advice given to you by your health care provider. Make sure you discuss any questions you have with your health care provider.  Document Revised: 08/21/2023 Document Reviewed: 08/21/2023  [employer] Patient Education © 2024 [employer]

## 2025-04-08 NOTE — PROGRESS NOTES
[facility]     Serenity Lagos  1940   [id number]      Patient Care Team:  Bernadette Somers MD as PCP - General (Internal Medicine)  Syed Hooker MD as Consulting Physician (Cardiology)  Celio Iniguez MD as Consulting Physician (Ophthalmology)  Ender Jolly MD as Consulting Physician (Dermatology)  Jil Arreola APRN as Nurse Practitioner (Nurse Practitioner)  Josh Noyola Jr., MD as Consulting Physician (Hand Surgery)  Samina Dudley MD as Consulting Physician (Radiation Oncology)  Sasha Gonzalez MD as Referring Physician (General Surgery)  Patrick Walters MD as Consulting Physician (Colon and Rectal Surgery)  Rebecca Adames MD as Consulting Physician (Hematology and Oncology)    Chief Complaint   Patient presents with    Shoulder Pain    Hip Pain    Foot Pain    Neck Pain        Patient is a 84 y.o. female.   History of Present Illness  The patient presents for an acute visit for left shoulder pain, left hip pain, and left little toe pain.    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 She was diagnosed with bursitis, which was managed with medication and injections. Over the years, she has experienced progressive stiffness and pain in her shoulder. Approximately 6 to 8 months ago, she experienced severe shoulder pain, initially attributed to arthritis, which resolved spontaneously. However, her current episode of shoulder pain has not shown any signs of improvement. She reports limited arm mobility and pain in the outer part of her arm, which developed rapidly over a few days. She has not consulted an orthopedist for her shoulder in recent years. She reports no radiating neck pain or shooting pain down her arm. She has been managing her pain with Tylenol and meloxicam, the latter providing more relief than the former. She also uses a heating pad for comfort. She received injections from a  series of neurologists, including Dr. Toro, which provided significant relief. Her last injection was administered by Dr. Galaviz, a pain management specialist, approximately 2 years ago.    She continues to experience left hip pain, which is exacerbated by walking. She reports no clicking or rubbing sensations in her hip during ambulation. She has a history of multiple falls, resulting in acquired scoliosis diagnosed by Dr. Richmond at an orthopedic center on [facility]. She was advised against surgical intervention by Dr. Bertrand. She reports no dizziness but admits to balance issues and not doing exercises at home.    She experiences stabbing pain in her left little toe and has consulted a podiatrist. She believes she may have injured her foot during a fall, as she recalls landing on her foot sideways. She reports difficulty in pushing herself up due to lack of strength in her leg. An ultrasound of her left lower leg did not reveal any blood clots.    Supplemental Information  She has a history of fistulectomy due to recurrent bleeding from her bottom. She is scheduled to see Dr. Romo next week. She has arthritis in her hands and had Dupuytren's contracture in her right hand. She experiences locking of her left hand every 5 minutes and pain when attempting to straighten it. She had planned surgery for her finger by Dr. Mcdermott but decided against it due to other health concerns.    FAMILY HISTORY  Her sister had severe rheumatoid arthritis.  Her mother had regular arthritis.    MEDICATIONS  Current: Tylenol, meloxicam         CHRONIC CONDITIONS      Past Medical History:   Diagnosis Date    Breast cancer 2024    left breast    Cervicalgia     Chronic bronchitis     Coronary artery disease     Dysphagia     Glaucoma, open angle     Hearing loss     Hypertension     Hyperthyroidism     thyroid gland killed off with MD treatment    Hypothyroidism     regulated by Levothyroxine    Low back pain     Medicare annual  wellness visit, subsequent 12/15/2020    Mixed hyperlipidemia     Osteoarthritis     cervical spine degenerative    Osteopenia     Perforation of tympanic membrane     Pruritus     chronic general pruritis    Renal insufficiency 4/8/2025    Salivary gland cancer     Scoliosis 2005    discovered in x-rays by Orthopedist    Shingles     postherpetic neuralgia-thorax    Spondylolisthesis      lumbar spine    Wrist tendonitis        Past Surgical History:   Procedure Laterality Date    APPENDECTOMY      BREAST BIOPSY      BREAST BIOPSY Right 07/29/2019    BREAST CYST EXCISION      BREAST LUMPECTOMY Left     for breast cancer    COLONOSCOPY N/A 11/21/2024    Procedure: COLONOSCOPY;  Surgeon: Patrick Walters MD;  Location:  [facility];  Service: General;  Laterality: N/A;  scope ID: 949; scope in: 0949; cecum: 0803; scope out: 0812    ENDOSCOPY  2017    normal (per pt) EGD    MOUTH SURGERY  2000    RECTAL FISTULECTOMY N/A 11/21/2024    Procedure: RECTAL FISTULECTOMY;  Surgeon: Patrick Walters MD;  Location:  [facility];  Service: General;  Laterality: N/A;    TONSILLECTOMY         Family History   Problem Relation Age of Onset    Hypertension Mother     Heart failure Mother         CHF    Coronary artery disease Mother         CABG    Arthritis Mother         osteo arthritis    Heart disease Mother     Lung cancer Father 59        Heavy smoker and Black Lung    Alcohol abuse Father     Cancer Father         lung cancer    Rheum arthritis Sister     COPD Sister         heavy smoker     Alcohol abuse Sister     Arthritis Sister         severe rheumatoid arthritis    Rheumatologic disease Sister     Alzheimer's disease Maternal Grandmother     Coronary artery disease Maternal Grandfather     Heart disease Maternal Grandfather     Cancer Paternal Grandmother         stomach    Heart disease Paternal Grandfather     Stroke Maternal Aunt     Heart disease Maternal Aunt     Hypertension Maternal Aunt     Stroke Maternal Aunt      "Hypertension Maternal Aunt     Stroke Maternal Aunt     Heart disease Maternal Aunt     Hypertension Maternal Aunt     Coronary artery disease Maternal Uncle     Stroke Maternal Uncle     Hypertension Maternal Uncle     Heart disease Maternal Uncle     Heart disease Maternal Uncle     Heart failure Maternal Uncle     Hypertension Maternal Uncle     Hypertension Other     Obesity Other     Breast cancer Neg Hx     Ovarian cancer Neg Hx        Social History     Socioeconomic History    Marital status:    Tobacco Use    Smoking status: Former     Current packs/day: 0.00     Average packs/day: 1 pack/day for 20.0 years (20.0 ttl pk-yrs)     Types: Cigarettes     Start date: 1960     Quit date: 1980     Years since quittin.2    Smokeless tobacco: Never   Vaping Use    Vaping status: Never Used   Substance and Sexual Activity    Alcohol use: Not Currently     Alcohol/week: 7.0 standard drinks of alcohol     Comment: no alcohol since 2024    Drug use: No    Sexual activity: Not Currently     Partners: Male       Allergies   Allergen Reactions    Wound Dressing Adhesive Rash       Review of Systems:     Review of Systems    Vital Signs  Vitals:    25 1134   BP: 128/86   BP Location: Left arm   Patient Position: Sitting   Cuff Size: Adult   Pulse: 86   Temp: 98.2 °F (36.8 °C)   TempSrc: Infrared   SpO2: 98%   Weight: 84.6 kg (186 lb 6.4 oz)   Height: 157.5 cm (62.01\")   PainSc: 0-No pain     Body mass index is 34.08 kg/m².  BMI is >= 30 and <35. (Class 1 Obesity). The following options were offered after discussion;: exercise counseling/recommendations, nutrition counseling/recommendations, and Information on healthy weight added to patient's after visit summary.          Current Outpatient Medications:     acetaminophen (TYLENOL) 500 MG tablet, Take 1 tablet by mouth Every 6 (Six) Hours As Needed for Mild Pain., Disp: , Rfl:     cyanocobalamin (VITAMIN B-12) 500 MCG tablet, Take 1 tablet " by mouth Daily., Disp: , Rfl:     Docusate Sodium 100 MG capsule, Take 2 tablets by mouth 2 (Two) Times a Day., Disp: 360 tablet, Rfl: 3    Glucosamine-Chondroitin (OSTEO BI-FLEX REGULAR STRENGTH PO), Take 1 tablet by mouth Daily. 1 daily, Disp: , Rfl:     ketoconazole (NIZORAL) 2 % cream, Apply 1 Application topically to the appropriate area as directed Daily. Please see attached for detailed directions, Disp: , Rfl:     ketoconazole (NIZORAL) 2 % shampoo, WASH INTO SCALP, LET SIT FOR 5 MINS, AND THEN RINSE. USE TWICE WEEKLY, Disp: , Rfl:     Lactobacillus-Inulin (Glazeone [employer]est[employer]) capsule, Take 1 capsule by mouth Daily., Disp: , Rfl:     latanoprost (XALATAN) 0.005 % ophthalmic solution, Administer 1 drop to both eyes Every Night., Disp: , Rfl:     levothyroxine (SYNTHROID, LEVOTHROID) 112 MCG tablet, Take 1 tablet by mouth Daily., Disp: 90 tablet, Rfl: 3    metoprolol succinate XL (TOPROL-XL) 50 MG 24 hr tablet, TAKE 1 TABLET DAILY, Disp: 90 tablet, Rfl: 1    omeprazole (priLOSEC) 40 MG capsule, Take 1 capsule by mouth Daily., Disp: 90 capsule, Rfl: 3    polyethylene glycol (GlycoLax) 17 GM/SCOOP powder, Take 17 g by mouth Daily., Disp: , Rfl:     raloxifene (EVISTA) 60 MG tablet, TAKE 1 TABLET DAILY, Disp: 90 tablet, Rfl: 3    telmisartan (MICARDIS) 80 MG tablet, Take 1 tablet by mouth Daily., Disp: 90 tablet, Rfl: 3    Physical Exam:    Physical Exam  Vitals and nursing note reviewed.   Constitutional:       Appearance: She is well-developed. She is obese.   HENT:      Head: Normocephalic.   Eyes:      Conjunctiva/sclera: Conjunctivae normal.      Pupils: Pupils are equal, round, and reactive to light.   Neck:      Thyroid: No thyromegaly.   Cardiovascular:      Rate and Rhythm: Normal rate and regular rhythm.      Heart sounds: Normal heart sounds.   Pulmonary:      Effort: Pulmonary effort is normal.      Breath sounds: Normal breath sounds. No wheezing.   Musculoskeletal:         General: Normal  range of motion.      Right shoulder: Normal.      Left shoulder: Deformity present. Decreased range of motion.      Right wrist: Deformity present.      Left wrist: Deformity present.      Right hand: Deformity present.      Left hand: Deformity present.      Cervical back: Normal range of motion and neck supple.      Right hip: Normal.      Left hip: Tenderness present.      Comments: Osteoarthritis changes of both hands, especially the thumbs.  No red or swollen joints.  No swelling of soft tissue.    Severely decreased range of motion of the left shoulder.  Some anterior tenderness.   Lymphadenopathy:      Cervical: No cervical adenopathy.   Skin:     General: Skin is warm and dry.   Neurological:      Mental Status: She is alert and oriented to person, place, and time.   Psychiatric:         Attention and Perception: Attention normal.         Mood and Affect: Mood normal.         Thought Content: Thought content normal.          ACE III MINI        Results Review:    I reviewed the patient's new clinical results.  Results  Laboratory Studies  Kidney function was worse than usual in November.    Imaging  X-ray of left shoulder in 2023 showed mild arthritis.  X-ray of cervical spine showed a lot of arthritis.  Ultrasound of left lower leg showed no blood clots.       CMP:  Lab Results   Component Value Date    Glucose 85 11/18/2024    Glucose, UA Negative 11/18/2024    BUN 15 11/18/2024    BUN/Creatinine Ratio 14.6 11/18/2024    Creatinine 1.03 (H) 11/18/2024    Creatinine 0.80 09/29/2024    Creatinine, Urine 177.7 11/18/2024    Ketones, UA Negative 11/18/2024    CO2 23.0 11/18/2024    Calcium 9.5 11/18/2024    Albumin 3.7 11/18/2024    AST (SGOT) 14 11/18/2024    ALT (SGPT) 9 11/18/2024     HbA1c:  Lab Results   Component Value Date    HGBA1C 5.80 (H) 11/18/2024    HGBA1C 5.60 09/29/2024     Microalbumin:  Lab Results   Component Value Date    MICROALBUR <1.2 11/18/2024     Lipid Panel  Lab Results   Component  Value Date    CHOL 176 11/18/2024    TRIG 104 11/18/2024    HDL 59 11/18/2024    LDL 98 11/18/2024    AST 14 11/18/2024    ALT 9 11/18/2024       Medication Review: Medications reviewed and noted  Patient Instructions   Problem List Items Addressed This Visit          Advance Directives and General Issues    At risk for falls (Chronic)       Genitourinary and Reproductive     Renal insufficiency       Musculoskeletal and Injuries    Cervicalgia (Chronic)    Overview              Relevant Orders    XR Spine Cervical 2 or 3 View    Chronic left shoulder pain - Primary (Chronic)    Relevant Orders    XR Shoulder 2+ View Left    Ambulatory Referral to Orthopedic Surgery    CBC & Differential    Comprehensive Metabolic Panel    Left foot pain (Chronic)    Overview   She saw Dr. Amaya and DX with neuroma. Pain improving with steroid injection and meloxicam         Bilateral hand pain    Overview   Patient took cipro in August, but arm pain did not occur until January.         Pain of left hip    Relevant Orders    XR Hip With or Without Pelvis 2 - 3 View Left       Symptoms and Signs    Poor balance    Overview   Resume exercises from [facility].               Diagnosis Plan   1. Chronic left shoulder pain  XR Shoulder 2+ View Left    Ambulatory Referral to Orthopedic Surgery    CBC & Differential    Comprehensive Metabolic Panel      2. Cervicalgia  XR Spine Cervical 2 or 3 View      3. Pain of left hip  XR Hip With or Without Pelvis 2 - 3 View Left      4. Left foot pain        5. Bilateral hand pain        6. Renal insufficiency        7. Poor balance        8. At risk for falls          Assessment & Plan  Left shoulder pain.  The patient has a history of bursitis in the left shoulder. 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 She also received injections from a series of neurologists, including Dr. Toro, which provided significant relief. Her last  injection was administered by Dr. Anderson, a pain management specialist, approximately 2 years ago.  She feels that multiple falls over the last year  may have contributed to her current symptoms with pain in the shoulder and limited range of motion.  Moving the arm and shoulder does reproduce pain.. Previous x-rays from 2023 indicated mild arthritis in the left shoulder. The possibility of a rotator cuff tear is also considered. She is advised to take Tylenol 650 mg three times daily and apply a moist heat pack to the affected area.  She should avoid NSAIDs since her renal function was decreased some on the last labs.  A referral to an orthopedic specialist, Dr. Kike Be at [facility], will be made for further evaluation. X-rays of the neck, shoulder, and hip will be ordered and done today next-door.  The patient declines physical therapy.    Cervicalgia  Neck pain is worse on the left than the right side.  She is encouraged to use a moist heat pack on the neck while relaxing at home.  Do some neck stretches throughout the day.    Left hip pain.  The patient reports persistent pain in the left hip, which may be due to bursitis.  The left trochanter is tender.  An x-ray of the hip will be ordered to assess the extent of arthritis. She is advised to continue using a heating pad and to stay well-hydrated to prevent falls.  The benefit of physical therapy was explained.    Left foot pain.  The patient experiences stabbing pain in the left little toe, likely due to a previous fall. An ultrasound of the left lower leg previously showed no blood clots. Further evaluation will be conducted as needed.  She will continue wearing good supportive shoes at all times.  Do not go barefoot.    Bilateral hand pain due to osteoarthritis.   She is advised to  take Tylenol 650 mg three times daily and to use a moist heat pack for relief.    Poor balance.  High fall risk.  The patient reports balance issues and a history of falls.  She  denies any dizziness or lightheadedness.  Physical therapy for balance has been recommended, and she is encouraged to do some balance exercises at home.  She declines physical therapy.  Staying well-hydrated does help prevent falls.  Eating some protein in the diet 3 times a day also helps prevent falls.    Renal insufficiency  The patient's kidney function was worse than usual on November 2024 labs. Anti-inflammatories can exacerbate kidney function deterioration. She is advised to take Tylenol instead of meloxicam or ibuprofen.  Drink plenty of water every day. Kidney function will be rechecked today.    Follow-up  The patient will follow up on 05/16/2025.    PROCEDURE  The patient underwent a fistulectomy due to recurrent rectal bleeding.         Plan of care reviewed with patient at the conclusion of today's visit. Education was provided regarding diagnosis, management, and any prescribed or recommended OTC medications. Patient verbalizes understanding of and agreement with management plan.         04/08/25   11:40 EDT    Patient or patient representative verbalized consent for the use of Ambient Listening during the visit with  Bernadette Somers MD for chart documentation. 4/8/2025  17:41 EDT

## 2025-04-09 LAB
ALBUMIN SERPL-MCNC: 4 G/DL (ref 3.5–5.2)
ALBUMIN/GLOB SERPL: 1.1 G/DL
ALP SERPL-CCNC: 68 U/L (ref 39–117)
ALT SERPL W P-5'-P-CCNC: 12 U/L (ref 1–33)
ANION GAP SERPL CALCULATED.3IONS-SCNC: 10.2 MMOL/L (ref 5–15)
AST SERPL-CCNC: 19 U/L (ref 1–32)
BILIRUB SERPL-MCNC: 0.3 MG/DL (ref 0–1.2)
BUN SERPL-MCNC: 20 MG/DL (ref 8–23)
BUN/CREAT SERPL: 18.3 (ref 7–25)
CALCIUM SPEC-SCNC: 9.4 MG/DL (ref 8.6–10.5)
CHLORIDE SERPL-SCNC: 100 MMOL/L (ref 98–107)
CO2 SERPL-SCNC: 22.8 MMOL/L (ref 22–29)
CREAT SERPL-MCNC: 1.09 MG/DL (ref 0.57–1)
EGFRCR SERPLBLD CKD-EPI 2021: 50.5 ML/MIN/1.73
GLOBULIN UR ELPH-MCNC: 3.6 GM/DL
GLUCOSE SERPL-MCNC: 87 MG/DL (ref 65–99)
POTASSIUM SERPL-SCNC: 4.8 MMOL/L (ref 3.5–5.2)
PROT SERPL-MCNC: 7.6 G/DL (ref 6–8.5)
SODIUM SERPL-SCNC: 133 MMOL/L (ref 136–145)

## 2025-04-14 ENCOUNTER — OFFICE VISIT (OUTPATIENT)
Dept: ORTHOPEDIC SURGERY | Facility: CLINIC | Age: 85
End: 2025-04-14
Payer: MEDICARE

## 2025-04-14 VITALS
WEIGHT: 186 LBS | DIASTOLIC BLOOD PRESSURE: 84 MMHG | BODY MASS INDEX: 34.23 KG/M2 | HEIGHT: 62 IN | SYSTOLIC BLOOD PRESSURE: 126 MMHG

## 2025-04-14 DIAGNOSIS — M75.92 SUPRASPINATUS TENDINITIS, LEFT: Primary | ICD-10-CM

## 2025-04-14 DIAGNOSIS — I25.84 CORONARY ARTERY CALCIFICATION OF NATIVE ARTERY: Chronic | ICD-10-CM

## 2025-04-14 DIAGNOSIS — I10 BENIGN ESSENTIAL HYPERTENSION: ICD-10-CM

## 2025-04-14 DIAGNOSIS — I25.10 CORONARY ARTERY CALCIFICATION OF NATIVE ARTERY: Chronic | ICD-10-CM

## 2025-04-14 PROCEDURE — 20610 DRAIN/INJ JOINT/BURSA W/O US: CPT | Performed by: ORTHOPAEDIC SURGERY

## 2025-04-14 PROCEDURE — 3079F DIAST BP 80-89 MM HG: CPT | Performed by: ORTHOPAEDIC SURGERY

## 2025-04-14 PROCEDURE — 3074F SYST BP LT 130 MM HG: CPT | Performed by: ORTHOPAEDIC SURGERY

## 2025-04-14 PROCEDURE — 99204 OFFICE O/P NEW MOD 45 MIN: CPT | Performed by: ORTHOPAEDIC SURGERY

## 2025-04-14 RX ORDER — BUPIVACAINE HYDROCHLORIDE 2.5 MG/ML
4 INJECTION, SOLUTION EPIDURAL; INFILTRATION; INTRACAUDAL; PERINEURAL
Status: COMPLETED | OUTPATIENT
Start: 2025-04-14 | End: 2025-04-14

## 2025-04-14 RX ORDER — LIDOCAINE HYDROCHLORIDE 10 MG/ML
4 INJECTION, SOLUTION EPIDURAL; INFILTRATION; INTRACAUDAL; PERINEURAL
Status: COMPLETED | OUTPATIENT
Start: 2025-04-14 | End: 2025-04-14

## 2025-04-14 RX ORDER — TRIAMCINOLONE ACETONIDE 40 MG/ML
40 INJECTION, SUSPENSION INTRA-ARTICULAR; INTRAMUSCULAR
Status: COMPLETED | OUTPATIENT
Start: 2025-04-14 | End: 2025-04-14

## 2025-04-14 RX ADMIN — LIDOCAINE HYDROCHLORIDE 4 ML: 10 INJECTION, SOLUTION EPIDURAL; INFILTRATION; INTRACAUDAL; PERINEURAL at 15:35

## 2025-04-14 RX ADMIN — BUPIVACAINE HYDROCHLORIDE 4 ML: 2.5 INJECTION, SOLUTION EPIDURAL; INFILTRATION; INTRACAUDAL; PERINEURAL at 15:35

## 2025-04-14 RX ADMIN — TRIAMCINOLONE ACETONIDE 40 MG: 40 INJECTION, SUSPENSION INTRA-ARTICULAR; INTRAMUSCULAR at 15:35

## 2025-04-14 NOTE — PROGRESS NOTES
[facility] [place] [facility] Note        Subjective     Initial Evaluation of the Left Shoulder      HPI    Serenity Lagos is a 84 y.o. female.  New patient with left shoulder pain.  She has had it for 10 days.  She woke up with it.  Unable to lift her arm above 90 degrees.  She had a history of breast surgery in November.  Radiation in January.  X-rays April 8.  History of previous shoulder injections as recently as 3 years ago with cortisone with good relief.  She has hypertension and heart disease    Past Medical History:   Diagnosis Date    Arthritis of neck present    Breast cancer 2024    left breast    Bursitis of hip ? present    Cervicalgia     Chronic bronchitis     Coronary artery disease     Dysphagia     Fracture, foot 2022    broken toe from fall    Glaucoma, open angle     Hearing loss     Hip arthrosis present    Hypertension     Hyperthyroidism     thyroid gland killed off with MD treatment    Hypothyroidism     regulated by Levothyroxine    Low back pain     Medicare annual wellness visit, subsequent 12/15/2020    Mixed hyperlipidemia     Neuroma of foot 2021?    Romero's neuroma (podiatrist)    Osteoarthritis     cervical spine degenerative    Osteopenia     Perforation of tympanic membrane     Periarthritis of shoulder present    Pruritus     chronic general pruritis    Renal insufficiency 04/08/2025    Salivary gland cancer     Scoliosis 2005    discovered in x-rays by Orthopedist    Shingles     postherpetic neuralgia-thorax    Spondylolisthesis      lumbar spine    Wrist tendonitis       Past Surgical History:   Procedure Laterality Date    APPENDECTOMY      BREAST BIOPSY      BREAST BIOPSY Right 07/29/2019    BREAST CYST EXCISION      BREAST LUMPECTOMY Left     for breast cancer    COLONOSCOPY N/A 11/21/2024    Procedure: COLONOSCOPY;  Surgeon: Patrick Walters MD;  Location: [facility];  Service: General;  Laterality: N/A;  scope ID: 949; scope in: 0949; cecum: 0803; scope out:  0812    ENDOSCOPY  2017    normal (per pt) EGD    MOUTH SURGERY      RECTAL FISTULECTOMY N/A 2024    Procedure: RECTAL FISTULECTOMY;  Surgeon: Patrick Walters MD;  Location: [facility];  Service: General;  Laterality: N/A;    TONSILLECTOMY      TRIGGER POINT INJECTION  multiple times since       Family History   Problem Relation Age of Onset    Hypertension Mother     Heart failure Mother         CHF    Coronary artery disease Mother         CABG    Arthritis Mother         osteo arthritis    Heart disease Mother     Lung cancer Father 59        Heavy smoker and Black Lung    Alcohol abuse Father     Cancer Father         lung cancer    Rheum arthritis Sister     COPD Sister         heavy smoker     Alcohol abuse Sister     Arthritis Sister         severe rheumatoid arthritis    Rheumatologic disease Sister     Alzheimer's disease Maternal Grandmother     Coronary artery disease Maternal Grandfather     Heart disease Maternal Grandfather     Cancer Paternal Grandmother         stomach    Heart disease Paternal Grandfather     Stroke Maternal Aunt     Heart disease Maternal Aunt     Hypertension Maternal Aunt     Stroke Maternal Aunt     Hypertension Maternal Aunt     Stroke Maternal Aunt     Heart disease Maternal Aunt     Hypertension Maternal Aunt     Coronary artery disease Maternal Uncle     Stroke Maternal Uncle     Hypertension Maternal Uncle     Heart disease Maternal Uncle     Heart disease Maternal Uncle     Heart failure Maternal Uncle     Hypertension Maternal Uncle     Hypertension Other     Obesity Other     Breast cancer Neg Hx     Ovarian cancer Neg Hx      Social History     Socioeconomic History    Marital status:    Tobacco Use    Smoking status: Former     Current packs/day: 0.00     Average packs/day: 1 pack/day for 20.0 years (20.0 ttl pk-yrs)     Types: Cigarettes     Start date: 1960     Quit date: 1980     Years since quittin.3    Smokeless tobacco: Never    Vaping Use    Vaping status: Never Used   Substance and Sexual Activity    Alcohol use: Not Currently     Alcohol/week: 7.0 standard drinks of alcohol     Comment: no alcohol since September 2024    Drug use: Never    Sexual activity: Not Currently     Partners: Male     Birth control/protection: None      Current Outpatient Medications on File Prior to Visit   Medication Sig Dispense Refill    acetaminophen (TYLENOL) 500 MG tablet Take 1 tablet by mouth Every 6 (Six) Hours As Needed for Mild Pain.      cyanocobalamin (VITAMIN B-12) 500 MCG tablet Take 1 tablet by mouth Daily.      Docusate Sodium 100 MG capsule Take 2 tablets by mouth 2 (Two) Times a Day. 360 tablet 3    Glucosamine-Chondroitin (OSTEO BI-FLEX REGULAR STRENGTH PO) Take 1 tablet by mouth Daily. 1 daily      ketoconazole (NIZORAL) 2 % cream Apply 1 Application topically to the appropriate area as directed Daily. Please see attached for detailed directions      ketoconazole (NIZORAL) 2 % shampoo WASH INTO SCALP, LET SIT FOR 5 MINS, AND THEN RINSE. USE TWICE WEEKLY      Lactobacillus-Inulin ([facility]) capsule Take 1 capsule by mouth Daily.      latanoprost (XALATAN) 0.005 % ophthalmic solution Administer 1 drop to both eyes Every Night.      levothyroxine (SYNTHROID, LEVOTHROID) 112 MCG tablet Take 1 tablet by mouth Daily. 90 tablet 3    metoprolol succinate XL (TOPROL-XL) 50 MG 24 hr tablet TAKE 1 TABLET DAILY 90 tablet 1    omeprazole (priLOSEC) 40 MG capsule Take 1 capsule by mouth Daily. 90 capsule 3    polyethylene glycol (GlycoLax) 17 GM/SCOOP powder Take 17 g by mouth Daily.      raloxifene (EVISTA) 60 MG tablet TAKE 1 TABLET DAILY 90 tablet 3    telmisartan (MICARDIS) 80 MG tablet Take 1 tablet by mouth Daily. 90 tablet 3     No current facility-administered medications on file prior to visit.      Allergies   Allergen Reactions    Wound Dressing Adhesive Rash          Review of Systems   Constitutional:  Negative for  activity change, appetite change, chills, diaphoresis, fatigue, fever and unexpected weight change.   HENT:  Negative for congestion, dental problem, drooling, ear discharge, ear pain, facial swelling, hearing loss, mouth sores, nosebleeds, postnasal drip, rhinorrhea, sinus pressure, sneezing, sore throat, tinnitus, trouble swallowing and voice change.    Eyes:  Negative for photophobia, pain, discharge, redness, itching and visual disturbance.   Respiratory:  Negative for apnea, cough, choking, chest tightness, shortness of breath, wheezing and stridor.    Cardiovascular:  Negative for chest pain, palpitations and leg swelling.   Gastrointestinal:  Negative for abdominal distention, abdominal pain, anal bleeding, blood in stool, constipation, diarrhea, nausea, rectal pain and vomiting.   Endocrine: Negative for cold intolerance, heat intolerance, polydipsia, polyphagia and polyuria.   Genitourinary:  Negative for decreased urine volume, difficulty urinating, dysuria, enuresis, flank pain, frequency, genital sores, hematuria and urgency.   Musculoskeletal:  Positive for arthralgias. Negative for back pain, gait problem, joint swelling, myalgias, neck pain and neck stiffness.   Skin:  Negative for color change, pallor, rash and wound.   Allergic/Immunologic: Negative for environmental allergies, food allergies and immunocompromised state.   Neurological:  Negative for dizziness, tremors, seizures, syncope, facial asymmetry, speech difficulty, weakness, light-headedness, numbness and headaches.   Hematological:  Negative for adenopathy. Does not bruise/bleed easily.   Psychiatric/Behavioral:  Negative for agitation, behavioral problems, confusion, decreased concentration, dysphoric mood, hallucinations, self-injury, sleep disturbance and suicidal ideas. The patient is not nervous/anxious and is not hyperactive.         I reviewed the patient's chief complaint, history of present illness, review of systems, past  "medical history, surgical history, family history, social history, medications and allergy list.        Objective      Physical Exam  /84   Ht 157.5 cm (62.01\")   Wt 84.4 kg (186 lb)   BMI 34.01 kg/m²     Body mass index is 34.01 kg/m².    General  Mental Status - alert  General Appearance - cooperative, well groomed, not in acute distress  Orientation - Oriented X3  Build & Nutrition - well developed and well nourished  Posture - normal posture  Gait - normal gait       Ortho Exam  Left shoulder with forward flexion abduction 90 degrees actively.  Passively about 140 degrees.  Weakness of supraspinatus.  Subscapularis intact.  No tenderness.  Negative Spurling's.  Neurovascular intact.    Imaging/Studies Reviewed and Interpreted:  I viewed and personally interpreted the radiographs left shoulder from April 8 unremarkable    Assessment    Assessment:  1. Supraspinatus tendinitis, left    2. Coronary artery calcification of native artery    3. Benign essential hypertension        Plan:  Continue over-the-counter medication as needed for discomfort  I injected the left shoulder subacromial space with cortisone.  I discussed with the patient the potential benefits of performing a therapeutic injection of the cortisone as well as potential risks including but not limited to infection, swelling, pain, bleeding, bruising, nerve/vessel damage, skin color changes, transient elevation in blood glucose levels, and fat atrophy. After informed consent and verifying correct patient, procedure site, and type of procedure, the area was prepped with Hibiclens, ethyl chloride was used to numb the skin. The patient tolerated the procedure well. There were no complications.  She will monitor her blood pressure as the cortisone may cause a temporary increase.  She would like to follow-up as needed        Dimitri Pena MD  04/14/25  15:51 EDT      Dictated Utilizing Dragon Dictation.    "

## 2025-04-14 NOTE — PROGRESS NOTES
Procedure   - Large Joint Arthrocentesis: L subacromial bursa on 4/14/2025 3:35 PM  Indications: pain  Details: 21 G needle, posterior approach  Medications: 4 mL lidocaine PF 1% 1 %; 40 mg triamcinolone acetonide 40 MG/ML; 4 mL bupivacaine (PF) 0.25 %  Outcome: tolerated well, no immediate complications  Procedure, treatment alternatives, risks and benefits explained, specific risks discussed. Consent was given by the patient. Immediately prior to procedure a time out was called to verify the correct patient, procedure, equipment, support staff and site/side marked as required. Patient was prepped and draped in the usual sterile fashion.

## 2025-05-19 DIAGNOSIS — R00.2 PALPITATIONS: ICD-10-CM

## 2025-05-19 DIAGNOSIS — I10 BENIGN ESSENTIAL HYPERTENSION: ICD-10-CM

## 2025-05-19 RX ORDER — METOPROLOL SUCCINATE 50 MG/1
50 TABLET, EXTENDED RELEASE ORAL DAILY
Qty: 90 TABLET | Refills: 0 | Status: SHIPPED | OUTPATIENT
Start: 2025-05-19

## 2025-07-21 ENCOUNTER — HOSPITAL ENCOUNTER (OUTPATIENT)
Dept: MAMMOGRAPHY | Facility: HOSPITAL | Age: 85
Discharge: HOME | End: 2025-07-21
Admitting: SURGERY
Payer: MEDICARE

## 2025-07-21 DIAGNOSIS — D05.12 INTRADUCTAL CARCINOMA IN SITU OF LEFT BREAST: ICD-10-CM

## 2025-07-21 PROCEDURE — 77066 DX MAMMO INCL CAD BI: CPT | Performed by: RADIOLOGY

## 2025-07-21 PROCEDURE — 77066 DX MAMMO INCL CAD BI: CPT

## 2025-07-21 PROCEDURE — G0279 TOMOSYNTHESIS, MAMMO: HCPCS

## 2025-07-21 PROCEDURE — G0279 TOMOSYNTHESIS, MAMMO: HCPCS | Performed by: RADIOLOGY

## 2025-07-30 RX ORDER — OMEPRAZOLE 40 MG/1
40 CAPSULE, DELAYED RELEASE ORAL DAILY
Qty: 90 CAPSULE | Refills: 3 | Status: SHIPPED | OUTPATIENT
Start: 2025-07-30

## 2025-08-18 DIAGNOSIS — I10 BENIGN ESSENTIAL HYPERTENSION: ICD-10-CM

## 2025-08-18 DIAGNOSIS — R00.2 PALPITATIONS: ICD-10-CM

## 2025-08-18 RX ORDER — METOPROLOL SUCCINATE 50 MG/1
50 TABLET, EXTENDED RELEASE ORAL DAILY
Qty: 90 TABLET | Refills: 0 | Status: SHIPPED | OUTPATIENT
Start: 2025-08-18

## 2025-08-18 RX ORDER — METOPROLOL SUCCINATE 50 MG/1
TABLET, EXTENDED RELEASE ORAL
Qty: 90 TABLET | Refills: 3 | OUTPATIENT
Start: 2025-08-18

## 2025-08-22 ENCOUNTER — TELEPHONE (OUTPATIENT)
Dept: INTERNAL MEDICINE | Facility: CLINIC | Age: 85
End: 2025-08-22
Payer: MEDICARE

## (undated) DEVICE — PENCL SMOKE/EVAC MEGADYNE TELESCP 10FT

## (undated) DEVICE — HYPODERMIC SAFETY NEEDLE: Brand: MONOJECT

## (undated) DEVICE — PREMIUM DRY TRAY LF: Brand: MEDLINE INDUSTRIES, INC.

## (undated) DEVICE — GLV SURG SENSICARE PI MIC PF SZ7.5 LF STRL

## (undated) DEVICE — CVR HNDL LIGHT RIGID

## (undated) DEVICE — NEEDLE, QUINCKE 22GX3.5": Brand: MEDLINE INDUSTRIES, INC.

## (undated) DEVICE — DRAPE,UNDERBUTTOCKS,STERILE: Brand: MEDLINE

## (undated) DEVICE — JELLY,LUBE,STERILE,FLIP TOP,TUBE,2-OZ: Brand: MEDLINE

## (undated) DEVICE — LEGGINGS, PAIR, 29X43, STERILE: Brand: MEDLINE

## (undated) DEVICE — SPNG GZ WOVN 4X4IN 12PLY 10/BX STRL

## (undated) DEVICE — GOWN SURG ORBIS LVL3 2XL STRL

## (undated) DEVICE — 3M™ MEDIPORE™ H SOFT CLOTH SURGICAL TAPE 2862, 2 INCH X 10 YARD (5CM X 9,1M), 12 ROLLS/CASE: Brand: 3M™ MEDIPORE™

## (undated) DEVICE — Device: Brand: DEFENDO AIR/WATER/SUCTION AND BIOPSY VALVE

## (undated) DEVICE — INTENDED TO BE USED TO OCCLUDE, RETRACT AND IDENTIFY ARTERIES, VEINS, TENDONS AND NERVES IN SURGICAL PROCEDURES: Brand: STERION®  VESSEL LOOP

## (undated) DEVICE — SHEET,DRAPE,40X58,STERILE: Brand: MEDLINE

## (undated) DEVICE — PK MINOR SPLT 10